# Patient Record
Sex: FEMALE | Race: WHITE | Employment: FULL TIME | URBAN - METROPOLITAN AREA
[De-identification: names, ages, dates, MRNs, and addresses within clinical notes are randomized per-mention and may not be internally consistent; named-entity substitution may affect disease eponyms.]

---

## 2017-01-19 RX ORDER — TRAMADOL HYDROCHLORIDE 50 MG/1
50 TABLET ORAL EVERY 6 HOURS PRN
Qty: 40 TABLET | Refills: 0 | Status: SHIPPED | OUTPATIENT
Start: 2017-01-19 | End: 2017-02-23 | Stop reason: HOSPADM

## 2017-02-23 PROBLEM — M17.11 OSTEOARTHRITIS OF RIGHT KNEE: Status: ACTIVE | Noted: 2017-02-23

## 2017-04-17 ENCOUNTER — OFFICE VISIT (OUTPATIENT)
Dept: FAMILY MEDICINE CLINIC | Age: 52
End: 2017-04-17
Payer: COMMERCIAL

## 2017-04-17 VITALS
HEIGHT: 62 IN | SYSTOLIC BLOOD PRESSURE: 128 MMHG | HEART RATE: 68 BPM | WEIGHT: 261 LBS | DIASTOLIC BLOOD PRESSURE: 78 MMHG | RESPIRATION RATE: 20 BRPM | BODY MASS INDEX: 48.03 KG/M2

## 2017-04-17 DIAGNOSIS — G25.81 RESTLESS LEGS: ICD-10-CM

## 2017-04-17 DIAGNOSIS — G47.33 OSA (OBSTRUCTIVE SLEEP APNEA): ICD-10-CM

## 2017-04-17 DIAGNOSIS — Z12.39 BREAST CANCER SCREENING, HIGH RISK PATIENT: ICD-10-CM

## 2017-04-17 DIAGNOSIS — J30.1 NON-SEASONAL ALLERGIC RHINITIS DUE TO POLLEN: ICD-10-CM

## 2017-04-17 DIAGNOSIS — E78.2 MIXED HYPERLIPIDEMIA: ICD-10-CM

## 2017-04-17 DIAGNOSIS — E04.9 THYROID ENLARGEMENT: ICD-10-CM

## 2017-04-17 DIAGNOSIS — I10 ESSENTIAL HYPERTENSION: Primary | ICD-10-CM

## 2017-04-17 DIAGNOSIS — Z83.49 FAMILY HISTORY OF THYROID DISEASE: ICD-10-CM

## 2017-04-17 DIAGNOSIS — K21.9 GASTROESOPHAGEAL REFLUX DISEASE WITHOUT ESOPHAGITIS: ICD-10-CM

## 2017-04-17 PROCEDURE — 99214 OFFICE O/P EST MOD 30 MIN: CPT | Performed by: NURSE PRACTITIONER

## 2017-04-17 RX ORDER — ROPINIROLE 0.5 MG/1
TABLET, FILM COATED ORAL
Qty: 90 TABLET | Refills: 1 | Status: SHIPPED | OUTPATIENT
Start: 2017-04-17 | End: 2017-10-23 | Stop reason: SDUPTHER

## 2017-04-17 RX ORDER — FLUTICASONE PROPIONATE 50 MCG
1 SPRAY, SUSPENSION (ML) NASAL DAILY
Qty: 1 BOTTLE | Refills: 3 | Status: SHIPPED | OUTPATIENT
Start: 2017-04-17 | End: 2018-04-23 | Stop reason: SDUPTHER

## 2017-04-17 RX ORDER — OMEPRAZOLE 20 MG/1
CAPSULE, DELAYED RELEASE ORAL
Qty: 90 CAPSULE | Refills: 1 | Status: SHIPPED | OUTPATIENT
Start: 2017-04-17 | End: 2017-10-23 | Stop reason: SDUPTHER

## 2017-04-17 RX ORDER — EZETIMIBE 10 MG/1
TABLET ORAL
Qty: 90 TABLET | Refills: 1 | Status: SHIPPED | OUTPATIENT
Start: 2017-04-17 | End: 2017-10-23 | Stop reason: SDUPTHER

## 2017-04-17 RX ORDER — ASPIRIN 81 MG/1
81 TABLET ORAL DAILY
COMMUNITY

## 2017-04-17 RX ORDER — METOPROLOL TARTRATE 50 MG/1
50 TABLET, FILM COATED ORAL 2 TIMES DAILY
Qty: 180 TABLET | Refills: 1 | Status: SHIPPED | OUTPATIENT
Start: 2017-04-17 | End: 2017-10-23 | Stop reason: SDUPTHER

## 2017-04-17 RX ORDER — TRAMADOL HYDROCHLORIDE 50 MG/1
TABLET ORAL
Refills: 0 | COMMUNITY
Start: 2017-02-28 | End: 2017-07-26 | Stop reason: ALTCHOICE

## 2017-04-17 ASSESSMENT — ENCOUNTER SYMPTOMS
SINUS PRESSURE: 1
WHEEZING: 0
ABDOMINAL DISTENTION: 0
RHINORRHEA: 1
COUGH: 0
SHORTNESS OF BREATH: 0

## 2017-04-27 ENCOUNTER — HOSPITAL ENCOUNTER (OUTPATIENT)
Dept: ULTRASOUND IMAGING | Age: 52
Discharge: HOME | End: 2017-04-27
Payer: COMMERCIAL

## 2017-04-27 ENCOUNTER — HOSPITAL ENCOUNTER (OUTPATIENT)
Dept: GENERAL RADIOLOGY | Age: 52
Discharge: HOME | End: 2017-04-27
Payer: COMMERCIAL

## 2017-04-27 DIAGNOSIS — Z12.39 BREAST CANCER SCREENING, HIGH RISK PATIENT: ICD-10-CM

## 2017-04-27 DIAGNOSIS — Z83.49 FAMILY HISTORY OF THYROID DISEASE: ICD-10-CM

## 2017-04-27 DIAGNOSIS — E04.9 THYROID ENLARGEMENT: ICD-10-CM

## 2017-04-27 PROCEDURE — 76536 US EXAM OF HEAD AND NECK: CPT

## 2017-04-27 PROCEDURE — G0204 DX MAMMO INCL CAD BI: HCPCS

## 2017-04-28 ENCOUNTER — TELEPHONE (OUTPATIENT)
Dept: FAMILY MEDICINE CLINIC | Age: 52
End: 2017-04-28

## 2017-05-01 ENCOUNTER — TELEPHONE (OUTPATIENT)
Dept: FAMILY MEDICINE CLINIC | Age: 52
End: 2017-05-01

## 2017-05-03 ENCOUNTER — TELEPHONE (OUTPATIENT)
Dept: FAMILY MEDICINE CLINIC | Age: 52
End: 2017-05-03

## 2017-05-03 DIAGNOSIS — E04.2 MULTIPLE THYROID NODULES: Primary | ICD-10-CM

## 2017-06-01 ENCOUNTER — HOSPITAL ENCOUNTER (OUTPATIENT)
Dept: GENERAL RADIOLOGY | Age: 52
Discharge: HOME | End: 2017-06-01
Payer: COMMERCIAL

## 2017-06-01 ENCOUNTER — HOSPITAL ENCOUNTER (OUTPATIENT)
Age: 52
Discharge: HOME | End: 2017-06-01
Payer: COMMERCIAL

## 2017-06-01 DIAGNOSIS — M25.561 RIGHT KNEE PAIN, UNSPECIFIED CHRONICITY: ICD-10-CM

## 2017-06-01 PROCEDURE — 73562 X-RAY EXAM OF KNEE 3: CPT

## 2017-06-12 ENCOUNTER — HOSPITAL ENCOUNTER (OUTPATIENT)
Age: 52
Discharge: HOME | End: 2017-06-12
Payer: COMMERCIAL

## 2017-06-12 LAB
FREE THYROXINE INDEX: 7.6 UG/DL (ref 5–11)
T4 TOTAL: 7 UG/DL (ref 4.5–12)
THYROXINE UPTAKE: 32.16 % (ref 28–41)
TSH SERPL DL<=0.05 MIU/L-ACNC: 4.48 MIU/L (ref 0.3–5)

## 2017-06-12 PROCEDURE — 84443 ASSAY THYROID STIM HORMONE: CPT

## 2017-06-12 PROCEDURE — 84479 ASSAY OF THYROID (T3 OR T4): CPT

## 2017-06-12 PROCEDURE — 84436 ASSAY OF TOTAL THYROXINE: CPT

## 2017-06-12 PROCEDURE — 36415 COLL VENOUS BLD VENIPUNCTURE: CPT

## 2017-06-29 ENCOUNTER — TELEPHONE (OUTPATIENT)
Dept: FAMILY MEDICINE CLINIC | Age: 52
End: 2017-06-29

## 2017-06-29 RX ORDER — SPIRONOLACTONE 25 MG/1
25 TABLET ORAL DAILY
Qty: 90 TABLET | Refills: 1 | Status: SHIPPED | OUTPATIENT
Start: 2017-06-29 | End: 2017-10-23 | Stop reason: SDUPTHER

## 2017-06-29 RX ORDER — SPIRONOLACTONE 25 MG/1
25 TABLET ORAL DAILY
Qty: 90 TABLET | Refills: 1 | Status: SHIPPED | OUTPATIENT
Start: 2017-06-29 | End: 2017-06-29 | Stop reason: SDUPTHER

## 2017-07-26 ENCOUNTER — HOSPITAL ENCOUNTER (EMERGENCY)
Age: 52
Discharge: HOME | End: 2017-07-27
Attending: EMERGENCY MEDICINE
Payer: COMMERCIAL

## 2017-07-26 VITALS
DIASTOLIC BLOOD PRESSURE: 64 MMHG | SYSTOLIC BLOOD PRESSURE: 131 MMHG | TEMPERATURE: 97.9 F | RESPIRATION RATE: 15 BRPM | HEART RATE: 73 BPM | OXYGEN SATURATION: 98 %

## 2017-07-26 DIAGNOSIS — N23 RENAL COLIC: Primary | ICD-10-CM

## 2017-07-26 DIAGNOSIS — N30.00 ACUTE CYSTITIS WITHOUT HEMATURIA: ICD-10-CM

## 2017-07-26 PROCEDURE — 6360000002 HC RX W HCPCS: Performed by: EMERGENCY MEDICINE

## 2017-07-26 PROCEDURE — 99283 EMERGENCY DEPT VISIT LOW MDM: CPT

## 2017-07-26 PROCEDURE — 96375 TX/PRO/DX INJ NEW DRUG ADDON: CPT

## 2017-07-26 PROCEDURE — 81001 URINALYSIS AUTO W/SCOPE: CPT

## 2017-07-26 PROCEDURE — 96374 THER/PROPH/DIAG INJ IV PUSH: CPT

## 2017-07-26 RX ORDER — MORPHINE SULFATE 4 MG/ML
4 INJECTION, SOLUTION INTRAMUSCULAR; INTRAVENOUS ONCE
Status: COMPLETED | OUTPATIENT
Start: 2017-07-27 | End: 2017-07-26

## 2017-07-26 RX ORDER — ONDANSETRON 2 MG/ML
4 INJECTION INTRAMUSCULAR; INTRAVENOUS ONCE
Status: COMPLETED | OUTPATIENT
Start: 2017-07-27 | End: 2017-07-26

## 2017-07-26 RX ADMIN — ONDANSETRON 4 MG: 2 INJECTION INTRAMUSCULAR; INTRAVENOUS at 23:54

## 2017-07-26 RX ADMIN — MORPHINE SULFATE 4 MG: 4 INJECTION, SOLUTION INTRAMUSCULAR; INTRAVENOUS at 23:54

## 2017-07-26 ASSESSMENT — PAIN DESCRIPTION - ONSET: ONSET: ON-GOING

## 2017-07-26 ASSESSMENT — PAIN DESCRIPTION - ORIENTATION: ORIENTATION: RIGHT

## 2017-07-26 ASSESSMENT — PAIN SCALES - GENERAL
PAINLEVEL_OUTOF10: 10
PAINLEVEL_OUTOF10: 10

## 2017-07-26 ASSESSMENT — PAIN DESCRIPTION - DESCRIPTORS: DESCRIPTORS: ACHING;THROBBING

## 2017-07-26 ASSESSMENT — PAIN DESCRIPTION - FREQUENCY: FREQUENCY: CONTINUOUS

## 2017-07-26 ASSESSMENT — PAIN DESCRIPTION - PAIN TYPE: TYPE: ACUTE PAIN

## 2017-07-26 ASSESSMENT — PAIN DESCRIPTION - LOCATION: LOCATION: BACK

## 2017-07-27 ENCOUNTER — APPOINTMENT (OUTPATIENT)
Dept: CT IMAGING | Age: 52
End: 2017-07-27
Payer: COMMERCIAL

## 2017-07-27 LAB
-: ABNORMAL
AMORPHOUS: ABNORMAL
BACTERIA: ABNORMAL
BILIRUBIN URINE: NEGATIVE
CASTS UA: ABNORMAL /LPF
COLOR: YELLOW
COMMENT UA: ABNORMAL
CRYSTALS, UA: ABNORMAL /HPF
EPITHELIAL CELLS UA: ABNORMAL /HPF
GLUCOSE URINE: NEGATIVE
KETONES, URINE: NEGATIVE
LEUKOCYTE ESTERASE, URINE: ABNORMAL
MUCUS: ABNORMAL
NITRITE, URINE: NEGATIVE
OTHER OBSERVATIONS UA: ABNORMAL
PH UA: 5 (ref 5–8)
PROTEIN UA: ABNORMAL
RBC UA: ABNORMAL /HPF (ref 0–2)
RENAL EPITHELIAL, UA: ABNORMAL /HPF
SPECIFIC GRAVITY UA: 1.02 (ref 1–1.03)
TRICHOMONAS: ABNORMAL
TURBIDITY: CLEAR
URINE HGB: ABNORMAL
UROBILINOGEN, URINE: NORMAL
WBC UA: ABNORMAL /HPF
YEAST: ABNORMAL

## 2017-07-27 PROCEDURE — 96375 TX/PRO/DX INJ NEW DRUG ADDON: CPT

## 2017-07-27 PROCEDURE — 87086 URINE CULTURE/COLONY COUNT: CPT

## 2017-07-27 PROCEDURE — 74176 CT ABD & PELVIS W/O CONTRAST: CPT

## 2017-07-27 PROCEDURE — 6360000002 HC RX W HCPCS: Performed by: EMERGENCY MEDICINE

## 2017-07-27 RX ORDER — OXYCODONE HYDROCHLORIDE AND ACETAMINOPHEN 5; 325 MG/1; MG/1
1 TABLET ORAL EVERY 6 HOURS PRN
Qty: 4 TABLET | Refills: 0 | Status: SHIPPED | OUTPATIENT
Start: 2017-07-27 | End: 2017-07-28

## 2017-07-27 RX ORDER — OXYCODONE HYDROCHLORIDE AND ACETAMINOPHEN 5; 325 MG/1; MG/1
1 TABLET ORAL ONCE
Status: DISCONTINUED | OUTPATIENT
Start: 2017-07-27 | End: 2017-07-27 | Stop reason: HOSPADM

## 2017-07-27 RX ORDER — KETOROLAC TROMETHAMINE 30 MG/ML
30 INJECTION, SOLUTION INTRAMUSCULAR; INTRAVENOUS ONCE
Status: COMPLETED | OUTPATIENT
Start: 2017-07-27 | End: 2017-07-27

## 2017-07-27 RX ORDER — CIPROFLOXACIN 500 MG/1
500 TABLET, FILM COATED ORAL 2 TIMES DAILY
Qty: 20 TABLET | Refills: 0 | Status: SHIPPED | OUTPATIENT
Start: 2017-07-27 | End: 2017-08-06

## 2017-07-27 RX ADMIN — KETOROLAC TROMETHAMINE 30 MG: 30 INJECTION, SOLUTION INTRAMUSCULAR at 00:16

## 2017-07-27 ASSESSMENT — PAIN SCALES - GENERAL
PAINLEVEL_OUTOF10: 3
PAINLEVEL_OUTOF10: 10

## 2017-07-27 ASSESSMENT — ENCOUNTER SYMPTOMS
ALLERGIC/IMMUNOLOGIC NEGATIVE: 1
BELCHING: 0
ABDOMINAL PAIN: 1
SHORTNESS OF BREATH: 0
ABDOMINAL DISTENTION: 0
NAUSEA: 1
FLATUS: 0
DIARRHEA: 0
EYES NEGATIVE: 1
RESPIRATORY NEGATIVE: 1
COUGH: 0
BLOOD IN STOOL: 0
HEMATEMESIS: 0
VOMITING: 0
CONSTIPATION: 0
BACK PAIN: 0
SORE THROAT: 0
RECTAL PAIN: 0
HEMATOCHEZIA: 0
ANAL BLEEDING: 0

## 2017-07-28 LAB
CULTURE: NORMAL
CULTURE: NORMAL
Lab: NORMAL
SPECIMEN DESCRIPTION: NORMAL
SPECIMEN DESCRIPTION: NORMAL
STATUS: NORMAL

## 2017-08-17 ENCOUNTER — OFFICE VISIT (OUTPATIENT)
Dept: UROLOGY | Age: 52
End: 2017-08-17
Payer: COMMERCIAL

## 2017-08-17 ENCOUNTER — HOSPITAL ENCOUNTER (OUTPATIENT)
Age: 52
Setting detail: SPECIMEN
Discharge: HOME | End: 2017-08-17
Payer: COMMERCIAL

## 2017-08-17 VITALS
BODY MASS INDEX: 45.27 KG/M2 | HEIGHT: 62 IN | DIASTOLIC BLOOD PRESSURE: 60 MMHG | TEMPERATURE: 95.3 F | WEIGHT: 246 LBS | SYSTOLIC BLOOD PRESSURE: 104 MMHG

## 2017-08-17 DIAGNOSIS — N20.0 RENAL LITHIASIS: ICD-10-CM

## 2017-08-17 DIAGNOSIS — N23 RENAL COLIC: ICD-10-CM

## 2017-08-17 DIAGNOSIS — N20.0 RENAL LITHIASIS: Primary | ICD-10-CM

## 2017-08-17 LAB
-: ABNORMAL
AMORPHOUS: ABNORMAL
BACTERIA: ABNORMAL
BILIRUBIN URINE: NEGATIVE
CASTS UA: ABNORMAL /LPF
COLOR: YELLOW
COMMENT UA: ABNORMAL
CRYSTALS, UA: ABNORMAL /HPF
EPITHELIAL CELLS UA: ABNORMAL /HPF
GLUCOSE URINE: NEGATIVE
KETONES, URINE: NEGATIVE
LEUKOCYTE ESTERASE, URINE: ABNORMAL
MUCUS: ABNORMAL
NITRITE, URINE: NEGATIVE
OTHER OBSERVATIONS UA: ABNORMAL
PH UA: 5 (ref 5–8)
PROTEIN UA: ABNORMAL
RBC UA: ABNORMAL /HPF (ref 0–2)
RENAL EPITHELIAL, UA: ABNORMAL /HPF
SPECIFIC GRAVITY UA: 1.02 (ref 1–1.03)
TRICHOMONAS: ABNORMAL
TURBIDITY: CLEAR
URINE HGB: NEGATIVE
UROBILINOGEN, URINE: NORMAL
WBC UA: ABNORMAL /HPF
YEAST: ABNORMAL

## 2017-08-17 PROCEDURE — 81001 URINALYSIS AUTO W/SCOPE: CPT

## 2017-08-17 PROCEDURE — 87086 URINE CULTURE/COLONY COUNT: CPT

## 2017-08-17 PROCEDURE — 99204 OFFICE O/P NEW MOD 45 MIN: CPT | Performed by: NURSE PRACTITIONER

## 2017-08-17 PROCEDURE — 82365 CALCULUS SPECTROSCOPY: CPT

## 2017-08-22 LAB
STONE COMPOSITION: NORMAL
STONE DESCRIPTION: NORMAL
STONE MASS: 16 MG
STONE NUMBER: 2
STONE SIZE: NORMAL MM

## 2017-08-31 ENCOUNTER — HOSPITAL ENCOUNTER (OUTPATIENT)
Dept: GENERAL RADIOLOGY | Age: 52
Discharge: HOME | End: 2017-08-31
Payer: COMMERCIAL

## 2017-08-31 ENCOUNTER — HOSPITAL ENCOUNTER (OUTPATIENT)
Dept: ULTRASOUND IMAGING | Age: 52
Discharge: HOME | End: 2017-08-31
Payer: COMMERCIAL

## 2017-08-31 ENCOUNTER — HOSPITAL ENCOUNTER (OUTPATIENT)
Age: 52
End: 2017-08-31
Payer: COMMERCIAL

## 2017-08-31 DIAGNOSIS — N20.0 RENAL LITHIASIS: ICD-10-CM

## 2017-08-31 DIAGNOSIS — N23 RENAL COLIC: ICD-10-CM

## 2017-08-31 PROCEDURE — 76770 US EXAM ABDO BACK WALL COMP: CPT

## 2017-08-31 PROCEDURE — 74000 XR ABDOMEN LIMITED (KUB): CPT

## 2017-09-21 ENCOUNTER — OFFICE VISIT (OUTPATIENT)
Dept: UROLOGY | Age: 52
End: 2017-09-21
Payer: COMMERCIAL

## 2017-09-21 VITALS
SYSTOLIC BLOOD PRESSURE: 132 MMHG | HEIGHT: 62 IN | DIASTOLIC BLOOD PRESSURE: 82 MMHG | BODY MASS INDEX: 43.79 KG/M2 | WEIGHT: 238 LBS

## 2017-09-21 DIAGNOSIS — N20.0 RENAL LITHIASIS: Primary | ICD-10-CM

## 2017-09-21 PROCEDURE — 99213 OFFICE O/P EST LOW 20 MIN: CPT | Performed by: NURSE PRACTITIONER

## 2017-09-21 ASSESSMENT — ENCOUNTER SYMPTOMS
VOMITING: 0
SHORTNESS OF BREATH: 0
BACK PAIN: 0
COUGH: 0
EYE PAIN: 0
ABDOMINAL PAIN: 0
CONSTIPATION: 0
COLOR CHANGE: 0
NAUSEA: 0
EYE REDNESS: 0
WHEEZING: 0

## 2017-10-17 ENCOUNTER — TELEPHONE (OUTPATIENT)
Dept: FAMILY MEDICINE CLINIC | Age: 52
End: 2017-10-17

## 2017-10-17 DIAGNOSIS — M79.605 LEG PAIN, BILATERAL: ICD-10-CM

## 2017-10-17 DIAGNOSIS — E78.2 MIXED HYPERLIPIDEMIA: Primary | ICD-10-CM

## 2017-10-17 DIAGNOSIS — R73.01 IFG (IMPAIRED FASTING GLUCOSE): ICD-10-CM

## 2017-10-17 DIAGNOSIS — E55.9 VITAMIN D DEFICIENCY: ICD-10-CM

## 2017-10-17 DIAGNOSIS — M79.604 LEG PAIN, BILATERAL: ICD-10-CM

## 2017-10-17 DIAGNOSIS — E03.1 CONGENITAL HYPOTHYROIDISM WITHOUT GOITER: ICD-10-CM

## 2017-10-17 DIAGNOSIS — I10 ESSENTIAL HYPERTENSION: ICD-10-CM

## 2017-10-17 DIAGNOSIS — Z13.0 SCREENING, ANEMIA, DEFICIENCY, IRON: ICD-10-CM

## 2017-10-17 NOTE — TELEPHONE ENCOUNTER
Patient left a voice message wanting to know if Rubi Rodrigez could order labs prior to her appointment on 10/23/17. Please contact patient and let her know [phone].     Health Maintenance   Topic Date Due    Hepatitis C screen  1965    HIV screen  02/28/1980    DTaP/Tdap/Td vaccine (1 - Tdap) 02/28/1984    Cervical cancer screen  11/25/2016    Flu vaccine (1) 09/01/2017    Breast cancer screen  04/27/2019    Colon cancer screen colonoscopy  10/21/2021    Lipid screen  11/04/2021             (applicable per patient's age: Cancer Screenings, Depression Screening, Fall Risk Screening, Immunizations)    LDL Cholesterol (mg/dL)   Date Value   11/04/2016 156 (H)     LDL Calculated (mg/dL)   Date Value   11/05/2015 167 (A)     AST (U/L)   Date Value   11/04/2016 28     ALT (U/L)   Date Value   11/04/2016 36 (H)     BUN (mg/dL)   Date Value   02/07/2017 20      (goal A1C is < 7)   (goal LDL is <100) need 30-50% reduction from baseline     BP Readings from Last 3 Encounters:   09/21/17 132/82   08/17/17 104/60   07/26/17 131/64    (goal /80)      All Future Testing planned in [facility]:  Lab Frequency Next Occurrence   XR ABDOMEN (KUB) (SINGLE AP VIEW) Once 09/21/2018       Next Visit Date:  Future Appointments  Date Time Provider Tez Montemayor   [facility]2017 6[facility]00 [facility]   12/12/2017 8:30  [facility]   9/27/2018 4:30 PM Carli Acevedo MD [facility]            Patient Active Problem List:     Hyperlipidemia     HTN (hypertension)     Warts     Leg pain, bilateral     Shortness of breath     UTI (urinary tract infection)     Choking sensation     History of adenomatous polyp of colon     NEIL (obstructive sleep apnea)     Restless legs     Female genuine stress incontinence     Disease of urethra     Osteoarthritis of right knee

## 2017-10-18 ENCOUNTER — HOSPITAL ENCOUNTER (OUTPATIENT)
Age: 52
Discharge: HOME | End: 2017-10-18
Payer: COMMERCIAL

## 2017-10-18 DIAGNOSIS — I10 ESSENTIAL HYPERTENSION: ICD-10-CM

## 2017-10-18 DIAGNOSIS — E55.9 VITAMIN D DEFICIENCY: ICD-10-CM

## 2017-10-18 DIAGNOSIS — R73.01 IFG (IMPAIRED FASTING GLUCOSE): ICD-10-CM

## 2017-10-18 DIAGNOSIS — E78.2 MIXED HYPERLIPIDEMIA: ICD-10-CM

## 2017-10-18 DIAGNOSIS — E03.1 CONGENITAL HYPOTHYROIDISM WITHOUT GOITER: ICD-10-CM

## 2017-10-18 LAB
ABSOLUTE EOS #: 0.2 K/UL (ref 0–0.4)
ABSOLUTE IMMATURE GRANULOCYTE: NORMAL K/UL (ref 0–0.3)
ABSOLUTE LYMPH #: 2.7 K/UL (ref 1–4.8)
ABSOLUTE MONO #: 0.6 K/UL (ref 0–1)
BASOPHILS # BLD: 0 %
BASOPHILS ABSOLUTE: 0 K/UL (ref 0–0.2)
CHOLESTEROL/HDL RATIO: 4.6
CHOLESTEROL: 237 MG/DL
DIFFERENTIAL TYPE: YES
EOSINOPHILS RELATIVE PERCENT: 3 %
ESTIMATED AVERAGE GLUCOSE: 105 MG/DL
HBA1C MFR BLD: 5.3 % (ref 4.8–5.9)
HCT VFR BLD CALC: 40 % (ref 36–46)
HDLC SERPL-MCNC: 52 MG/DL
HEMOGLOBIN: 13.7 G/DL (ref 12–16)
IMMATURE GRANULOCYTES: NORMAL %
LDL CHOLESTEROL: 154 MG/DL (ref 0–130)
LYMPHOCYTES # BLD: 43 %
MCH RBC QN AUTO: 28.7 PG (ref 26–34)
MCHC RBC AUTO-ENTMCNC: 34.2 G/DL (ref 31–37)
MCV RBC AUTO: 84 FL (ref 80–100)
MONOCYTES # BLD: 10 %
PATIENT FASTING?: YES
PDW BLD-RTO: 13.5 % (ref 12.1–15.2)
PLATELET # BLD: 245 K/UL (ref 140–450)
PLATELET ESTIMATE: NORMAL
PMV BLD AUTO: NORMAL FL (ref 6–12)
RBC # BLD: 4.76 M/UL (ref 4–5.2)
RBC # BLD: NORMAL 10*6/UL
SEG NEUTROPHILS: 44 %
SEGMENTED NEUTROPHILS ABSOLUTE COUNT: 2.8 K/UL (ref 2.5–7)
TRIGL SERPL-MCNC: 156 MG/DL
TSH SERPL DL<=0.05 MIU/L-ACNC: 1.7 MIU/L (ref 0.3–5)
VITAMIN D 25-HYDROXY: 19.6 NG/ML (ref 30–100)
VLDLC SERPL CALC-MCNC: ABNORMAL MG/DL (ref 1–30)
WBC # BLD: 6.3 K/UL (ref 3.5–11)
WBC # BLD: NORMAL 10*3/UL

## 2017-10-18 PROCEDURE — 85025 COMPLETE CBC W/AUTO DIFF WBC: CPT

## 2017-10-18 PROCEDURE — 84443 ASSAY THYROID STIM HORMONE: CPT

## 2017-10-18 PROCEDURE — 80061 LIPID PANEL: CPT

## 2017-10-18 PROCEDURE — 83036 HEMOGLOBIN GLYCOSYLATED A1C: CPT

## 2017-10-18 PROCEDURE — 82306 VITAMIN D 25 HYDROXY: CPT

## 2017-10-18 PROCEDURE — 36415 COLL VENOUS BLD VENIPUNCTURE: CPT

## 2017-10-23 ENCOUNTER — HOSPITAL ENCOUNTER (OUTPATIENT)
Age: 52
Setting detail: SPECIMEN
Discharge: HOME | End: 2017-10-23
Payer: COMMERCIAL

## 2017-10-23 ENCOUNTER — OFFICE VISIT (OUTPATIENT)
Dept: FAMILY MEDICINE CLINIC | Age: 52
End: 2017-10-23
Payer: COMMERCIAL

## 2017-10-23 VITALS
HEART RATE: 63 BPM | RESPIRATION RATE: 18 BRPM | HEIGHT: 62 IN | SYSTOLIC BLOOD PRESSURE: 110 MMHG | OXYGEN SATURATION: 98 % | BODY MASS INDEX: 43.24 KG/M2 | WEIGHT: 235 LBS | DIASTOLIC BLOOD PRESSURE: 70 MMHG

## 2017-10-23 DIAGNOSIS — Z11.59 NEED FOR HEPATITIS C SCREENING TEST: ICD-10-CM

## 2017-10-23 DIAGNOSIS — E78.00 HYPERCHOLESTEROLEMIA: Primary | ICD-10-CM

## 2017-10-23 DIAGNOSIS — E78.2 MIXED HYPERLIPIDEMIA: ICD-10-CM

## 2017-10-23 DIAGNOSIS — I10 ESSENTIAL HYPERTENSION: ICD-10-CM

## 2017-10-23 DIAGNOSIS — G25.81 RESTLESS LEGS: ICD-10-CM

## 2017-10-23 DIAGNOSIS — Z11.4 SCREENING FOR HIV WITHOUT PRESENCE OF RISK FACTORS: ICD-10-CM

## 2017-10-23 DIAGNOSIS — Z78.9 STATIN INTOLERANCE: ICD-10-CM

## 2017-10-23 DIAGNOSIS — K21.9 GASTROESOPHAGEAL REFLUX DISEASE WITHOUT ESOPHAGITIS: ICD-10-CM

## 2017-10-23 DIAGNOSIS — E78.00 HYPERCHOLESTEROLEMIA: ICD-10-CM

## 2017-10-23 LAB
ALBUMIN SERPL-MCNC: 4.6 G/DL (ref 3.5–5.2)
ALBUMIN/GLOBULIN RATIO: NORMAL (ref 1–2.5)
ALP BLD-CCNC: 73 U/L (ref 35–104)
ALT SERPL-CCNC: 29 U/L (ref 5–33)
AST SERPL-CCNC: 24 U/L
BILIRUB SERPL-MCNC: 0.39 MG/DL (ref 0.3–1.2)
BILIRUBIN DIRECT: <0.08 MG/DL
BILIRUBIN, INDIRECT: NORMAL MG/DL (ref 0–1)
GLOBULIN: NORMAL G/DL (ref 1.5–3.8)
TOTAL CK: 350 U/L (ref 26–192)
TOTAL PROTEIN: 7.5 G/DL (ref 6.4–8.3)

## 2017-10-23 PROCEDURE — 99214 OFFICE O/P EST MOD 30 MIN: CPT | Performed by: NURSE PRACTITIONER

## 2017-10-23 PROCEDURE — 82550 ASSAY OF CK (CPK): CPT

## 2017-10-23 PROCEDURE — 80076 HEPATIC FUNCTION PANEL: CPT

## 2017-10-23 RX ORDER — SPIRONOLACTONE 25 MG/1
25 TABLET ORAL DAILY
Qty: 90 TABLET | Refills: 1 | Status: SHIPPED | OUTPATIENT
Start: 2017-10-23 | End: 2018-04-23 | Stop reason: SDUPTHER

## 2017-10-23 RX ORDER — METOPROLOL TARTRATE 50 MG/1
50 TABLET, FILM COATED ORAL 2 TIMES DAILY
Qty: 180 TABLET | Refills: 1 | Status: SHIPPED | OUTPATIENT
Start: 2017-10-23 | End: 2018-04-23 | Stop reason: SDUPTHER

## 2017-10-23 RX ORDER — EZETIMIBE 10 MG/1
TABLET ORAL
Qty: 90 TABLET | Refills: 1 | Status: SHIPPED | OUTPATIENT
Start: 2017-10-23 | End: 2018-04-23 | Stop reason: SDUPTHER

## 2017-10-23 RX ORDER — OMEPRAZOLE 20 MG/1
CAPSULE, DELAYED RELEASE ORAL
Qty: 90 CAPSULE | Refills: 1 | Status: SHIPPED | OUTPATIENT
Start: 2017-10-23 | End: 2018-04-23 | Stop reason: SDUPTHER

## 2017-10-23 RX ORDER — ROPINIROLE 0.5 MG/1
TABLET, FILM COATED ORAL
Qty: 90 TABLET | Refills: 1 | Status: SHIPPED | OUTPATIENT
Start: 2017-10-23 | End: 2018-04-13 | Stop reason: SDUPTHER

## 2017-10-23 ASSESSMENT — PATIENT HEALTH QUESTIONNAIRE - PHQ9
2. FEELING DOWN, DEPRESSED OR HOPELESS: 0
1. LITTLE INTEREST OR PLEASURE IN DOING THINGS: 0
SUM OF ALL RESPONSES TO PHQ QUESTIONS 1-9: 0
SUM OF ALL RESPONSES TO PHQ9 QUESTIONS 1 & 2: 0

## 2017-10-23 NOTE — PROGRESS NOTES
(1 - Tdap) 02/28/1984    Cervical cancer screen  11/25/2016    Flu vaccine (1) 09/01/2017    Breast cancer screen  04/27/2019    Colon cancer screen colonoscopy  10/21/2021    Lipid screen  10/18/2022       HPI:   Darleen Jc is a 46 y.o. female who presents today for her medical conditions/complaints as noted below. Darleen Jc is c/o of Check-Up (HTN )      HPI    Patient uses cpap full mask with humidification and heated. Used nightly. [address] Works 3rd shift. Labs done recently rewed  hepatitic C and HIV screening discussed coverage with insurance will be checked by patient. Pap not due with hx PABLO with Jl S+O , no pap indicated not having any discharge. Hx endometriosis. 28 pound weight loss with weight watchers for 2 1/2 months. Discussed family hx heart disease, obesity, htn and concern with cholesterol control hx using zocor in past but caused trouble with muscles. Upon review elevation in CK level . No hx fatty liver. Patient on zetia and fish oil only. ASCVD risk over next 10 years is 0.8% and lifetime risk 2.6 % but with strong family hx concern with urge to start low dose statin pt willing to do liver labs and CK to check baseline. May need to do liver ultrasound if any elevations. Pt agreeable to treatment plan. Continues daily 81 mg EC ASA. Lab Results   Component Value Date    LDLCALC 167 (A) 11/05/2015    LDLCHOLESTEROL 154 (H) 10/18/2017     Hx thyroid nodule and follows with ENT DR. Janneth Krueger has scheduled repeat ultrasound in December and may need Bx. Gerd controlled with PPI. Has plantar fasciitis in past and now with achilles issues on one foot, using naprosyn already for knees with minimal improvement following with Dr. Kenzie Weeks. Vitamin D deficiency on supplement. requip used for RLS , no anemia.           Past Medical History:   Diagnosis Date    Allergic rhinitis     Enlarged thyroid 2016    nodules check annually    GERD Swelling of upper lip    Simvastatin Other (See Comments)     Elevated CK/CKMB. Health Maintenance   Topic Date Due    Hepatitis C screen  1965    HIV screen  02/28/1980    DTaP/Tdap/Td vaccine (1 - Tdap) 02/28/1984    Cervical cancer screen  11/25/2016    Flu vaccine (1) 09/01/2017    Breast cancer screen  04/27/2019    Colon cancer screen colonoscopy  10/21/2021    Lipid screen  10/18/2022       Subjective:      Review of Systems   Constitutional: Negative for activity change and fever. HENT: Negative for congestion, rhinorrhea and sore throat. Eyes: Negative. Respiratory: Negative for chest tightness and shortness of breath. Cardiovascular: Negative for chest pain, palpitations and leg swelling. Gastrointestinal: Negative for abdominal distention. Endocrine: Negative. Negative for cold intolerance and heat intolerance. Genitourinary: Negative for difficulty urinating. Musculoskeletal: Positive for arthralgias (chris knees and  left achilles). Neurological: Negative for dizziness. Psychiatric/Behavioral: Positive for sleep disturbance (on night shift). The patient is not nervous/anxious. Objective:     Physical Exam   Constitutional: She is oriented to person, place, and time. She appears well-developed and well-nourished. HENT:   Head: Normocephalic and atraumatic. Right Ear: External ear normal.   Left Ear: External ear normal.   Mouth/Throat: Oropharynx is clear and moist. No oropharyngeal exudate. Eyes: Pupils are equal, round, and reactive to light. Neck: Normal range of motion. Neck supple. No JVD present. Thyromegaly (right side enlargement) present. Cardiovascular: Normal rate, regular rhythm and normal heart sounds. No murmur heard. No carotid bruit chris   Pulmonary/Chest: Effort normal and breath sounds normal. No respiratory distress. She has no wheezes. Abdominal: Soft. Bowel sounds are normal. She exhibits no distension.  There is no

## 2017-10-24 DIAGNOSIS — R74.8 ELEVATED CK: Primary | ICD-10-CM

## 2017-10-24 DIAGNOSIS — R14.0 BLOATING: ICD-10-CM

## 2017-10-24 ASSESSMENT — ENCOUNTER SYMPTOMS
SHORTNESS OF BREATH: 0
EYES NEGATIVE: 1
CHEST TIGHTNESS: 0
SORE THROAT: 0
ABDOMINAL DISTENTION: 0
RHINORRHEA: 0

## 2017-11-09 ENCOUNTER — HOSPITAL ENCOUNTER (OUTPATIENT)
Dept: ULTRASOUND IMAGING | Age: 52
Discharge: HOME | End: 2017-11-09
Payer: COMMERCIAL

## 2017-11-09 DIAGNOSIS — R14.0 BLOATING: ICD-10-CM

## 2017-11-09 DIAGNOSIS — R74.8 ELEVATED CK: ICD-10-CM

## 2017-11-09 PROCEDURE — 76705 ECHO EXAM OF ABDOMEN: CPT

## 2017-11-13 DIAGNOSIS — R74.8 ELEVATED CPK: Primary | ICD-10-CM

## 2017-11-28 ENCOUNTER — HOSPITAL ENCOUNTER (OUTPATIENT)
Age: 52
Discharge: HOME | End: 2017-11-28
Payer: COMMERCIAL

## 2017-11-28 LAB
C-REACTIVE PROTEIN: 1.3 MG/L (ref 0–5)
FERRITIN: 116 UG/L (ref 13–150)
IRON SATURATION: 19 % (ref 20–55)
IRON: 66 UG/DL (ref 37–145)
SEDIMENTATION RATE, ERYTHROCYTE: 14 MM (ref 0–30)
TOTAL CK: 300 U/L (ref 26–192)
TOTAL IRON BINDING CAPACITY: 356 UG/DL (ref 250–450)
UNSATURATED IRON BINDING CAPACITY: 290 UG/DL (ref 112–347)

## 2017-11-28 PROCEDURE — 83540 ASSAY OF IRON: CPT

## 2017-11-28 PROCEDURE — 82728 ASSAY OF FERRITIN: CPT

## 2017-11-28 PROCEDURE — 83550 IRON BINDING TEST: CPT

## 2017-11-28 PROCEDURE — 82550 ASSAY OF CK (CPK): CPT

## 2017-11-28 PROCEDURE — 36415 COLL VENOUS BLD VENIPUNCTURE: CPT

## 2017-11-28 PROCEDURE — 85651 RBC SED RATE NONAUTOMATED: CPT

## 2017-11-28 PROCEDURE — 86140 C-REACTIVE PROTEIN: CPT

## 2017-12-12 ENCOUNTER — HOSPITAL ENCOUNTER (OUTPATIENT)
Dept: ULTRASOUND IMAGING | Age: 52
Discharge: HOME | End: 2017-12-12
Payer: COMMERCIAL

## 2017-12-12 DIAGNOSIS — E04.1 NONTOXIC UNINODULAR GOITER: ICD-10-CM

## 2017-12-12 PROCEDURE — 76536 US EXAM OF HEAD AND NECK: CPT

## 2018-01-08 ENCOUNTER — OFFICE VISIT (OUTPATIENT)
Dept: PRIMARY CARE CLINIC | Age: 53
End: 2018-01-08
Payer: COMMERCIAL

## 2018-01-08 VITALS
OXYGEN SATURATION: 96 % | TEMPERATURE: 97.5 F | HEIGHT: 62 IN | DIASTOLIC BLOOD PRESSURE: 84 MMHG | WEIGHT: 233 LBS | SYSTOLIC BLOOD PRESSURE: 135 MMHG | HEART RATE: 56 BPM | BODY MASS INDEX: 42.88 KG/M2 | RESPIRATION RATE: 22 BRPM

## 2018-01-08 DIAGNOSIS — N30.01 ACUTE CYSTITIS WITH HEMATURIA: Primary | ICD-10-CM

## 2018-01-08 DIAGNOSIS — R35.0 FREQUENCY OF URINATION: ICD-10-CM

## 2018-01-08 LAB
BILIRUBIN, POC: ABNORMAL
BLOOD URINE, POC: ABNORMAL
CLARITY, POC: CLEAR
COLOR, POC: YELLOW
GLUCOSE URINE, POC: ABNORMAL
KETONES, POC: ABNORMAL
LEUKOCYTE EST, POC: ABNORMAL
NITRITE, POC: ABNORMAL
PH, POC: 5.5
PROTEIN, POC: ABNORMAL
SPECIFIC GRAVITY, POC: 1.02
UROBILINOGEN, POC: 0.2

## 2018-01-08 PROCEDURE — 99213 OFFICE O/P EST LOW 20 MIN: CPT | Performed by: NURSE PRACTITIONER

## 2018-01-08 PROCEDURE — 81003 URINALYSIS AUTO W/O SCOPE: CPT | Performed by: NURSE PRACTITIONER

## 2018-01-08 RX ORDER — NITROFURANTOIN 25; 75 MG/1; MG/1
100 CAPSULE ORAL 2 TIMES DAILY
Qty: 14 CAPSULE | Refills: 0 | Status: SHIPPED | OUTPATIENT
Start: 2018-01-08 | End: 2018-01-15

## 2018-01-08 ASSESSMENT — ENCOUNTER SYMPTOMS
NAUSEA: 1
WHEEZING: 0
SHORTNESS OF BREATH: 0
CONSTIPATION: 0
VOMITING: 0
DIARRHEA: 0
COUGH: 0

## 2018-01-08 NOTE — PATIENT INSTRUCTIONS
your body to absorb nitrofurantoin. What are the possible side effects of nitrofurantoin? Get emergency medical help if you have any of these signs of an allergic reaction:  hives; difficult breathing; swelling of your face, lips, tongue, or throat. Call your doctor at once if you have:  · diarrhea that is watery or bloody;  · sudden chest pain or discomfort, wheezing, dry cough or hack;  · new or worsening cough, trouble breathing;  · fever, chills, body aches, tiredness, unexplained weight loss;  · numbness, tingling, or pain in your hands or feet;  · liver problems --nausea, upper stomach pain, itching, tired feeling, loss of appetite, dark urine, eric-colored stools, jaundice (yellowing of the skin or eyes); or  · lupus-like syndrome --joint pain or swelling with fever, swollen glands, muscle aches, chest pain, vomiting, unusual thoughts or behavior, and patchy skin color. Serious side effects may be more likely in older adults and those who are ill or debilitated. Common side effects may include:  · headache, dizziness;  · gas, upset stomach;  · mild diarrhea; or  · vaginal itching or discharge. This is not a complete list of side effects and others may occur. Call your doctor for medical advice about side effects. You may report side effects to FDA at 5-937-FDA-3104. What other drugs will affect nitrofurantoin? Other drugs may interact with nitrofurantoin, including prescription and over-the-counter medicines, vitamins, and herbal products. Tell each of your health care providers about all medicines you use now and any medicine you start or stop using. Where can I get more information? Your pharmacist can provide more information about nitrofurantoin. Remember, keep this and all other medicines out of the reach of children, never share your medicines with others, and use this medication only for the indication prescribed.   Every effort has been made to ensure that the information provided by [address] is accurate, up-to-date, and complete, but no guarantee is made to that effect. Drug information contained herein may be time sensitive. [facility] information has been compiled for use by healthcare practitioners and consumers in the [place] and therefore [facility] does not warrant that uses outside of the [place] are appropriate, unless specifically indicated otherwise. [facility]'s drug information does not endorse drugs, diagnose patients or recommend therapy. [facility] drug information is an informational resource designed to assist licensed healthcare practitioners in caring for their patients and/or to serve consumers viewing this service as a supplement to, and not a substitute for, the expertise, skill, knowledge and judgment of healthcare practitioners. The absence of a warning for a given drug or drug combination in no way should be construed to indicate that the drug or drug combination is safe, effective or appropriate for any given patient. [facility] does not assume any responsibility for any aspect of healthcare administered with the aid of information [facility] provides. The information contained herein is not intended to cover all possible uses, directions, precautions, warnings, drug interactions, allergic reactions, or adverse effects. If you have questions about the drugs you are taking, check with your doctor, nurse or pharmacist.  Copyright 5799-9024 [address]. Version: 8.01. Revision date: 3/11/2014. Care instructions adapted under license by [facility] [place] ([facility]). If you have questions about a medical condition or this instruction, always ask your healthcare professional. [place] [address] any warranty or liability for your use of this information.           Urinary Tract Infection in Women: Care Instructions  Your Care Instructions    A urinary tract infection, or UTI, is a general term for an infection anywhere between the kidneys and the urethra new pain in your back just below your rib cage. This is called flank pain. ? · There is new blood or pus in your urine. ? · You have any problems with your antibiotic medicine. ? Watch closely for changes in your health, and be sure to contact your doctor if:  ? · You are not getting better after taking an antibiotic for 2 days. ? · Your symptoms go away but then come back. Where can you learn more? Go to [email or web address] and sign in to your M[employer]ie account. Enter M329 in the Fungos box to learn more about \"Urinary Tract Infection in Women: Care Instructions. \"     If you do not have an account, please click on the \"Sign Up Now\" link. Current as of: May 12, 2017  Content Version: 11.5  © 9757-2412 [employer]. Care instructions adapted under license by [facility] ([facility]). If you have questions about a medical condition or this instruction, always ask your healthcare professional. [place] [address] any warranty or liability for your use of this information. · Encouraged to increase fluids and to eat nutritiously. · Avoid full bladder, bubble baths, bath oils and hot tubs. · Wipe front to back and void after sexual intercourse. · Continue antibiotic as prescribed until all doses are completed  · Probiotic OTC or greek yogurt daily while on antibiotic  · Have urine checked for blood after completion of antibiotics. · Patient instructions given for urinary tract infection. · To ER or call 911 if any difficulty breathing, shortness of breath, inability to swallow, hives, rash, facial/tongue swelling or temp greater than 103 degrees.   · Follow up with PCP or [facility] if symptoms worsen or do not improve

## 2018-01-08 NOTE — PROGRESS NOTES
delayed release capsule TAKE 1 CAPSULE DAILY 90 capsule 1    rOPINIRole (REQUIP) 0.5 MG tablet TAKE 1 TABLET EVERY EVENING 90 tablet 1    spironolactone (ALDACTONE) 25 MG tablet Take 1 tablet by mouth daily 90 tablet 1    aspirin 81 MG EC tablet Take 81 mg by mouth Indications: once a day      fluticasone (FLONASE) 50 MCG/ACT nasal spray 1 spray by Nasal route daily 1 Bottle 3    Calcium Carb-Cholecalciferol (CALCIUM + D3 PO) Take 1,200 mg by mouth daily      naproxen (NAPROSYN) 500 MG tablet Take 1 tablet by mouth 2 times daily (with meals) 60 tablet 3    vitamin D (CHOLECALCIFEROL) 1000 UNIT TABS tablet Take 1,000 Units by mouth daily       Omega-3 Fatty Acids (FISH OIL) 1000 MG CAPS Take 1,000 mg by mouth daily        No current facility-administered medications for this visit. Allergies   Allergen Reactions    Iv Contrast [Iodides] Swelling    Lisinopril Swelling     Swelling of upper lip    Simvastatin Other (See Comments)     Elevated CK/CKMB. Subjective:      Review of Systems   Constitutional: Negative for appetite change, chills, diaphoresis, fatigue and fever. HENT: Negative for congestion. Respiratory: Negative for cough, shortness of breath and wheezing. Gastrointestinal: Positive for nausea. Negative for constipation, diarrhea and vomiting. Genitourinary: Positive for dysuria, flank pain, frequency and urgency. Negative for hematuria, hesitancy and vaginal discharge. Skin: Negative for rash and wound. Neurological: Positive for headaches. Negative for dizziness and light-headedness. Objective:     Physical Exam   Constitutional: She is oriented to person, place, and time. Vital signs are normal. She appears well-developed and well-nourished. She is cooperative. She does not appear ill. No distress. HENT:   Head: Normocephalic and atraumatic. Right Ear: External ear normal.   Left Ear: External ear normal.   Eyes: Conjunctivae are normal.   Neck: Neck supple. 14 capsule     Refill:  0      · Encouraged to increase fluids and to eat nutritiously. · Avoid full bladder, bubble baths, bath oils and hot tubs. · Wipe front to back and void after sexual intercourse. · Continue antibiotic as prescribed until all doses are completed  · Probiotic OTC or greek yogurt daily while on antibiotic  · Have urine checked for blood after completion of antibiotics. · Patient instructions given for urinary tract infection. · To ER or call 911 if any difficulty breathing, shortness of breath, inability to swallow, hives, rash, facial/tongue swelling or temp greater than 103 degrees. · Follow up with PCP or [facility] if symptoms worsen or do not improve. Loretta received counseling on the following healthy behaviors: medication adherence. Patient given educational materials - see patient instructions. Discussed use, benefit, and side effects of prescribed medications. Treatment plan discussed at visit. Continue routine health care follow up. All patient questions answered. Pt voiced understanding.       Electronically signed by Latia Bonilla CNP on 1/8/2018 at 7:06 PM

## 2018-03-31 ENCOUNTER — OFFICE VISIT (OUTPATIENT)
Dept: PRIMARY CARE CLINIC | Age: 53
End: 2018-03-31
Payer: COMMERCIAL

## 2018-03-31 VITALS
DIASTOLIC BLOOD PRESSURE: 94 MMHG | WEIGHT: 241 LBS | TEMPERATURE: 97.6 F | OXYGEN SATURATION: 100 % | BODY MASS INDEX: 44.08 KG/M2 | HEART RATE: 56 BPM | SYSTOLIC BLOOD PRESSURE: 144 MMHG

## 2018-03-31 DIAGNOSIS — H10.9 BACTERIAL CONJUNCTIVITIS OF RIGHT EYE: ICD-10-CM

## 2018-03-31 DIAGNOSIS — J01.00 ACUTE NON-RECURRENT MAXILLARY SINUSITIS: Primary | ICD-10-CM

## 2018-03-31 LAB
INFLUENZA A ANTIBODY: NORMAL
INFLUENZA B ANTIBODY: NORMAL

## 2018-03-31 PROCEDURE — 99213 OFFICE O/P EST LOW 20 MIN: CPT | Performed by: NURSE PRACTITIONER

## 2018-03-31 PROCEDURE — 87804 INFLUENZA ASSAY W/OPTIC: CPT | Performed by: NURSE PRACTITIONER

## 2018-03-31 RX ORDER — AMOXICILLIN AND CLAVULANATE POTASSIUM 875; 125 MG/1; MG/1
1 TABLET, FILM COATED ORAL 2 TIMES DAILY
Qty: 20 TABLET | Refills: 0 | Status: SHIPPED | OUTPATIENT
Start: 2018-03-31 | End: 2018-04-10

## 2018-03-31 ASSESSMENT — ENCOUNTER SYMPTOMS
NAUSEA: 0
SHORTNESS OF BREATH: 0
EYE REDNESS: 1
HOARSE VOICE: 0
EYE ITCHING: 1
SINUS PRESSURE: 1
COUGH: 1
CONSTIPATION: 0
RHINORRHEA: 1
EYE DISCHARGE: 1
SORE THROAT: 1
VOMITING: 0
DIARRHEA: 0
SINUS PAIN: 1

## 2018-03-31 NOTE — PROGRESS NOTES
for constipation, diarrhea, nausea and vomiting. Skin: Negative for rash and wound. Neurological: Positive for dizziness (couple times when laying down) and headaches. Negative for light-headedness. Objective:     Physical Exam   Constitutional: She is oriented to person, place, and time. She appears well-developed and well-nourished. She is cooperative. She does not appear ill. No distress. HENT:   Head: Normocephalic and atraumatic. Right Ear: Hearing, external ear and ear canal normal. No mastoid tenderness. Tympanic membrane is not injected, not erythematous and not bulging. A middle ear effusion (opaque white fluid) is present. Left Ear: Hearing, external ear and ear canal normal. No mastoid tenderness. Tympanic membrane is not injected, not erythematous and not bulging. A middle ear effusion (opaque white fluid) is present. Nose: Mucosal edema and rhinorrhea present. Right sinus exhibits maxillary sinus tenderness. Right sinus exhibits no frontal sinus tenderness. Left sinus exhibits maxillary sinus tenderness. Left sinus exhibits no frontal sinus tenderness. Mouth/Throat: Uvula is midline and mucous membranes are normal. Oropharyngeal exudate (thick yellow secretions posterior pharynx) and posterior oropharyngeal erythema (slight erythema) present. No posterior oropharyngeal edema or tonsillar abscesses. Eyes: EOM are normal. Pupils are equal, round, and reactive to light. Right eye exhibits no discharge and no hordeolum. No foreign body present in the right eye. Left eye exhibits no discharge. Right conjunctiva is injected. No scleral icterus. Cardiovascular: Regular rhythm, S1 normal, S2 normal, normal heart sounds and intact distal pulses. Bradycardia present. Exam reveals no gallop and no friction rub. No murmur heard. Pulmonary/Chest: Effort normal and breath sounds normal. No accessory muscle usage. No respiratory distress. She has no decreased breath sounds.  She has no wheezes. She has no rhonchi. She has no rales. Occasional moist cough. Breath sounds clear B/L anterior and posterior lobes. Chest expansion symmetrical.  No audible wheezing or respiratory distress. No rales or rhonchi   Musculoskeletal: Normal range of motion. Lymphadenopathy:     She has no cervical adenopathy. Right cervical: No superficial cervical and no posterior cervical adenopathy present. Left cervical: No superficial cervical and no posterior cervical adenopathy present. Neurological: She is alert and oriented to person, place, and time. Skin: Skin is warm and dry. No rash noted. She is not diaphoretic. No erythema. No pallor. Psychiatric: She has a normal mood and affect. Her behavior is normal.   Nursing note and vitals reviewed. BP (!) 144/94 (Site: Left Arm, Position: Sitting, Cuff Size: Large Adult)   Pulse 56   Temp 97.6 °F (36.4 °C) (Oral)   Wt 241 lb (109.3 kg)   SpO2 100%   BMI 44.08 kg/m²      POCT influenza:  Negative A and Negative B    Assessment:     1. Acute non-recurrent maxillary sinusitis  POCT Influenza A/B    amoxicillin-clavulanate (AUGMENTIN) 875-125 MG per tablet   2. Bacterial conjunctivitis of right eye  amoxicillin-clavulanate (AUGMENTIN) 875-125 MG per tablet       Plan:      Return if symptoms worsen or fail to improve, for Resume all previous medications as directed. Orders Placed This Encounter   Medications    amoxicillin-clavulanate (AUGMENTIN) 875-125 MG per tablet     Sig: Take 1 tablet by mouth 2 times daily for 10 days     Dispense:  20 tablet     Refill:  0      1.   Sinusitis:  · Encouraged to increase fluids and rest  · Continue antibiotic as prescribed until all doses are completed - take with food  · Probiotic OTC or greek yogurt daily while on antibiotic  · Mucinex/Guaifenesin OTC as directed on package  · Nasal saline spray OTC every couple of hours for nasal congestion  · Fluticasone nasal spray, 1 spray each nostril, twice a

## 2018-04-02 ENCOUNTER — TELEPHONE (OUTPATIENT)
Dept: PRIMARY CARE CLINIC | Age: 53
End: 2018-04-02

## 2018-04-02 DIAGNOSIS — J01.00 ACUTE NON-RECURRENT MAXILLARY SINUSITIS: Primary | ICD-10-CM

## 2018-04-02 RX ORDER — BENZONATATE 100 MG/1
100 CAPSULE ORAL 3 TIMES DAILY PRN
Qty: 21 CAPSULE | Refills: 0 | Status: SHIPPED | OUTPATIENT
Start: 2018-04-02 | End: 2018-04-09

## 2018-04-13 DIAGNOSIS — G25.81 RESTLESS LEGS: ICD-10-CM

## 2018-04-13 RX ORDER — ROPINIROLE 0.5 MG/1
TABLET, FILM COATED ORAL
Qty: 90 TABLET | Refills: 1 | Status: SHIPPED | OUTPATIENT
Start: 2018-04-13 | End: 2018-04-23 | Stop reason: ALTCHOICE

## 2018-04-23 ENCOUNTER — OFFICE VISIT (OUTPATIENT)
Dept: FAMILY MEDICINE CLINIC | Age: 53
End: 2018-04-23
Payer: COMMERCIAL

## 2018-04-23 VITALS
WEIGHT: 242 LBS | SYSTOLIC BLOOD PRESSURE: 124 MMHG | HEIGHT: 62 IN | DIASTOLIC BLOOD PRESSURE: 70 MMHG | OXYGEN SATURATION: 98 % | RESPIRATION RATE: 18 BRPM | BODY MASS INDEX: 44.53 KG/M2 | HEART RATE: 66 BPM

## 2018-04-23 DIAGNOSIS — J30.89 CHRONIC NONSEASONAL ALLERGIC RHINITIS DUE TO POLLEN: ICD-10-CM

## 2018-04-23 DIAGNOSIS — Z12.39 SCREENING FOR BREAST CANCER: ICD-10-CM

## 2018-04-23 DIAGNOSIS — I10 ESSENTIAL HYPERTENSION: Primary | ICD-10-CM

## 2018-04-23 DIAGNOSIS — E28.39 ESTROGEN DEFICIENCY: ICD-10-CM

## 2018-04-23 DIAGNOSIS — Z13.820 OSTEOPOROSIS SCREENING: ICD-10-CM

## 2018-04-23 DIAGNOSIS — Z72.89 OTHER PROBLEMS RELATED TO LIFESTYLE: ICD-10-CM

## 2018-04-23 DIAGNOSIS — K21.9 GASTROESOPHAGEAL REFLUX DISEASE WITHOUT ESOPHAGITIS: ICD-10-CM

## 2018-04-23 DIAGNOSIS — M17.11 PRIMARY OSTEOARTHRITIS OF RIGHT KNEE: ICD-10-CM

## 2018-04-23 DIAGNOSIS — E78.2 MIXED HYPERLIPIDEMIA: ICD-10-CM

## 2018-04-23 PROCEDURE — 99214 OFFICE O/P EST MOD 30 MIN: CPT | Performed by: NURSE PRACTITIONER

## 2018-04-23 RX ORDER — MELOXICAM 15 MG/1
15 TABLET ORAL DAILY
Qty: 90 TABLET | Refills: 1 | Status: SHIPPED | OUTPATIENT
Start: 2018-04-23 | End: 2018-10-22 | Stop reason: ALTCHOICE

## 2018-04-23 RX ORDER — SPIRONOLACTONE 25 MG/1
25 TABLET ORAL DAILY
Qty: 90 TABLET | Refills: 1 | Status: SHIPPED | OUTPATIENT
Start: 2018-04-23 | End: 2018-10-22 | Stop reason: SDUPTHER

## 2018-04-23 RX ORDER — ROPINIROLE 1 MG/1
TABLET, FILM COATED ORAL
Refills: 6 | COMMUNITY
Start: 2018-04-05 | End: 2018-04-23 | Stop reason: SDUPTHER

## 2018-04-23 RX ORDER — FLUTICASONE PROPIONATE 50 MCG
1 SPRAY, SUSPENSION (ML) NASAL DAILY
Qty: 1 BOTTLE | Refills: 3 | Status: SHIPPED | OUTPATIENT
Start: 2018-04-23 | End: 2019-04-15 | Stop reason: SDUPTHER

## 2018-04-23 RX ORDER — EZETIMIBE 10 MG/1
TABLET ORAL
Qty: 90 TABLET | Refills: 1 | Status: SHIPPED | OUTPATIENT
Start: 2018-04-23 | End: 2018-10-22 | Stop reason: SDUPTHER

## 2018-04-23 RX ORDER — NAPROXEN 375 MG/1
375 TABLET ORAL 2 TIMES DAILY WITH MEALS
Qty: 60 TABLET | Refills: 1 | Status: SHIPPED | OUTPATIENT
Start: 2018-04-23 | End: 2018-09-05 | Stop reason: SDUPTHER

## 2018-04-23 RX ORDER — ROPINIROLE 1 MG/1
1 TABLET, FILM COATED ORAL NIGHTLY
Qty: 90 TABLET | Refills: 1 | Status: SHIPPED | OUTPATIENT
Start: 2018-04-23 | End: 2018-10-22 | Stop reason: SDUPTHER

## 2018-04-23 RX ORDER — METOPROLOL TARTRATE 50 MG/1
50 TABLET, FILM COATED ORAL 2 TIMES DAILY
Qty: 180 TABLET | Refills: 1 | Status: SHIPPED | OUTPATIENT
Start: 2018-04-23 | End: 2018-10-22 | Stop reason: SDUPTHER

## 2018-04-23 RX ORDER — OMEPRAZOLE 20 MG/1
CAPSULE, DELAYED RELEASE ORAL
Qty: 90 CAPSULE | Refills: 1 | Status: SHIPPED | OUTPATIENT
Start: 2018-04-23 | End: 2018-10-22 | Stop reason: SDUPTHER

## 2018-04-23 ASSESSMENT — ENCOUNTER SYMPTOMS
ABDOMINAL DISTENTION: 0
BLURRED VISION: 0
EYES NEGATIVE: 1
SHORTNESS OF BREATH: 0

## 2018-05-03 LAB
ALT SERPL-CCNC: 30 U/L
AST SERPL-CCNC: 26 U/L
BASOPHILS ABSOLUTE: NORMAL /ΜL
BASOPHILS RELATIVE PERCENT: NORMAL %
BUN BLDV-MCNC: 19 MG/DL
CALCIUM SERPL-MCNC: 9.6 MG/DL
CHLORIDE BLD-SCNC: 102 MMOL/L
CHOLESTEROL, TOTAL: 249 MG/DL
CHOLESTEROL/HDL RATIO: 4
CO2: 23 MMOL/L
CREAT SERPL-MCNC: 0.71 MG/DL
EOSINOPHILS ABSOLUTE: NORMAL /ΜL
EOSINOPHILS RELATIVE PERCENT: NORMAL %
GFR CALCULATED: >60
GLUCOSE BLD-MCNC: 87 MG/DL
HCT VFR BLD CALC: 42 % (ref 36–46)
HDLC SERPL-MCNC: 62 MG/DL (ref 35–70)
HEMOGLOBIN: 14.2 G/DL (ref 12–16)
LDL CHOLESTEROL CALCULATED: 158 MG/DL (ref 0–160)
LYMPHOCYTES ABSOLUTE: NORMAL /ΜL
LYMPHOCYTES RELATIVE PERCENT: NORMAL %
MCH RBC QN AUTO: 28.8 PG
MCHC RBC AUTO-ENTMCNC: 33.7 G/DL
MCV RBC AUTO: 85.3 FL
MONOCYTES ABSOLUTE: NORMAL /ΜL
MONOCYTES RELATIVE PERCENT: NORMAL %
NEUTROPHILS ABSOLUTE: NORMAL /ΜL
NEUTROPHILS RELATIVE PERCENT: NORMAL %
PLATELET # BLD: 264 K/ΜL
PMV BLD AUTO: 13.2 FL
POTASSIUM SERPL-SCNC: 3.9 MMOL/L
RBC # BLD: 4.93 10^6/ΜL
SODIUM BLD-SCNC: 141 MMOL/L
TRIGL SERPL-MCNC: 143 MG/DL
VLDLC SERPL CALC-MCNC: NORMAL MG/DL
WBC # BLD: 7.7 10^3/ML

## 2018-05-04 ENCOUNTER — HOSPITAL ENCOUNTER (OUTPATIENT)
Dept: BONE DENSITY | Age: 53
Discharge: HOME | End: 2018-05-06
Payer: COMMERCIAL

## 2018-05-04 ENCOUNTER — HOSPITAL ENCOUNTER (OUTPATIENT)
Dept: MAMMOGRAPHY | Age: 53
Discharge: HOME | End: 2018-05-06
Payer: COMMERCIAL

## 2018-05-04 DIAGNOSIS — Z13.820 OSTEOPOROSIS SCREENING: ICD-10-CM

## 2018-05-04 DIAGNOSIS — Z12.39 SCREENING FOR BREAST CANCER: ICD-10-CM

## 2018-05-04 DIAGNOSIS — E28.39 ESTROGEN DEFICIENCY: ICD-10-CM

## 2018-05-04 PROCEDURE — 77080 DXA BONE DENSITY AXIAL: CPT

## 2018-05-04 PROCEDURE — 77067 SCR MAMMO BI INCL CAD: CPT

## 2018-08-10 ENCOUNTER — HOSPITAL ENCOUNTER (EMERGENCY)
Age: 53
Discharge: ANOTHER HOSPITAL | End: 2018-08-10
Attending: EMERGENCY MEDICINE
Payer: COMMERCIAL

## 2018-08-10 ENCOUNTER — APPOINTMENT (OUTPATIENT)
Dept: GENERAL RADIOLOGY | Age: 53
DRG: 694 | End: 2018-08-10
Attending: UROLOGY
Payer: COMMERCIAL

## 2018-08-10 ENCOUNTER — ANESTHESIA EVENT (OUTPATIENT)
Dept: OPERATING ROOM | Age: 53
DRG: 694 | End: 2018-08-10
Payer: COMMERCIAL

## 2018-08-10 ENCOUNTER — ANESTHESIA (OUTPATIENT)
Dept: OPERATING ROOM | Age: 53
DRG: 694 | End: 2018-08-10
Payer: COMMERCIAL

## 2018-08-10 ENCOUNTER — HOSPITAL ENCOUNTER (INPATIENT)
Age: 53
LOS: 1 days | Discharge: HOME | DRG: 694 | End: 2018-08-10
Attending: UROLOGY | Admitting: UROLOGY
Payer: COMMERCIAL

## 2018-08-10 ENCOUNTER — APPOINTMENT (OUTPATIENT)
Dept: CT IMAGING | Age: 53
End: 2018-08-10
Payer: COMMERCIAL

## 2018-08-10 VITALS
HEIGHT: 62 IN | DIASTOLIC BLOOD PRESSURE: 78 MMHG | WEIGHT: 248 LBS | HEART RATE: 69 BPM | OXYGEN SATURATION: 100 % | BODY MASS INDEX: 45.64 KG/M2 | TEMPERATURE: 96.8 F | RESPIRATION RATE: 18 BRPM | SYSTOLIC BLOOD PRESSURE: 137 MMHG

## 2018-08-10 VITALS
OXYGEN SATURATION: 97 % | BODY MASS INDEX: 45.25 KG/M2 | DIASTOLIC BLOOD PRESSURE: 62 MMHG | WEIGHT: 245.9 LBS | RESPIRATION RATE: 20 BRPM | HEART RATE: 63 BPM | HEIGHT: 62 IN | TEMPERATURE: 97.8 F | SYSTOLIC BLOOD PRESSURE: 130 MMHG

## 2018-08-10 VITALS
OXYGEN SATURATION: 97 % | DIASTOLIC BLOOD PRESSURE: 60 MMHG | RESPIRATION RATE: 16 BRPM | SYSTOLIC BLOOD PRESSURE: 104 MMHG

## 2018-08-10 DIAGNOSIS — R10.9 RIGHT FLANK PAIN: ICD-10-CM

## 2018-08-10 DIAGNOSIS — N20.0 KIDNEY STONE: Primary | ICD-10-CM

## 2018-08-10 PROBLEM — N13.2 URETERAL STONE WITH HYDRONEPHROSIS: Status: ACTIVE | Noted: 2018-08-10

## 2018-08-10 PROBLEM — N23 RENAL COLIC ON RIGHT SIDE: Status: ACTIVE | Noted: 2018-08-10

## 2018-08-10 LAB
-: ABNORMAL
ABSOLUTE EOS #: <0.03 K/UL (ref 0–0.44)
ABSOLUTE IMMATURE GRANULOCYTE: 0.05 K/UL (ref 0–0.3)
ABSOLUTE LYMPH #: 1.67 K/UL (ref 1.1–3.7)
ABSOLUTE MONO #: 0.64 K/UL (ref 0.1–1.2)
AMORPHOUS: ABNORMAL
ANION GAP SERPL CALCULATED.3IONS-SCNC: 10 MMOL/L (ref 9–17)
BACTERIA: ABNORMAL
BASOPHILS # BLD: 0 % (ref 0–2)
BASOPHILS ABSOLUTE: <0.03 K/UL (ref 0–0.2)
BILIRUBIN URINE: NEGATIVE
BUN BLDV-MCNC: 18 MG/DL (ref 6–20)
BUN/CREAT BLD: 29 (ref 9–20)
CALCIUM SERPL-MCNC: 8.6 MG/DL (ref 8.6–10.4)
CASTS UA: ABNORMAL /LPF
CHLORIDE BLD-SCNC: 105 MMOL/L (ref 98–107)
CO2: 23 MMOL/L (ref 20–31)
COLOR: YELLOW
COMMENT UA: ABNORMAL
CREAT SERPL-MCNC: 0.63 MG/DL (ref 0.5–0.9)
CRYSTALS, UA: ABNORMAL /HPF
DIFFERENTIAL TYPE: ABNORMAL
EOSINOPHILS RELATIVE PERCENT: 0 % (ref 1–4)
EPITHELIAL CELLS UA: ABNORMAL /HPF
GFR AFRICAN AMERICAN: >60 ML/MIN
GFR NON-AFRICAN AMERICAN: >60 ML/MIN
GFR SERPL CREATININE-BSD FRML MDRD: ABNORMAL ML/MIN/{1.73_M2}
GFR SERPL CREATININE-BSD FRML MDRD: ABNORMAL ML/MIN/{1.73_M2}
GLUCOSE BLD-MCNC: 105 MG/DL (ref 70–99)
GLUCOSE URINE: NEGATIVE
HCT VFR BLD CALC: 37.9 % (ref 36.3–47.1)
HEMOGLOBIN: 12.9 G/DL (ref 11.9–15.1)
IMMATURE GRANULOCYTES: 1 %
KETONES, URINE: NEGATIVE
LEUKOCYTE ESTERASE, URINE: ABNORMAL
LYMPHOCYTES # BLD: 18 % (ref 24–43)
MCH RBC QN AUTO: 29.1 PG (ref 25.2–33.5)
MCHC RBC AUTO-ENTMCNC: 34 G/DL (ref 28.4–34.8)
MCV RBC AUTO: 85.6 FL (ref 82.6–102.9)
MONOCYTES # BLD: 7 % (ref 3–12)
MUCUS: ABNORMAL
NITRITE, URINE: NEGATIVE
NRBC AUTOMATED: 0 PER 100 WBC
OTHER OBSERVATIONS UA: ABNORMAL
PDW BLD-RTO: 12.7 % (ref 11.8–14.4)
PH UA: 6 (ref 5–8)
PLATELET # BLD: 233 K/UL (ref 138–453)
PLATELET ESTIMATE: ABNORMAL
PMV BLD AUTO: 8.9 FL (ref 8.1–13.5)
POTASSIUM SERPL-SCNC: 3.7 MMOL/L (ref 3.7–5.3)
PROTEIN UA: ABNORMAL
RBC # BLD: 4.43 M/UL (ref 3.95–5.11)
RBC # BLD: ABNORMAL 10*6/UL
RBC UA: ABNORMAL /HPF (ref 0–2)
RENAL EPITHELIAL, UA: ABNORMAL /HPF
SEG NEUTROPHILS: 74 % (ref 36–65)
SEGMENTED NEUTROPHILS ABSOLUTE COUNT: 7.18 K/UL (ref 1.5–8.1)
SODIUM BLD-SCNC: 138 MMOL/L (ref 135–144)
SPECIFIC GRAVITY UA: 1.02 (ref 1–1.03)
TRICHOMONAS: ABNORMAL
TURBIDITY: CLEAR
URINE HGB: ABNORMAL
UROBILINOGEN, URINE: NORMAL
WBC # BLD: 9.6 K/UL (ref 3.5–11.3)
WBC # BLD: ABNORMAL 10*3/UL
WBC UA: ABNORMAL /HPF
YEAST: ABNORMAL

## 2018-08-10 PROCEDURE — 96375 TX/PRO/DX INJ NEW DRUG ADDON: CPT

## 2018-08-10 PROCEDURE — 87086 URINE CULTURE/COLONY COUNT: CPT

## 2018-08-10 PROCEDURE — 80048 BASIC METABOLIC PNL TOTAL CA: CPT

## 2018-08-10 PROCEDURE — 2709999900 HC NON-CHARGEABLE SUPPLY: Performed by: UROLOGY

## 2018-08-10 PROCEDURE — 6360000002 HC RX W HCPCS: Performed by: NURSE ANESTHETIST, CERTIFIED REGISTERED

## 2018-08-10 PROCEDURE — 1200000000 HC SEMI PRIVATE

## 2018-08-10 PROCEDURE — C1769 GUIDE WIRE: HCPCS | Performed by: UROLOGY

## 2018-08-10 PROCEDURE — 96374 THER/PROPH/DIAG INJ IV PUSH: CPT

## 2018-08-10 PROCEDURE — 2580000003 HC RX 258: Performed by: EMERGENCY MEDICINE

## 2018-08-10 PROCEDURE — 74018 RADEX ABDOMEN 1 VIEW: CPT

## 2018-08-10 PROCEDURE — 74176 CT ABD & PELVIS W/O CONTRAST: CPT

## 2018-08-10 PROCEDURE — 2500000003 HC RX 250 WO HCPCS: Performed by: NURSE ANESTHETIST, CERTIFIED REGISTERED

## 2018-08-10 PROCEDURE — 2580000003 HC RX 258: Performed by: NURSE PRACTITIONER

## 2018-08-10 PROCEDURE — 2580000003 HC RX 258: Performed by: UROLOGY

## 2018-08-10 PROCEDURE — 0T768DZ DILATION OF RIGHT URETER WITH INTRALUMINAL DEVICE, VIA NATURAL OR ARTIFICIAL OPENING ENDOSCOPIC: ICD-10-PCS | Performed by: UROLOGY

## 2018-08-10 PROCEDURE — 6360000002 HC RX W HCPCS: Performed by: NURSE PRACTITIONER

## 2018-08-10 PROCEDURE — 3700000000 HC ANESTHESIA ATTENDED CARE: Performed by: UROLOGY

## 2018-08-10 PROCEDURE — 6370000000 HC RX 637 (ALT 250 FOR IP): Performed by: UROLOGY

## 2018-08-10 PROCEDURE — 7100000000 HC PACU RECOVERY - FIRST 15 MIN: Performed by: UROLOGY

## 2018-08-10 PROCEDURE — 85025 COMPLETE CBC W/AUTO DIFF WBC: CPT

## 2018-08-10 PROCEDURE — 3600000004 HC SURGERY LEVEL 4 BASE: Performed by: UROLOGY

## 2018-08-10 PROCEDURE — 6370000000 HC RX 637 (ALT 250 FOR IP): Performed by: NURSE PRACTITIONER

## 2018-08-10 PROCEDURE — 96376 TX/PRO/DX INJ SAME DRUG ADON: CPT

## 2018-08-10 PROCEDURE — 3600000014 HC SURGERY LEVEL 4 ADDTL 15MIN: Performed by: UROLOGY

## 2018-08-10 PROCEDURE — 87088 URINE BACTERIA CULTURE: CPT

## 2018-08-10 PROCEDURE — 7100000001 HC PACU RECOVERY - ADDTL 15 MIN: Performed by: UROLOGY

## 2018-08-10 PROCEDURE — 6370000000 HC RX 637 (ALT 250 FOR IP): Performed by: EMERGENCY MEDICINE

## 2018-08-10 PROCEDURE — 6360000002 HC RX W HCPCS: Performed by: EMERGENCY MEDICINE

## 2018-08-10 PROCEDURE — 99285 EMERGENCY DEPT VISIT HI MDM: CPT

## 2018-08-10 PROCEDURE — 81001 URINALYSIS AUTO W/SCOPE: CPT

## 2018-08-10 PROCEDURE — 87186 SC STD MICRODIL/AGAR DIL: CPT

## 2018-08-10 PROCEDURE — 6360000002 HC RX W HCPCS: Performed by: UROLOGY

## 2018-08-10 PROCEDURE — 3700000001 HC ADD 15 MINUTES (ANESTHESIA): Performed by: UROLOGY

## 2018-08-10 PROCEDURE — C2617 STENT, NON-COR, TEM W/O DEL: HCPCS | Performed by: UROLOGY

## 2018-08-10 PROCEDURE — 36415 COLL VENOUS BLD VENIPUNCTURE: CPT

## 2018-08-10 DEVICE — URETERAL STENT
Type: IMPLANTABLE DEVICE | Site: URETER | Status: FUNCTIONAL
Brand: PERCUFLEX™ PLUS

## 2018-08-10 RX ORDER — METOPROLOL TARTRATE 50 MG/1
50 TABLET, FILM COATED ORAL 2 TIMES DAILY
Status: DISCONTINUED | OUTPATIENT
Start: 2018-08-10 | End: 2018-08-10 | Stop reason: HOSPADM

## 2018-08-10 RX ORDER — SODIUM CHLORIDE 9 MG/ML
INJECTION, SOLUTION INTRAVENOUS CONTINUOUS
Status: DISCONTINUED | OUTPATIENT
Start: 2018-08-10 | End: 2018-08-10

## 2018-08-10 RX ORDER — ROPINIROLE 1 MG/1
1 TABLET, FILM COATED ORAL NIGHTLY
Status: DISCONTINUED | OUTPATIENT
Start: 2018-08-10 | End: 2018-08-10

## 2018-08-10 RX ORDER — PANTOPRAZOLE SODIUM 40 MG/1
40 TABLET, DELAYED RELEASE ORAL
Status: DISCONTINUED | OUTPATIENT
Start: 2018-08-11 | End: 2018-08-10

## 2018-08-10 RX ORDER — METOCLOPRAMIDE HYDROCHLORIDE 5 MG/ML
10 INJECTION INTRAMUSCULAR; INTRAVENOUS
Status: DISCONTINUED | OUTPATIENT
Start: 2018-08-10 | End: 2018-08-10 | Stop reason: HOSPADM

## 2018-08-10 RX ORDER — FLUTICASONE PROPIONATE 50 MCG
1 SPRAY, SUSPENSION (ML) NASAL DAILY
Status: DISCONTINUED | OUTPATIENT
Start: 2018-08-11 | End: 2018-08-10 | Stop reason: HOSPADM

## 2018-08-10 RX ORDER — MORPHINE SULFATE 4 MG/ML
2 INJECTION, SOLUTION INTRAMUSCULAR; INTRAVENOUS
Status: DISCONTINUED | OUTPATIENT
Start: 2018-08-10 | End: 2018-08-10 | Stop reason: HOSPADM

## 2018-08-10 RX ORDER — EZETIMIBE 10 MG/1
1 TABLET ORAL DAILY
Status: DISCONTINUED | OUTPATIENT
Start: 2018-08-10 | End: 2018-08-10 | Stop reason: CLARIF

## 2018-08-10 RX ORDER — SODIUM CHLORIDE 9 MG/ML
INJECTION, SOLUTION INTRAVENOUS CONTINUOUS
Status: DISCONTINUED | OUTPATIENT
Start: 2018-08-10 | End: 2018-08-10 | Stop reason: HOSPADM

## 2018-08-10 RX ORDER — KETOROLAC TROMETHAMINE 30 MG/ML
30 INJECTION, SOLUTION INTRAMUSCULAR; INTRAVENOUS EVERY 6 HOURS PRN
Status: DISCONTINUED | OUTPATIENT
Start: 2018-08-10 | End: 2018-08-10

## 2018-08-10 RX ORDER — SULFAMETHOXAZOLE AND TRIMETHOPRIM 800; 160 MG/1; MG/1
1 TABLET ORAL 2 TIMES DAILY
Qty: 20 TABLET | Refills: 0 | Status: SHIPPED | OUTPATIENT
Start: 2018-08-10 | End: 2018-08-13 | Stop reason: ALTCHOICE

## 2018-08-10 RX ORDER — DEXAMETHASONE SODIUM PHOSPHATE 4 MG/ML
INJECTION, SOLUTION INTRA-ARTICULAR; INTRALESIONAL; INTRAMUSCULAR; INTRAVENOUS; SOFT TISSUE PRN
Status: DISCONTINUED | OUTPATIENT
Start: 2018-08-10 | End: 2018-08-10 | Stop reason: SDUPTHER

## 2018-08-10 RX ORDER — SODIUM CHLORIDE, SODIUM LACTATE, POTASSIUM CHLORIDE, CALCIUM CHLORIDE 600; 310; 30; 20 MG/100ML; MG/100ML; MG/100ML; MG/100ML
INJECTION, SOLUTION INTRAVENOUS CONTINUOUS
Status: DISCONTINUED | OUTPATIENT
Start: 2018-08-10 | End: 2018-08-10 | Stop reason: HOSPADM

## 2018-08-10 RX ORDER — TAMSULOSIN HYDROCHLORIDE 0.4 MG/1
0.4 CAPSULE ORAL DAILY
Status: COMPLETED | OUTPATIENT
Start: 2018-08-10 | End: 2018-08-10

## 2018-08-10 RX ORDER — SPIRONOLACTONE 25 MG/1
25 TABLET ORAL DAILY
Status: DISCONTINUED | OUTPATIENT
Start: 2018-08-10 | End: 2018-08-10 | Stop reason: HOSPADM

## 2018-08-10 RX ORDER — LIDOCAINE HYDROCHLORIDE 20 MG/ML
INJECTION, SOLUTION INFILTRATION; PERINEURAL PRN
Status: DISCONTINUED | OUTPATIENT
Start: 2018-08-10 | End: 2018-08-10 | Stop reason: SDUPTHER

## 2018-08-10 RX ORDER — ONDANSETRON 2 MG/ML
4 INJECTION INTRAMUSCULAR; INTRAVENOUS
Status: COMPLETED | OUTPATIENT
Start: 2018-08-10 | End: 2018-08-10

## 2018-08-10 RX ORDER — CIPROFLOXACIN 2 MG/ML
400 INJECTION, SOLUTION INTRAVENOUS ONCE
Status: COMPLETED | OUTPATIENT
Start: 2018-08-10 | End: 2018-08-10

## 2018-08-10 RX ORDER — EZETIMIBE 10 MG/1
10 TABLET ORAL DAILY
Status: DISCONTINUED | OUTPATIENT
Start: 2018-08-10 | End: 2018-08-10 | Stop reason: HOSPADM

## 2018-08-10 RX ORDER — EZETIMIBE 10 MG/1
10 TABLET ORAL DAILY
Status: DISCONTINUED | OUTPATIENT
Start: 2018-08-10 | End: 2018-08-10

## 2018-08-10 RX ORDER — PROPOFOL 10 MG/ML
INJECTION, EMULSION INTRAVENOUS PRN
Status: DISCONTINUED | OUTPATIENT
Start: 2018-08-10 | End: 2018-08-10 | Stop reason: SDUPTHER

## 2018-08-10 RX ORDER — IBUPROFEN 600 MG/1
600 TABLET ORAL EVERY 6 HOURS PRN
Qty: 20 TABLET | Refills: 0 | Status: SHIPPED | OUTPATIENT
Start: 2018-08-10 | End: 2018-08-10

## 2018-08-10 RX ORDER — KETOROLAC TROMETHAMINE 30 MG/ML
INJECTION, SOLUTION INTRAMUSCULAR; INTRAVENOUS PRN
Status: DISCONTINUED | OUTPATIENT
Start: 2018-08-10 | End: 2018-08-10 | Stop reason: SDUPTHER

## 2018-08-10 RX ORDER — ONDANSETRON 4 MG/1
4 TABLET, ORALLY DISINTEGRATING ORAL EVERY 30 MIN PRN
Status: DISCONTINUED | OUTPATIENT
Start: 2018-08-10 | End: 2018-08-10 | Stop reason: HOSPADM

## 2018-08-10 RX ORDER — ROPINIROLE 1 MG/1
1 TABLET, FILM COATED ORAL NIGHTLY
Status: DISCONTINUED | OUTPATIENT
Start: 2018-08-10 | End: 2018-08-10 | Stop reason: HOSPADM

## 2018-08-10 RX ORDER — ONDANSETRON 2 MG/ML
4 INJECTION INTRAMUSCULAR; INTRAVENOUS EVERY 6 HOURS PRN
Status: DISCONTINUED | OUTPATIENT
Start: 2018-08-10 | End: 2018-08-10 | Stop reason: HOSPADM

## 2018-08-10 RX ORDER — PANTOPRAZOLE SODIUM 40 MG/1
40 TABLET, DELAYED RELEASE ORAL
Status: DISCONTINUED | OUTPATIENT
Start: 2018-08-11 | End: 2018-08-10 | Stop reason: HOSPADM

## 2018-08-10 RX ORDER — SODIUM CHLORIDE 0.9 % (FLUSH) 0.9 %
10 SYRINGE (ML) INJECTION EVERY 12 HOURS SCHEDULED
Status: DISCONTINUED | OUTPATIENT
Start: 2018-08-10 | End: 2018-08-10 | Stop reason: HOSPADM

## 2018-08-10 RX ORDER — PROMETHAZINE HYDROCHLORIDE 25 MG/ML
6.25 INJECTION, SOLUTION INTRAMUSCULAR; INTRAVENOUS
Status: DISCONTINUED | OUTPATIENT
Start: 2018-08-10 | End: 2018-08-10 | Stop reason: HOSPADM

## 2018-08-10 RX ORDER — HYDROMORPHONE HCL 110MG/55ML
0.5 PATIENT CONTROLLED ANALGESIA SYRINGE INTRAVENOUS
Status: DISCONTINUED | OUTPATIENT
Start: 2018-08-10 | End: 2018-08-10 | Stop reason: HOSPADM

## 2018-08-10 RX ORDER — KETOROLAC TROMETHAMINE 30 MG/ML
30 INJECTION, SOLUTION INTRAMUSCULAR; INTRAVENOUS EVERY 6 HOURS PRN
Status: DISCONTINUED | OUTPATIENT
Start: 2018-08-10 | End: 2018-08-10 | Stop reason: HOSPADM

## 2018-08-10 RX ORDER — METOPROLOL TARTRATE 50 MG/1
50 TABLET, FILM COATED ORAL 2 TIMES DAILY
Status: DISCONTINUED | OUTPATIENT
Start: 2018-08-10 | End: 2018-08-10

## 2018-08-10 RX ORDER — SODIUM CHLORIDE 0.9 % (FLUSH) 0.9 %
10 SYRINGE (ML) INJECTION PRN
Status: DISCONTINUED | OUTPATIENT
Start: 2018-08-10 | End: 2018-08-10

## 2018-08-10 RX ORDER — TAMSULOSIN HYDROCHLORIDE 0.4 MG/1
0.4 CAPSULE ORAL DAILY
Qty: 5 CAPSULE | Refills: 0 | Status: SHIPPED | OUTPATIENT
Start: 2018-08-10 | End: 2018-08-13 | Stop reason: ALTCHOICE

## 2018-08-10 RX ORDER — TAMSULOSIN HYDROCHLORIDE 0.4 MG/1
0.4 CAPSULE ORAL 2 TIMES DAILY
Status: DISCONTINUED | OUTPATIENT
Start: 2018-08-10 | End: 2018-08-10

## 2018-08-10 RX ORDER — SPIRONOLACTONE 25 MG/1
25 TABLET ORAL DAILY
Status: DISCONTINUED | OUTPATIENT
Start: 2018-08-10 | End: 2018-08-10

## 2018-08-10 RX ORDER — OXYCODONE HYDROCHLORIDE AND ACETAMINOPHEN 5; 325 MG/1; MG/1
1 TABLET ORAL EVERY 8 HOURS PRN
Qty: 10 TABLET | Refills: 0 | Status: SHIPPED | OUTPATIENT
Start: 2018-08-10 | End: 2018-08-13 | Stop reason: ALTCHOICE

## 2018-08-10 RX ORDER — IBUPROFEN 600 MG/1
600 TABLET ORAL EVERY 6 HOURS PRN
Qty: 20 TABLET | Refills: 0 | Status: SHIPPED | OUTPATIENT
Start: 2018-08-10 | End: 2018-10-22 | Stop reason: ALTCHOICE

## 2018-08-10 RX ORDER — ACETAMINOPHEN 325 MG/1
650 TABLET ORAL EVERY 4 HOURS PRN
Status: DISCONTINUED | OUTPATIENT
Start: 2018-08-10 | End: 2018-08-10 | Stop reason: HOSPADM

## 2018-08-10 RX ORDER — OXYCODONE HYDROCHLORIDE 5 MG/1
5 TABLET ORAL
Status: DISCONTINUED | OUTPATIENT
Start: 2018-08-10 | End: 2018-08-10 | Stop reason: HOSPADM

## 2018-08-10 RX ORDER — HYDROMORPHONE HCL 110MG/55ML
0.5 PATIENT CONTROLLED ANALGESIA SYRINGE INTRAVENOUS ONCE
Status: COMPLETED | OUTPATIENT
Start: 2018-08-10 | End: 2018-08-10

## 2018-08-10 RX ORDER — SODIUM CHLORIDE 0.9 % (FLUSH) 0.9 %
10 SYRINGE (ML) INJECTION EVERY 12 HOURS SCHEDULED
Status: DISCONTINUED | OUTPATIENT
Start: 2018-08-10 | End: 2018-08-10

## 2018-08-10 RX ORDER — ONDANSETRON 2 MG/ML
4 INJECTION INTRAMUSCULAR; INTRAVENOUS EVERY 6 HOURS PRN
Status: DISCONTINUED | OUTPATIENT
Start: 2018-08-10 | End: 2018-08-10

## 2018-08-10 RX ORDER — FENTANYL CITRATE 50 UG/ML
50 INJECTION, SOLUTION INTRAMUSCULAR; INTRAVENOUS EVERY 5 MIN PRN
Status: DISCONTINUED | OUTPATIENT
Start: 2018-08-10 | End: 2018-08-10 | Stop reason: HOSPADM

## 2018-08-10 RX ORDER — MORPHINE SULFATE 2 MG/ML
2 INJECTION, SOLUTION INTRAMUSCULAR; INTRAVENOUS EVERY 4 HOURS PRN
Status: DISCONTINUED | OUTPATIENT
Start: 2018-08-10 | End: 2018-08-10

## 2018-08-10 RX ORDER — OMEPRAZOLE 20 MG/1
1 CAPSULE, DELAYED RELEASE ORAL DAILY
Status: DISCONTINUED | OUTPATIENT
Start: 2018-08-10 | End: 2018-08-10 | Stop reason: CLARIF

## 2018-08-10 RX ORDER — KETOROLAC TROMETHAMINE 30 MG/ML
15 INJECTION, SOLUTION INTRAMUSCULAR; INTRAVENOUS ONCE
Status: COMPLETED | OUTPATIENT
Start: 2018-08-10 | End: 2018-08-10

## 2018-08-10 RX ORDER — FLUTICASONE PROPIONATE 50 MCG
1 SPRAY, SUSPENSION (ML) NASAL DAILY
Status: DISCONTINUED | OUTPATIENT
Start: 2018-08-10 | End: 2018-08-10

## 2018-08-10 RX ORDER — ACETAMINOPHEN 325 MG/1
650 TABLET ORAL EVERY 4 HOURS PRN
Status: DISCONTINUED | OUTPATIENT
Start: 2018-08-10 | End: 2018-08-10

## 2018-08-10 RX ORDER — FENTANYL CITRATE 50 UG/ML
25 INJECTION, SOLUTION INTRAMUSCULAR; INTRAVENOUS EVERY 5 MIN PRN
Status: DISCONTINUED | OUTPATIENT
Start: 2018-08-10 | End: 2018-08-10 | Stop reason: HOSPADM

## 2018-08-10 RX ORDER — SODIUM CHLORIDE 0.9 % (FLUSH) 0.9 %
10 SYRINGE (ML) INJECTION PRN
Status: DISCONTINUED | OUTPATIENT
Start: 2018-08-10 | End: 2018-08-10 | Stop reason: HOSPADM

## 2018-08-10 RX ORDER — 0.9 % SODIUM CHLORIDE 0.9 %
500 INTRAVENOUS SOLUTION INTRAVENOUS ONCE
Status: COMPLETED | OUTPATIENT
Start: 2018-08-10 | End: 2018-08-10

## 2018-08-10 RX ADMIN — MORPHINE SULFATE 2 MG: 4 INJECTION INTRAVENOUS at 02:37

## 2018-08-10 RX ADMIN — LIDOCAINE HYDROCHLORIDE 100 MG: 20 INJECTION, SOLUTION INFILTRATION; PERINEURAL at 18:30

## 2018-08-10 RX ADMIN — SODIUM CHLORIDE: 9 INJECTION, SOLUTION INTRAVENOUS at 12:22

## 2018-08-10 RX ADMIN — SODIUM CHLORIDE, POTASSIUM CHLORIDE, SODIUM LACTATE AND CALCIUM CHLORIDE: 600; 310; 30; 20 INJECTION, SOLUTION INTRAVENOUS at 17:27

## 2018-08-10 RX ADMIN — SODIUM CHLORIDE 500 ML: 9 INJECTION, SOLUTION INTRAVENOUS at 02:40

## 2018-08-10 RX ADMIN — KETOROLAC TROMETHAMINE 15 MG: 30 INJECTION, SOLUTION INTRAMUSCULAR at 02:35

## 2018-08-10 RX ADMIN — KETOROLAC TROMETHAMINE 30 MG: 30 INJECTION, SOLUTION INTRAMUSCULAR; INTRAVENOUS at 18:33

## 2018-08-10 RX ADMIN — KETOROLAC TROMETHAMINE 30 MG: 30 INJECTION, SOLUTION INTRAMUSCULAR at 12:25

## 2018-08-10 RX ADMIN — TAMSULOSIN HYDROCHLORIDE 0.4 MG: 0.4 CAPSULE ORAL at 13:11

## 2018-08-10 RX ADMIN — SODIUM CHLORIDE: 9 INJECTION, SOLUTION INTRAVENOUS at 19:47

## 2018-08-10 RX ADMIN — MORPHINE SULFATE 2 MG: 4 INJECTION INTRAVENOUS at 03:16

## 2018-08-10 RX ADMIN — DEXAMETHASONE SODIUM PHOSPHATE 8 MG: 4 INJECTION, SOLUTION INTRAMUSCULAR; INTRAVENOUS at 18:33

## 2018-08-10 RX ADMIN — HYDROMORPHONE HYDROCHLORIDE 0.5 MG: 2 INJECTION, SOLUTION INTRAMUSCULAR; INTRAVENOUS; SUBCUTANEOUS at 03:49

## 2018-08-10 RX ADMIN — ONDANSETRON 4 MG: 2 INJECTION INTRAMUSCULAR; INTRAVENOUS at 03:51

## 2018-08-10 RX ADMIN — ONDANSETRON 4 MG: 2 INJECTION INTRAMUSCULAR; INTRAVENOUS at 02:39

## 2018-08-10 RX ADMIN — CIPROFLOXACIN 400 MG: 2 INJECTION, SOLUTION INTRAVENOUS at 18:08

## 2018-08-10 RX ADMIN — TAMSULOSIN HYDROCHLORIDE 0.4 MG: 0.4 CAPSULE ORAL at 05:23

## 2018-08-10 RX ADMIN — HYDROMORPHONE HYDROCHLORIDE 0.5 MG: 2 INJECTION, SOLUTION INTRAMUSCULAR; INTRAVENOUS; SUBCUTANEOUS at 05:22

## 2018-08-10 RX ADMIN — PROPOFOL 200 MG: 10 INJECTION, EMULSION INTRAVENOUS at 18:30

## 2018-08-10 ASSESSMENT — PAIN SCALES - GENERAL
PAINLEVEL_OUTOF10: 0
PAINLEVEL_OUTOF10: 5
PAINLEVEL_OUTOF10: 9
PAINLEVEL_OUTOF10: 0
PAINLEVEL_OUTOF10: 0
PAINLEVEL_OUTOF10: 5
PAINLEVEL_OUTOF10: 0
PAINLEVEL_OUTOF10: 10
PAINLEVEL_OUTOF10: 2
PAINLEVEL_OUTOF10: 10
PAINLEVEL_OUTOF10: 0
PAINLEVEL_OUTOF10: 3
PAINLEVEL_OUTOF10: 9
PAINLEVEL_OUTOF10: 10

## 2018-08-10 ASSESSMENT — PULMONARY FUNCTION TESTS
PIF_VALUE: 1
PIF_VALUE: 1
PIF_VALUE: 14
PIF_VALUE: 12
PIF_VALUE: 13
PIF_VALUE: 3
PIF_VALUE: 13
PIF_VALUE: 12
PIF_VALUE: 1
PIF_VALUE: 12
PIF_VALUE: 1
PIF_VALUE: 1
PIF_VALUE: 13
PIF_VALUE: 12
PIF_VALUE: 24
PIF_VALUE: 1
PIF_VALUE: 12
PIF_VALUE: 1
PIF_VALUE: 13
PIF_VALUE: 13
PIF_VALUE: 12
PIF_VALUE: 1

## 2018-08-10 ASSESSMENT — PAIN DESCRIPTION - DESCRIPTORS
DESCRIPTORS: THROBBING
DESCRIPTORS: CONSTANT;SHARP
DESCRIPTORS: CONSTANT;STABBING

## 2018-08-10 ASSESSMENT — PAIN DESCRIPTION - ORIENTATION
ORIENTATION: RIGHT

## 2018-08-10 ASSESSMENT — PAIN - FUNCTIONAL ASSESSMENT: PAIN_FUNCTIONAL_ASSESSMENT: 0-10

## 2018-08-10 ASSESSMENT — ENCOUNTER SYMPTOMS
SHORTNESS OF BREATH: 0
COLOR CHANGE: 0
SORE THROAT: 0
DIARRHEA: 0
ABDOMINAL PAIN: 0
COUGH: 0
BACK PAIN: 0
TROUBLE SWALLOWING: 0
EYE PAIN: 0
NAUSEA: 0
VOMITING: 0
EYE REDNESS: 0

## 2018-08-10 ASSESSMENT — PAIN SCALES - WONG BAKER: WONGBAKER_NUMERICALRESPONSE: 0

## 2018-08-10 ASSESSMENT — PAIN DESCRIPTION - PAIN TYPE
TYPE: ACUTE PAIN

## 2018-08-10 ASSESSMENT — PAIN DESCRIPTION - PROGRESSION
CLINICAL_PROGRESSION: NOT CHANGED
CLINICAL_PROGRESSION: GRADUALLY WORSENING

## 2018-08-10 ASSESSMENT — PAIN DESCRIPTION - LOCATION
LOCATION: FLANK
LOCATION: FLANK
LOCATION: BACK
LOCATION: FLANK

## 2018-08-10 ASSESSMENT — LIFESTYLE VARIABLES: SMOKING_STATUS: 0

## 2018-08-10 ASSESSMENT — PAIN DESCRIPTION - FREQUENCY
FREQUENCY: CONTINUOUS
FREQUENCY: CONTINUOUS

## 2018-08-10 ASSESSMENT — PAIN DESCRIPTION - ONSET
ONSET: SUDDEN
ONSET: GRADUAL

## 2018-08-10 NOTE — H&P
Daily    tamsulosin  0.4 mg Oral BID    sodium chloride flush  10 mL Intravenous 2 times per day    ezetimibe  10 mg Oral Daily    [START ON 8/11/2018] pantoprazole  40 mg Oral QAM AC     Continuous Infusions:   sodium chloride 150 mL/hr at 08/10/18 1222     PRN Meds:.ketorolac, morphine, ondansetron, sodium chloride flush, acetaminophen, magnesium hydroxide    Allergies: Iv contrast [iodides]; Lisinopril; and Simvastatin    Social History:    Social History     Social History    Marital status: Single     Spouse name: N/A    Number of children: N/A    Years of education: N/A     Occupational History    Not on file. Social History Main Topics    Smoking status: Never Smoker    Smokeless tobacco: Never Used    Alcohol use No    Drug use: No    Sexual activity: Not on file     Other Topics Concern    Not on file     Social History Narrative    No narrative on file       Family History:    Family History   Problem Relation Age of Onset    Heart Disease Mother     High Blood Pressure Father     Heart Disease Father     High Blood Pressure Sister     High Blood Pressure Brother     Cancer Sister         Breast       REVIEW OF SYSTEMS:  Constitutional: Negative for fever, chills and unexpected weight change. Respiratory: Negative for shortness of breath and wheezing. Cardiovascular: Negative for chest pain and palpitations. Gastrointestinal: Positive for nausea. Negative for vomiting, abdominal pain, diarrhea, constipation and abdominal distention. Endocrine: Negative for polydipsia and polyuria. Genitourinary: Positive for flank pain and hematuria. Negative for dysuria, urgency, frequency, enuresis, decreased urine volume, vaginal discharge, genital sores. Musculoskeletal: Negative for myalgias and joint swelling. Skin: Negative for rash and wound. Neurological: Negative for dizziness and headaches. Hematological: Negative for adenopathy. Does not bruise/bleed easily.

## 2018-08-10 NOTE — ANESTHESIA PRE PROCEDURE
Department of Anesthesiology  Preprocedure Note       Name:  Dwayne Walter   Age:  48 y.o.  :  1965                                          MRN:  [id number]         Date:  8/10/2018      Surgeon: Ana Laura Douglas):  Kunal Garcia MD    Procedure: Procedure(s):  CYSTOSCOPY STENT INSERTION    Medications prior to admission:   Prior to Admission medications    Medication Sig Start Date End Date Taking?  Authorizing Provider   tamsulosin (FLOMAX) 0.4 MG capsule Take 1 capsule by mouth daily 8/10/18  Yes Monty Rodriguez MD   ibuprofen (ADVIL;MOTRIN) 600 MG tablet Take 1 tablet by mouth every 6 hours as needed for Pain 8/10/18  Yes Monty Rodriguez MD   fluticasone [facility]) 50 MCG/ACT nasal spray 1 spray by Nasal route daily 18  Yes ELIGIO Marsh CNP   spironolactone (ALDACTONE) 25 MG tablet Take 1 tablet by mouth daily 18  Yes ELIGIO Marsh CNP   omeprazole (PRILOSEC) 20 MG delayed release capsule TAKE 1 CAPSULE DAILY 18  Yes ELIGIO Marsh CNP   metoprolol tartrate (LOPRESSOR) 50 MG tablet Take 1 tablet by mouth 2 times daily 18  Yes ELIGIO Marsh CNP   ezetimibe (ZETIA) 10 MG tablet TAKE 1 TABLET DAILY 18  Yes ELIGIO Marsh CNP   meloxicam (MOBIC) 15 MG tablet Take 1 tablet by mouth daily For right knee osteoarthritis 18  Yes ELIGIO Marsh CNP   rOPINIRole (REQUIP) 1 MG tablet Take 1 tablet by mouth nightly Restless legs 18  Yes ELIGIO Marsh CNP   naproxen (NAPROSYN) 375 MG tablet Take 1 tablet by mouth 2 times daily (with meals) For flares of right knee pain (aware to hold meloxicam if this is used) 18  Yes ELIGIO Marsh CNP   aspirin 81 MG EC tablet Take 81 mg by mouth Indications: once a day   Yes Historical Provider, MD   Calcium Carb-Cholecalciferol (CALCIUM + D3 PO) Take 1,200 mg by mouth daily   Yes Historical Provider, MD   vitamin D (CHOLECALCIFEROL) 1000 UNIT TABS tablet Take 1,000 Units by mouth daily    Yes Historical Provider, MD   Omega-3 Fatty Acids (FISH OIL) 1000 MG CAPS Take 1,000 mg by mouth daily    Yes Historical Provider, MD   sulfamethoxazole-trimethoprim (BACTRIM DS) 800-160 MG per tablet Take 1 tablet by mouth 2 times daily for 10 days 8/10/18 8/20/18  Edie Robbins MD   oxyCODONE-acetaminophen (PERCOCET) 5-325 MG per tablet Take 1 tablet by mouth every 8 hours as needed for Pain for up to 3 days. . 8/10/18 8/13/18  Edie Robbins MD       Current medications:    Current Facility-Administered Medications   Medication Dose Route Frequency Provider Last Rate Last Dose    [MAR Hold] 0.9 % sodium chloride infusion   Intravenous Continuous ELIGIO Ortiz  mL/hr at 08/10/18 1222      [MAR Hold] ketorolac (TORADOL) injection 30 mg  30 mg Intravenous Q6H PRN ELIGIO Ortiz CNP   30 mg at 08/10/18 1225    [MAR Hold] morphine injection 2 mg  2 mg Intravenous Q4H PRN ELIGIO Ortiz CNP        [MAR Hold] ondansetron (ZOFRAN) injection 4 mg  4 mg Intravenous Q6H PRN ELIGIO Wilder CNP        [MAR Hold] fluticasone (FLONASE) 50 MCG/ACT nasal spray 1 spray  1 spray Nasal Daily ELIGIO Wilder CNP        [MAR Hold] metoprolol tartrate (LOPRESSOR) tablet 50 mg  50 mg Oral BID ELIGIO Wilder CNP        [MAR Hold] rOPINIRole (REQUIP) tablet 1 mg  1 mg Oral Nightly ELIGIO Wilder CNP        [MAR Hold] spironolactone (ALDACTONE) tablet 25 mg  25 mg Oral Daily ELIGIO Wilder CNP        [MAR Hold] tamsulosin (FLOMAX) capsule 0.4 mg  0.4 mg Oral BID ELIGIO Wilder CNP   0.4 mg at 08/10/18 1311    [MAR Hold] sodium chloride flush 0.9 % injection 10 mL  10 mL Intravenous 2 times per day ELIGIO Wilder CNP        [facility] Hold] sodium chloride flush 0.9 % injection 10 mL  10 mL Intravenous PRN ELIGIO Ortiz - CNP        [MAR Hold] acetaminophen (TYLENOL) tablet 650 mg  650 mg Oral Q4H PRN Valentín Villatoro, PROTIME 9.4 03/14/2014    INR 0.9 03/14/2014    APTT 25.9 03/14/2014       HCG (If Applicable): No results found for: PREGTESTUR, PREGSERUM, HCG, HCGQUANT     ABGs: No results found for: PHART, PO2ART, LRR7RPU, CYM7IHK, BEART, N0PNCMFI     Type & Screen (If Applicable):  No results found for: [facility]    Anesthesia Evaluation  Patient summary reviewed no history of anesthetic complications:   Airway: Mallampati: II  TM distance: >3 FB   Neck ROM: full  Mouth opening: > = 3 FB Dental: normal exam         Pulmonary:normal exam    (+) sleep apnea: on CPAP,      (-) recent URI and not a current smoker                           Cardiovascular:  Exercise tolerance: good (>4 METS),   (+) hypertension: mild,          Beta Blocker:  Dose within 24 Hrs         Neuro/Psych:   (+) headaches: migraine headaches,             GI/Hepatic/Renal:   (+) GERD (Pt states none today. ): well controlled,           Endo/Other:                     Abdominal:   (+) obese,         Vascular:                                        Anesthesia Plan      general     ASA 3       Induction: intravenous. MIPS: Postoperative opioids intended and Prophylactic antiemetics administered. Anesthetic plan and risks discussed with patient.                       [address] [facility], ELIGIO - CRNA   8/10/2018

## 2018-08-10 NOTE — ED NOTES
Pt resting with eyes closed. No distress noted at this time. Call light within reach.       L[place] Cantrell RN  08/10/18 4832

## 2018-08-10 NOTE — ED PROVIDER NOTES
[facility] ED  eMERGENCY dEPARTMENT eNCOUnter      Pt Name: Jeb Mustafa  MRN: [id number]  [place] 1965  Date of evaluation: 8/10/2018  Provider: Brittany Velázquez MD    CHIEF COMPLAINT       Chief Complaint   Patient presents with    Flank Pain     \"think I am passing a kidney stone on right side\"     Patient is a 70-year-old female who presents to the emergency department complaining of pain to the right flank. Patient states that she woke up this morning with sharp pain to her flank. She states she's had a history of kidney stone the past and it feels similar. Denies any fever or chills denies any chest pain or shortness of breath. Denies any abdominal pain. States the pain is severe and makes her nauseous. Denies any other associated symptoms. Nursing Notes were reviewed. REVIEW OF SYSTEMS    (2-9 systems for level 4, 10 or more for level 5)     Review of Systems   Constitutional: Negative for chills and fever. HENT: Negative for ear pain, sore throat and trouble swallowing. Eyes: Negative for pain and redness. Respiratory: Negative for cough and shortness of breath. Cardiovascular: Negative for chest pain and palpitations. Gastrointestinal: Negative for abdominal pain, diarrhea, nausea and vomiting. Genitourinary: Positive for flank pain. Negative for dysuria and frequency. Musculoskeletal: Negative for back pain and neck pain. Skin: Negative for color change and rash. Neurological: Negative for dizziness, syncope and headaches. Psychiatric/Behavioral: Negative for hallucinations and suicidal ideas. Except as noted above the remainder of the review of systems was reviewed and negative.        PAST MEDICAL HISTORY     Past Medical History:   Diagnosis Date    Allergic rhinitis     Enlarged thyroid 2016    nodules check annually    GERD (gastroesophageal reflux disease)     Headache(784.0)     migranes at times    Hyperlipidemia     Hypertension

## 2018-08-10 NOTE — BRIEF OP NOTE
Brief Postoperative Note  ______________________________________________________________    Patient: Alice Rodarte  YOB: 1965  MRN: [id number]  Date of Procedure: 8/10/2018    Pre-Op Diagnosis: RENAL CALCULUS    Post-Op Diagnosis: Same       Procedure(s):  CYSTOSCOPY STENT INSERTION    Anesthesia: General    Surgeon(s):  Nam Brown MD    Staff:  Scrub Person First: Robert Richardson  Scrub Person Second: Constanza Fernández     Estimated Blood Loss: * No values recorded between 8/10/2018  6:20 PM and 8/10/2018  6:39 PM * None    Complications: None    Specimens:   * No specimens in log *    Implants:    Implant Name Type Inv.  Item Serial No.  Lot No. LRB No. Used   STENT URET HYDRPL COAT W/O [id number] Stent:Urological STENT URET HYDRPL COAT W/O [id number]   [place] SCI: INTERVENTIONAL CARDIO [id number] Right 1         Drains:      Findings: right ureteral obstruction    Nam Brown MD  Date: 8/10/2018  Time: 6:39 PM

## 2018-08-10 NOTE — ED NOTES
Pt reports pain is back to 10 out of 10. Dr. Eliza Riley notified. Dr. Eliza Riley calls Dr. Keyona Soto, urologist. Pt agrees to be transferred to [facility].       Alvarez Coon RN  08/10/18 2159

## 2018-08-10 NOTE — ANESTHESIA POSTPROCEDURE EVALUATION
Department of Anesthesiology  Postprocedure Note    Patient: Kathy Wilson  MRN: [id number]  YOB: 1965  Date of evaluation: 8/10/2018  Time:  7:10 PM     Procedure Summary     Date:  08/10/18 Room / Location:  [facility]    Anesthesia Start:  [place] 36 Anesthesia Stop:  1848    Procedure:  CYSTOSCOPY STENT INSERTION (Right ) Diagnosis:  (RENAL CALCULUS)    Surgeon:  Lavaun Barthel, MD Responsible Provider:  Ferna Osler, APRN - CRNA    Anesthesia Type:  general ASA Status:  3          Anesthesia Type: general    Philip Phase I: Philip Score: 10    Philip Phase II:      Last vitals: Reviewed and per EMR flowsheets.        Anesthesia Post Evaluation    Patient location during evaluation: PACU  Patient participation: complete - patient participated  Level of consciousness: awake and alert  Pain score: 0  Airway patency: patent  Nausea & Vomiting: no vomiting and no nausea  Complications: no  Cardiovascular status: hemodynamically stable  Respiratory status: room air and spontaneous ventilation  Hydration status: stable

## 2018-08-11 LAB
CULTURE: ABNORMAL
Lab: ABNORMAL
ORGANISM: ABNORMAL
SPECIMEN DESCRIPTION: ABNORMAL
STATUS: ABNORMAL

## 2018-08-11 NOTE — OP NOTE
[address] [address]                                 OPERATIVE REPORT    PATIENT NAME: COLTEN Beckett                    :        1965  MED REC NO:   [id number]                              ROOM:  ACCOUNT NO:   [de-identified]                           ADMIT DATE: 08/10/2018  PROVIDER:     Toya Chirinos    DATE OF PROCEDURE:  08/10/2018    SURGEON:  Dr. Toya Chirinos. ASSISTANT:  None. PREOPERATIVE DIAGNOSIS:  Right ureteral calculus. POSTOPERATIVE DIAGNOSIS:  Right ureteral calculus. PROCEDURE PERFORMED:  Cystoscopy and right ureteral stent placement. ANESTHESIA:  General.    COMPLICATIONS:  None. ESTIMATED BLOOD LOSS:  Minimal.    SPECIMENS:  None. PROSTHESIS:  A 6-[place] 24 cm JJ ureteral stent. DISPOSITION:  Stable. FINDINGS:  Right ureteral obstruction. INDICATIONS:  The patient is a 70-year-old female with CT-proven distal  right ureteral calculus, did get a 12-hour trial of medical expulsive  therapy, could not pass the stone, here now for temporizing therapy in the  form of ureteral stent placement. DESCRIPTION OF PROCEDURE:  The patient was taken back to the operating  room. After informed consent including all risks, benefits, and  alternatives were obtained, the patient was transferred from [facility] onto  the operating room table where she was induced under general anesthesia. She was given IV Cipro for preoperative antibiotic prophylaxis. To begin  the case, she was prepped and draped in normal sterile fashion, placed in  dorsal lithotomy. She had a 22-[place] sheath with 30-degree lens passed  through the urethra into the bladder. Once in the bladder, we identified  the right ureteral orifice. The patient then had a 0.035-inch wire was  passed up the ureter into the kidney, confirmed by fluoroscopy.   We then  were able to place a 6-[place] 24-cm JJ ureteral stent over

## 2018-08-13 ENCOUNTER — TELEPHONE (OUTPATIENT)
Dept: UROLOGY | Age: 53
End: 2018-08-13

## 2018-08-13 RX ORDER — SULFAMETHOXAZOLE AND TRIMETHOPRIM 800; 160 MG/1; MG/1
1 TABLET ORAL 2 TIMES DAILY
Qty: 20 TABLET | Refills: 0 | Status: SHIPPED | OUTPATIENT
Start: 2018-08-13 | End: 2018-08-23

## 2018-08-13 NOTE — PROGRESS NOTES
Patient instructed on the pre-operative, intra-operative, and post-operative process. Patient's surgery arrival time to the hospital and surgery start time confirmed for the day of surgery. Patient instructed on NPO status. Medication instructions and pre operative instruction sheet reviewed over the phone. Instructed pt to stop taking all non prescription vitamins, aspirin, mobic, naproxen, and ibuprofen as of 8-14-18. Instructed pt to take metoprolol and Prilosec with a small sip of water prior to arriving to the hospital the day of surgery. Informed pt if her surgery remains at 1530 on 8-21-18 she may have watetr only until 0800 on 8-21-18 and then remain NPO after 0800. Pt verbalizes understanding of NPO instructions.

## 2018-08-13 NOTE — TELEPHONE ENCOUNTER
Called patient and informed her that she is scheduled for HLL on 8/21. She stated that she never received RX for antibiotic when in hospital @ [facility].

## 2018-08-14 ENCOUNTER — TELEPHONE (OUTPATIENT)
Dept: FAMILY MEDICINE CLINIC | Age: 53
End: 2018-08-14

## 2018-08-14 NOTE — TELEPHONE ENCOUNTER
[facility] Transitions Initial Follow Up Call    Outreach made within 2 business days of discharge: Yes    Patient: Brabara Acosta Patient : 1965   MRN: [id number]  Reason for Admission: There are no discharge diagnoses documented for the most recent discharge. Discharge Date: 8/10/18       Spoke with: Adiel Wilkins     Discharge department/facility: [facility]     TCM Interactive Patient Contact:  Was patient able to fill all prescriptions: Yes  Was patient instructed to bring all medications to the follow-up visit: Yes  Is patient taking all medications as directed in the discharge summary?  Yes  Does patient understand their discharge instructions: Yes  Does patient have questions or concerns that need addressed prior to 7-14 day follow up office visit: no    Scheduled appointment with PCP within 7-14 days    Follow Up  Future Appointments  Date Time Provider Tez Montemayor   2018 4:30 PM MD Tye Ambrose [facility]   10/22/2018 6:00 PM ELIGIO Hayward CNP [facility]   2018 9:30 AM [address] [place] [address]       Liudmila Sosa LPN

## 2018-08-15 NOTE — DISCHARGE SUMMARY
Fatty Acids (FISH OIL) 1000 MG CAPS  Take 1,000 mg by mouth daily              omeprazole (PRILOSEC) 20 MG delayed release capsule  TAKE 1 CAPSULE DAILY             rOPINIRole (REQUIP) 1 MG tablet  Take 1 tablet by mouth nightly Restless legs             spironolactone (ALDACTONE) 25 MG tablet  Take 1 tablet by mouth daily             vitamin D (CHOLECALCIFEROL) 1000 UNIT TABS tablet  Take 1,000 Units by mouth daily                  Hospital course: Pt admitted for pain contro. Pt had right ureteral stent placed. She tolerated PO and remained afebrile. She will f/u in one to two weeks for definitive stone tx.     Claudia Woodson  8:53 AM 8/15/2018

## 2018-08-20 ENCOUNTER — ANESTHESIA EVENT (OUTPATIENT)
Dept: OPERATING ROOM | Age: 53
End: 2018-08-20
Payer: COMMERCIAL

## 2018-08-21 ENCOUNTER — ANESTHESIA (OUTPATIENT)
Dept: OPERATING ROOM | Age: 53
End: 2018-08-21
Payer: COMMERCIAL

## 2018-08-21 ENCOUNTER — HOSPITAL ENCOUNTER (OUTPATIENT)
Age: 53
Setting detail: OUTPATIENT SURGERY
Discharge: HOME | End: 2018-08-21
Attending: UROLOGY | Admitting: UROLOGY
Payer: COMMERCIAL

## 2018-08-21 ENCOUNTER — APPOINTMENT (OUTPATIENT)
Dept: GENERAL RADIOLOGY | Age: 53
End: 2018-08-21
Attending: UROLOGY
Payer: COMMERCIAL

## 2018-08-21 VITALS
TEMPERATURE: 95.6 F | SYSTOLIC BLOOD PRESSURE: 102 MMHG | RESPIRATION RATE: 14 BRPM | OXYGEN SATURATION: 97 % | DIASTOLIC BLOOD PRESSURE: 55 MMHG

## 2018-08-21 VITALS
OXYGEN SATURATION: 99 % | DIASTOLIC BLOOD PRESSURE: 76 MMHG | BODY MASS INDEX: 45.64 KG/M2 | HEIGHT: 62 IN | HEART RATE: 69 BPM | TEMPERATURE: 97 F | SYSTOLIC BLOOD PRESSURE: 133 MMHG | RESPIRATION RATE: 18 BRPM | WEIGHT: 248 LBS

## 2018-08-21 PROCEDURE — 6360000002 HC RX W HCPCS: Performed by: UROLOGY

## 2018-08-21 PROCEDURE — 7100000001 HC PACU RECOVERY - ADDTL 15 MIN: Performed by: UROLOGY

## 2018-08-21 PROCEDURE — C1769 GUIDE WIRE: HCPCS | Performed by: UROLOGY

## 2018-08-21 PROCEDURE — C2617 STENT, NON-COR, TEM W/O DEL: HCPCS | Performed by: UROLOGY

## 2018-08-21 PROCEDURE — 7100000011 HC PHASE II RECOVERY - ADDTL 15 MIN: Performed by: UROLOGY

## 2018-08-21 PROCEDURE — 7100000010 HC PHASE II RECOVERY - FIRST 15 MIN: Performed by: UROLOGY

## 2018-08-21 PROCEDURE — 2709999900 HC NON-CHARGEABLE SUPPLY: Performed by: UROLOGY

## 2018-08-21 PROCEDURE — 6360000002 HC RX W HCPCS: Performed by: NURSE ANESTHETIST, CERTIFIED REGISTERED

## 2018-08-21 PROCEDURE — 6370000000 HC RX 637 (ALT 250 FOR IP): Performed by: UROLOGY

## 2018-08-21 PROCEDURE — 3600000004 HC SURGERY LEVEL 4 BASE: Performed by: UROLOGY

## 2018-08-21 PROCEDURE — 3700000000 HC ANESTHESIA ATTENDED CARE: Performed by: UROLOGY

## 2018-08-21 PROCEDURE — 74018 RADEX ABDOMEN 1 VIEW: CPT

## 2018-08-21 PROCEDURE — 82365 CALCULUS SPECTROSCOPY: CPT

## 2018-08-21 PROCEDURE — 7100000000 HC PACU RECOVERY - FIRST 15 MIN: Performed by: UROLOGY

## 2018-08-21 PROCEDURE — 3600000014 HC SURGERY LEVEL 4 ADDTL 15MIN: Performed by: UROLOGY

## 2018-08-21 PROCEDURE — 2500000003 HC RX 250 WO HCPCS: Performed by: NURSE ANESTHETIST, CERTIFIED REGISTERED

## 2018-08-21 PROCEDURE — 2580000003 HC RX 258: Performed by: UROLOGY

## 2018-08-21 PROCEDURE — 3700000001 HC ADD 15 MINUTES (ANESTHESIA): Performed by: UROLOGY

## 2018-08-21 PROCEDURE — C1773 RET DEV, INSERTABLE: HCPCS | Performed by: UROLOGY

## 2018-08-21 DEVICE — URETERAL STENT
Type: IMPLANTABLE DEVICE | Site: URETER | Status: FUNCTIONAL
Brand: PERCUFLEX™ PLUS

## 2018-08-21 RX ORDER — CIPROFLOXACIN 2 MG/ML
400 INJECTION, SOLUTION INTRAVENOUS
Status: COMPLETED | OUTPATIENT
Start: 2018-08-21 | End: 2018-08-21

## 2018-08-21 RX ORDER — SODIUM CHLORIDE, SODIUM LACTATE, POTASSIUM CHLORIDE, CALCIUM CHLORIDE 600; 310; 30; 20 MG/100ML; MG/100ML; MG/100ML; MG/100ML
INJECTION, SOLUTION INTRAVENOUS CONTINUOUS
Status: DISCONTINUED | OUTPATIENT
Start: 2018-08-21 | End: 2018-08-21 | Stop reason: HOSPADM

## 2018-08-21 RX ORDER — ONDANSETRON 4 MG/1
4 TABLET, ORALLY DISINTEGRATING ORAL ONCE
Status: COMPLETED | OUTPATIENT
Start: 2018-08-21 | End: 2018-08-21

## 2018-08-21 RX ORDER — DIMENHYDRINATE 50 MG/1
50 TABLET ORAL ONCE
Status: COMPLETED | OUTPATIENT
Start: 2018-08-21 | End: 2018-08-21

## 2018-08-21 RX ORDER — CIPROFLOXACIN 2 MG/ML
INJECTION, SOLUTION INTRAVENOUS PRN
Status: DISCONTINUED | OUTPATIENT
Start: 2018-08-21 | End: 2018-08-21 | Stop reason: SDUPTHER

## 2018-08-21 RX ORDER — FENTANYL CITRATE 50 UG/ML
INJECTION, SOLUTION INTRAMUSCULAR; INTRAVENOUS PRN
Status: DISCONTINUED | OUTPATIENT
Start: 2018-08-21 | End: 2018-08-21 | Stop reason: SDUPTHER

## 2018-08-21 RX ORDER — ACETAMINOPHEN 325 MG/1
650 TABLET ORAL ONCE
Status: COMPLETED | OUTPATIENT
Start: 2018-08-21 | End: 2018-08-21

## 2018-08-21 RX ORDER — PROPOFOL 10 MG/ML
INJECTION, EMULSION INTRAVENOUS PRN
Status: DISCONTINUED | OUTPATIENT
Start: 2018-08-21 | End: 2018-08-21 | Stop reason: SDUPTHER

## 2018-08-21 RX ORDER — EPHEDRINE SULFATE 50 MG/ML
INJECTION, SOLUTION INTRAVENOUS PRN
Status: DISCONTINUED | OUTPATIENT
Start: 2018-08-21 | End: 2018-08-21 | Stop reason: SDUPTHER

## 2018-08-21 RX ADMIN — DIMENHYDRINATE 50 MG: 50 TABLET ORAL at 11:21

## 2018-08-21 RX ADMIN — CIPROFLOXACIN 400 MG: 2 INJECTION, SOLUTION INTRAVENOUS at 11:37

## 2018-08-21 RX ADMIN — ONDANSETRON 4 MG: 4 TABLET, ORALLY DISINTEGRATING ORAL at 11:21

## 2018-08-21 RX ADMIN — FENTANYL CITRATE 50 MCG: 50 INJECTION INTRAMUSCULAR; INTRAVENOUS at 11:43

## 2018-08-21 RX ADMIN — SODIUM CHLORIDE, POTASSIUM CHLORIDE, SODIUM LACTATE AND CALCIUM CHLORIDE: 600; 310; 30; 20 INJECTION, SOLUTION INTRAVENOUS at 11:23

## 2018-08-21 RX ADMIN — ACETAMINOPHEN 650 MG: 325 TABLET ORAL at 11:21

## 2018-08-21 RX ADMIN — PROPOFOL 50 MG: 10 INJECTION, EMULSION INTRAVENOUS at 12:10

## 2018-08-21 RX ADMIN — CIPROFLOXACIN 400 MG: 2 INJECTION, SOLUTION INTRAVENOUS at 11:40

## 2018-08-21 RX ADMIN — PROPOFOL 150 MG: 10 INJECTION, EMULSION INTRAVENOUS at 11:43

## 2018-08-21 RX ADMIN — EPHEDRINE SULFATE 10 MG: 50 INJECTION INTRAMUSCULAR; INTRAVENOUS; SUBCUTANEOUS at 11:58

## 2018-08-21 ASSESSMENT — PULMONARY FUNCTION TESTS
PIF_VALUE: 2
PIF_VALUE: 14
PIF_VALUE: 14
PIF_VALUE: 2
PIF_VALUE: 2
PIF_VALUE: 4
PIF_VALUE: 4
PIF_VALUE: 14
PIF_VALUE: 2
PIF_VALUE: 2
PIF_VALUE: 14
PIF_VALUE: 3
PIF_VALUE: 2
PIF_VALUE: 1
PIF_VALUE: 15
PIF_VALUE: 13
PIF_VALUE: 15
PIF_VALUE: 14
PIF_VALUE: 21
PIF_VALUE: 15
PIF_VALUE: 13
PIF_VALUE: 15
PIF_VALUE: 3
PIF_VALUE: 16
PIF_VALUE: 15
PIF_VALUE: 14
PIF_VALUE: 13
PIF_VALUE: 0
PIF_VALUE: 3
PIF_VALUE: 2
PIF_VALUE: 14
PIF_VALUE: 0
PIF_VALUE: 15

## 2018-08-21 ASSESSMENT — PAIN DESCRIPTION - DESCRIPTORS
DESCRIPTORS: BURNING
DESCRIPTORS: ACHING

## 2018-08-21 ASSESSMENT — PAIN SCALES - GENERAL
PAINLEVEL_OUTOF10: 0
PAINLEVEL_OUTOF10: 2
PAINLEVEL_OUTOF10: 0
PAINLEVEL_OUTOF10: 0

## 2018-08-21 ASSESSMENT — ENCOUNTER SYMPTOMS: SHORTNESS OF BREATH: 1

## 2018-08-21 ASSESSMENT — PAIN - FUNCTIONAL ASSESSMENT: PAIN_FUNCTIONAL_ASSESSMENT: 0-10

## 2018-08-21 ASSESSMENT — PAIN DESCRIPTION - PAIN TYPE: TYPE: SURGICAL PAIN

## 2018-08-21 NOTE — ANESTHESIA PRE PROCEDURE
POCGLU, POCNA, POCK, POCCL, POCBUN, POCHEMO, POCHCT in the last 72 hours. Coags:   Lab Results   Component Value Date    PROTIME 9.4 03/14/2014    INR 0.9 03/14/2014    APTT 25.9 03/14/2014       HCG (If Applicable): No results found for: PREGTESTUR, PREGSERUM, HCG, HCGQUANT     ABGs: No results found for: PHART, PO2ART, BTA6QZS, LJW9YNT, BEART, A8FAGCUN     Type & Screen (If Applicable):  No results found for: [facility], [address]    Anesthesia Evaluation  Patient summary reviewed and Nursing notes reviewed no history of anesthetic complications:   Airway: Mallampati: II  TM distance: >3 FB   Neck ROM: full  Mouth opening: > = 3 FB Dental:          Pulmonary:normal exam    (+) shortness of breath: no interval change,  sleep apnea: on CPAP,                             Cardiovascular:  Exercise tolerance: no interval change,   (+) hypertension: no interval change,         Rhythm: regular  Rate: normal           Beta Blocker:  Dose within 24 Hrs         Neuro/Psych:   (+) headaches:,             GI/Hepatic/Renal:   (+) GERD: well controlled,           Endo/Other: Negative Endo/Other ROS                    Abdominal:   (+) obese,         Vascular:                                        Anesthesia Plan      general     ASA 3       Induction: intravenous. Anesthetic plan and risks discussed with patient. Plan discussed with CRNA.                   Ammon Barger   8/21/2018

## 2018-08-21 NOTE — ANESTHESIA POSTPROCEDURE EVALUATION
Department of Anesthesiology  Postprocedure Note    Patient: Makayla Mayorga  MRN: [id number]  YOB: 1965  Date of evaluation: 8/21/2018  Time:  2:18 PM     Procedure Summary     Date:  08/21/18 Room / Location:  [facility] OR    Anesthesia Start:  1140 Anesthesia Stop:  1227    Procedures:       CYSTOSCOPY URETEROSCOPY LASER-HLL (Right )      CYSTOSCOPY STENT INSERTION-EXCHANGE (Right ) Diagnosis:  (RIGHT URETHERAL CALCULUS)    Surgeon:  Lauri Zamarripa MD Responsible Provider:  Danilo Erwin    Anesthesia Type:  general ASA Status:  3          Anesthesia Type: general    Philip Phase I: Philip Score: 10    Philip Phase II:      Last vitals: Reviewed and per EMR flowsheets.        Anesthesia Post Evaluation    Patient location during evaluation: PACU  Patient participation: complete - patient participated  Level of consciousness: awake and alert  Pain score: 0  Airway patency: patent  Nausea & Vomiting: no nausea and no vomiting  Complications: no  Cardiovascular status: hemodynamically stable  Respiratory status: acceptable and spontaneous ventilation  Hydration status: stable

## 2018-08-23 ENCOUNTER — OFFICE VISIT (OUTPATIENT)
Dept: UROLOGY | Age: 53
End: 2018-08-23
Payer: COMMERCIAL

## 2018-08-23 VITALS
HEIGHT: 62 IN | SYSTOLIC BLOOD PRESSURE: 120 MMHG | TEMPERATURE: 97.3 F | BODY MASS INDEX: 43.98 KG/M2 | WEIGHT: 239 LBS | DIASTOLIC BLOOD PRESSURE: 84 MMHG

## 2018-08-23 DIAGNOSIS — N20.0 KIDNEY STONES: Primary | ICD-10-CM

## 2018-08-23 PROCEDURE — 99213 OFFICE O/P EST LOW 20 MIN: CPT | Performed by: UROLOGY

## 2018-08-23 ASSESSMENT — ENCOUNTER SYMPTOMS
COLOR CHANGE: 0
EYE REDNESS: 0
SHORTNESS OF BREATH: 0
ABDOMINAL PAIN: 0
VOMITING: 0
COUGH: 0
EYE PAIN: 0
BACK PAIN: 0
NAUSEA: 0
WHEEZING: 0

## 2018-08-23 NOTE — PROGRESS NOTES
Subjective:      Patient ID: Kaitlyn Tinoco is a 48 y.o. female. HPI  Patient is a 48 y.o. female in no acute distress. She is alert and oriented to person, place, and time. History  Referral from the ER for right ureteral stone. She did present to the ER on 7/27/17 with complaints of right flank pain and nausea. While at the ER she did have a CT scan did show a 3 mm bladder stone with right hydronephrosis and right hydroureter. She does have 2 additional punctate nonobstructing stones in the right kidney. There are no calcifications in the left. She states when she did leave the ER she did pass her stone. She did bring this with her to today's visit. Since being evaluated in the ER she has not experienced any pain, nausea or vomiting, fevers, dysuria, gross hematuria, frequency, or urgency. She denies any previous stone history. She has seen urology in the past for evaluation of stress incontinence. Her gynecologist did perform one treatment of urethral bulking. She did have a mid urethral sling placed 3 years ago. She has not had any new or worsening urinary incontinence since. Right HLL 8/21/2018    Currently  Pt is here today for stent string pull. Pt did have recent Holmium laser lithotripsy done on the right side. Patient did have her stent removed today without difficulty. She is experiencing some mild right flank pain after stent removal.  Patient is very minimal gross hematuria. Patient to see that she had some right-sided flank pain that started this morning before the stent was removed.   Mild pain, 3 of 10 currently    Past Medical History:   Diagnosis Date    Allergic rhinitis     Enlarged thyroid 2016    nodules check annually    GERD (gastroesophageal reflux disease)     Headache(784.0)     migranes at times    Hyperlipidemia     Hypertension     NEIL (obstructive sleep apnea) 7/8/2016    Restless legs 7/8/2016     Past Surgical History:   Procedure Laterality Date    BLADDER SURGERY  9/9/14    bladder sling    CHOLECYSTECTOMY      CYSTOSCOPY Right 08/10/2018    Dr Brennen Wilson- stent placement    HYSTERECTOMY      Total    LITHOTRIPSY Right 08/21/2018    stent placement    OTHER SURGICAL HISTORY  03-    Coaptite (Dr. Belle Laurent)    [place] CYSTO/URETERO W/LITHOTRIPSY &INDWELL STENT INSRT Right 8/21/2018    CYSTOSCOPY URETEROSCOPY LASER-HLL performed by Maxim Decker MD at [facility] &INDWELL STENT INSRT Right 8/21/2018    CYSTOSCOPY STENT [place] performed by Maxim Decker MD at [address] [facility] OFFICE/OUTPT [address] [facility] Right 8/10/2018    CYSTOSCOPY STENT INSERTION performed by Maxim Decker MD at [address] [place] [address]       Family History   Problem Relation Age of Onset    Heart Disease Mother     High Blood Pressure Father     Heart Disease Father     High Blood Pressure Sister     High Blood Pressure Brother     Cancer Sister         Breast     Current Outpatient Prescriptions on File Prior to Visit   Medication Sig Dispense Refill    sulfamethoxazole-trimethoprim (BACTRIM DS) 800-160 MG per tablet Take 1 tablet by mouth 2 times daily for 10 days 20 tablet 0    ibuprofen (ADVIL;MOTRIN) 600 MG tablet Take 1 tablet by mouth every 6 hours as needed for Pain 20 tablet 0    fluticasone (FLONASE) 50 MCG/ACT nasal spray 1 spray by Nasal route daily (Patient taking differently: 1 spray by Nasal route daily as needed ) 1 Bottle 3    spironolactone (ALDACTONE) 25 MG tablet Take 1 tablet by mouth daily 90 tablet 1    omeprazole (PRILOSEC) 20 MG delayed release capsule TAKE 1 CAPSULE DAILY 90 capsule 1    metoprolol tartrate (LOPRESSOR) 50 MG tablet Take 1 tablet by mouth 2 times daily 180 tablet 1    ezetimibe (ZETIA) 10 MG tablet TAKE 1 TABLET DAILY 90 tablet 1    meloxicam (MOBIC) 15 MG tablet Take 1 tablet by mouth daily For right knee osteoarthritis 90 tablet 1    rOPINIRole (REQUIP) 1 MG tablet Take 1 tablet by mouth nightly Restless legs 90 tablet 1    naproxen (NAPROSYN) 375 MG tablet Take 1 tablet by mouth 2 times daily (with meals) For flares of right knee pain (aware to hold meloxicam if this is used) 60 tablet 1    aspirin 81 MG EC tablet Take 81 mg by mouth Indications: once a day      Calcium Carb-Cholecalciferol (CALCIUM + D3 PO) Take 1,200 mg by mouth daily      vitamin D (CHOLECALCIFEROL) 1000 UNIT TABS tablet Take 1,000 Units by mouth daily       Omega-3 Fatty Acids (FISH OIL) 1000 MG CAPS Take 1,000 mg by mouth daily        No current facility-administered medications on file prior to visit.        Outpatient Encounter Prescriptions as of 8/23/2018   Medication Sig Dispense Refill    sulfamethoxazole-trimethoprim (BACTRIM DS) 800-160 MG per tablet Take 1 tablet by mouth 2 times daily for 10 days 20 tablet 0    ibuprofen (ADVIL;MOTRIN) 600 MG tablet Take 1 tablet by mouth every 6 hours as needed for Pain 20 tablet 0    fluticasone (FLONASE) 50 MCG/ACT nasal spray 1 spray by Nasal route daily (Patient taking differently: 1 spray by Nasal route daily as needed ) 1 Bottle 3    spironolactone (ALDACTONE) 25 MG tablet Take 1 tablet by mouth daily 90 tablet 1    omeprazole (PRILOSEC) 20 MG delayed release capsule TAKE 1 CAPSULE DAILY 90 capsule 1    metoprolol tartrate (LOPRESSOR) 50 MG tablet Take 1 tablet by mouth 2 times daily 180 tablet 1    ezetimibe (ZETIA) 10 MG tablet TAKE 1 TABLET DAILY 90 tablet 1    meloxicam (MOBIC) 15 MG tablet Take 1 tablet by mouth daily For right knee osteoarthritis 90 tablet 1    rOPINIRole (REQUIP) 1 MG tablet Take 1 tablet by mouth nightly Restless legs 90 tablet 1    naproxen (NAPROSYN) 375 MG tablet Take 1 tablet by mouth 2 times daily (with meals) For flares of right knee pain (aware to hold meloxicam if this is used) 60 tablet 1    aspirin 81 MG EC tablet Take 81 mg by mouth Indications: once a day      Calcium Carb-Cholecalciferol (CALCIUM + D3 PO) Take 1,200 mg by mouth daily      vitamin D (CHOLECALCIFEROL) 1000 UNIT TABS tablet Take 1,000 Units by mouth daily       Omega-3 Fatty Acids (FISH OIL) 1000 MG CAPS Take 1,000 mg by mouth daily        No facility-administered encounter medications on file as of 8/23/2018. Iv contrast [iodides]; Lisinopril; and Simvastatin  History   Smoking Status    Never Smoker   Smokeless Tobacco    Never Used     History   Alcohol Use No       There were no vitals filed for this visit. PHYSICAL EXAM:  Constitutional: Patient resting comfortably, in no acute distress. Neuro: Alert and oriented to person place and time. Cranial nerves grossly intact. Psych: Mood and affect normal.  Skin: Warm, dry  HEENT: normocephalic, atraumatic  Lymphatics: No palpable lymphadenopathy  Lungs: Respiratory effort normal, unlabored  Cardiovascular:  Normal peripheral pulses  Abdomen: Soft, non-tender, non-distended with no organomegaly or palpable masses. : No CVA tenderness. Bladder non-tender and not distended. Pelvic: deferred      No results found for this visit on 08/23/18. Lab Results   Component Value Date    BUN 18 08/10/2018     Lab Results   Component Value Date    CREATININE 0.63 08/10/2018     Review of Systems   Constitutional: Negative for appetite change, chills and fever. Eyes: Negative for pain, redness and visual disturbance. Respiratory: Negative for cough, shortness of breath and wheezing. Cardiovascular: Negative for chest pain and leg swelling. Gastrointestinal: Negative for abdominal pain, nausea and vomiting. Genitourinary: Negative for difficulty urinating, dysuria, flank pain, frequency, hematuria, pelvic pain, vaginal bleeding and vaginal discharge. Musculoskeletal: Negative for back pain, joint swelling and myalgias. Skin: Negative for color change, rash and wound. Neurological: Negative for dizziness, tremors and numbness. Hematological: Negative for adenopathy.  Does not bruise/bleed easily. Objective:   Physical Exam    Assessment: This is a 48 y.o. female with the following diagnoses:   Diagnosis Orders   1. Kidney stones  XR ABDOMEN (KUB) (SINGLE AP VIEW)         Plan: We will see her back in 6 weeks with a repeat KUB. We did touch upon stone prevention from a dietary standpoint today. She will obtain a KUB prior to returning. We did have her take ibuprofen for discomfort today.         Lisa Schaeffer MD

## 2018-08-23 NOTE — PROGRESS NOTES
Pt had ureteral stent placed on 08/21/2018 following Right HLL. Stent removed via string in office today without difficulty. Pt tolerated removal well.

## 2018-08-25 LAB
STONE COMPOSITION: NORMAL
STONE DESCRIPTION: NORMAL
STONE MASS: 13 MG
STONE NUMBER: 1
STONE SIZE: NORMAL MM

## 2018-09-05 DIAGNOSIS — M17.11 PRIMARY OSTEOARTHRITIS OF RIGHT KNEE: ICD-10-CM

## 2018-09-05 RX ORDER — NAPROXEN 375 MG/1
375 TABLET ORAL 2 TIMES DAILY WITH MEALS
Qty: 60 TABLET | Refills: 1 | Status: SHIPPED | OUTPATIENT
Start: 2018-09-05 | End: 2018-10-22 | Stop reason: SDUPTHER

## 2018-10-02 ENCOUNTER — HOSPITAL ENCOUNTER (OUTPATIENT)
Age: 53
Setting detail: SPECIMEN
Discharge: HOME | End: 2018-10-02
Payer: COMMERCIAL

## 2018-10-02 ENCOUNTER — TELEPHONE (OUTPATIENT)
Dept: UROLOGY | Age: 53
End: 2018-10-02

## 2018-10-02 ENCOUNTER — HOSPITAL ENCOUNTER (OUTPATIENT)
Dept: CT IMAGING | Age: 53
Discharge: HOME | End: 2018-10-04
Payer: COMMERCIAL

## 2018-10-02 DIAGNOSIS — N23 RENAL COLIC: ICD-10-CM

## 2018-10-02 DIAGNOSIS — N23 RENAL COLIC: Primary | ICD-10-CM

## 2018-10-02 LAB
-: ABNORMAL
AMORPHOUS: ABNORMAL
BACTERIA: ABNORMAL
BILIRUBIN URINE: NEGATIVE
CASTS UA: ABNORMAL /LPF
COLOR: YELLOW
COMMENT UA: ABNORMAL
CRYSTALS, UA: ABNORMAL /HPF
EPITHELIAL CELLS UA: ABNORMAL /HPF
GLUCOSE URINE: NEGATIVE
KETONES, URINE: NEGATIVE
LEUKOCYTE ESTERASE, URINE: NEGATIVE
MUCUS: ABNORMAL
NITRITE, URINE: NEGATIVE
OTHER OBSERVATIONS UA: ABNORMAL
PH UA: 6 (ref 5–8)
PROTEIN UA: ABNORMAL
RBC UA: ABNORMAL /HPF (ref 0–2)
RENAL EPITHELIAL, UA: ABNORMAL /HPF
SPECIFIC GRAVITY UA: 1.02 (ref 1–1.03)
TRICHOMONAS: ABNORMAL
TURBIDITY: CLEAR
URINE HGB: ABNORMAL
UROBILINOGEN, URINE: NORMAL
WBC UA: ABNORMAL /HPF
YEAST: ABNORMAL

## 2018-10-02 PROCEDURE — 81001 URINALYSIS AUTO W/SCOPE: CPT

## 2018-10-02 PROCEDURE — 74176 CT ABD & PELVIS W/O CONTRAST: CPT

## 2018-10-02 PROCEDURE — 87086 URINE CULTURE/COLONY COUNT: CPT

## 2018-10-02 RX ORDER — TAMSULOSIN HYDROCHLORIDE 0.4 MG/1
0.4 CAPSULE ORAL EVERY EVENING
Qty: 30 CAPSULE | Refills: 1 | Status: SHIPPED | OUTPATIENT
Start: 2018-10-02 | End: 2019-03-15 | Stop reason: ALTCHOICE

## 2018-10-02 NOTE — TELEPHONE ENCOUNTER
Patient contacted the office this afternoon stating she had the CT performed today at 12:30pm and has since passed her stone. She did mention she was able to save the stone and will bring to the appointment on Thursday 10/04/2018.

## 2018-10-02 NOTE — TELEPHONE ENCOUNTER
Called patient and informed her of response and all instructions.   Patient stated she does not have RX for Flomax and would like sent to [facility]

## 2018-10-03 LAB
CULTURE: NORMAL
Lab: NORMAL
SPECIMEN DESCRIPTION: NORMAL
STATUS: NORMAL

## 2018-10-04 ENCOUNTER — HOSPITAL ENCOUNTER (OUTPATIENT)
Age: 53
Setting detail: SPECIMEN
Discharge: HOME | End: 2018-10-04
Payer: COMMERCIAL

## 2018-10-04 ENCOUNTER — OFFICE VISIT (OUTPATIENT)
Dept: UROLOGY | Age: 53
End: 2018-10-04
Payer: COMMERCIAL

## 2018-10-04 VITALS
DIASTOLIC BLOOD PRESSURE: 84 MMHG | WEIGHT: 250 LBS | HEIGHT: 62 IN | SYSTOLIC BLOOD PRESSURE: 152 MMHG | BODY MASS INDEX: 46.01 KG/M2

## 2018-10-04 DIAGNOSIS — N13.2 URETERAL STONE WITH HYDRONEPHROSIS: ICD-10-CM

## 2018-10-04 DIAGNOSIS — N20.0 KIDNEY STONES: Primary | ICD-10-CM

## 2018-10-04 PROCEDURE — 99213 OFFICE O/P EST LOW 20 MIN: CPT | Performed by: UROLOGY

## 2018-10-04 PROCEDURE — 82365 CALCULUS SPECTROSCOPY: CPT

## 2018-10-04 ASSESSMENT — ENCOUNTER SYMPTOMS
EYE REDNESS: 0
COUGH: 0
BACK PAIN: 0
NAUSEA: 0
VOMITING: 0
COLOR CHANGE: 0
WHEEZING: 0
ABDOMINAL PAIN: 0
SHORTNESS OF BREATH: 0
EYE PAIN: 0

## 2018-10-04 NOTE — PROGRESS NOTES
7/8/2016     Past Surgical History:   Procedure Laterality Date    BLADDER SURGERY  9/9/14    bladder sling    CHOLECYSTECTOMY      CYSTOSCOPY Right 08/10/2018    Dr Herbie Allen- stent placement    HYSTERECTOMY      Total    LITHOTRIPSY Right 08/21/2018    stent placement    OTHER SURGICAL HISTORY  03-    Coaptite (Dr. Cherri Shelton)    [place] CYSTO/URETERO W/LITHOTRIPSY &INDWELL STENT INSRT Right 8/21/2018    CYSTOSCOPY URETEROSCOPY LASER-HLL performed by Claudia Barnes MD at [facility] &INDWELL STENT INSRT Right 8/21/2018    CYSTOSCOPY STENT [place] performed by Claudia Barnes MD at [address] [facility] OFFICE/OUTPT [address] [facility] Right 8/10/2018    CYSTOSCOPY STENT INSERTION performed by Claudia Barnes MD at [facility]       Family History   Problem Relation Age of Onset    Heart Disease Mother     High Blood Pressure Father     Heart Disease Father     High Blood Pressure Sister     High Blood Pressure Brother     Cancer Sister         Breast     Current Outpatient Prescriptions on File Prior to Visit   Medication Sig Dispense Refill    tamsulosin (FLOMAX) 0.4 MG capsule Take 1 capsule by mouth every evening 30 capsule 1    naproxen (NAPROSYN) 375 MG tablet Take 1 tablet by mouth 2 times daily (with meals) For flares of right knee pain (aware to hold meloxicam if this is used) 60 tablet 1    ibuprofen (ADVIL;MOTRIN) 600 MG tablet Take 1 tablet by mouth every 6 hours as needed for Pain 20 tablet 0    fluticasone (FLONASE) 50 MCG/ACT nasal spray 1 spray by Nasal route daily (Patient taking differently: 1 spray by Nasal route daily as needed ) 1 Bottle 3    spironolactone (ALDACTONE) 25 MG tablet Take 1 tablet by mouth daily 90 tablet 1    omeprazole (PRILOSEC) 20 MG delayed release capsule TAKE 1 CAPSULE DAILY 90 capsule 1    metoprolol tartrate (LOPRESSOR) 50 MG tablet Take 1 tablet by mouth 2 times daily 180 tablet 1    ezetimibe mg by mouth daily      vitamin D (CHOLECALCIFEROL) 1000 UNIT TABS tablet Take 1,000 Units by mouth daily       Omega-3 Fatty Acids (FISH OIL) 1000 MG CAPS Take 1,000 mg by mouth daily        No facility-administered encounter medications on file as of 10/4/2018. Iv contrast [iodides]; Lisinopril; and Simvastatin  History   Smoking Status    Never Smoker   Smokeless Tobacco    Never Used     History   Alcohol Use No       Vitals:    10/04/18 1557   BP: (!) 158/85     PHYSICAL EXAM:  Constitutional: Patient resting comfortably, in no acute distress. Neuro: Alert and oriented to person place and time. Cranial nerves grossly intact. Psych: Mood and affect normal.  Skin: Warm, dry  HEENT: normocephalic, atraumatic  Lymphatics: No palpable lymphadenopathy  Lungs: Respiratory effort normal, unlabored  Cardiovascular:  Normal peripheral pulses  Abdomen: Soft, non-tender, non-distended with no organomegaly or palpable masses. : No CVA tenderness. Bladder non-tender and not distended. Pelvic: deferred      No results found for this visit on 10/04/18. Lab Results   Component Value Date    BUN 18 08/10/2018     Lab Results   Component Value Date    CREATININE 0.63 08/10/2018     Review of Systems   Constitutional: Negative for appetite change, chills and fever. Eyes: Negative for pain, redness and visual disturbance. Respiratory: Negative for cough, shortness of breath and wheezing. Cardiovascular: Negative for chest pain and leg swelling. Gastrointestinal: Negative for abdominal pain, nausea and vomiting. Genitourinary: Negative for difficulty urinating, dysuria, flank pain, frequency, hematuria, pelvic pain, vaginal bleeding and vaginal discharge. Musculoskeletal: Negative for back pain, joint swelling and myalgias. Skin: Negative for color change, rash and wound. Neurological: Negative for dizziness, tremors and numbness. Hematological: Negative for adenopathy.  Does not bruise/bleed

## 2018-10-05 ENCOUNTER — TELEPHONE (OUTPATIENT)
Dept: UROLOGY | Age: 53
End: 2018-10-05

## 2018-10-10 LAB
STONE COMPOSITION: NORMAL
STONE DESCRIPTION: NORMAL
STONE MASS: 14 MG
STONE NUMBER: 1
STONE SIZE: NORMAL MM

## 2018-10-22 ENCOUNTER — OFFICE VISIT (OUTPATIENT)
Dept: FAMILY MEDICINE CLINIC | Age: 53
End: 2018-10-22
Payer: COMMERCIAL

## 2018-10-22 VITALS
SYSTOLIC BLOOD PRESSURE: 126 MMHG | OXYGEN SATURATION: 97 % | DIASTOLIC BLOOD PRESSURE: 74 MMHG | HEIGHT: 62 IN | BODY MASS INDEX: 45.64 KG/M2 | HEART RATE: 70 BPM | WEIGHT: 248 LBS

## 2018-10-22 DIAGNOSIS — I10 ESSENTIAL HYPERTENSION: ICD-10-CM

## 2018-10-22 DIAGNOSIS — E78.2 MIXED HYPERLIPIDEMIA: ICD-10-CM

## 2018-10-22 DIAGNOSIS — M17.11 PRIMARY OSTEOARTHRITIS OF RIGHT KNEE: ICD-10-CM

## 2018-10-22 DIAGNOSIS — K21.9 GASTROESOPHAGEAL REFLUX DISEASE WITHOUT ESOPHAGITIS: ICD-10-CM

## 2018-10-22 PROCEDURE — 99214 OFFICE O/P EST MOD 30 MIN: CPT | Performed by: NURSE PRACTITIONER

## 2018-10-22 RX ORDER — SPIRONOLACTONE 25 MG/1
25 TABLET ORAL DAILY
Qty: 90 TABLET | Refills: 1 | Status: SHIPPED | OUTPATIENT
Start: 2018-10-22 | End: 2019-04-15 | Stop reason: SDUPTHER

## 2018-10-22 RX ORDER — METOPROLOL TARTRATE 50 MG/1
50 TABLET, FILM COATED ORAL 2 TIMES DAILY
Qty: 180 TABLET | Refills: 1 | Status: SHIPPED | OUTPATIENT
Start: 2018-10-22 | End: 2019-04-15 | Stop reason: SDUPTHER

## 2018-10-22 RX ORDER — OMEPRAZOLE 20 MG/1
CAPSULE, DELAYED RELEASE ORAL
Qty: 90 CAPSULE | Refills: 1 | Status: SHIPPED | OUTPATIENT
Start: 2018-10-22 | End: 2019-04-15 | Stop reason: SDUPTHER

## 2018-10-22 RX ORDER — NAPROXEN 375 MG/1
375 TABLET ORAL 2 TIMES DAILY WITH MEALS
Qty: 180 TABLET | Refills: 1 | Status: SHIPPED | OUTPATIENT
Start: 2018-10-22 | End: 2019-02-28 | Stop reason: SDUPTHER

## 2018-10-22 RX ORDER — EZETIMIBE 10 MG/1
TABLET ORAL
Qty: 90 TABLET | Refills: 1 | Status: SHIPPED | OUTPATIENT
Start: 2018-10-22 | End: 2019-04-15 | Stop reason: SDUPTHER

## 2018-10-22 RX ORDER — ROPINIROLE 1 MG/1
1 TABLET, FILM COATED ORAL NIGHTLY
Qty: 90 TABLET | Refills: 1 | Status: SHIPPED | OUTPATIENT
Start: 2018-10-22 | End: 2019-04-15 | Stop reason: SDUPTHER

## 2018-10-22 ASSESSMENT — PATIENT HEALTH QUESTIONNAIRE - PHQ9
SUM OF ALL RESPONSES TO PHQ QUESTIONS 1-9: 0
1. LITTLE INTEREST OR PLEASURE IN DOING THINGS: 0
SUM OF ALL RESPONSES TO PHQ9 QUESTIONS 1 & 2: 0
SUM OF ALL RESPONSES TO PHQ QUESTIONS 1-9: 0
2. FEELING DOWN, DEPRESSED OR HOPELESS: 0

## 2018-10-22 NOTE — PROGRESS NOTES
[place] [id number]  Dept: [phone]  Dept Fax: [phone]    Last encounter Visit date not found    HPI:   Ericka Trevino is a 48 y.o. female who presentstoday for her medical conditions/complaints as noted below. Ericka Trevino is c/o of Diabetes (Has been doing ok)      HPI    48year old female with hx hyperlipidemia, hypertension, chronic elevated CPK, kidney stones with procedure since last visit. NEIL  RLS    Vaccine-Tdap   Influenza vaccine at work will be done. zetia for hypercholesterol due to elevated liver enzymes and CPK elevation chronically. Lab Results   Component Value Date    LDLCALC 158 05/03/2018    LDLCHOLESTEROL 154 (H) 10/18/2017     Knees with bilateral pain but also has in shoulders and other joints too feels naproxyn works better than mobic would like to continue daily up to twice a day use, continues on PPI. Hypertension well controlled  Leg cramps managed well with higher dose requip. Due to kidney stones make sure pt is not taking calcium and vitamin D supplement. Last DEXA good no osteoporosis. These supplements can increase risk of kidney stones. Mammogram up to date.      Past Medical History:   Diagnosis Date    Allergic rhinitis     Enlarged thyroid 2016    nodules check annually    GERD (gastroesophageal reflux disease)     Headache(784.0)     migranes at times    Hyperlipidemia     Hypertension     NEIL (obstructive sleep apnea) 7/8/2016    Restless legs 7/8/2016      Past Surgical History:   Procedure Laterality Date    BLADDER SURGERY  9/9/14    bladder sling    CHOLECYSTECTOMY      CYSTOSCOPY Right 08/10/2018    Dr Jose J Javed- stent placement    HYSTERECTOMY      Total    LITHOTRIPSY Right 08/21/2018    stent placement    OTHER SURGICAL HISTORY  03-    Coaptite (Dr. Hernán Inman)    [place] CYSTO/URETERO W/LITHOTRIPSY &INDWELL STENT INSRT Right 8/21/2018    CYSTOSCOPY tablet Take 1,000 Units by mouth daily       Omega-3 Fatty Acids (FISH OIL) 1000 MG CAPS Take 1,000 mg by mouth daily        No current facility-administered medications for this visit. Allergies   Allergen Reactions    Iv Contrast [Iodides] Swelling    Lisinopril Swelling     Swelling of upper lip    Simvastatin Other (See Comments)     Elevated CK/CKMB. Health Maintenance   Topic Date Due    Hepatitis C screen  1965    HIV screen  02/28/1980    DTaP/Tdap/Td vaccine (1 - Tdap) 02/28/1984    Shingles Vaccine (1 of 2 - 2 Dose Series) 02/28/2015    Cervical cancer screen  11/25/2016    Flu vaccine (1) 09/01/2018    Potassium monitoring  08/10/2019    Creatinine monitoring  08/10/2019    Breast cancer screen  05/04/2020    Diabetes screen  10/18/2020    Colon cancer screen colonoscopy  10/21/2021    Lipid screen  05/03/2023       Subjective:      Review of Systems   Constitutional: Negative for activity change, appetite change and fever. HENT: Negative for congestion, postnasal drip, sore throat and voice change. Eyes: Negative. Respiratory: Negative for chest tightness and shortness of breath. Cardiovascular: Negative for chest pain. Gastrointestinal: Negative for abdominal distention. Genitourinary: Negative for difficulty urinating. Musculoskeletal: Positive for arthralgias (knees , shoulder). Neurological: Negative for dizziness and headaches. Objective:     Physical Exam   Constitutional: She is oriented to person, place, and time. She appears well-developed and well-nourished. No distress. HENT:   Head: Normocephalic and atraumatic. Right Ear: External ear normal.   Left Ear: External ear normal.   Mouth/Throat: Oropharynx is clear and moist.   Eyes: Pupils are equal, round, and reactive to light. EOM are normal. No scleral icterus. Neck: Normal range of motion. Neck supple. Cardiovascular: Normal rate, regular rhythm and normal heart sounds.     No Sig: Take 1 tablet by mouth 2 times daily (with meals) For R knee pain       All patient questions answered. Patient voiced understanding. Quality Measures    Body mass index is 45.36 kg/m². Elevated. Weight control planned discussed Healthy diet and regular exercise. BP: 126/74 Blood pressure is normal. Treatment plan consists of Weight Reduction, DASH Eating Plan, Dietary Sodium Restriction and No treatment change needed.     Lab Results   Component Value Date    LDLCALC 158 05/03/2018    LDLCHOLESTEROL 154 (H) 10/18/2017    (goal LDL reduction with dx if diabetes is 50% LDL reduction)      PHQ Scores 10/22/2018 10/23/2017   PHQ2 Score 0 0   PHQ9 Score 0 0     Interpretation of Total Score Depression Severity: 1-4 = Minimal depression, 5-9 = Mild depression, 10-14 = Moderate depression, 15-19 = Moderately severe depression, 20-27 = Severe depression         Electronically signed by ELIGIO Vergara CNP on 10/23/2018 at 11:18 PM

## 2018-10-23 ASSESSMENT — ENCOUNTER SYMPTOMS
EYES NEGATIVE: 1
ABDOMINAL DISTENTION: 0
VOICE CHANGE: 0
SORE THROAT: 0
SHORTNESS OF BREATH: 0
CHEST TIGHTNESS: 0

## 2018-12-12 ENCOUNTER — HOSPITAL ENCOUNTER (OUTPATIENT)
Dept: ULTRASOUND IMAGING | Age: 53
Discharge: HOME | End: 2018-12-14
Payer: COMMERCIAL

## 2018-12-12 DIAGNOSIS — E04.1 THYROID NODULE: ICD-10-CM

## 2018-12-12 PROCEDURE — 76536 US EXAM OF HEAD AND NECK: CPT

## 2018-12-26 ENCOUNTER — OFFICE VISIT (OUTPATIENT)
Dept: PRIMARY CARE CLINIC | Age: 53
End: 2018-12-26
Payer: COMMERCIAL

## 2018-12-26 VITALS
TEMPERATURE: 98.1 F | SYSTOLIC BLOOD PRESSURE: 121 MMHG | OXYGEN SATURATION: 97 % | HEART RATE: 75 BPM | DIASTOLIC BLOOD PRESSURE: 78 MMHG | WEIGHT: 245 LBS | BODY MASS INDEX: 44.81 KG/M2

## 2018-12-26 DIAGNOSIS — K52.9 GASTROENTERITIS, ACUTE: ICD-10-CM

## 2018-12-26 DIAGNOSIS — J02.9 SORE THROAT: ICD-10-CM

## 2018-12-26 DIAGNOSIS — J06.9 UPPER RESPIRATORY INFECTION, ACUTE: Primary | ICD-10-CM

## 2018-12-26 LAB — S PYO AG THROAT QL: NORMAL

## 2018-12-26 PROCEDURE — 99213 OFFICE O/P EST LOW 20 MIN: CPT | Performed by: NURSE PRACTITIONER

## 2018-12-26 PROCEDURE — 87880 STREP A ASSAY W/OPTIC: CPT | Performed by: NURSE PRACTITIONER

## 2018-12-26 RX ORDER — BENZONATATE 100 MG/1
100 CAPSULE ORAL 3 TIMES DAILY PRN
Qty: 21 CAPSULE | Refills: 0 | Status: SHIPPED | OUTPATIENT
Start: 2018-12-26 | End: 2019-01-02

## 2018-12-26 ASSESSMENT — ENCOUNTER SYMPTOMS
WHEEZING: 0
SINUS PRESSURE: 0
SORE THROAT: 1
SHORTNESS OF BREATH: 0
RHINORRHEA: 1
VOMITING: 1
NAUSEA: 1
SINUS PAIN: 0
COUGH: 1
DIARRHEA: 1

## 2018-12-26 NOTE — PROGRESS NOTES
place, and time. Vital signs are normal. She appears well-developed and well-nourished. She is cooperative. She does not appear ill. No distress. HENT:   Head: Normocephalic and atraumatic. Right Ear: Hearing, tympanic membrane, external ear and ear canal normal.   Left Ear: Hearing, tympanic membrane, external ear and ear canal normal.   Nose: Mucosal edema and rhinorrhea present. Right sinus exhibits no maxillary sinus tenderness and no frontal sinus tenderness. Left sinus exhibits no maxillary sinus tenderness and no frontal sinus tenderness. Mouth/Throat: Uvula is midline and mucous membranes are normal. Posterior oropharyngeal erythema present. No oropharyngeal exudate, posterior oropharyngeal edema or tonsillar abscesses. Eyes: Pupils are equal, round, and reactive to light. Conjunctivae are normal.   Cardiovascular: Normal rate, regular rhythm, S1 normal, S2 normal, normal heart sounds and intact distal pulses. Exam reveals no gallop and no friction rub. No murmur heard. Pulmonary/Chest: Effort normal and breath sounds normal. No accessory muscle usage. No respiratory distress. She has no decreased breath sounds. She has no wheezes. She has no rhonchi. She has no rales. Abdominal: Soft. Normal appearance. Bowel sounds are increased. There is no tenderness. Musculoskeletal: Normal range of motion. Lymphadenopathy:     She has no cervical adenopathy. Right cervical: No superficial cervical and no posterior cervical adenopathy present. Left cervical: No superficial cervical and no posterior cervical adenopathy present. Neurological: She is alert and oriented to person, place, and time. Skin: Skin is warm and dry. No rash noted. She is not diaphoretic. No erythema. No pallor. Psychiatric: She has a normal mood and affect. Her behavior is normal.   Nursing note and vitals reviewed.     /78 (Site: Left Upper Arm, Position: Sitting, Cuff Size: Large Adult)   Pulse 75 next 24 hours, broths, jello and oral rehydration solutions (Pedialyte). Avoid spicy, hot or high acid beverages or foods. Increase diet as tolerated, start with bland diet, dry toast, bananas, rice and applesauce and take smaller portions. · Meticulous handwashing to prevent spread of infection. · Patient instructions given for viral gastroenteritis. · Follow up with PCP or walk in care if symptoms worsen or do not improve. · To ER if unable to keep fluids down, severe abdominal pain, excessive bloody stool or rectal bleeding, prolonged symptoms greater than a week, weakness, any difficulty breathing, shortness of breath, inability to swallow, hives, rash, facial/tongue swelling or temp greater than 103 degrees. Loretta received counseling on the following healthy behaviors: medication adherence. Patient given educational materials - see patient instructions. Discussed use,benefit, and side effects of prescribed medications. Treatment plan discussed atvisit. Continue routine health care follow up. All patient questions answered. Pt voiced understanding.       Electronically signed by ELIGIO Allen CNP on 12/27/2018 at 8:28 PM

## 2018-12-26 NOTE — PATIENT INSTRUCTIONS
larger or smaller amounts or for longer than recommended. Improper use of lidocaine viscous may result in death. If your medicine comes with patient instructions for safe and effective use, follow these directions carefully. Ask your doctor or pharmacist if you have any questions. Lidocaine viscous may be applied with your finger tips or a cotton swab, or with the applicator provided with the medicine. Use the smallest amount of this medication needed to numb or relieve pain. Do not use large amounts of lidocaine viscous. Avoid swallowing the medicine while applying it to your gums or the inside of your mouth. Your body may absorb more of this medicine if:  · you use too much;  · you swallow the medicine;  · you apply the medicine to gum tissue that is cut or irritated; or  · you apply heat to a treated area. Avoid eating within 1 hour after using this medicine inside your mouth or throat. You may have trouble swallowing and choking could occur, especially in a child. Store at room temperature away from moisture and heat. What happens if I miss a dose? Since lidocaine viscous is used when needed, you may not be on a dosing schedule. If you are on a schedule, use the missed dose as soon as you remember. Skip the missed dose if it is almost time for your next scheduled dose. Do not use extra medicine to make up the missed dose. What happens if I overdose? Seek emergency medical attention or call the Poison Help line at [phone] if anyone has accidentally swallowed the medication. An overdose of lidocaine viscous can be fatal.  Lidocaine viscous is not likely to cause an overdose unless you apply more than the recommended dose. Overdose may also occur if you apply heat to treated areas.   Overdose symptoms may include drowsiness, ringing in your ears, confusion, double vision, feeling hot or cold, tremors or shaking, seizure (convulsions), slow heartbeats, weak or shallow breathing, and loss of help quitting, talk to your doctor about stop-smoking programs and medicines. These can increase your chances of quitting for good. When should you call for help? Call 911 anytime you think you may need emergency care. For example, call if:    · You have severe trouble breathing.    Call your doctor now or seek immediate medical care if:    · You seem to be getting much sicker.     · You have new or worse trouble breathing.     · You have a new or higher fever.     · You have a new rash.    Watch closely for changes in your health, and be sure to contact your doctor if:    · You have a new symptom, such as a sore throat, an earache, or sinus pain.     · You cough more deeply or more often, especially if you notice more mucus or a change in the color of your mucus.     · You do not get better as expected. Where can you learn more? Go to https://Sophonopepiceweb.CosmosID. org and sign in to your [employer] account. Enter Q376 in the [employer] box to learn more about \"Upper Respiratory Infection (Cold): Care Instructions. \"     If you do not have an account, please click on the \"Sign Up Now\" link. Current as of: December 6, 2017  Content Version: 11.8  © 2784-4092 Healthwise, Incorporated. Care instructions adapted under license by [facility] ([facility]). If you have questions about a medical condition or this instruction, always ask your healthcare professional. [place] [address] any warranty or liability for your use of this information.

## 2018-12-28 ENCOUNTER — OFFICE VISIT (OUTPATIENT)
Dept: PRIMARY CARE CLINIC | Age: 53
End: 2018-12-28
Payer: COMMERCIAL

## 2018-12-28 VITALS
TEMPERATURE: 98.3 F | OXYGEN SATURATION: 96 % | SYSTOLIC BLOOD PRESSURE: 119 MMHG | BODY MASS INDEX: 44.81 KG/M2 | WEIGHT: 245 LBS | HEART RATE: 70 BPM | DIASTOLIC BLOOD PRESSURE: 79 MMHG

## 2018-12-28 DIAGNOSIS — J01.10 ACUTE FRONTAL SINUSITIS, RECURRENCE NOT SPECIFIED: Primary | ICD-10-CM

## 2018-12-28 DIAGNOSIS — R05.9 COUGH: ICD-10-CM

## 2018-12-28 LAB
INFLUENZA A ANTIBODY: NEGATIVE
INFLUENZA B ANTIBODY: NEGATIVE

## 2018-12-28 PROCEDURE — 99213 OFFICE O/P EST LOW 20 MIN: CPT | Performed by: NURSE PRACTITIONER

## 2018-12-28 PROCEDURE — 87804 INFLUENZA ASSAY W/OPTIC: CPT | Performed by: NURSE PRACTITIONER

## 2018-12-28 RX ORDER — AMOXICILLIN 875 MG/1
875 TABLET, COATED ORAL EVERY 12 HOURS
Qty: 20 TABLET | Refills: 0 | Status: SHIPPED | OUTPATIENT
Start: 2018-12-28 | End: 2019-01-07

## 2018-12-28 ASSESSMENT — ENCOUNTER SYMPTOMS
COUGH: 1
SINUS PAIN: 1
EYES NEGATIVE: 1
SHORTNESS OF BREATH: 0
RHINORRHEA: 1
SORE THROAT: 1
SINUS PRESSURE: 1
ALLERGIC/IMMUNOLOGIC NEGATIVE: 1
GASTROINTESTINAL NEGATIVE: 1

## 2019-01-08 ENCOUNTER — HOSPITAL ENCOUNTER (OUTPATIENT)
Dept: ULTRASOUND IMAGING | Age: 54
Discharge: HOME | End: 2019-01-10
Payer: COMMERCIAL

## 2019-01-08 VITALS — HEART RATE: 79 BPM | DIASTOLIC BLOOD PRESSURE: 83 MMHG | OXYGEN SATURATION: 100 % | SYSTOLIC BLOOD PRESSURE: 140 MMHG

## 2019-01-08 DIAGNOSIS — E04.1 THYROID NODULE: ICD-10-CM

## 2019-01-08 PROCEDURE — 88173 CYTOPATH EVAL FNA REPORT: CPT

## 2019-01-08 PROCEDURE — 60100 BIOPSY OF THYROID: CPT

## 2019-01-08 PROCEDURE — 2500000003 HC RX 250 WO HCPCS: Performed by: RADIOLOGY

## 2019-01-08 PROCEDURE — 88305 TISSUE EXAM BY PATHOLOGIST: CPT

## 2019-01-08 RX ADMIN — Medication 10 ML: at 09:09

## 2019-01-09 LAB — SURGICAL PATHOLOGY REPORT: NORMAL

## 2019-01-30 ENCOUNTER — TELEPHONE (OUTPATIENT)
Dept: FAMILY MEDICINE CLINIC | Age: 54
End: 2019-01-30

## 2019-02-11 ENCOUNTER — NURSE ONLY (OUTPATIENT)
Dept: FAMILY MEDICINE CLINIC | Age: 54
End: 2019-02-11

## 2019-02-11 VITALS — SYSTOLIC BLOOD PRESSURE: 146 MMHG | HEART RATE: 79 BPM | DIASTOLIC BLOOD PRESSURE: 82 MMHG | OXYGEN SATURATION: 99 %

## 2019-02-11 DIAGNOSIS — I10 HYPERTENSION, ESSENTIAL: Primary | ICD-10-CM

## 2019-02-11 RX ORDER — DOXAZOSIN MESYLATE 1 MG/1
1 TABLET ORAL DAILY
Qty: 30 TABLET | Refills: 2 | Status: SHIPPED | OUTPATIENT
Start: 2019-02-11 | End: 2019-04-15 | Stop reason: SDUPTHER

## 2019-02-11 ASSESSMENT — PATIENT HEALTH QUESTIONNAIRE - PHQ9
SUM OF ALL RESPONSES TO PHQ QUESTIONS 1-9: 0
SUM OF ALL RESPONSES TO PHQ9 QUESTIONS 1 & 2: 0
2. FEELING DOWN, DEPRESSED OR HOPELESS: 0
1. LITTLE INTEREST OR PLEASURE IN DOING THINGS: 0
SUM OF ALL RESPONSES TO PHQ QUESTIONS 1-9: 0

## 2019-02-28 ENCOUNTER — OFFICE VISIT (OUTPATIENT)
Dept: FAMILY MEDICINE CLINIC | Age: 54
End: 2019-02-28
Payer: COMMERCIAL

## 2019-02-28 VITALS
SYSTOLIC BLOOD PRESSURE: 136 MMHG | HEART RATE: 72 BPM | OXYGEN SATURATION: 97 % | DIASTOLIC BLOOD PRESSURE: 84 MMHG | TEMPERATURE: 97.8 F | BODY MASS INDEX: 47.19 KG/M2 | WEIGHT: 258 LBS

## 2019-02-28 DIAGNOSIS — H65.93 FLUID LEVEL BEHIND TYMPANIC MEMBRANE OF BOTH EARS: ICD-10-CM

## 2019-02-28 DIAGNOSIS — M23.8X2 MCL DEFICIENCY, KNEE, LEFT: Primary | ICD-10-CM

## 2019-02-28 PROCEDURE — 99213 OFFICE O/P EST LOW 20 MIN: CPT | Performed by: NURSE PRACTITIONER

## 2019-02-28 RX ORDER — NAPROXEN 500 MG/1
500 TABLET ORAL 2 TIMES DAILY WITH MEALS
Qty: 60 TABLET | Refills: 2 | Status: SHIPPED | OUTPATIENT
Start: 2019-02-28 | End: 2019-08-12 | Stop reason: DRUGHIGH

## 2019-02-28 ASSESSMENT — ENCOUNTER SYMPTOMS
COUGH: 0
VOMITING: 0
SHORTNESS OF BREATH: 0
NAUSEA: 0
DIARRHEA: 0

## 2019-03-15 ENCOUNTER — OFFICE VISIT (OUTPATIENT)
Dept: FAMILY MEDICINE CLINIC | Age: 54
End: 2019-03-15
Payer: COMMERCIAL

## 2019-03-15 ENCOUNTER — HOSPITAL ENCOUNTER (OUTPATIENT)
Dept: VASCULAR LAB | Age: 54
Discharge: HOME | End: 2019-03-17
Payer: COMMERCIAL

## 2019-03-15 VITALS
DIASTOLIC BLOOD PRESSURE: 86 MMHG | HEART RATE: 66 BPM | WEIGHT: 260.2 LBS | TEMPERATURE: 97.7 F | SYSTOLIC BLOOD PRESSURE: 116 MMHG | OXYGEN SATURATION: 98 % | BODY MASS INDEX: 47.59 KG/M2

## 2019-03-15 DIAGNOSIS — M79.89 PAIN AND SWELLING OF LEFT LOWER LEG: Primary | ICD-10-CM

## 2019-03-15 DIAGNOSIS — M46.1 SACROILIITIS (HCC): ICD-10-CM

## 2019-03-15 DIAGNOSIS — M54.32 SCIATICA OF LEFT SIDE: ICD-10-CM

## 2019-03-15 DIAGNOSIS — M79.662 PAIN AND SWELLING OF LEFT LOWER LEG: ICD-10-CM

## 2019-03-15 DIAGNOSIS — M79.89 PAIN AND SWELLING OF LEFT LOWER LEG: ICD-10-CM

## 2019-03-15 DIAGNOSIS — M79.662 PAIN AND SWELLING OF LEFT LOWER LEG: Primary | ICD-10-CM

## 2019-03-15 PROCEDURE — 99213 OFFICE O/P EST LOW 20 MIN: CPT | Performed by: NURSE PRACTITIONER

## 2019-03-15 PROCEDURE — 93971 EXTREMITY STUDY: CPT

## 2019-03-15 RX ORDER — PREDNISONE 10 MG/1
TABLET ORAL
Qty: 18 TABLET | Refills: 0 | Status: SHIPPED | OUTPATIENT
Start: 2019-03-15 | End: 2019-04-01 | Stop reason: ALTCHOICE

## 2019-03-16 ASSESSMENT — ENCOUNTER SYMPTOMS
BACK PAIN: 1
EYES NEGATIVE: 1
ABDOMINAL DISTENTION: 0
RHINORRHEA: 0

## 2019-03-22 ENCOUNTER — TELEPHONE (OUTPATIENT)
Dept: FAMILY MEDICINE CLINIC | Age: 54
End: 2019-03-22

## 2019-03-22 RX ORDER — AZITHROMYCIN 250 MG/1
TABLET, FILM COATED ORAL
Qty: 1 PACKET | Refills: 0 | Status: SHIPPED | OUTPATIENT
Start: 2019-03-22 | End: 2019-04-01 | Stop reason: ALTCHOICE

## 2019-04-01 ENCOUNTER — OFFICE VISIT (OUTPATIENT)
Dept: FAMILY MEDICINE CLINIC | Age: 54
End: 2019-04-01
Payer: COMMERCIAL

## 2019-04-01 VITALS
DIASTOLIC BLOOD PRESSURE: 86 MMHG | SYSTOLIC BLOOD PRESSURE: 138 MMHG | HEART RATE: 104 BPM | TEMPERATURE: 97.7 F | WEIGHT: 258.2 LBS | BODY MASS INDEX: 47.23 KG/M2 | OXYGEN SATURATION: 98 %

## 2019-04-01 DIAGNOSIS — S86.112A GASTROCNEMIUS STRAIN, LEFT, INITIAL ENCOUNTER: Primary | ICD-10-CM

## 2019-04-01 DIAGNOSIS — M79.605 LEFT LEG PAIN: ICD-10-CM

## 2019-04-01 PROCEDURE — 99214 OFFICE O/P EST MOD 30 MIN: CPT | Performed by: NURSE PRACTITIONER

## 2019-04-01 ASSESSMENT — ENCOUNTER SYMPTOMS
SORE THROAT: 0
COUGH: 0

## 2019-04-01 NOTE — PATIENT INSTRUCTIONS
SURVEY:    You may be receiving a survey from [employer] regarding your visit today. Please complete the survey to enable us to provide the highest quality of care to you and your family. If you cannot score us a very good on any question, please call the office to discuss how we could have made your experience a very good one. Thank you.

## 2019-04-01 NOTE — LETTER
[facility] of [address] [address]  Phone: [phone]  Fax: [address], ELIGIO - JOSIANE        April 1, 2019     Patient: Rod Vitale   YOB: 1965   Date of Visit: 4/1/2019       To Whom It May Concern: It is my medical opinion that Felisha Cunha was seen today in office with Left Lower leg pain. She will be off work due to inability to ambulate well with this strain 4/1/19- 4/10/19. Further evaluation by ortho is being requested. If you have any questions or concerns, please don't hesitate to call.     Sincerely,        ELIGIO Santoro CNP

## 2019-04-01 NOTE — PROGRESS NOTES
0  [facility]  [facility] OF [place] [address] [address]  [address]  Dept: [phone]  Dept Fax: [phone]    Last encounter 3/15/2019    HPI:   Vernon Gil is a 47 y.o. female who presentstoday for her medical conditions/complaints as noted below. Vernon Gil is c/o of Leg Pain (left calf/leg)      HPI      Patient with chronic pain in left leg with some swelling 2/28/19 originally seen  MCL deficiency knee used naprosyn. 3/15/19 with pain in lower left leg and sacroiliitis after doppler negative pt given steroid taper 30 mg did well with improvement. 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   Pain posterior knee and posterior calf with dorsiflexion of foot any twisting movements to lower leg with tenderness and pain. Swelling in left leg. Denies any falls or quick stops. ASA 81 mg taken daily. Steroid taper finished 3/23/19. Restarted naprosyn 500 mg  taking twice a day. Wrapped leg with ace wrap and did not seem to help . Not more painful but about the same. Right leg is normal    PABLO in past     3/15/19 doppler left leg     Findings:        Right Impression:     Left Impression:    Right groin scanned   The common femoral, superficial femoral, popliteal,    for comparison        tibials, and greater saphenous veins, profunda are    appears normal.       patent, compressible with normal doppler responses.                          Negative for DVT.                               Duplex scan using B-mode, Gray scale imaging.                          Spectral doppler analysis and color flow throughout (ZETIA) 10 MG tablet TAKE 1 TABLET DAILY 90 tablet 1    omeprazole (PRILOSEC) 20 MG delayed release capsule TAKE 1 CAPSULE DAILY in am on empty stomach 90 capsule 1    rOPINIRole (REQUIP) 1 MG tablet Take 1 tablet by mouth nightly Restless legs 90 tablet 1    spironolactone (ALDACTONE) 25 MG tablet Take 1 tablet by mouth daily 90 tablet 1    metoprolol tartrate (LOPRESSOR) 50 MG tablet Take 1 tablet by mouth 2 times daily 180 tablet 1    fluticasone (FLONASE) 50 MCG/ACT nasal spray 1 spray by Nasal route daily (Patient taking differently: 1 spray by Nasal route daily as needed ) 1 Bottle 3    aspirin 81 MG EC tablet Take 81 mg by mouth daily       Omega-3 Fatty Acids (FISH OIL) 1000 MG CAPS Take 1,000 mg by mouth daily        No current facility-administered medications for this visit. Allergies   Allergen Reactions    Iv Contrast [Iodides] Swelling    Lisinopril Swelling     Swelling of upper lip    Simvastatin Other (See Comments)     Elevated CK/CKMB. Health Maintenance   Topic Date Due    Hepatitis C screen  1965    HIV screen  02/28/1980    DTaP/Tdap/Td vaccine (1 - Tdap) 02/28/1984    Shingles Vaccine (1 of 2) 02/28/2015    Cervical cancer screen  11/25/2016    Potassium monitoring  08/10/2019    Creatinine monitoring  08/10/2019    Flu vaccine (Season Ended) 09/01/2019    Breast cancer screen  05/04/2020    Diabetes screen  10/18/2020    Colon cancer screen colonoscopy  10/21/2021    Lipid screen  05/03/2023       Subjective:      Review of Systems   Constitutional: Positive for activity change. Negative for chills and fever. HENT: Negative for congestion and sore throat. Respiratory: Negative for cough. Genitourinary: Negative for difficulty urinating. Musculoskeletal: Positive for arthralgias and myalgias (left lower leg). Neurological: Negative for dizziness. Objective:     Physical Exam   Constitutional: She is oriented to person, place, and time. She appears well-developed and well-nourished. No distress. HENT:   Head: Normocephalic and atraumatic. Right Ear: External ear normal.   Left Ear: External ear normal.   Mouth/Throat: Oropharynx is clear and moist.   Eyes: Pupils are equal, round, and reactive to light. EOM are normal. No scleral icterus. Neck: Normal range of motion. Neck supple. No thyromegaly present. Cardiovascular: Normal rate, regular rhythm and normal heart sounds. No murmur heard. Pulmonary/Chest: Effort normal and breath sounds normal. No respiratory distress. She has no wheezes. Abdominal: Soft. There is no tenderness. Musculoskeletal: Normal range of motion. She exhibits tenderness ( His present of left lower extremity likely the gastrocnemius muscle no warmth or swelling noted). Her Allan pedis and posterior tibial pulses 2+ bilaterally  Bulk of lower extremities normal patient ambulates with hobble to gait heel walking on left leg, LCL tenderness on exam. Neg patellar apprehension, morales test present with intact achilles. Poor flexion of left leg. Lymphadenopathy:     She has no cervical adenopathy. Neurological: She is alert and oriented to person, place, and time. Skin: Skin is warm and dry. Circumference of left calf in and 1/4 inches left lower leg 2 inches below the knee 16 inches  Bright leg 2 inches below the knee 16-1/4 inches right calf 19 inches get of Homans sign chris   Psychiatric: She has a normal mood and affect. Her behavior is normal. Judgment and thought content normal.   Nursing note and vitals reviewed.     /86 (Site: Right Upper Arm, Position: Sitting, Cuff Size: Large Adult)   Pulse 104   Temp 97.7 °F (36.5 °C) (Oral)   Wt 258 lb 3.2 oz (117.1 kg)   LMP  (LMP Unknown)   SpO2 98%   BMI 47.23 kg/m²     Data:     Lab Results   Component Value Date     08/10/2018    K 3.7 08/10/2018     08/10/2018    CO2 23 08/10/2018    BUN 18 08/10/2018    CREATININE 0.63 08/10/2018 GLUCOSE 105 08/10/2018    GLUCOSE 95 02/14/2012    PROT 7.5 10/23/2017    LABALBU 4.6 10/23/2017    LABALBU 4.6 02/14/2012    BILITOT 0.39 10/23/2017    ALKPHOS 73 10/23/2017    AST 26 05/03/2018    ALT 30 05/03/2018     Lab Results   Component Value Date    WBC 9.6 08/10/2018    RBC 4.43 08/10/2018    HGB 12.9 08/10/2018    HCT 37.9 08/10/2018    MCV 85.6 08/10/2018    MCH 29.1 08/10/2018    MCHC 34.0 08/10/2018    RDW 12.7 08/10/2018     08/10/2018    MPV 8.9 08/10/2018     Lab Results   Component Value Date    TSH 1.70 10/18/2017     Lab Results   Component Value Date    CHOL 249 05/03/2018    HDL 62 05/03/2018    LABA1C 5.3 10/18/2017          Assessment & Plan       1. Gastrocnemius strain, left, initial encounter  Rest, ice compression, elevation. Try to ambulate normally  May use crutches TTWB if needed for recovery. Good hydration  Gentle stretches pain is your guide. - External Referral To Orthopedic Surgery    2. Left leg pain    - External Referral To Orthopedic Surgery        Likely strain of soleus or gastrocnemius muscle with TPT negative rachel's sign. Completed Refills   Requested Prescriptions      No prescriptions requested or ordered in this encounter     No follow-ups on file. No orders of the defined types were placed in this encounter. Orders Placed This Encounter   Procedures    External Referral To Orthopedic Surgery     Referral Priority:   Routine     Referral Type:   Consult for Advice and Opinion     Referral Reason:   Specialty Services Required     Referred to Provider:   Linsey Trinidad DO     Requested Specialty:   Orthopedic Surgery     Number of Visits Requested:   1         Loretta received counseling on the following healthy behaviors: nutrition, exercise and medication adherence  Reviewed prior labs and health maintenance. Continue current medications, diet and exercise. Discussed use, benefit, and side effects of prescribed medications.

## 2019-04-02 ENCOUNTER — HOSPITAL ENCOUNTER (OUTPATIENT)
Dept: GENERAL RADIOLOGY | Age: 54
Discharge: HOME | End: 2019-04-04
Payer: COMMERCIAL

## 2019-04-02 ENCOUNTER — HOSPITAL ENCOUNTER (OUTPATIENT)
Age: 54
Discharge: HOME | End: 2019-04-04
Payer: COMMERCIAL

## 2019-04-02 DIAGNOSIS — N20.0 KIDNEY STONES: ICD-10-CM

## 2019-04-02 DIAGNOSIS — N13.2 URETERAL STONE WITH HYDRONEPHROSIS: ICD-10-CM

## 2019-04-02 PROCEDURE — 74018 RADEX ABDOMEN 1 VIEW: CPT

## 2019-04-04 ENCOUNTER — OFFICE VISIT (OUTPATIENT)
Dept: UROLOGY | Age: 54
End: 2019-04-04
Payer: COMMERCIAL

## 2019-04-04 VITALS — BODY MASS INDEX: 47.37 KG/M2 | DIASTOLIC BLOOD PRESSURE: 72 MMHG | SYSTOLIC BLOOD PRESSURE: 116 MMHG | WEIGHT: 259 LBS

## 2019-04-04 DIAGNOSIS — N20.0 KIDNEY STONES: Primary | ICD-10-CM

## 2019-04-04 PROCEDURE — 99213 OFFICE O/P EST LOW 20 MIN: CPT | Performed by: UROLOGY

## 2019-04-10 ENCOUNTER — HOSPITAL ENCOUNTER (OUTPATIENT)
Dept: GENERAL RADIOLOGY | Age: 54
Discharge: HOME | End: 2019-04-12
Payer: COMMERCIAL

## 2019-04-10 ENCOUNTER — HOSPITAL ENCOUNTER (OUTPATIENT)
Age: 54
Discharge: HOME | End: 2019-04-12
Payer: COMMERCIAL

## 2019-04-10 DIAGNOSIS — M79.662 PAIN OF LEFT LOWER LEG: ICD-10-CM

## 2019-04-10 PROCEDURE — 73590 X-RAY EXAM OF LOWER LEG: CPT

## 2019-04-15 ENCOUNTER — OFFICE VISIT (OUTPATIENT)
Dept: FAMILY MEDICINE CLINIC | Age: 54
End: 2019-04-15
Payer: COMMERCIAL

## 2019-04-15 VITALS
DIASTOLIC BLOOD PRESSURE: 72 MMHG | OXYGEN SATURATION: 98 % | HEART RATE: 91 BPM | TEMPERATURE: 97.5 F | BODY MASS INDEX: 47.19 KG/M2 | SYSTOLIC BLOOD PRESSURE: 118 MMHG | WEIGHT: 258 LBS

## 2019-04-15 DIAGNOSIS — Z00.00 ANNUAL PHYSICAL EXAM: Primary | ICD-10-CM

## 2019-04-15 DIAGNOSIS — J30.89 CHRONIC NONSEASONAL ALLERGIC RHINITIS DUE TO POLLEN: ICD-10-CM

## 2019-04-15 DIAGNOSIS — G47.33 OSA (OBSTRUCTIVE SLEEP APNEA): ICD-10-CM

## 2019-04-15 DIAGNOSIS — K21.9 GASTROESOPHAGEAL REFLUX DISEASE WITHOUT ESOPHAGITIS: ICD-10-CM

## 2019-04-15 DIAGNOSIS — B35.1 TOENAIL FUNGUS: ICD-10-CM

## 2019-04-15 DIAGNOSIS — R73.01 IFG (IMPAIRED FASTING GLUCOSE): ICD-10-CM

## 2019-04-15 DIAGNOSIS — Z12.39 BREAST CANCER SCREENING: ICD-10-CM

## 2019-04-15 DIAGNOSIS — I10 ESSENTIAL HYPERTENSION: ICD-10-CM

## 2019-04-15 DIAGNOSIS — G25.81 RESTLESS LEGS: ICD-10-CM

## 2019-04-15 DIAGNOSIS — E55.9 VITAMIN D DEFICIENCY: ICD-10-CM

## 2019-04-15 DIAGNOSIS — E04.2 MULTINODULAR THYROID: ICD-10-CM

## 2019-04-15 DIAGNOSIS — E78.2 MIXED HYPERLIPIDEMIA: ICD-10-CM

## 2019-04-15 DIAGNOSIS — E78.01 FAMILIAL HYPERCHOLESTEROLEMIA: ICD-10-CM

## 2019-04-15 PROCEDURE — 99396 PREV VISIT EST AGE 40-64: CPT | Performed by: NURSE PRACTITIONER

## 2019-04-15 RX ORDER — METOPROLOL TARTRATE 50 MG/1
50 TABLET, FILM COATED ORAL 2 TIMES DAILY
Qty: 180 TABLET | Refills: 1 | Status: SHIPPED | OUTPATIENT
Start: 2019-04-15 | End: 2019-10-21 | Stop reason: SDUPTHER

## 2019-04-15 RX ORDER — DOXAZOSIN MESYLATE 1 MG/1
1 TABLET ORAL DAILY
Qty: 90 TABLET | Refills: 3 | Status: SHIPPED | OUTPATIENT
Start: 2019-04-15 | End: 2020-05-23

## 2019-04-15 RX ORDER — SPIRONOLACTONE 25 MG/1
25 TABLET ORAL DAILY
Qty: 90 TABLET | Refills: 1 | Status: SHIPPED | OUTPATIENT
Start: 2019-04-15 | End: 2019-10-21 | Stop reason: SDUPTHER

## 2019-04-15 RX ORDER — OMEPRAZOLE 20 MG/1
CAPSULE, DELAYED RELEASE ORAL
Qty: 90 CAPSULE | Refills: 1 | Status: SHIPPED | OUTPATIENT
Start: 2019-04-15 | End: 2019-10-21 | Stop reason: SDUPTHER

## 2019-04-15 RX ORDER — EZETIMIBE 10 MG/1
TABLET ORAL
Qty: 90 TABLET | Refills: 1 | Status: SHIPPED | OUTPATIENT
Start: 2019-04-15 | End: 2019-10-21 | Stop reason: SDUPTHER

## 2019-04-15 RX ORDER — FLUTICASONE PROPIONATE 50 MCG
1 SPRAY, SUSPENSION (ML) NASAL DAILY PRN
Qty: 2 BOTTLE | Refills: 1 | Status: SHIPPED | OUTPATIENT
Start: 2019-04-15 | End: 2020-06-01 | Stop reason: ALTCHOICE

## 2019-04-15 RX ORDER — PREDNISONE 10 MG/1
10 TABLET ORAL DAILY
Refills: 0 | COMMUNITY
Start: 2019-04-10 | End: 2019-08-14 | Stop reason: ALTCHOICE

## 2019-04-15 RX ORDER — ROPINIROLE 1 MG/1
1 TABLET, FILM COATED ORAL NIGHTLY
Qty: 90 TABLET | Refills: 1 | Status: SHIPPED | OUTPATIENT
Start: 2019-04-15 | End: 2019-10-21 | Stop reason: SDUPTHER

## 2019-04-15 ASSESSMENT — ENCOUNTER SYMPTOMS
NAUSEA: 0
ABDOMINAL PAIN: 0
COUGH: 0
SHORTNESS OF BREATH: 0
EYE REDNESS: 0
BACK PAIN: 0
WHEEZING: 0
COLOR CHANGE: 0
EYE PAIN: 0
VOMITING: 0

## 2019-04-15 NOTE — PROGRESS NOTES
Arelytod Liset- stent placement    HYSTERECTOMY      Total    LITHOTRIPSY Right 08/21/2018    stent placement    OTHER SURGICAL HISTORY  03-    [facility] (Dr. Noemi Leo)   [facility] W/LITHOTRIPSY &[facility] STENT INSRT Right 8/21/2018    CYSTOSCOPY URETEROSCOPY LASER-HLL performed by Migel Shaw MD at [facility] &[facility]WE[facility] [address] Right 8/21/2018    CYSTOSCOPY STENT INSERTION-EXCHANGE performed by Migel Shaw MD at [address] [facility] [address] OFFICE/OUTPT [facility] Right 8/10/2018    CYSTOSCOPY STENT INSERTION performed by Migel Shaw MD at [address]       Outpatient Encounter Medications as of 4/4/2019   Medication Sig Dispense Refill    vitamin D 1000 units CAPS Take 1 capsule by mouth daily      naproxen (NAPROSYN) 500 MG tablet Take 1 tablet by mouth 2 times daily (with meals) 60 tablet 2    doxazosin (CARDURA) 1 MG tablet Take 1 tablet by mouth daily At bedtime 30 tablet 2    Acetaminophen (TYLENOL PO) Take 500 mg by mouth every 6 hours as needed       ezetimibe (ZETIA) 10 MG tablet TAKE 1 TABLET DAILY 90 tablet 1    omeprazole (PRILOSEC) 20 MG delayed release capsule TAKE 1 CAPSULE DAILY in am on empty stomach 90 capsule 1    rOPINIRole (REQUIP) 1 MG tablet Take 1 tablet by mouth nightly Restless legs 90 tablet 1    spironolactone (ALDACTONE) 25 MG tablet Take 1 tablet by mouth daily 90 tablet 1    metoprolol tartrate (LOPRESSOR) 50 MG tablet Take 1 tablet by mouth 2 times daily 180 tablet 1    fluticasone (FLONASE) 50 MCG/ACT nasal spray 1 spray by Nasal route daily (Patient taking differently: 1 spray by Nasal route daily as needed ) 1 Bottle 3    aspirin 81 MG EC tablet Take 81 mg by mouth daily       Omega-3 Fatty Acids (FISH OIL) 1000 MG CAPS Take 1,000 mg by mouth daily        No facility-administered encounter medications on file as of 4/4/2019.        Current Outpatient Medications on File Prior to Visit   Medication Sig Dispense Refill    vitamin D 1000 units CAPS Take 1 capsule by mouth daily      naproxen (NAPROSYN) 500 MG tablet Take 1 tablet by mouth 2 times daily (with meals) 60 tablet 2    doxazosin (CARDURA) 1 MG tablet Take 1 tablet by mouth daily At bedtime 30 tablet 2    Acetaminophen (TYLENOL PO) Take 500 mg by mouth every 6 hours as needed       ezetimibe (ZETIA) 10 MG tablet TAKE 1 TABLET DAILY 90 tablet 1    omeprazole (PRILOSEC) 20 MG delayed release capsule TAKE 1 CAPSULE DAILY in am on empty stomach 90 capsule 1    rOPINIRole (REQUIP) 1 MG tablet Take 1 tablet by mouth nightly Restless legs 90 tablet 1    spironolactone (ALDACTONE) 25 MG tablet Take 1 tablet by mouth daily 90 tablet 1    metoprolol tartrate (LOPRESSOR) 50 MG tablet Take 1 tablet by mouth 2 times daily 180 tablet 1    fluticasone (FLONASE) 50 MCG/ACT nasal spray 1 spray by Nasal route daily (Patient taking differently: 1 spray by Nasal route daily as needed ) 1 Bottle 3    aspirin 81 MG EC tablet Take 81 mg by mouth daily       Omega-3 Fatty Acids (FISH OIL) 1000 MG CAPS Take 1,000 mg by mouth daily        No current facility-administered medications on file prior to visit. Iv contrast [iodides]; Lisinopril; and Simvastatin  Family History   Problem Relation Age of Onset    Heart Disease Mother     High Blood Pressure Father     Heart Disease Father     High Blood Pressure Sister     High Blood Pressure Brother     Cancer Sister         Breast     Social History     Tobacco Use   Smoking Status Never Smoker   Smokeless Tobacco Never Used       Social History     Substance and Sexual Activity   Alcohol Use No       Review of Systems   Constitutional: Negative for appetite change, chills and fever. Eyes: Negative for pain, redness and visual disturbance. Respiratory: Negative for cough, shortness of breath and wheezing. Cardiovascular: Negative for chest pain and leg swelling.    Gastrointestinal: Negative for abdominal pain, nausea and vomiting. Genitourinary: Negative for difficulty urinating, dysuria, flank pain, frequency, hematuria, pelvic pain, vaginal bleeding and vaginal discharge. Musculoskeletal: Negative for back pain, joint swelling and myalgias. Skin: Negative for color change, rash and wound. Neurological: Negative for dizziness, tremors and numbness. Hematological: Negative for adenopathy. Does not bruise/bleed easily. /72 (Site: Left Upper Arm, Position: Sitting, Cuff Size: Medium Adult)   Wt 259 lb (117.5 kg)   LMP  (LMP Unknown)   BMI 47.37 kg/m²       PHYSICAL EXAM:  Constitutional: Patient resting comfortably, in no acute distress. Neuro: Alert and oriented to person place and time. Cranial nerves grossly intact. Psych: Mood and affect normal.  Skin: Warm, dry  HEENT: normocephalic, atraumatic  Lymphatics: No palpable lymphadenopathy  Lungs: Respiratory effort normal, unlabored  Cardiovascular:  Normal peripheral pulses  Abdomen: Soft, non-tender, non-distended with no organomegaly or palpable masses. : No CVA tenderness. Bladder non-tender and not distended. Pelvic: Deferred    Lab Results   Component Value Date    BUN 18 08/10/2018     Lab Results   Component Value Date    CREATININE 0.63 08/10/2018       ASSESSMENT:  This is a 47 y.o. female with the following diagnoses:   Diagnosis Orders   1. Kidney stones  XR ABDOMEN (KUB) (SINGLE AP VIEW)         PLAN:  We will plan on seeing the patient back in 6 months for repeat KUB. If the stone does increase in size in her left side we will plan for ESWL. Patient will call the interim if she does develop flank pain nausea or vomiting.

## 2019-04-15 NOTE — PROGRESS NOTES
[address]  [address] [place] [id number]  Dept: [phone]  Dept Fax: [phone]    Last encounter 2/28/2019    HPI:   Hilario Das is a 47 y.o. female who presentstoday for her medical conditions/complaints as noted below. Hilario Das is c/o of Annual Exam (right foot 2nd toe nail)      HPI    Annual physical   47year old female  Right handed  Education:  graduate  Occupation : [employer]  Left leg radiculopathy currently in physical therapy , off work and steroid taper. Not taking naprosyn while on prednisone. Hypertension- doxazosin, metoprolol, spironolactone. Sleep apnea on cpap with mask - uses [place]. Recent treatment for leg swelling and suspected low back origin. Currently starting physical therapy. bp has been well controlled. On omeprazole for GERD    penlac for toenail fungus on right 1,2 toe.     requip for leg cramps works okay    Hx kidney stones and recommended NOT to take calcium or vitamin D. Good hydration     Enlarged thyroid with FNA in past has follow up with Dr. Sharonda Chiang.      Past Medical History:   Diagnosis Date    Allergic rhinitis     Enlarged thyroid 2016    nodules check annually    GERD (gastroesophageal reflux disease)     Headache(784.0)     migranes at times    Hyperlipidemia     Hypertension     NEIL (obstructive sleep apnea) 7/8/2016    Restless legs 7/8/2016      Past Surgical History:   Procedure Laterality Date    BLADDER SURGERY  9/9/14    bladder sling    CHOLECYSTECTOMY      CYSTOSCOPY Right 08/10/2018    Dr Fito Melchor- stent placement    HYSTERECTOMY      Total    LITHOTRIPSY Right 08/21/2018    stent placement    OTHER SURGICAL HISTORY  03-    Coaptite (Dr. Hortensia Stanley)    LA CYSTO/URETERO W/LITHOTRIPSY &INDWELL STENT INSRT Right 8/21/2018    CYSTOSCOPY URETEROSCOPY LASER-HLL performed by Phyliss Lombard, MD at [facility] STENT INSRT Right 8/21/2018    CYSTOSCOPY STENT INSERTION-EXCHANGE performed by Abhi Bowden MD at [address] [facility] OFFICE/OUTPT VISIT,PROCEDURE ONLY Right 8/10/2018    CYSTOSCOPY STENT INSERTION performed by Abhi Bowden MD at [address] [facility]         Family History   Problem Relation Age of Onset    Heart Disease Mother     High Blood Pressure Father     Heart Disease Father     High Blood Pressure Sister     High Blood Pressure Brother     Cancer Sister         Breast          Social History     Tobacco Use    Smoking status: Never Smoker    Smokeless tobacco: Never Used   Substance Use Topics    Alcohol use: No      Current Outpatient Medications   Medication Sig Dispense Refill    predniSONE (DELTASONE) 10 MG tablet Take 10 mg by mouth daily  0    doxazosin (CARDURA) 1 MG tablet Take 1 tablet by mouth daily At bedtime 90 tablet 3    ciclopirox (PENLAC) 8 % solution Apply to the affected nails nightly remove medication from nail every 7 days with rubbing alcohol 1 Bottle 1    ezetimibe (ZETIA) 10 MG tablet TAKE 1 TABLET DAILY 90 tablet 1    omeprazole (PRILOSEC) 20 MG delayed release capsule TAKE 1 CAPSULE DAILY in am on empty stomach 90 capsule 1    rOPINIRole (REQUIP) 1 MG tablet Take 1 tablet by mouth nightly Restless legs 90 tablet 1    spironolactone (ALDACTONE) 25 MG tablet Take 1 tablet by mouth daily 90 tablet 1    metoprolol tartrate (LOPRESSOR) 50 MG tablet Take 1 tablet by mouth 2 times daily 180 tablet 1    fluticasone (FLONASE) 50 MCG/ACT nasal spray 1 spray by Nasal route daily as needed for Rhinitis or Allergies 2 Bottle 1    vitamin D 1000 units CAPS Take 1 capsule by mouth daily      naproxen (NAPROSYN) 500 MG tablet Take 1 tablet by mouth 2 times daily (with meals) 60 tablet 2    Acetaminophen (TYLENOL PO) Take 500 mg by mouth every 6 hours as needed       aspirin 81 MG EC tablet Take 81 mg by mouth daily       Omega-3 Fatty Acids (FISH OIL) 1000 MG CAPS Take 1,000 mg by mouth daily        No current facility-administered medications for this visit. Allergies   Allergen Reactions    Iv Contrast [Iodides] Swelling    Lisinopril Swelling     Swelling of upper lip    Simvastatin Other (See Comments)     Elevated CK/CKMB. Health Maintenance   Topic Date Due    Hepatitis C screen  1965    HIV screen  02/28/1980    DTaP/Tdap/Td vaccine (1 - Tdap) 02/28/1984    Shingles Vaccine (1 of 2) 02/28/2015    Cervical cancer screen  11/25/2016    Potassium monitoring  08/10/2019    Creatinine monitoring  08/10/2019    Flu vaccine (Season Ended) 09/01/2019    Breast cancer screen  05/04/2020    Diabetes screen  10/18/2020    Colon cancer screen colonoscopy  10/21/2021    Lipid screen  05/03/2023    Pneumococcal 0-64 years Vaccine  Aged Out       Subjective:      Review of Systems   Constitutional: Negative for activity change, chills and fever. HENT: Negative for congestion, postnasal drip, sinus pain and sore throat. Eyes: Negative. Respiratory: Negative for cough and shortness of breath. Cardiovascular: Negative for chest pain. Gastrointestinal: Negative for abdominal distention. Genitourinary: Negative for difficulty urinating. Musculoskeletal: Positive for back pain and gait problem. Negative for arthralgias. Neurological: Negative for dizziness. Psychiatric/Behavioral: Negative for agitation. Objective:     Physical Exam   Constitutional: She is oriented to person, place, and time. She appears well-developed and well-nourished. No distress. HENT:   Head: Normocephalic and atraumatic. Right Ear: External ear normal.   Left Ear: External ear normal.   Mouth/Throat: Oropharynx is clear and moist.   Eyes: Pupils are equal, round, and reactive to light. EOM are normal. No scleral icterus. Neck: Normal range of motion. Neck supple. Thyromegaly (enlarged bilaterally) present.    Cardiovascular: Normal rate, regular rhythm and hypertension  Stable and controlled    - Basic Metabolic Panel; Future  - Lipid Panel; Future  - CBC Auto Differential; Future  - metoprolol tartrate (LOPRESSOR) 50 MG tablet; Take 1 tablet by mouth 2 times daily  Dispense: 180 tablet; Refill: 1    3. Mixed hyperlipidemia  Lab Results   Component Value Date    LDLCALC 158 05/03/2018    LDLCHOLESTEROL 154 (H) 10/18/2017       - ezetimibe (ZETIA) 10 MG tablet; TAKE 1 TABLET DAILY  Dispense: 90 tablet; Refill: 1    4. NEIL (obstructive sleep apnea)  Compliant with cpap  Which helps control    - CBC Auto Differential; Future    5. Multinodular thyroid  Keep follow up with ENT  - TSH with Reflex; Future    6. Breast cancer screening    - ISAAC SCREENING W CAD BILATERAL 2 VW; Future    7. Gastroesophageal reflux disease without esophagitis  Continue PPI  - omeprazole (PRILOSEC) 20 MG delayed release capsule; TAKE 1 CAPSULE DAILY in am on empty stomach  Dispense: 90 capsule; Refill: 1    8. Toenail fungus  Start penlac discussed proper use  - ciclopirox (PENLAC) 8 % solution; Apply to the affected nails nightly remove medication from nail every 7 days with rubbing alcohol  Dispense: 1 Bottle; Refill: 1    9. Familial hypercholesterolemia  Lab Results   Component Value Date    LDLCALC 158 05/03/2018    LDLCHOLESTEROL 154 (H) 10/18/2017     Continue zetia  Hx chronic CPK elevation  - Hepatic Function Panel; Future  - Lipid Panel; Future    10. Restless legs  Control requip  - Basic Metabolic Panel; Future    11. Vitamin D deficiency    - Vitamin D 25 Hydroxy; Future    12. IFG (impaired fasting glucose)    - Hemoglobin A1C; Future    13. Chronic nonseasonal allergic rhinitis due to pollen    - fluticasone (FLONASE) 50 MCG/ACT nasal spray; 1 spray by Nasal route daily as needed for Rhinitis or Allergies  Dispense: 2 Bottle; Refill: 1    See in 6 months    No follow-ups on file.   Orders Placed This Encounter   Medications    doxazosin (CARDURA) 1 MG tablet     Sig: Take 1 tablet Differential     Standing Status:   Future     Standing Expiration Date:   4/15/2020         Loretta received counseling on the following healthy behaviors: nutrition, exercise and medication adherence  Reviewed prior labs and health maintenance. Continue current medications, diet and exercise. Discussed use, benefit, and side effects of prescribed medications. Barriers to medication compliance addressed. Patient given educational materials - see patient instructions. All patient questions answered. Patient voiced understanding.           Electronically signed by ELIGIO Marcial CNP on 4/16/2019 at 9:05 PM

## 2019-04-16 ENCOUNTER — HOSPITAL ENCOUNTER (OUTPATIENT)
Dept: PHYSICAL THERAPY | Age: 54
Setting detail: THERAPIES SERIES
Discharge: HOME | End: 2019-04-16
Payer: COMMERCIAL

## 2019-04-16 PROCEDURE — 97162 PT EVAL MOD COMPLEX 30 MIN: CPT

## 2019-04-16 PROCEDURE — 97012 MECHANICAL TRACTION THERAPY: CPT

## 2019-04-16 ASSESSMENT — PAIN DESCRIPTION - ORIENTATION: ORIENTATION: LEFT

## 2019-04-16 ASSESSMENT — ENCOUNTER SYMPTOMS
EYES NEGATIVE: 1
COUGH: 0
SINUS PAIN: 0
ABDOMINAL DISTENTION: 0
SORE THROAT: 0
BACK PAIN: 1
SHORTNESS OF BREATH: 0

## 2019-04-16 ASSESSMENT — PAIN DESCRIPTION - LOCATION: LOCATION: LEG

## 2019-04-16 ASSESSMENT — PAIN SCALES - GENERAL: PAINLEVEL_OUTOF10: 4

## 2019-04-16 NOTE — PROGRESS NOTES
Phone: [phone]         Fax: [phone]                      Outpatient Physical Therapy                                                                      Evaluation    Date: 2019  Patient: Kadi Bonilla  : 1965  ACCT #: [de-identified]    Referring Practitioner: Dr. Donny Chaney    Referral Date : 04/10/19    Diagnosis: Left Sciatica    Treatment Diagnosis: back pain  Onset Date: 04/10/19(Referral)  Total # of Visits Approved: 12 Per Physician Order  Total # of Visits to Date: 1     Subjective     Additional Pertinent Hx: Left lower leg pain started in calf about 3 months ago. Dr set patient up on predisone pack and had scan to rule out blood clot. Patient doing better until  took trip to [place] and sitting in car increased pain. Back to Dr who put her off work x 10 days and referred to Dr Donny Chaney. Dx with sciatica and put on steroid pack again. Still off work until next week. Did have xray- negative. Laying down with legs up decreases pain. Prolong sitting and standing increase pain buttock to ankle back of leg. Rates pain 4/10 currently with medication. Hx- restless legs,hysterectomy, right knee scope, Gallbladder removed, HBP. Works at [employer] in [employer] [facility] with lifting and climbing up steps.   Pain Screening  Patient Currently in Pain: Yes  Pain Assessment  Pain Assessment: 0-10  Pain Level: 4(with med/ steroid)  Pain Location: Leg  Pain Orientation: Left  Social/Functional History  Occupation: Full time employment  Type of occupation: Factory Work       Objective        Spine  Lumbar: forward flexion WFL, left rotation 25 % limited  Special Tests: positive SLR left leg  Joint Mobility  Spine: tightness noted left L 3-L4  Strength RLE  Strength RLE: WFL  AROM RLE (degrees)  RLE AROM: WFL  Strength LLE  Strength LLE: Exception  L Hip Flexion: 5/5  L Hip Extension: 4-/5  L Hip ABduction: 4/5  L Knee Extension: 3+/5  L Ankle Dorsiflexion: 5/5  AROM LLE (degrees)  LLE AROM : WFL       AROM RLE (degrees)  RLE AROM: WFL  AROM LLE (degrees)  LLE AROM : WFL        PROM LLE (degrees)  LLE PROM: WFL  LLE General PROM: except positive SLR  PROM RLE (degrees)  RLE PROM: WFL                 Ambulation 1  Quality of Gait: antalgic, mild limp favoring left leg             Assessment  Body structures, Functions, Activity limitations: Decreased functional mobility , Decreased ROM, Decreased strength  Assessment: Patient has sciatica left leg, possible herniated disc. Educated on HEP and issued handout and lumbar traction per Doc Flow. Plan for core strengthening, back exercises, back education, traction and as needed manual therapy. Prognosis: Good  Decision Making: Medium Complexity  History: as above  Exam: LEFS score 33/80    Clinical Presentation:  Evolving  The Following Comorbities will impact the patients progression and Plan of Care:   Obesity and Previous Orthopedic Injury/Surgery        Education:   Patient Education: On POC and HEP handout       Goals  Short term goals  Time Frame for Short term goals: 8  Short term goal 1: Patient to be independent with HEP  Short term goal 2: Increase trunk ROM WFL all planes  Short term goal 3:  Increase strength left knee extension 4+/5 to walk up and down stair steps WFL    Long term goals  Time Frame for Long term goals : 12  Long term goal 1: Improve functional mobility with score of >50/80 on LEFS  Long term goal 2: Decrease pain in left leg 2/10 without medication x 3 days    Patient's Goal:   Patient wants to be rid of left leg pain    Total Treatment Time: 55     Time In: 10:00  Time Out: 10:55    Ronal Arredondo PT Date: 4/16/2019

## 2019-04-16 NOTE — PLAN OF CARE
[facility] JANNIE MARTINO       Phone: [phone]   Date: 2019                      Outpatient Physical Therapy  Fax: [phone]    ACCT #: [de-identified]                     Plan of Care  [facility]#: [id number]  Patient: Kadi Bonilla  : 1965    Referring Practitioner: Dr. Donny Chaney    Referral Date : 04/10/19    Diagnosis: Left Sciatica  Onset Date: 04/10/19(Referral)  Treatment Diagnosis: back pain    Assessment  Body structures, Functions, Activity limitations: Decreased functional mobility , Decreased ROM, Decreased strength  Assessment: Patient has sciatica left leg, possible herniated disc. Educated on HEP and issued handout and lumbar traction per Doc Flow. Plan for core strengthening, back exercises, back education, traction and as needed manual therapy. Prognosis: Good    Treatment Plan   Days: 2 times per week Weeks: 6 weeks Total # of Visits Approved: 12    Therapeutic Exercise, Traction, Patient Education/HEP, Back Education, Manual Therapy: Myofacial Release and Manual Therapy: Mobilization/Manipulation     Goals  Short term goals  Time Frame for Short term goals: 8  Short term goal 1: Patient to be independent with HEP  Short term goal 2: Increase trunk ROM WFL all planes  Short term goal 3: Increase strength left knee extension 4+/5 to walk up and down stair steps WFL  Long term goals  Time Frame for Long term goals : 12  Long term goal 1: Improve functional mobility with score of >50/80 on LEFS  Long term goal 2: Decrease pain in left leg 2/10 without medication x 3 days     Sidra Matthews PT   Date: 2019    ______________________________________ Date: 2019  Physician Signature  By signing above or cosigning electronically, I have reviewed this Plan of Care and certify a need for medically necessary rehabilitation services.

## 2019-04-16 NOTE — OP NOTE
PT/OT INITIAL EVALUATION REPORT   Paty Malin Date: 4/16/2019     Insurance Company: _____________  Patient Name: Juana Harris                                           Patient ID #:  ____________________   Date of Birth / Age: 1965/ 47 y.o. Date Of Injury: 4 /10  /2019   Date Of Surgery:  /  /   ICD-9 Code(s): _________________ Diagnosis: Left Sciatica  Referring Practitioner: Dr. Mik Villarreal  Referring Physician ID #:_______________   Therapy Office: [address] [place] [address] and [address] [place] [address]           Discipline: [x]PT / []OT       OBJECTIVE FINDINGS    Involved Region: [x]Left / []Right / []N/A     Strength ( 0-5 )       Range of Motion   Motion  /  Grade     PROM    AROM   Strength RLE  Strength RLE: WFL PROM RLE (degrees)  RLE PROM: WFL        Strength LLE  Strength LLE: Exception  L Hip Flexion: 5/5  L Hip Extension: 4-/5  L Hip ABduction: 4/5  L Knee Extension: 3+/5  L Ankle Dorsiflexion: 5/5 PROM LLE (degrees)  LLE PROM: WFL  LLE General PROM: except positive SLR            AROM RLE (degrees)  RLE AROM: WFL          AROM LLE (degrees)  LLE AROM : WFL        Spine  Lumbar: forward flexion WFL, left rotation 25 % limited  Special Tests: positive SLR left leg     How / Where Injury Occurred:     Work Related? []Yes  [x]No      Pertinent History: Additional Pertinent Hx: Left lower leg pain started in calf about 3 months ago.  set patient up on predisone pack and had scan to rule out blood clot. Patient doing better until  took trip to [place] and sitting in car increased pain. Back to Dr who put her off work x 10 days and referred to Dr Mik Villarreal. Dx with sciatica and put on steroid pack again. Still off work until next week. Did have xray- negative. Laying down with legs up decreases pain. Prolong sitting and standing increase pain buttock to ankle back of leg. Rates pain 4/10 currently with medication. Hx- restless legs,hysterectomy, right knee scope, Gallbladder removed, HBP.     Works at [employer]

## 2019-04-18 NOTE — PROGRESS NOTES
Patient called and stated that after her eval earlier this week, she had increased pain. She phoned her doctor who recommended that she cancel her remaining appointments and she is scheduled to have an MRI.     Thom Bass PTA

## 2019-04-19 ENCOUNTER — HOSPITAL ENCOUNTER (OUTPATIENT)
Dept: PHYSICAL THERAPY | Age: 54
Setting detail: THERAPIES SERIES
Discharge: HOME | End: 2019-04-19
Payer: COMMERCIAL

## 2019-04-22 ENCOUNTER — APPOINTMENT (OUTPATIENT)
Dept: PHYSICAL THERAPY | Age: 54
End: 2019-04-22
Payer: COMMERCIAL

## 2019-04-23 ENCOUNTER — HOSPITAL ENCOUNTER (OUTPATIENT)
Age: 54
Discharge: HOME | End: 2019-04-23
Payer: COMMERCIAL

## 2019-04-23 DIAGNOSIS — G25.81 RESTLESS LEGS: ICD-10-CM

## 2019-04-23 DIAGNOSIS — E55.9 VITAMIN D DEFICIENCY: ICD-10-CM

## 2019-04-23 DIAGNOSIS — R73.01 IFG (IMPAIRED FASTING GLUCOSE): ICD-10-CM

## 2019-04-23 DIAGNOSIS — E04.2 MULTINODULAR THYROID: ICD-10-CM

## 2019-04-23 DIAGNOSIS — I10 ESSENTIAL HYPERTENSION: ICD-10-CM

## 2019-04-23 DIAGNOSIS — Z00.00 ANNUAL PHYSICAL EXAM: ICD-10-CM

## 2019-04-23 DIAGNOSIS — E78.01 FAMILIAL HYPERCHOLESTEROLEMIA: ICD-10-CM

## 2019-04-23 DIAGNOSIS — G47.33 OSA (OBSTRUCTIVE SLEEP APNEA): ICD-10-CM

## 2019-04-23 LAB
ABSOLUTE EOS #: 0.1 K/UL (ref 0–0.4)
ABSOLUTE IMMATURE GRANULOCYTE: ABNORMAL K/UL (ref 0–0.3)
ABSOLUTE LYMPH #: 3.7 K/UL (ref 1–4.8)
ABSOLUTE MONO #: 0.9 K/UL (ref 0–1)
ALBUMIN SERPL-MCNC: 4.4 G/DL (ref 3.5–5.2)
ALBUMIN/GLOBULIN RATIO: NORMAL (ref 1–2.5)
ALP BLD-CCNC: 77 U/L (ref 35–104)
ALT SERPL-CCNC: 30 U/L (ref 5–33)
ANION GAP SERPL CALCULATED.3IONS-SCNC: 12 MMOL/L (ref 9–17)
AST SERPL-CCNC: 15 U/L
BASOPHILS # BLD: 1 % (ref 0–2)
BASOPHILS ABSOLUTE: 0 K/UL (ref 0–0.2)
BILIRUB SERPL-MCNC: 0.4 MG/DL (ref 0.3–1.2)
BILIRUBIN DIRECT: <0.08 MG/DL
BILIRUBIN, INDIRECT: NORMAL MG/DL (ref 0–1)
BUN BLDV-MCNC: 18 MG/DL (ref 6–20)
BUN/CREAT BLD: 24 (ref 9–20)
CALCIUM SERPL-MCNC: 8.7 MG/DL (ref 8.6–10.4)
CHLORIDE BLD-SCNC: 102 MMOL/L (ref 98–107)
CHOLESTEROL/HDL RATIO: 3.3
CHOLESTEROL: 254 MG/DL
CO2: 23 MMOL/L (ref 20–31)
CREAT SERPL-MCNC: 0.74 MG/DL (ref 0.5–0.9)
DIFFERENTIAL TYPE: YES
EOSINOPHILS RELATIVE PERCENT: 1 % (ref 0–5)
ESTIMATED AVERAGE GLUCOSE: 108 MG/DL
GFR AFRICAN AMERICAN: >60 ML/MIN
GFR NON-AFRICAN AMERICAN: >60 ML/MIN
GFR SERPL CREATININE-BSD FRML MDRD: ABNORMAL ML/MIN/{1.73_M2}
GFR SERPL CREATININE-BSD FRML MDRD: ABNORMAL ML/MIN/{1.73_M2}
GLOBULIN: NORMAL G/DL (ref 1.5–3.8)
GLUCOSE BLD-MCNC: 92 MG/DL (ref 70–99)
HBA1C MFR BLD: 5.4 % (ref 4.8–5.9)
HCT VFR BLD CALC: 40.2 % (ref 36–46)
HDLC SERPL-MCNC: 78 MG/DL
HEMOGLOBIN: 13.9 G/DL (ref 12–16)
IMMATURE GRANULOCYTES: ABNORMAL %
LDL CHOLESTEROL: 109 MG/DL (ref 0–130)
LYMPHOCYTES # BLD: 39 % (ref 15–40)
MCH RBC QN AUTO: 29.8 PG (ref 26–34)
MCHC RBC AUTO-ENTMCNC: 34.5 G/DL (ref 31–37)
MCV RBC AUTO: 86.1 FL (ref 80–100)
MONOCYTES # BLD: 10 % (ref 4–8)
NRBC AUTOMATED: ABNORMAL PER 100 WBC
PATIENT FASTING?: YES
PDW BLD-RTO: 14.3 % (ref 12.1–15.2)
PLATELET # BLD: 268 K/UL (ref 140–450)
PLATELET ESTIMATE: ABNORMAL
PMV BLD AUTO: ABNORMAL FL (ref 6–12)
POTASSIUM SERPL-SCNC: 3.9 MMOL/L (ref 3.7–5.3)
RBC # BLD: 4.67 M/UL (ref 4–5.2)
RBC # BLD: ABNORMAL 10*6/UL
SEG NEUTROPHILS: 49 % (ref 47–75)
SEGMENTED NEUTROPHILS ABSOLUTE COUNT: 4.7 K/UL (ref 2.5–7)
SODIUM BLD-SCNC: 137 MMOL/L (ref 135–144)
TOTAL PROTEIN: 7.4 G/DL (ref 6.4–8.3)
TRIGL SERPL-MCNC: 335 MG/DL
TSH SERPL DL<=0.05 MIU/L-ACNC: 2.42 MIU/L (ref 0.3–5)
VITAMIN D 25-HYDROXY: 18.4 NG/ML (ref 30–100)
VLDLC SERPL CALC-MCNC: ABNORMAL MG/DL (ref 1–30)
WBC # BLD: 9.5 K/UL (ref 3.5–11)
WBC # BLD: ABNORMAL 10*3/UL

## 2019-04-23 PROCEDURE — 80076 HEPATIC FUNCTION PANEL: CPT

## 2019-04-23 PROCEDURE — 85025 COMPLETE CBC W/AUTO DIFF WBC: CPT

## 2019-04-23 PROCEDURE — 82306 VITAMIN D 25 HYDROXY: CPT

## 2019-04-23 PROCEDURE — 80061 LIPID PANEL: CPT

## 2019-04-23 PROCEDURE — 36415 COLL VENOUS BLD VENIPUNCTURE: CPT

## 2019-04-23 PROCEDURE — 80048 BASIC METABOLIC PNL TOTAL CA: CPT

## 2019-04-23 PROCEDURE — 84443 ASSAY THYROID STIM HORMONE: CPT

## 2019-04-23 PROCEDURE — 83036 HEMOGLOBIN GLYCOSYLATED A1C: CPT

## 2019-04-24 ENCOUNTER — APPOINTMENT (OUTPATIENT)
Dept: PHYSICAL THERAPY | Age: 54
End: 2019-04-24
Payer: COMMERCIAL

## 2019-05-03 ENCOUNTER — HOSPITAL ENCOUNTER (OUTPATIENT)
Age: 54
Discharge: HOME | End: 2019-05-05
Payer: COMMERCIAL

## 2019-05-03 ENCOUNTER — HOSPITAL ENCOUNTER (OUTPATIENT)
Dept: MRI IMAGING | Age: 54
Discharge: HOME | End: 2019-05-05
Payer: COMMERCIAL

## 2019-05-03 ENCOUNTER — HOSPITAL ENCOUNTER (OUTPATIENT)
Dept: GENERAL RADIOLOGY | Age: 54
Discharge: HOME | End: 2019-05-05
Payer: COMMERCIAL

## 2019-05-03 DIAGNOSIS — M54.16 RADICULOPATHY, LUMBAR REGION: ICD-10-CM

## 2019-05-03 DIAGNOSIS — M54.32 LEFT SIDED SCIATICA: ICD-10-CM

## 2019-05-03 PROCEDURE — 72148 MRI LUMBAR SPINE W/O DYE: CPT

## 2019-05-03 PROCEDURE — 72110 X-RAY EXAM L-2 SPINE 4/>VWS: CPT

## 2019-05-06 ENCOUNTER — HOSPITAL ENCOUNTER (OUTPATIENT)
Dept: MAMMOGRAPHY | Age: 54
Discharge: HOME | End: 2019-05-08
Payer: COMMERCIAL

## 2019-05-06 DIAGNOSIS — Z00.00 ANNUAL PHYSICAL EXAM: ICD-10-CM

## 2019-05-06 DIAGNOSIS — Z12.39 BREAST CANCER SCREENING: ICD-10-CM

## 2019-05-06 PROCEDURE — 77067 SCR MAMMO BI INCL CAD: CPT

## 2019-05-16 ENCOUNTER — HOSPITAL ENCOUNTER (OUTPATIENT)
Dept: PHYSICAL THERAPY | Age: 54
Setting detail: THERAPIES SERIES
Discharge: HOME | End: 2019-05-16
Payer: COMMERCIAL

## 2019-05-16 PROCEDURE — 97164 PT RE-EVAL EST PLAN CARE: CPT

## 2019-05-16 PROCEDURE — 97012 MECHANICAL TRACTION THERAPY: CPT

## 2019-05-16 ASSESSMENT — PAIN SCALES - GENERAL: PAINLEVEL_OUTOF10: 6

## 2019-05-16 ASSESSMENT — PAIN DESCRIPTION - LOCATION: LOCATION: BACK;HIP;LEG

## 2019-05-16 ASSESSMENT — PAIN DESCRIPTION - ORIENTATION: ORIENTATION: LEFT;RIGHT

## 2019-05-16 NOTE — PROGRESS NOTES
Phone: [address]      Fax: [phone]                            Outpatient Physical Therapy                                                                            Daily Note    Date: 2019  Patient Name: Bess Hodgkins        MRN: [id number]   ACCT#:  [de-identified]  : 1965  (47 y.o.)    Referring Practitioner: Dr. Paul Espinoza    Referral Date : 04/10/19    Diagnosis: Left Sciatica  Treatment Diagnosis: back pain    Onset Date: 04/10/19(Referral)  PT Insurance Information: [employer]  Total # of Visits Approved: 12 Per Physician Order  Total # of Visits to Date: 2    Pre-Treatment Pain:  6/10     Assessment  Assessment: Patient has not been to PT x one month, reassessed patient. Patient reports still off work at this time secondary to radicular back pain. Pain 5-6/10 low back and radiates into both legs but left worse than right. C/o left hip numbness constant for about 5 days. Pain worse in left calf pain. Had MRI- bulging disc L5-S1. Strength Right hip, knee , ankle 5/5; left hip flexion 4-/5, hip abd 4-/5, knee extension 4-/5, ankle DF 4+/5. Dr Paul Espinoza wanted patient to return to PT and scheduled to see pain management Dr for injections. Modified traction to prone position and added ice pack. Patient c/o poor tolerance on traction , that left leg burning worse after traction. Plan to start aquatics next visit. Chart Reviewed: Yes    Plan  Plan: Alter current plan- aquatics    Exercises/Modalities/Manual:  See DocFlow Sheet    Education:           Goals  (Total # of Visits to Date: 2)   Short Term Goals - Time Frame for Short term goals: 8  Short term goal 1: Patient to be independent with HEP  Short term goal 2: Patient to demonstrate proper lifting following back education  Short term goal 3:  Increase strength left knee extension 4+/5 to walk up and down stair steps WFL          Long Term Goals - Time Frame for Long term goals : 12  Long term goal 1: Improve functional mobility with score of >50/80 on LEFS  Long term goal 2: Decrease pain in left leg 2/10 without medication x 3 days             Post Treatment Pain:  5/10    Time In: 14:45    Time Out : 15:20        Timed Code Treatment Minutes: 20 Minutes  Total Treatment Time: Alexei Perdomo, PT     Date: 5/16/2019

## 2019-05-20 ENCOUNTER — HOSPITAL ENCOUNTER (OUTPATIENT)
Dept: PHYSICAL THERAPY | Age: 54
Setting detail: THERAPIES SERIES
Discharge: HOME | End: 2019-05-20
Payer: COMMERCIAL

## 2019-05-20 PROCEDURE — 97113 AQUATIC THERAPY/EXERCISES: CPT

## 2019-05-20 ASSESSMENT — PAIN SCALES - GENERAL: PAINLEVEL_OUTOF10: 5

## 2019-05-20 NOTE — PROGRESS NOTES
Phone: [address]      Fax: [phone]                            Outpatient Physical Therapy                                                                            Daily Note    Date: 2019  Patient Name: Janet Talavera        MRN: [id number]   ACCT#:  [de-identified]  : 1965  (47 y.o.)    Referring Practitioner: Dr. Leavitt Officer    Referral Date : 04/10/19    Diagnosis: Left Sciatica  Treatment Diagnosis: back pain    Onset Date: 04/10/19(Referral)  PT Insurance Information: Aetna  Total # of Visits Approved: 12 Per Physician Order  Total # of Visits to Date: 3  No Show: 0  Canceled Appointment: 0    Pre-Treatment Pain:  5/10  Subjective: Pain management  appt 19. Assessment  Assessment: Pt reports she plans to contact  asking for extension of SETELLE. Initiated aquatic ex as outlined. Good guerrero to session. Will progress reps of ex's as guerrero. Chart Reviewed: Yes    Plan  Plan: Continue with current plan    Exercises/Modalities/Manual:  See DocFlow Sheet      Goals  (Total # of Visits to Date: 3)   Short Term Goals - Time Frame for Short term goals: 8  Short term goal 1: Patient to be independent with HEP  Short term goal 2: Patient to demonstrate proper lifting following back education  Short term goal 3:  Increase strength left knee extension 4+/5 to walk up and down stair steps WFL        Long Term Goals - Time Frame for Long term goals : 12  Long term goal 1: Improve functional mobility with score of >50/80 on LEFS  Long term goal 2: Decrease pain in left leg 2/10 without medication x 3 days       Post Treatment Pain:  5/10    Time In: 1302  Time Out : 1342        Timed Code Treatment Minutes: 30 Minutes  Total Treatment Time: 40(Allowed time to enter and exit pool) Minutes    Sanchez Baker  PTA   Date: 2019

## 2019-05-22 ENCOUNTER — HOSPITAL ENCOUNTER (OUTPATIENT)
Dept: PHYSICAL THERAPY | Age: 54
Setting detail: THERAPIES SERIES
Discharge: HOME | End: 2019-05-22
Payer: COMMERCIAL

## 2019-05-22 PROCEDURE — 97113 AQUATIC THERAPY/EXERCISES: CPT

## 2019-05-22 ASSESSMENT — PAIN SCALES - GENERAL: PAINLEVEL_OUTOF10: 6

## 2019-05-22 NOTE — PROGRESS NOTES
Phone: [phone]      Fax: [phone]                            Outpatient Physical Therapy                                                                            Daily Note    Date: 2019  Patient Name: Lukas Weiss        MRN: [id number]   ACCT#:  [de-identified]  : 1965  (47 y.o.)    Referring Practitioner: Dr. Boris Corley    Referral Date : 04/10/19    Diagnosis: Left Sciatica  Treatment Diagnosis: back pain    Onset Date: 04/10/19(Referral)  PT Insurance Information: A[employer]na  Total # of Visits Approved: 12 Per Physician Order  Total # of Visits to Date: 4  No Show: 0  Canceled Appointment: 0    Pre-Treatment Pain:  6/10  Subjective: Pain management  appradha 19. Assessment  Assessment: Pt reports good guerrero to last session. RTW postponed until  6/10/19. continued with aquatics with progressed ex's with good guerrero. Chart Reviewed: Yes    Plan  Plan: Continue with current plan    Exercises/Modalities/Manual:  See DocFlow Sheet      Goals  (Total # of Visits to Date: 4)   Short Term Goals - Time Frame for Short term goals: 8  Short term goal 1: Patient to be independent with HEP  Short term goal 2: Patient to demonstrate proper lifting following back education  Short term goal 3:  Increase strength left knee extension 4+/5 to walk up and down stair steps WFL       Long Term Goals - Time Frame for Long term goals : 12  Long term goal 1: Improve functional mobility with score of >50/80 on LEFS  Long term goal 2: Decrease pain in left leg 2/10 without medication x 3 days       Post Treatment Pain:  5/10    Time In: 1245    Time Out : 1325        Timed Code Treatment Minutes: 30 Minutes  Total Treatment Time: 40(Allowed time to enter and exit pool) Minutes    Dante Baker PTA    Date: 2019

## 2019-05-24 ENCOUNTER — HOSPITAL ENCOUNTER (OUTPATIENT)
Dept: PHYSICAL THERAPY | Age: 54
Setting detail: THERAPIES SERIES
Discharge: HOME | End: 2019-05-24
Payer: COMMERCIAL

## 2019-05-24 PROCEDURE — 97113 AQUATIC THERAPY/EXERCISES: CPT

## 2019-05-24 ASSESSMENT — PAIN SCALES - GENERAL: PAINLEVEL_OUTOF10: 6

## 2019-05-24 ASSESSMENT — PAIN DESCRIPTION - LOCATION: LOCATION: BACK;LEG

## 2019-05-24 ASSESSMENT — PAIN DESCRIPTION - ORIENTATION: ORIENTATION: RIGHT;LEFT

## 2019-05-24 NOTE — PROGRESS NOTES
Phone: [address]      Fax: [phone]                            Outpatient Physical Therapy                                                                            Daily Note    Date: 2019  Patient Name: Monica Bhardwaj        MRN: [id number]   ACCT#:  [de-identified]  : 1965  (47 y.o.)    Referring Practitioner: Dr. Patsy Swain    Referral Date : 04/10/19    Diagnosis: Left Sciatica  Treatment Diagnosis: back pain    Onset Date: 04/10/19(Referral)  PT Insurance Information: Aetna  Total # of Visits Approved: 12 Per Physician Order  Total # of Visits to Date: 5    Pre-Treatment Pain:  6/10  Subjective: Pain management  appt 19. Assessment  Assessment: Patient reports pain still 5-6/10 low back and radiating down both legs. She reports several hours of less pain after aquatic sessions, but overall no improvement. Encouraged patient to try getting in pool to exercise on own over weekend being she will have access to pool. Chart Reviewed: Yes    Plan  Plan: Continue with current plan    Exercises/Modalities/Manual:  See DocFlow Sheet    Education:           Goals  (Total # of Visits to Date: 5)   Short Term Goals - Time Frame for Short term goals: 8  Short term goal 1: Patient to be independent with HEP  Short term goal 2: Patient to demonstrate proper lifting following back education  Short term goal 3:  Increase strength left knee extension 4+/5 to walk up and down stair steps WFL          Long Term Goals - Time Frame for Long term goals : 12  Long term goal 1: Improve functional mobility with score of >50/80 on LEFS  Long term goal 2: Decrease pain in left leg 2/10 without medication x 3 days             Post Treatment Pain:  4/10    Time In: 13:00    Time Out : 13:45        Timed Code Treatment Minutes: 35 Minutes  Total Treatment Time: 45(Allowed time to enter and exit pool) [address], PT     Date: 2019

## 2019-05-28 ENCOUNTER — HOSPITAL ENCOUNTER (OUTPATIENT)
Dept: ULTRASOUND IMAGING | Age: 54
Discharge: HOME | End: 2019-05-30
Payer: COMMERCIAL

## 2019-05-28 DIAGNOSIS — E04.1 THYROID NODULE: ICD-10-CM

## 2019-05-28 PROCEDURE — 76536 US EXAM OF HEAD AND NECK: CPT

## 2019-05-29 ENCOUNTER — HOSPITAL ENCOUNTER (OUTPATIENT)
Dept: PHYSICAL THERAPY | Age: 54
Setting detail: THERAPIES SERIES
Discharge: HOME | End: 2019-05-29
Payer: COMMERCIAL

## 2019-05-29 PROCEDURE — 97113 AQUATIC THERAPY/EXERCISES: CPT

## 2019-05-29 ASSESSMENT — PAIN SCALES - GENERAL: PAINLEVEL_OUTOF10: 5

## 2019-05-31 ENCOUNTER — HOSPITAL ENCOUNTER (OUTPATIENT)
Dept: PHYSICAL THERAPY | Age: 54
Setting detail: THERAPIES SERIES
Discharge: HOME | End: 2019-05-31
Payer: COMMERCIAL

## 2019-05-31 PROCEDURE — 97113 AQUATIC THERAPY/EXERCISES: CPT

## 2019-05-31 ASSESSMENT — PAIN SCALES - GENERAL: PAINLEVEL_OUTOF10: 7

## 2019-05-31 NOTE — PROGRESS NOTES
Phone: [phone]      Fax: [phone]                            Outpatient Physical Therapy                                                                            Daily Note    Date: 2019  Patient Name: Tiera De La Garza        MRN: [id number]   ACCT#:  [de-identified]  : 1965  (47 y.o.)    Referring Practitioner: Dr. Betzy Prater    Referral Date : 04/10/19    Diagnosis: Left Sciatica  Treatment Diagnosis: back pain    Onset Date: 04/10/19(Referral)  PT Insurance Information: A[employer]  Total # of Visits Approved: 12 Per Physician Order  Total # of Visits to Date: 7  No Show: 0  Canceled Appointment: 0    Pre-Treatment Pain:  7/10  Subjective: Pain management Dr grajeda 19. Assessment  Assessment: Patient reports was feeling better but babysat grandchildren today  and was on feet and up and down more than usual which increased pain this date. Pain decreased post therapy session. Chart Reviewed: Yes    Plan  Plan: Continue with current plan    Exercises/Modalities/Manual:  See DocFlow Sheet    Education:           Goals  (Total # of Visits to Date: 7)   Short Term Goals - Time Frame for Short term goals: 8  Short term goal 1: Patient to be independent with HEP-met  Short term goal 2: Patient to demonstrate proper lifting following back education  Short term goal 3:  Increase strength left knee extension 4+/5 to walk up and down stair steps WFL          Long Term Goals - Time Frame for Long term goals : 12  Long term goal 1: Improve functional mobility with score of >50/80 on LEFS  Long term goal 2: Decrease pain in left leg 10 without medication x 3 days             Post Treatment Pain:  5/10      Time In: 1503  Time Out: 1545  Timed Code Treatment Minutes: 36 Minutes  Total Treatment Time: 45(Allowed time to enter and exit pool) Minutes    Manasa Morrell PTA     Date: 2019

## 2019-06-03 ENCOUNTER — HOSPITAL ENCOUNTER (OUTPATIENT)
Dept: PHYSICAL THERAPY | Age: 54
Setting detail: THERAPIES SERIES
Discharge: HOME | End: 2019-06-03
Payer: COMMERCIAL

## 2019-06-03 PROCEDURE — 97113 AQUATIC THERAPY/EXERCISES: CPT

## 2019-06-03 ASSESSMENT — PAIN SCALES - GENERAL: PAINLEVEL_OUTOF10: 4

## 2019-06-06 ENCOUNTER — HOSPITAL ENCOUNTER (OUTPATIENT)
Dept: PHYSICAL THERAPY | Age: 54
Setting detail: THERAPIES SERIES
Discharge: HOME | End: 2019-06-06
Payer: COMMERCIAL

## 2019-06-06 PROCEDURE — 97113 AQUATIC THERAPY/EXERCISES: CPT

## 2019-06-06 ASSESSMENT — PAIN SCALES - GENERAL: PAINLEVEL_OUTOF10: 4

## 2019-06-06 NOTE — PROGRESS NOTES
Phone: [phone]      Fax: [phone]                            Outpatient Physical Therapy                                                                            Daily Note    Date: 2019  Patient Name: Kadi Bonilla        MRN: [id number]   ACCT#:  [de-identified]  : 1965  (47 y.o.)    Referring Practitioner: Dr. Donny Chaney    Referral Date : 04/10/19    Diagnosis: Left Sciatica  Treatment Diagnosis: back pain    Onset Date: 04/10/19(Referral)  PT Insurance Information: Aetna  Total # of Visits Approved: 12 Per Physician Order  Total # of Visits to Date: 9    Pre-Treatment Pain:  4/10  Subjective: Pain management Dr appt 19. Assessment  Assessment: Patient reports slowly feeling better, no longer having numbness in left leg. Pain 4/10. Walking much improved. Continue per plan x 3 visits. Chart Reviewed: Yes    Plan  Plan: Continue with current plan    Exercises/Modalities/Manual:  See DocFlow Sheet    Education:           Goals  (Total # of Visits to Date: 5)   Short Term Goals - Time Frame for Short term goals: 8  Short term goal 1: Patient to be independent with HEP-met  Short term goal 2: Patient to demonstrate proper lifting following back education Met  Short term goal 3:  Increase strength left knee extension 4+/5 to walk up and down stair steps WFL Met          Long Term Goals - Time Frame for Long term goals : 12  Long term goal 1: Improve functional mobility with score of >50/80 on LEFS  Long term goal 2: Decrease pain in left leg 2/10 without medication x 3 days             Post Treatment Pain:  3/10    Time In: 13:00    Time Out : 13;45        Timed Code Treatment Minutes: 35 Minutes  Total Treatment Time: 45(Allowed time to enter and exit pool) [address], PT     Date: 2019

## 2019-06-10 ENCOUNTER — HOSPITAL ENCOUNTER (OUTPATIENT)
Dept: PHYSICAL THERAPY | Age: 54
Setting detail: THERAPIES SERIES
Discharge: HOME | End: 2019-06-10
Payer: COMMERCIAL

## 2019-06-10 PROCEDURE — 97113 AQUATIC THERAPY/EXERCISES: CPT

## 2019-06-10 ASSESSMENT — PAIN SCALES - GENERAL: PAINLEVEL_OUTOF10: 3

## 2019-06-10 NOTE — PROGRESS NOTES
Phone: [address]      Fax: [phone]                            Outpatient Physical Therapy                                                                            Daily Note    Date: 6/10/2019  Patient Name: Talia Estes        MRN: [id number]   ACCT#:  [de-identified]  : 1965  (47 y.o.)    Referring Practitioner: Dr. Henrik Graham    Referral Date : 04/10/19    Diagnosis: Left Sciatica  Treatment Diagnosis: back pain    Onset Date: 04/10/19(Referral)  PT Insurance Information: A[employer]na  Total # of Visits Approved: 12 Per Physician Order  Total # of Visits to Date: 10  No Show: 0  Canceled Appointment: 0    Pre-Treatment Pain:  3/10  Subjective: Pain management  appradha 19. Assessment  Assessment: Patient to see pain management tomorrow and returns to work on Monday. Patient continues to state no numbness in leg at this time. Patient continues to c/o leg pain when completing ambulation and pain increases with long sitting and excessive ambulation. Patient experiences relief when walking post long time sitting and from in bed all night. Chart Reviewed: Yes    Plan  Plan: Continue with current plan    Exercises/Modalities/Manual:  See DocFlow Sheet    Education:           Goals  (Total # of Visits to Date: 8)   Short Term Goals - Time Frame for Short term goals: 8  Short term goal 1: Patient to be independent with HEP-met  Short term goal 2: Patient to demonstrate proper lifting following back education Met  Short term goal 3:  Increase strength left knee extension 4+/5 to walk up and down stair steps WFL Met          Long Term Goals - Time Frame for Long term goals : 15  Long term goal 1: Improve functional mobility with score of >50/80 on LEFS  Long term goal 2: Decrease pain in left leg 2/10 without medication x 3 days             Post Treatment Pain:  3/10    Time In: 1548  Time Out: 1628  Timed Code Treatment Minutes: 37 Minutes  Total Treatment Time: 45(Allowed time to enter and exit pool) Minutes    Lilo Holliday, PTA     Date: 6/10/2019

## 2019-06-12 ENCOUNTER — HOSPITAL ENCOUNTER (OUTPATIENT)
Dept: PHYSICAL THERAPY | Age: 54
Setting detail: THERAPIES SERIES
Discharge: HOME | End: 2019-06-12
Payer: COMMERCIAL

## 2019-06-12 PROCEDURE — 97110 THERAPEUTIC EXERCISES: CPT

## 2019-06-12 ASSESSMENT — PAIN DESCRIPTION - ORIENTATION: ORIENTATION: LEFT

## 2019-06-12 ASSESSMENT — PAIN SCALES - GENERAL: PAINLEVEL_OUTOF10: 3

## 2019-06-12 ASSESSMENT — PAIN DESCRIPTION - LOCATION: LOCATION: LEG

## 2019-06-12 NOTE — PROGRESS NOTES
Phone: [address]      Fax: [phone]                            Outpatient Physical Therapy                                                                            Daily Note    Date: 2019  Patient Name: Juana Harris        MRN: [id number]   ACCT#:  [de-identified]  : 1965  (47 y.o.)    Referring Practitioner: Dr. Mik Villarreal    Referral Date : 04/10/19    Diagnosis: Left Sciatica  Treatment Diagnosis: back pain    Onset Date: 04/10/19(Referral)  PT Insurance Information: [employer]  Total # of Visits Approved: 12 Per Physician Order  Total # of Visits to Date: 11    Pre-Treatment Pain:  3/10  Subjective: Pain management Dr grajeda 19. Assessment  Assessment: Pain 3/10 in left leg, no right leg pain today. Per patient , Neurologist has scheduled injection for disc on 19 and she is to return to work 19. MMT strength B hip flexion 4+/5, knee extension R 5/5, left 4+/5. Education on proper body mechanics to avoid bending and twisting at trunk. Plan to recheck LEFS and discharge next visit. Chart Reviewed: Yes    Plan  Plan: Continue with current plan    Exercises/Modalities/Manual:  See DocFlow Sheet    Education:           Goals  (Total # of Visits to Date: 6)   Short Term Goals - Time Frame for Short term goals: 8  Short term goal 1: Patient to be independent with HEP-met  Short term goal 2: Patient to demonstrate proper lifting following back education Met  Short term goal 3:  Increase strength left knee extension 4+/5 to walk up and down stair steps WFL Met          Long Term Goals - Time Frame for Long term goals : 15  Long term goal 1: Improve functional mobility with score of >50/80 on LEFS  Long term goal 2: Decrease pain in left leg 2/10 without medication x 3 days             Post Treatment Pain:  2/10    Time In: 10:30    Time Out : 11:15        Timed Code Treatment Minutes: 37 Minutes  Total Treatment Time: 45(Allowed time to enter and exit

## 2019-06-14 ENCOUNTER — HOSPITAL ENCOUNTER (OUTPATIENT)
Dept: PHYSICAL THERAPY | Age: 54
Setting detail: THERAPIES SERIES
Discharge: HOME | End: 2019-06-14
Payer: COMMERCIAL

## 2019-06-14 PROCEDURE — 97113 AQUATIC THERAPY/EXERCISES: CPT

## 2019-06-14 ASSESSMENT — PAIN SCALES - GENERAL: PAINLEVEL_OUTOF10: 3

## 2019-06-14 ASSESSMENT — PAIN DESCRIPTION - LOCATION: LOCATION: LEG

## 2019-06-14 ASSESSMENT — PAIN DESCRIPTION - ORIENTATION: ORIENTATION: LEFT

## 2019-06-14 NOTE — PROGRESS NOTES
Phone: [address]      Fax: [phone]                            Outpatient Physical Therapy                                                                            Daily Note    Date: 2019  Patient Name: Bess Hodgkins        MRN: [id number]   ACCT#:  [de-identified]  : 1965  (47 y.o.)    Referring Practitioner: Dr. Paul Espinoza    Referral Date : 04/10/19    Diagnosis: Left Sciatica  Treatment Diagnosis: back pain    Onset Date: 04/10/19(Referral)  PT Insurance Information: Aetna  Total # of Visits Approved: 12 Per Physician Order  Total # of Visits to Date: 12    Pre-Treatment Pain:  3/10  Subjective: Pain management  appradha 19. Assessment  Assessment: Patient reports she continues  taking Tylenol every 8 hours as needed for pain but not requied to take as often. Patient reports when having a good day pain reduces to 2/10 occasionally but pain normally 3/10. LEFS score = 38/80 (score from first visit 33/80)  Chart Reviewed: Yes    Plan  Plan: Discharge    Exercises/Modalities/Manual:  See DocFlow Sheet    Education:           Goals  (Total # of Visits to Date: 15)   Short Term Goals - Time Frame for Short term goals: 8  Short term goal 1: Patient to be independent with HEP-met  Short term goal 2: Patient to demonstrate proper lifting following back education Met  Short term goal 3:  Increase strength left knee extension 4+/5 to walk up and down stair steps WFL Met          Long Term Goals - Time Frame for Long term goals : 12  Long term goal 1: Improve functional mobility with score of >50/80 on LEFS Not met  Long term goal 2: Decrease pain in left leg 2/10 without medication x 3 days Not met             Post Treatment Pain:  3/10      Time In: 1113  Time Out: 1158  Timed Code Treatment Minutes: 40 Minutes  Total Treatment Time: (Allowed time to enter and exit pool) Minutes    Chelly Estimable, PTA     Date: 2019

## 2019-06-17 NOTE — DISCHARGE SUMMARY
Phone: [address]             Fax: [phone]                            Outpatient Physical Therapy                                                                    Discharge Summary    Patient: Jeffery Ta  : 1965  ACCT #: [de-identified]   Referring Practitioner: Dr. Anais Smith      Diagnosis: Left Sciatica  Date Treatment Initiated:19  Date of Last Treatment: 19    PT Visit Information  Onset Date: 04/10/19(Referral)  PT Insurance Information: Aetna  Total # of Visits Approved: 12  Total # of Visits to Date: 12  Plan of Care/Certification Expiration Date: 19    Frequency/Duration  Days: 2 times per week  Weeks: 6 weeks    Treatment Received  Therapeutic Exercise, Traction, Patient Education/HEP, Aquatics and Back Education    Assessment   Patient reports when having a good day pain reduces to 2/10 occasionally but pain normally 3/10. MMT strength B hip flexion 4+/5, knee extension R 5/5, left 4+/5. Education on proper body mechanics to avoid bending and twisting at trunk. LEFS score = 38/80 (score from first visit 33/80). Pain Level: 3  Ambulation 1  Quality of Gait: antalgic, mild limp favoring left leg    Reason for Discharge  Completion of Prescribed visits    Comments:   Thank you for this referral      Leonardo Mathis  Date: 2019

## 2019-06-24 ENCOUNTER — OFFICE VISIT (OUTPATIENT)
Dept: PRIMARY CARE CLINIC | Age: 54
End: 2019-06-24
Payer: COMMERCIAL

## 2019-06-24 ENCOUNTER — HOSPITAL ENCOUNTER (OUTPATIENT)
Age: 54
Setting detail: SPECIMEN
Discharge: HOME | End: 2019-06-24
Payer: COMMERCIAL

## 2019-06-24 VITALS
TEMPERATURE: 97.8 F | DIASTOLIC BLOOD PRESSURE: 80 MMHG | WEIGHT: 259 LBS | HEART RATE: 66 BPM | OXYGEN SATURATION: 100 % | BODY MASS INDEX: 47.37 KG/M2 | SYSTOLIC BLOOD PRESSURE: 132 MMHG

## 2019-06-24 DIAGNOSIS — R30.0 DYSURIA: ICD-10-CM

## 2019-06-24 DIAGNOSIS — N30.01 ACUTE CYSTITIS WITH HEMATURIA: Primary | ICD-10-CM

## 2019-06-24 PROCEDURE — 81003 URINALYSIS AUTO W/O SCOPE: CPT | Performed by: NURSE PRACTITIONER

## 2019-06-24 PROCEDURE — 86403 PARTICLE AGGLUT ANTBDY SCRN: CPT

## 2019-06-24 PROCEDURE — 87088 URINE BACTERIA CULTURE: CPT

## 2019-06-24 PROCEDURE — 99213 OFFICE O/P EST LOW 20 MIN: CPT | Performed by: NURSE PRACTITIONER

## 2019-06-24 PROCEDURE — 87086 URINE CULTURE/COLONY COUNT: CPT

## 2019-06-24 PROCEDURE — 87186 SC STD MICRODIL/AGAR DIL: CPT

## 2019-06-24 RX ORDER — NITROFURANTOIN 25; 75 MG/1; MG/1
100 CAPSULE ORAL 2 TIMES DAILY
Qty: 10 CAPSULE | Refills: 0 | Status: SHIPPED | OUTPATIENT
Start: 2019-06-24 | End: 2019-06-29

## 2019-06-24 RX ORDER — GABAPENTIN 300 MG/1
400 CAPSULE ORAL 3 TIMES DAILY
Refills: 0 | COMMUNITY
Start: 2019-06-20 | End: 2020-06-01

## 2019-06-24 ASSESSMENT — ENCOUNTER SYMPTOMS
VOMITING: 0
NAUSEA: 0
SORE THROAT: 0
COUGH: 0
WHEEZING: 0
DIARRHEA: 0

## 2019-06-24 NOTE — PATIENT INSTRUCTIONS
SURVEY:    You may be receiving a survey from [employer] regarding your visit today. Please complete the survey to enable us to provide the highest quality of care to you and your family. If you cannot score us as very good on any question, please call the office to discuss how we could have made your experience exceptional.     Thank you. Patient Education        Urinary Tract Infection in Women: Care Instructions  Your Care Instructions    A urinary tract infection, or UTI, is a general term for an infection anywhere between the kidneys and the urethra (where urine comes out). Most UTIs are bladder infections. They often cause pain or burning when you urinate. UTIs are caused by bacteria and can be cured with antibiotics. Be sure to complete your treatment so that the infection goes away. Follow-up care is a key part of your treatment and safety. Be sure to make and go to all appointments, and call your doctor if you are having problems. It's also a good idea to know your test results and keep a list of the medicines you take. How can you care for yourself at home? · Take your antibiotics as directed. Do not stop taking them just because you feel better. You need to take the full course of antibiotics. · Drink extra water and other fluids for the next day or two. This may help wash out the bacteria that are causing the infection. (If you have kidney, heart, or liver disease and have to limit fluids, talk with your doctor before you increase your fluid intake.)  · Avoid drinks that are carbonated or have caffeine. They can irritate the bladder. · Urinate often. Try to empty your bladder each time. · To relieve pain, take a hot bath or lay a heating pad set on low over your lower belly or genital area. Never go to sleep with a heating pad in place. To prevent UTIs  · Drink plenty of water each day. This helps you urinate often, which clears bacteria from your system.  (If you have kidney, heart, or liver disease and have to limit fluids, talk with your doctor before you increase your fluid intake.)  · Urinate when you need to. · Urinate right after you have sex. · Change sanitary pads often. · Avoid douches, bubble baths, feminine hygiene sprays, and other feminine hygiene products that have deodorants. · After going to the bathroom, wipe from front to back. When should you call for help? Call your doctor now or seek immediate medical care if:    · Symptoms such as fever, chills, nausea, or vomiting get worse or appear for the first time.     · You have new pain in your back just below your rib cage. This is called flank pain.     · There is new blood or pus in your urine.     · You have any problems with your antibiotic medicine.    Watch closely for changes in your health, and be sure to contact your doctor if:    · You are not getting better after taking an antibiotic for 2 days.     · Your symptoms go away but then come back. Where can you learn more? Go to [email or web address]://[employer]igmore.[employer]. org and sign in to your [employer] account. Enter R183 in the [employer] box to learn more about \"Urinary Tract Infection in Women: Care Instructions. \"     If you do not have an account, please click on the \"Sign Up Now\" link. Current as of: December 19, 2018  Content Version: 12.0  © 6642-2001 Healthwise, Incorporated. Care instructions adapted under license by [facility] ([facility]). If you have questions about a medical condition or this instruction, always ask your healthcare professional. [place] [address] any warranty or liability for your use of this information. Patient Education        nitrofurantoin  Pronunciation:  ANTHONY RODRIGUEZ toin  Brand:  Furadantin, Macrobid, Macrodantin  What is the most important information I should know about nitrofurantoin?   You should not take nitrofurantoin if you have severe kidney disease, urination problems, or a history of jaundice or liver problems caused by nitrofurantoin. Do not take nitrofurantoin if you are in the last 2 to 4 weeks of pregnancy. What is nitrofurantoin? Nitrofurantoin is an antibiotic that fights bacteria in the body. Nitrofurantoin is used to treat urinary tract infections. Nitrofurantoin may also be used for purposes not listed in this medication guide. What should I discuss with my healthcare provider before taking nitrofurantoin? You should not take nitrofurantoin if you are allergic to it, or if you have:  · severe kidney disease;  · a history of jaundice or liver problems caused by taking nitrofurantoin;  · if you are urinating less than usual or not at all; or  · if you are in the last 2 to 4 weeks of pregnancy. Do not take nitrofurantoin if you are in the last 2 to 4 weeks of pregnancy. To make sure nitrofurantoin is safe for you, tell your doctor if you have:  · kidney disease;  · anemia;  · diabetes;  · an electrolyte imbalance or vitamin B deficiency;  · glucose-6-phosphate dehydrogenase (G6PD) deficiency; or  · any type of debilitating disease. FDA pregnancy category B. This medicine is not expected to be harmful to an unborn baby during early pregnancy. Tell your doctor if you are pregnant or plan to become pregnant during treatment. Nitrofurantoin can pass into breast milk and may harm a nursing baby. You should not breast-feed while you are taking nitrofurantoin. Nitrofurantoin should not be given to a child younger than 2 month old. How should I take nitrofurantoin? Follow all directions on your prescription label. Do not take this medicine in larger or smaller amounts or for longer than recommended. Take nitrofurantoin with food. Shake the oral suspension (liquid) well just before you measure a dose. Measure liquid medicine with the dosing syringe provided, or with a special dose-measuring spoon or medicine cup. If you do not have a dose-measuring device, ask your pharmacist for one.   You may mix your liquid dose with water, milk, or fruit juice to make it easier to swallow. Drink the entire mixture right away. Use this medicine for the full prescribed length of time. Your symptoms may improve before the infection is completely cleared. Skipping doses may also increase your risk of further infection that is resistant to antibiotics. Nitrofurantoin will not treat a viral infection such as the common cold or flu. Nitrofurantoin is usually given for up to 3 days after lab tests show that the infection has cleared. If you use this medicine long-term, you may need frequent medical tests at your doctor's office. Nitrofurantoin can cause unusual results with certain lab tests for glucose (sugar) in the urine. Tell any doctor who treats you that you are using nitrofurantoin. Store at room temperature away from moisture, heat, and light. What happens if I miss a dose? Take the missed dose as soon as you remember. Skip the missed dose if it is almost time for your next scheduled dose. Do not  take extra medicine to make up the missed dose. What happens if I overdose? Seek emergency medical attention or call the Poison Help line at 1-316.813.8926. What should I avoid while taking nitrofurantoin? Antibiotic medicines can cause diarrhea, which may be a sign of a new infection. If you have diarrhea that is watery or has blood in it, call your doctor. Do not use any medicine to stop the diarrhea unless your doctor has told you to. Avoid using antacids without your doctor's advice. Use only the type of antacid your doctor recommends. Some antacids can make it harder for your body to absorb nitrofurantoin. What are the possible side effects of nitrofurantoin? Get emergency medical help if you have any of these signs of an allergic reaction:  hives; difficult breathing; swelling of your face, lips, tongue, or throat.   Call your doctor at once if you have:  · diarrhea that is watery or bloody;  · sudden chest pain or discomfort, wheezing, dry cough or hack;  · new or worsening cough, trouble breathing;  · fever, chills, body aches, tiredness, unexplained weight loss;  · numbness, tingling, or pain in your hands or feet;  · liver problems --nausea, upper stomach pain, itching, tired feeling, loss of appetite, dark urine, eric-colored stools, jaundice (yellowing of the skin or eyes); or  · lupus-like syndrome --joint pain or swelling with fever, swollen glands, muscle aches, chest pain, vomiting, unusual thoughts or behavior, and patchy skin color. Serious side effects may be more likely in older adults and those who are ill or debilitated. Common side effects may include:  · headache, dizziness;  · gas, upset stomach;  · mild diarrhea; or  · vaginal itching or discharge. This is not a complete list of side effects and others may occur. Call your doctor for medical advice about side effects. You may report side effects to FDA at 7-277-FDA-5789. What other drugs will affect nitrofurantoin? Other drugs may interact with nitrofurantoin, including prescription and over-the-counter medicines, vitamins, and herbal products. Tell each of your health care providers about all medicines you use now and any medicine you start or stop using. Where can I get more information? Your pharmacist can provide more information about nitrofurantoin. Remember, keep this and all other medicines out of the reach of children, never share your medicines with others, and use this medication only for the indication prescribed. Every effort has been made to ensure that the information provided by [address] is accurate, up-to-date, and complete, but no guarantee is made to that effect. Drug information contained herein may be time sensitive.  [employer] information has been compiled for use by healthcare practitioners and consumers in the [facility] and therefore [employer] does not warrant that uses outside of the [facility] are appropriate, unless specifically indicated otherwise. [facility] drug information does not endorse drugs, diagnose patients or recommend therapy. [facility]'s drug information is an informational resource designed to assist licensed healthcare practitioners in caring for their patients and/or to serve consumers viewing this service as a supplement to, and not a substitute for, the expertise, skill, knowledge and judgment of healthcare practitioners. The absence of a warning for a given drug or drug combination in no way should be construed to indicate that the drug or drug combination is safe, effective or appropriate for any given patient. [facility] does not assume any responsibility for any aspect of healthcare administered with the aid of information [facility] provides. The information contained herein is not intended to cover all possible uses, directions, precautions, warnings, drug interactions, allergic reactions, or adverse effects. If you have questions about the drugs you are taking, check with your doctor, nurse or pharmacist.  Copyright 1821-[address]27 [address]. Version: 8.01. Revision date: 3/11/2014. Care instructions adapted under license by [facility] ([facility]). If you have questions about a medical condition or this instruction, always ask your healthcare professional. [place] [address] any warranty or liability for your use of this information. · Encouraged to increase fluids and to eat nutritiously. · Avoid full bladder, bubble baths, bath oils and hot tubs. · Wipe front to back and void after sexual intercourse. · Continue antibiotic as prescribed until all doses are completed  · Probiotic OTC or greek yogurt daily while on antibiotic  · Will call with urine culture results once obtained. · Patient instructions given for urinary tract infection and macrobid.   · To ER or call 911 if any difficulty breathing, shortness of breath, inability to swallow, hives, rash, facial/tongue

## 2019-06-24 NOTE — PROGRESS NOTES
[address] [facility] WALK-IN [facility] Madhav Chang [address] [id number]  Dept: [phone]  Dept Fax: [phone]     Mike Tolbert is a 47 y.o. female who presents to the [facility] in Care today for hermedical conditions/complaints as noted below. Mike Tolbert is c/o of Dysuria (patient presents for dysuria, urinary frequency since today, patient states the frequency started yesterday )      HPI:     Dysuria    This is a new problem. The current episode started today (Started today with burning with urination and frequency started yesterday. Denies fever or chills. ). The problem occurs every urination. The problem has been gradually worsening. The quality of the pain is described as burning. The pain is at a severity of 5/10. The pain is moderate. There has been no fever. She is sexually active. There is no history of pyelonephritis. Associated symptoms include frequency and urgency. Pertinent negatives include no chills, discharge, flank pain, hematuria, nausea, possible pregnancy or vomiting. Associated symptoms comments: Real dark urine. Scheduled for epidural on Wednesday for low back pain, bulging disc. 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 The treatment provided no relief. Her past medical history is significant for kidney stones (1 year or less, just seen by kidney doctor) and a urological procedure (stent a year ago with kidney stones, sling 5 years ago. ). There is no history of recurrent UTIs or urinary stasis.           Past Medical History:   Diagnosis Date    Allergic rhinitis     Enlarged thyroid 2016    nodules check annually    GERD (gastroesophageal reflux disease)     Headache(784.0)     migranes at times    Hyperlipidemia     Hypertension     NEIL (obstructive sleep apnea) 7/8/2016    Restless legs 7/8/2016        Current Outpatient Medications   Medication Sig Dispense Refill    gabapentin (NEURONTIN) 300 MG capsule TAKE 1 CAPSULE DAILY AT BEDTIME and increase every 3-5 days until taking THREE TIMES DAILY  0    nitrofurantoin, macrocrystal-monohydrate, (MACROBID) 100 MG capsule Take 1 capsule by mouth 2 times daily for 5 days 10 capsule 0    doxazosin (CARDURA) 1 MG tablet Take 1 tablet by mouth daily At bedtime 90 tablet 3    ciclopirox (PENLAC) 8 % solution Apply to the affected nails nightly remove medication from nail every 7 days with rubbing alcohol 1 Bottle 1    ezetimibe (ZETIA) 10 MG tablet TAKE 1 TABLET DAILY 90 tablet 1    omeprazole (PRILOSEC) 20 MG delayed release capsule TAKE 1 CAPSULE DAILY in am on empty stomach 90 capsule 1    rOPINIRole (REQUIP) 1 MG tablet Take 1 tablet by mouth nightly Restless legs 90 tablet 1    spironolactone (ALDACTONE) 25 MG tablet Take 1 tablet by mouth daily 90 tablet 1    metoprolol tartrate (LOPRESSOR) 50 MG tablet Take 1 tablet by mouth 2 times daily 180 tablet 1    fluticasone (FLONASE) 50 MCG/ACT nasal spray 1 spray by Nasal route daily as needed for Rhinitis or Allergies 2 Bottle 1    vitamin D 1000 units CAPS Take 1 capsule by mouth daily      naproxen (NAPROSYN) 500 MG tablet Take 1 tablet by mouth 2 times daily (with meals) 60 tablet 2    Acetaminophen (TYLENOL PO) Take 500 mg by mouth every 6 hours as needed       aspirin 81 MG EC tablet Take 81 mg by mouth daily       Omega-3 Fatty Acids (FISH OIL) 1000 MG CAPS Take 1,000 mg by mouth daily       predniSONE (DELTASONE) 10 MG tablet Take 10 mg by mouth daily  0     No current facility-administered medications for this visit. Allergies   Allergen Reactions    Iv Contrast [Iodides] Swelling    Lisinopril Swelling     Swelling of upper lip    Simvastatin Other (See Comments)     Elevated CK/CKMB. Subjective:     Review of Systems   Constitutional: Negative for appetite change, chills, diaphoresis, fatigue and fever. HENT: Negative for congestion, ear pain and sore throat. Respiratory: Negative for cough and wheezing. Gastrointestinal: Negative for diarrhea, nausea and vomiting. Genitourinary: Positive for decreased urine volume (urinating small amounts frequently), dysuria, frequency and urgency. Negative for flank pain and hematuria. Skin: Negative for rash and wound. Neurological: Negative for dizziness, light-headedness and headaches. Objective:      Physical Exam   Constitutional: She is oriented to person, place, and time. Vital signs are normal. She appears well-developed and well-nourished. She is cooperative. She does not appear ill. No distress. HENT:   Head: Normocephalic and atraumatic. Right Ear: External ear normal.   Left Ear: External ear normal.   Eyes: Conjunctivae are normal.   Neck: Neck supple. Cardiovascular: Normal rate, regular rhythm, S1 normal, S2 normal, normal heart sounds and intact distal pulses. Exam reveals no gallop and no friction rub. No murmur heard. Pulmonary/Chest: Effort normal and breath sounds normal. No accessory muscle usage. No respiratory distress. She has no decreased breath sounds. She has no wheezes. She has no rhonchi. She has no rales. Abdominal: Soft. Normal appearance. She exhibits no distension. There is no tenderness. There is no rigidity, no rebound, no guarding and no CVA tenderness. Musculoskeletal: Normal range of motion. Lymphadenopathy:     She has no cervical adenopathy. Right cervical: No superficial cervical and no posterior cervical adenopathy present. Left cervical: No superficial cervical and no posterior cervical adenopathy present. Neurological: She is alert and oriented to person, place, and time. Skin: Skin is warm and dry. No rash noted. She is not diaphoretic. No erythema. No pallor. Psychiatric: She has a normal mood and affect. Her behavior is normal.   Nursing note and vitals reviewed.     /80   Pulse 66   Temp 97.8 °F (36.6 °C)   Wt 259 lb (117.5 kg)   LMP  (LMP Unknown)   SpO2 100%   BMI 47.37 kg/m²     Results for orders placed or performed in visit on 06/24/19   POCT Urinalysis No Micro (Auto)   Result Value Ref Range    Color, UA yellow     Clarity, UA clear     Glucose, UA POC -     Bilirubin, UA -     Ketones, UA -     Spec Grav, UA 1.025     Blood, UA POC moderate     pH, UA 5.5     Protein, UA POC -     Urobilinogen, UA 0.2     Leukocytes, UA small     Nitrite, UA -      Will treat for UTI based on POCT Urine and symptoms. Will send urine for culture. Assessment:      Diagnosis Orders   1. Acute cystitis with hematuria  nitrofurantoin, macrocrystal-monohydrate, (MACROBID) 100 MG capsule   2. Dysuria  POCT Urinalysis No Micro (Auto)    Urine Culture       Plan:      Return if symptoms worsen or fail to improve, for Resume all previous medications as directed. Orders Placed This Encounter   Medications    nitrofurantoin, macrocrystal-monohydrate, (MACROBID) 100 MG capsule     Sig: Take 1 capsule by mouth 2 times daily for 5 days     Dispense:  10 capsule     Refill:  0      · Encouraged to increase fluids and to eat nutritiously. · Avoid full bladder, bubble baths, bath oils and hot tubs. · Wipe front to back and void after sexual intercourse. · Continue antibiotic as prescribed until all doses are completed  · Probiotic OTC or greek yogurt daily while on antibiotic  · Will call with urine culture results once obtained. · Patient instructions given for urinary tract infection and macrobid. · To ER or call 911 if any difficulty breathing, shortness of breath, inability to swallow, hives, rash, facial/tongue swelling or temp greater than 103 degrees. · Follow up with PCP or Walk in Care if symptoms worsen or do not improve. Loretta received counseling on the following healthy behaviors: medication adherence. Patient given educational materials - see patient instructions. Discussed use,benefit, and side effects of prescribed medications. Treatment plan discussed atvisit.  Continue routine health care follow up. All patient questions answered. Pt voiced understanding.       Electronically signed by ELIGIO Khalil CNP on 6/24/2019 at 6:54 PM

## 2019-06-27 LAB
CULTURE: ABNORMAL
CULTURE: ABNORMAL
Lab: ABNORMAL
SPECIMEN DESCRIPTION: ABNORMAL

## 2019-07-01 NOTE — PROGRESS NOTES
Inform patient UTI with E. Coli bacteria present, macrobid antibiotic she took is correct med to eliminate this bacteria. Encourage good hydration 80 ounces water daily. Reminder wipe from front to back. E.coli stool bacteria. If swimming urinate after and get out of wet swim clothes promptly. Voiding after intercourse also is recommended to prevent UTI. Any ? Let us know.      thanks

## 2019-08-10 DIAGNOSIS — M17.11 PRIMARY OSTEOARTHRITIS OF RIGHT KNEE: ICD-10-CM

## 2019-08-12 RX ORDER — NAPROXEN 375 MG/1
TABLET ORAL
Qty: 180 TABLET | Refills: 1 | Status: SHIPPED | OUTPATIENT
Start: 2019-08-12 | End: 2019-11-28 | Stop reason: SDUPTHER

## 2019-08-14 ENCOUNTER — OFFICE VISIT (OUTPATIENT)
Dept: FAMILY MEDICINE CLINIC | Age: 54
End: 2019-08-14
Payer: COMMERCIAL

## 2019-08-14 ENCOUNTER — HOSPITAL ENCOUNTER (OUTPATIENT)
Age: 54
Setting detail: SPECIMEN
Discharge: HOME | End: 2019-08-14
Payer: COMMERCIAL

## 2019-08-14 VITALS
BODY MASS INDEX: 46.57 KG/M2 | HEART RATE: 80 BPM | DIASTOLIC BLOOD PRESSURE: 82 MMHG | WEIGHT: 254.6 LBS | SYSTOLIC BLOOD PRESSURE: 118 MMHG | OXYGEN SATURATION: 98 % | TEMPERATURE: 97.8 F

## 2019-08-14 DIAGNOSIS — R31.9 URINARY TRACT INFECTION WITH HEMATURIA, SITE UNSPECIFIED: ICD-10-CM

## 2019-08-14 DIAGNOSIS — N39.0 URINARY TRACT INFECTION WITH HEMATURIA, SITE UNSPECIFIED: ICD-10-CM

## 2019-08-14 DIAGNOSIS — R30.9 PAINFUL URINATION: ICD-10-CM

## 2019-08-14 DIAGNOSIS — R30.9 PAINFUL URINATION: Primary | ICD-10-CM

## 2019-08-14 LAB
BILIRUBIN, POC: ABNORMAL
BLOOD URINE, POC: ABNORMAL
CLARITY, POC: ABNORMAL
COLOR, POC: ABNORMAL
GLUCOSE URINE, POC: ABNORMAL
KETONES, POC: ABNORMAL
LEUKOCYTE EST, POC: ABNORMAL
NITRITE, POC: ABNORMAL
PH, POC: 5
PROTEIN, POC: ABNORMAL
SPECIFIC GRAVITY, POC: 1.01
UROBILINOGEN, POC: 0.2

## 2019-08-14 PROCEDURE — 87086 URINE CULTURE/COLONY COUNT: CPT

## 2019-08-14 PROCEDURE — 87077 CULTURE AEROBIC IDENTIFY: CPT

## 2019-08-14 PROCEDURE — 99213 OFFICE O/P EST LOW 20 MIN: CPT | Performed by: NURSE PRACTITIONER

## 2019-08-14 PROCEDURE — 86403 PARTICLE AGGLUT ANTBDY SCRN: CPT

## 2019-08-14 PROCEDURE — 87186 SC STD MICRODIL/AGAR DIL: CPT

## 2019-08-14 PROCEDURE — 81002 URINALYSIS NONAUTO W/O SCOPE: CPT | Performed by: NURSE PRACTITIONER

## 2019-08-14 RX ORDER — CIPROFLOXACIN 500 MG/1
500 TABLET, FILM COATED ORAL 2 TIMES DAILY
Qty: 10 TABLET | Refills: 0 | Status: SHIPPED | OUTPATIENT
Start: 2019-08-14 | End: 2019-08-19

## 2019-08-16 LAB
CULTURE: ABNORMAL
CULTURE: ABNORMAL
Lab: ABNORMAL
SPECIMEN DESCRIPTION: ABNORMAL

## 2019-08-16 ASSESSMENT — ENCOUNTER SYMPTOMS
SORE THROAT: 0
SINUS PAIN: 0
ABDOMINAL DISTENTION: 0
CHEST TIGHTNESS: 0

## 2019-10-21 ENCOUNTER — OFFICE VISIT (OUTPATIENT)
Dept: FAMILY MEDICINE CLINIC | Age: 54
End: 2019-10-21
Payer: COMMERCIAL

## 2019-10-21 VITALS
TEMPERATURE: 97.9 F | BODY MASS INDEX: 47.55 KG/M2 | WEIGHT: 260 LBS | SYSTOLIC BLOOD PRESSURE: 118 MMHG | DIASTOLIC BLOOD PRESSURE: 84 MMHG | OXYGEN SATURATION: 97 % | HEART RATE: 69 BPM

## 2019-10-21 DIAGNOSIS — Z91.09 ENVIRONMENTAL ALLERGIES: ICD-10-CM

## 2019-10-21 DIAGNOSIS — M16.12 PRIMARY OSTEOARTHRITIS OF LEFT HIP: ICD-10-CM

## 2019-10-21 DIAGNOSIS — M54.17 LUMBOSACRAL RADICULOPATHY AT L4: ICD-10-CM

## 2019-10-21 DIAGNOSIS — I10 ESSENTIAL HYPERTENSION: Primary | ICD-10-CM

## 2019-10-21 DIAGNOSIS — G47.33 OSA (OBSTRUCTIVE SLEEP APNEA): ICD-10-CM

## 2019-10-21 DIAGNOSIS — E78.2 MIXED HYPERLIPIDEMIA: ICD-10-CM

## 2019-10-21 DIAGNOSIS — E04.2 MULTINODULAR THYROID: ICD-10-CM

## 2019-10-21 DIAGNOSIS — K21.9 GASTROESOPHAGEAL REFLUX DISEASE WITHOUT ESOPHAGITIS: ICD-10-CM

## 2019-10-21 DIAGNOSIS — G25.81 RESTLESS LEG: ICD-10-CM

## 2019-10-21 DIAGNOSIS — M17.11 PRIMARY OSTEOARTHRITIS OF RIGHT KNEE: ICD-10-CM

## 2019-10-21 PROCEDURE — 99214 OFFICE O/P EST MOD 30 MIN: CPT | Performed by: NURSE PRACTITIONER

## 2019-10-21 RX ORDER — SPIRONOLACTONE 25 MG/1
25 TABLET ORAL DAILY
Qty: 90 TABLET | Refills: 1 | Status: SHIPPED | OUTPATIENT
Start: 2019-10-21 | End: 2020-06-01 | Stop reason: SDUPTHER

## 2019-10-21 RX ORDER — EZETIMIBE 10 MG/1
TABLET ORAL
Qty: 90 TABLET | Refills: 1 | Status: SHIPPED | OUTPATIENT
Start: 2019-10-21 | End: 2020-06-01 | Stop reason: SDUPTHER

## 2019-10-21 RX ORDER — ROPINIROLE 1 MG/1
1 TABLET, FILM COATED ORAL NIGHTLY
Qty: 90 TABLET | Refills: 1 | Status: SHIPPED | OUTPATIENT
Start: 2019-10-21 | End: 2020-04-27 | Stop reason: SDUPTHER

## 2019-10-21 RX ORDER — OMEPRAZOLE 20 MG/1
CAPSULE, DELAYED RELEASE ORAL
Qty: 90 CAPSULE | Refills: 1 | Status: SHIPPED | OUTPATIENT
Start: 2019-10-21 | End: 2020-06-01 | Stop reason: SDUPTHER

## 2019-10-21 RX ORDER — MONTELUKAST SODIUM 10 MG/1
10 TABLET ORAL NIGHTLY
Qty: 90 TABLET | Refills: 1 | Status: SHIPPED | OUTPATIENT
Start: 2019-10-21 | End: 2019-10-21 | Stop reason: SDUPTHER

## 2019-10-21 RX ORDER — ROPINIROLE 1 MG/1
1 TABLET, FILM COATED ORAL NIGHTLY
Qty: 90 TABLET | Refills: 1 | Status: SHIPPED | OUTPATIENT
Start: 2019-10-21 | End: 2019-10-21 | Stop reason: SDUPTHER

## 2019-10-21 RX ORDER — METOPROLOL TARTRATE 50 MG/1
50 TABLET, FILM COATED ORAL 2 TIMES DAILY
Qty: 180 TABLET | Refills: 1 | Status: SHIPPED | OUTPATIENT
Start: 2019-10-21 | End: 2019-10-21 | Stop reason: SDUPTHER

## 2019-10-21 RX ORDER — METOPROLOL TARTRATE 50 MG/1
50 TABLET, FILM COATED ORAL 2 TIMES DAILY
Qty: 180 TABLET | Refills: 1 | Status: SHIPPED | OUTPATIENT
Start: 2019-10-21 | End: 2020-06-01 | Stop reason: SDUPTHER

## 2019-10-21 RX ORDER — SPIRONOLACTONE 25 MG/1
25 TABLET ORAL DAILY
Qty: 90 TABLET | Refills: 1 | Status: SHIPPED | OUTPATIENT
Start: 2019-10-21 | End: 2019-10-21 | Stop reason: SDUPTHER

## 2019-10-21 RX ORDER — EZETIMIBE 10 MG/1
TABLET ORAL
Qty: 90 TABLET | Refills: 1 | Status: SHIPPED | OUTPATIENT
Start: 2019-10-21 | End: 2019-10-21 | Stop reason: SDUPTHER

## 2019-10-21 RX ORDER — MONTELUKAST SODIUM 10 MG/1
10 TABLET ORAL NIGHTLY
Qty: 90 TABLET | Refills: 1 | Status: SHIPPED | OUTPATIENT
Start: 2019-10-21 | End: 2020-04-27 | Stop reason: SDUPTHER

## 2019-10-21 RX ORDER — OMEPRAZOLE 20 MG/1
CAPSULE, DELAYED RELEASE ORAL
Qty: 90 CAPSULE | Refills: 1 | Status: SHIPPED | OUTPATIENT
Start: 2019-10-21 | End: 2019-10-21 | Stop reason: SDUPTHER

## 2019-10-21 ASSESSMENT — ENCOUNTER SYMPTOMS
SHORTNESS OF BREATH: 0
EYES NEGATIVE: 1
SORE THROAT: 0
SINUS PAIN: 0
COUGH: 0
BACK PAIN: 1
ABDOMINAL DISTENTION: 0

## 2019-11-05 ENCOUNTER — HOSPITAL ENCOUNTER (OUTPATIENT)
Dept: ULTRASOUND IMAGING | Age: 54
Discharge: HOME | End: 2019-11-07
Payer: COMMERCIAL

## 2019-11-05 DIAGNOSIS — E04.1 THYROID NODULE: ICD-10-CM

## 2019-11-05 PROCEDURE — 76536 US EXAM OF HEAD AND NECK: CPT

## 2019-11-20 ENCOUNTER — HOSPITAL ENCOUNTER (OUTPATIENT)
Age: 54
Discharge: HOME | End: 2019-11-20
Payer: COMMERCIAL

## 2019-11-20 DIAGNOSIS — E78.2 MIXED HYPERLIPIDEMIA: ICD-10-CM

## 2019-11-20 LAB
ALBUMIN SERPL-MCNC: 4.5 G/DL (ref 3.5–5.2)
ALBUMIN/GLOBULIN RATIO: ABNORMAL (ref 1–2.5)
ALP BLD-CCNC: 85 U/L (ref 35–104)
ALT SERPL-CCNC: 23 U/L (ref 5–33)
ANION GAP SERPL CALCULATED.3IONS-SCNC: 12 MMOL/L (ref 9–17)
AST SERPL-CCNC: 18 U/L
BILIRUB SERPL-MCNC: 0.53 MG/DL (ref 0.3–1.2)
BUN BLDV-MCNC: 18 MG/DL (ref 6–20)
BUN/CREAT BLD: 26 (ref 9–20)
CALCIUM SERPL-MCNC: 9.9 MG/DL (ref 8.6–10.4)
CHLORIDE BLD-SCNC: 102 MMOL/L (ref 98–107)
CHOLESTEROL/HDL RATIO: 3.6
CHOLESTEROL: 247 MG/DL
CO2: 24 MMOL/L (ref 20–31)
CREAT SERPL-MCNC: 0.69 MG/DL (ref 0.5–0.9)
GFR AFRICAN AMERICAN: >60 ML/MIN
GFR NON-AFRICAN AMERICAN: >60 ML/MIN
GFR SERPL CREATININE-BSD FRML MDRD: ABNORMAL ML/MIN/{1.73_M2}
GFR SERPL CREATININE-BSD FRML MDRD: ABNORMAL ML/MIN/{1.73_M2}
GLUCOSE BLD-MCNC: 96 MG/DL (ref 70–99)
HDLC SERPL-MCNC: 69 MG/DL
LDL CHOLESTEROL: 150 MG/DL (ref 0–130)
PATIENT FASTING?: YES
POTASSIUM SERPL-SCNC: 4.2 MMOL/L (ref 3.7–5.3)
SODIUM BLD-SCNC: 138 MMOL/L (ref 135–144)
TOTAL PROTEIN: 8.2 G/DL (ref 6.4–8.3)
TRIGL SERPL-MCNC: 139 MG/DL
VLDLC SERPL CALC-MCNC: ABNORMAL MG/DL (ref 1–30)

## 2019-11-20 PROCEDURE — 80053 COMPREHEN METABOLIC PANEL: CPT

## 2019-11-20 PROCEDURE — 36415 COLL VENOUS BLD VENIPUNCTURE: CPT

## 2019-11-20 PROCEDURE — 80061 LIPID PANEL: CPT

## 2019-11-28 DIAGNOSIS — M17.11 PRIMARY OSTEOARTHRITIS OF RIGHT KNEE: ICD-10-CM

## 2019-11-28 RX ORDER — ROSUVASTATIN CALCIUM 10 MG/1
10 TABLET, COATED ORAL NIGHTLY
Qty: 90 TABLET | Refills: 0 | Status: SHIPPED | OUTPATIENT
Start: 2019-11-28 | End: 2020-03-24

## 2019-11-28 RX ORDER — NAPROXEN 375 MG/1
375 TABLET ORAL 2 TIMES DAILY WITH MEALS
Qty: 180 TABLET | Refills: 1 | Status: SHIPPED | OUTPATIENT
Start: 2019-11-28 | End: 2020-10-06

## 2020-03-24 RX ORDER — ROSUVASTATIN CALCIUM 10 MG/1
TABLET, COATED ORAL
Qty: 90 TABLET | Refills: 3 | Status: SHIPPED | OUTPATIENT
Start: 2020-03-24 | End: 2021-06-07 | Stop reason: SDUPTHER

## 2020-04-15 ENCOUNTER — TELEPHONE (OUTPATIENT)
Dept: UROLOGY | Age: 55
End: 2020-04-15

## 2020-04-27 ENCOUNTER — TELEPHONE (OUTPATIENT)
Dept: FAMILY MEDICINE CLINIC | Age: 55
End: 2020-04-27

## 2020-04-27 RX ORDER — ROPINIROLE 1 MG/1
1 TABLET, FILM COATED ORAL NIGHTLY
Qty: 90 TABLET | Refills: 1 | Status: SHIPPED | OUTPATIENT
Start: 2020-04-27 | End: 2020-10-22

## 2020-04-27 RX ORDER — MONTELUKAST SODIUM 10 MG/1
10 TABLET ORAL NIGHTLY
Qty: 90 TABLET | Refills: 1 | Status: SHIPPED | OUTPATIENT
Start: 2020-04-27 | End: 2020-11-30 | Stop reason: SDUPTHER

## 2020-05-23 RX ORDER — DOXAZOSIN MESYLATE 1 MG/1
TABLET ORAL
Qty: 90 TABLET | Refills: 3 | Status: SHIPPED | OUTPATIENT
Start: 2020-05-23 | End: 2021-06-07 | Stop reason: SDUPTHER

## 2020-06-01 ENCOUNTER — OFFICE VISIT (OUTPATIENT)
Dept: FAMILY MEDICINE CLINIC | Age: 55
End: 2020-06-01
Payer: COMMERCIAL

## 2020-06-01 VITALS
HEART RATE: 71 BPM | OXYGEN SATURATION: 98 % | DIASTOLIC BLOOD PRESSURE: 76 MMHG | BODY MASS INDEX: 47.74 KG/M2 | TEMPERATURE: 98.3 F | WEIGHT: 261 LBS | SYSTOLIC BLOOD PRESSURE: 124 MMHG

## 2020-06-01 PROCEDURE — 99214 OFFICE O/P EST MOD 30 MIN: CPT | Performed by: NURSE PRACTITIONER

## 2020-06-01 RX ORDER — EZETIMIBE 10 MG/1
TABLET ORAL
Qty: 90 TABLET | Refills: 1 | Status: SHIPPED | OUTPATIENT
Start: 2020-06-01 | End: 2021-01-25 | Stop reason: SDUPTHER

## 2020-06-01 RX ORDER — PREGABALIN 225 MG/1
225 CAPSULE ORAL 2 TIMES DAILY
COMMUNITY
Start: 2020-05-09 | End: 2020-12-11 | Stop reason: SDUPTHER

## 2020-06-01 RX ORDER — METOPROLOL TARTRATE 50 MG/1
50 TABLET, FILM COATED ORAL 2 TIMES DAILY
Qty: 180 TABLET | Refills: 1 | Status: SHIPPED | OUTPATIENT
Start: 2020-06-01 | End: 2020-11-30 | Stop reason: SDUPTHER

## 2020-06-01 RX ORDER — OMEPRAZOLE 20 MG/1
CAPSULE, DELAYED RELEASE ORAL
Qty: 90 CAPSULE | Refills: 1 | Status: SHIPPED | OUTPATIENT
Start: 2020-06-01 | End: 2020-11-30 | Stop reason: SDUPTHER

## 2020-06-01 RX ORDER — DULOXETIN HYDROCHLORIDE 30 MG/1
30 CAPSULE, DELAYED RELEASE ORAL DAILY
COMMUNITY
Start: 2020-05-06 | End: 2020-12-11 | Stop reason: SDUPTHER

## 2020-06-01 RX ORDER — SPIRONOLACTONE 25 MG/1
25 TABLET ORAL DAILY
Qty: 90 TABLET | Refills: 1 | Status: SHIPPED | OUTPATIENT
Start: 2020-06-01 | End: 2020-11-30 | Stop reason: SDUPTHER

## 2020-06-01 ASSESSMENT — PATIENT HEALTH QUESTIONNAIRE - PHQ9
SUM OF ALL RESPONSES TO PHQ QUESTIONS 1-9: 0
1. LITTLE INTEREST OR PLEASURE IN DOING THINGS: 0
2. FEELING DOWN, DEPRESSED OR HOPELESS: 0
SUM OF ALL RESPONSES TO PHQ QUESTIONS 1-9: 0
SUM OF ALL RESPONSES TO PHQ9 QUESTIONS 1 & 2: 0

## 2020-06-01 ASSESSMENT — ENCOUNTER SYMPTOMS
ABDOMINAL DISTENTION: 0
SHORTNESS OF BREATH: 0
CHEST TIGHTNESS: 0
RHINORRHEA: 0
SORE THROAT: 0
EYES NEGATIVE: 1

## 2020-06-01 NOTE — PROGRESS NOTES
[address] [place] [address] PRIMARY CARE [address] [address]  Dept: [phone]  Dept Fax: [phone]    Last encounter 10/21/2019    HPI:   Tanvir Martini is a 54 y.o. female who presentstoday for her medical conditions/complaints as noted below. Tanvir Martini is c/o of Hypertension (check up)      HPI    54year old female    hypertension on cardura 1 mg daily  Lopressor 50 mg bid. Spironolactone. No chest pain, no shortness of breath no dizziness. Hx prior eval by rheumatology without any further advise. Pain management-Erin   lyrica and also cymbalta with some nerve blocks. Much help epidurals do not last.   Radiofrequency ablasion took awhile but improvement. Legs greatly helped with cymbalta. singulair taken for allergies. crestor 10 daily    Working on weight loss. -Dr. Stacy Silverman in [place]    Due to kidney stones and bp restricted on what meds he can use. Toenails improved no longer using fungal med.          Past Medical History:   Diagnosis Date    Allergic rhinitis     Enlarged thyroid 2016    nodules check annually    GERD (gastroesophageal reflux disease)     Headache(784.0)     migranes at times    Hyperlipidemia     Hypertension     NEIL (obstructive sleep apnea) 7/8/2016    Restless legs 7/8/2016      Past Surgical History:   Procedure Laterality Date    BLADDER SURGERY  9/9/14    bladder sling    CHOLECYSTECTOMY      CYSTOSCOPY Right 08/10/2018    Dr Balderrama Back- stent placement    HYSTERECTOMY      Total    LITHOTRIPSY Right 08/21/2018    stent placement    OTHER SURGICAL HISTORY  03-    Coaptite (Dr. Jimmie Perera)    [place] CYSTO/URETERO W/LITHOTRIPSY &[place] STENT INSRT Right 8/21/2018    CYSTOSCOPY URETEROSCOPY LASER-HLL performed by Petr Leal MD at [address] &[place] [address] Right 8/21/2018    CYSTOSCOPY STENT INSERTION-EXCHANGE performed by Petr Leal MD at [address] Health Maintenance   Topic Date Due    Hepatitis C screen  1965    HIV screen  02/28/1980    DTaP/Tdap/Td vaccine (1 - Tdap) 02/28/1984    Shingles Vaccine (1 of 2) 02/28/2015    Cervical cancer screen  11/25/2016    Flu vaccine (Season Ended) 09/01/2020    Lipid screen  11/20/2020    Potassium monitoring  11/20/2020    Creatinine monitoring  11/20/2020    Breast cancer screen  05/06/2021    Colon cancer screen colonoscopy  10/21/2021    Hepatitis A vaccine  Aged Out    Hepatitis B vaccine  Aged Out    Hib vaccine  Aged Out    Meningococcal (ACWY) vaccine  Aged Out    Pneumococcal 0-64 years Vaccine  Aged Out       Subjective:      Review of Systems   Constitutional: Negative for activity change, chills and fever. HENT: Negative for congestion, rhinorrhea and sore throat. Eyes: Negative. Respiratory: Negative for chest tightness and shortness of breath. Cardiovascular: Negative for chest pain. Gastrointestinal: Negative for abdominal distention. Musculoskeletal: Negative for arthralgias. Psychiatric/Behavioral: Negative for agitation. Objective:     Physical Exam  Vitals signs and nursing note reviewed. Constitutional:       General: She is not in acute distress. Appearance: Normal appearance. She is well-developed. Comments: Female with BMI 47   HENT:      Head: Normocephalic and atraumatic. Right Ear: Tympanic membrane and external ear normal.      Left Ear: Tympanic membrane and external ear normal.      Nose: No congestion. Mouth/Throat:      Mouth: Mucous membranes are moist.   Eyes:      General: No scleral icterus. Pupils: Pupils are equal, round, and reactive to light. Neck:      Musculoskeletal: Normal range of motion and neck supple. Cardiovascular:      Rate and Rhythm: Normal rate and regular rhythm. Heart sounds: Normal heart sounds. No murmur.    Pulmonary:      Effort: Pulmonary effort is normal. No respiratory distress. Breath sounds: Normal breath sounds. No wheezing. Abdominal:      Palpations: Abdomen is soft. Tenderness: There is no abdominal tenderness. Musculoskeletal: Normal range of motion. General: Tenderness (low back and bilateral legs with swelling) present. Right lower leg: Edema present. Left lower leg: Edema present. Lymphadenopathy:      Cervical: No cervical adenopathy. Skin:     General: Skin is warm and dry. Findings: No erythema. Neurological:      Mental Status: She is alert and oriented to person, place, and time. Psychiatric:         Behavior: Behavior normal.         Thought Content: Thought content normal.         Judgment: Judgment normal.       /76 (Site: Right Upper Arm, Position: Sitting)   Pulse 71   Temp 98.3 °F (36.8 °C) (Oral)   Wt 261 lb (118.4 kg)   LMP  (LMP Unknown)   SpO2 98%   BMI 47.74 kg/m²     Data:     Lab Results   Component Value Date     11/20/2019    K 4.2 11/20/2019     11/20/2019    CO2 24 11/20/2019    BUN 18 11/20/2019    CREATININE 0.69 11/20/2019    GLUCOSE 96 11/20/2019    GLUCOSE 95 02/14/2012    PROT 8.2 11/20/2019    LABALBU 4.5 11/20/2019    LABALBU 4.6 02/14/2012    BILITOT 0.53 11/20/2019    ALKPHOS 85 11/20/2019    AST 18 11/20/2019    ALT 23 11/20/2019     Lab Results   Component Value Date    WBC 9.5 04/23/2019    RBC 4.67 04/23/2019    HGB 13.9 04/23/2019    HCT 40.2 04/23/2019    MCV 86.1 04/23/2019    MCH 29.8 04/23/2019    MCHC 34.5 04/23/2019    RDW 14.3 04/23/2019     04/23/2019    MPV NOT REPORTED 04/23/2019     Lab Results   Component Value Date    TSH 2.42 04/23/2019     Lab Results   Component Value Date    CHOL 247 11/20/2019    CHOL 249 05/03/2018    HDL 69 11/20/2019    LABA1C 5.4 04/23/2019          Assessment & Plan       1. Essential hypertension  Stable  Reduce salt  Increase exercise    - metoprolol tartrate (LOPRESSOR) 50 MG tablet;  Take 1 tablet by mouth 2 times 20 MG delayed release capsule     Sig: TAKE 1 CAPSULE DAILY in am on empty stomach     Dispense:  90 capsule     Refill:  1    ezetimibe (ZETIA) 10 MG tablet     Sig: TAKE 1 TABLET DAILY     Dispense:  90 tablet     Refill:  1     Orders Placed This Encounter   Procedures    ISAAC DIGITAL SCREEN W OR WO CAD BILATERAL     Standing Status:   Future     Standing Expiration Date:   8/1/2021     Order Specific Question:   Reason for exam:     Answer:   breast cancer screening         Loretta received counseling on the following healthy behaviors: nutrition, exercise and medication adherence  Reviewed prior labs and health maintenance. Continue current medications, diet and exercise. Discussed use, benefit, and side effects of prescribed medications. Barriers to medication compliance addressed. Patient given educational materials - see patient instructions. All patient questions answered. Patient voiced understanding.           Electronically signed by ELIGIO Ortiz CNP on 6/2/2020 at 11:27 PM

## 2020-06-01 NOTE — PATIENT INSTRUCTIONS
You may be receiving a survey from [email or web address] regarding your visit today. You may get this in the mail, through your MyChart or in your email. Please complete the survey to enable us to provide the highest quality of care to you and your family. If you cannot score us as very good ( 5 Stars) on any question, please feel free to call the office to discuss how we could have made your experience exceptional.     Thank You! ELIGIO Osullivan-CNP  [address]  [place]  Arslan Lunch, RMA      Weight loss med    [address]

## 2020-06-02 ENCOUNTER — TELEPHONE (OUTPATIENT)
Dept: PRIMARY CARE CLINIC | Age: 55
End: 2020-06-02

## 2020-06-02 NOTE — LETTER
[place] [address]  Phone: [phone]  Fax: [address], APRN - JOSIANE        June 2, 2020     Patient: Mary Louie   YOB: 1965   Date of Visit: 6/2/2020       To Whom It May Concern: It is my medical opinion that Whit Larsen will be off work 6/3/2020-6/7/2020 due to leg swelling and knee osteoarthritis. If you have any questions or concerns, please don't hesitate to call.     Sincerely,        ELIGIO Gonzalez - CNP

## 2020-06-16 ENCOUNTER — TELEPHONE (OUTPATIENT)
Dept: PRIMARY CARE CLINIC | Age: 55
End: 2020-06-16

## 2020-06-17 ENCOUNTER — HOSPITAL ENCOUNTER (OUTPATIENT)
Dept: MAMMOGRAPHY | Age: 55
Discharge: HOME | End: 2020-06-19
Payer: COMMERCIAL

## 2020-06-17 PROCEDURE — 77067 SCR MAMMO BI INCL CAD: CPT

## 2020-06-20 ENCOUNTER — APPOINTMENT (OUTPATIENT)
Dept: GENERAL RADIOLOGY | Age: 55
End: 2020-06-20
Payer: COMMERCIAL

## 2020-06-20 ENCOUNTER — HOSPITAL ENCOUNTER (EMERGENCY)
Age: 55
Discharge: HOME | End: 2020-06-21
Attending: FAMILY MEDICINE
Payer: COMMERCIAL

## 2020-06-20 VITALS
OXYGEN SATURATION: 100 % | TEMPERATURE: 98.2 F | DIASTOLIC BLOOD PRESSURE: 93 MMHG | HEIGHT: 62 IN | BODY MASS INDEX: 47.84 KG/M2 | RESPIRATION RATE: 18 BRPM | HEART RATE: 89 BPM | SYSTOLIC BLOOD PRESSURE: 139 MMHG | WEIGHT: 260 LBS

## 2020-06-20 PROCEDURE — 73564 X-RAY EXAM KNEE 4 OR MORE: CPT

## 2020-06-20 PROCEDURE — 99283 EMERGENCY DEPT VISIT LOW MDM: CPT

## 2020-06-20 ASSESSMENT — PAIN DESCRIPTION - LOCATION: LOCATION: KNEE

## 2020-06-20 ASSESSMENT — PAIN SCALES - GENERAL: PAINLEVEL_OUTOF10: 8

## 2020-06-20 ASSESSMENT — PAIN DESCRIPTION - ORIENTATION: ORIENTATION: RIGHT

## 2020-06-20 ASSESSMENT — PAIN DESCRIPTION - PAIN TYPE: TYPE: ACUTE PAIN

## 2020-06-20 NOTE — LETTER
[facility] ED  [address] [place], [address]  Phone: [phone]               June 21, 2020    Patient: Irl Flow   YOB: 1965   Date of Visit: 6/20/2020       To Whom It May Concern:    Landy Pichardo was seen and treated in our emergency department on 6/20/2020. She may return to work on 6/22/2020.       Sincerely,       Emmanuel Garcia RN         Signature:__________________________________

## 2020-06-21 PROCEDURE — 6360000002 HC RX W HCPCS: Performed by: FAMILY MEDICINE

## 2020-06-21 PROCEDURE — 96372 THER/PROPH/DIAG INJ SC/IM: CPT

## 2020-06-21 RX ORDER — KETOROLAC TROMETHAMINE 30 MG/ML
30 INJECTION, SOLUTION INTRAMUSCULAR; INTRAVENOUS ONCE
Status: COMPLETED | OUTPATIENT
Start: 2020-06-21 | End: 2020-06-21

## 2020-06-21 RX ADMIN — KETOROLAC TROMETHAMINE 30 MG: 30 INJECTION, SOLUTION INTRAMUSCULAR; INTRAVENOUS at 00:38

## 2020-06-21 NOTE — ED PROVIDER NOTES
tablet Take 1 tablet by mouth 2 times daily 180 tablet 1    spironolactone (ALDACTONE) 25 MG tablet Take 1 tablet by mouth daily 90 tablet 1    omeprazole (PRILOSEC) 20 MG delayed release capsule TAKE 1 CAPSULE DAILY in am on empty stomach 90 capsule 1    ezetimibe (ZETIA) 10 MG tablet TAKE 1 TABLET DAILY 90 tablet 1    doxazosin (CARDURA) 1 MG tablet TAKE 1 TABLET DAILY AT BEDTIME 90 tablet 3    montelukast (SINGULAIR) 10 MG tablet Take 1 tablet by mouth nightly 90 tablet 1    rOPINIRole (REQUIP) 1 MG tablet Take 1 tablet by mouth nightly Restless legs 90 tablet 1    rosuvastatin (CRESTOR) 10 MG tablet TAKE 1 TABLET NIGHTLY 90 tablet 3    naproxen (NAPROSYN) 375 MG tablet Take 1 tablet by mouth 2 times daily (with meals) 180 tablet 1    Acetaminophen (TYLENOL PO) Take 500 mg by mouth every 6 hours as needed       aspirin 81 MG EC tablet Take 81 mg by mouth daily          ALLERGIES    Allergies   Allergen Reactions    Iv Contrast [Iodides] Swelling    Lisinopril Swelling     Swelling of upper lip    Simvastatin Other (See Comments)     Elevated CK/CKMB.        FAMILY HISTORY    Family History   Problem Relation Age of Onset    Heart Disease Mother     High Blood Pressure Father     Heart Disease Father     High Blood Pressure Sister     High Blood Pressure Brother     Cancer Sister         Breast       SOCIAL HISTORY    Social History     Socioeconomic History    Marital status: Single     Spouse name: None    Number of children: None    Years of education: None    Highest education level: None   Occupational History    None   Social Needs    Financial resource strain: None    Food insecurity     Worry: None     Inability: None    Transportation needs     Medical: None     Non-medical: None   Tobacco Use    Smoking status: Never Smoker    Smokeless tobacco: Never Used   Substance and Sexual Activity    Alcohol use: No    Drug use: No    Sexual activity: None   Lifestyle    Physical Patient had good relief with treatment she received in the ER. She was stable on discharge. ED Medications administered this visit:    Medications   ketorolac (TORADOL) injection 30 mg (30 mg Intramuscular Given 6/21/20 0038)       New Prescriptions from this visit:    Discharge Medication List as of 6/21/2020 12:50 AM          Follow-up:  ELIGIO Polo CNP  [address]  [phone]    Call in 1 day          Final Impression:   1.  Sprain of right knee, unspecified ligament, initial encounter               (Please note that portions of this note were completed with a voice recognition program.  Efforts were made to edit the dictations but occasionally words are mis-transcribed.)     Janeen Estrada MD  06/21/20 [id number]

## 2020-06-22 ENCOUNTER — TELEPHONE (OUTPATIENT)
Dept: PRIMARY CARE CLINIC | Age: 55
End: 2020-06-22

## 2020-06-22 NOTE — TELEPHONE ENCOUNTER
[facility] ([facility]) ED Follow up Call    Reason for ED visit:  Knee sprain     6/22/2020     Jean Burgos , this is [place] from Dr. Beverly Dwyer office, just calling to see how you are doing after your recent ED visit. Did you receive discharge instructions? Yes  Do you understand the discharge instructions? Yes  Did the ED give you any new prescriptions? Yes  Were you able to fill your prescriptions? yes      Do you have one of our red, yellow and green  Zone sheets that help you to determine when you should go to the ED? Not Applicable      Do you need or want to make a follow up appt with your PCP? Not Applicable    Do you have any further needs in the home, e.g. equipment?   No        FU appts/Provider:    Future Appointments   Date Time Provider Tez Montemayor   7/16/2020  4:30 PM MD yosvany Baig urol W   11/30/2020  6:30 PM ELIGIO Graves - JOSIANE Jiang [place] [facility]

## 2020-06-23 ENCOUNTER — OFFICE VISIT (OUTPATIENT)
Dept: PRIMARY CARE CLINIC | Age: 55
End: 2020-06-23
Payer: COMMERCIAL

## 2020-06-23 ENCOUNTER — HOSPITAL ENCOUNTER (OUTPATIENT)
Age: 55
Discharge: HOME | End: 2020-06-23
Payer: COMMERCIAL

## 2020-06-23 VITALS
WEIGHT: 260 LBS | SYSTOLIC BLOOD PRESSURE: 130 MMHG | BODY MASS INDEX: 47.55 KG/M2 | DIASTOLIC BLOOD PRESSURE: 86 MMHG | TEMPERATURE: 97.2 F | OXYGEN SATURATION: 98 % | HEART RATE: 69 BPM

## 2020-06-23 LAB
ABSOLUTE EOS #: 0.2 K/UL (ref 0–0.4)
ABSOLUTE IMMATURE GRANULOCYTE: ABNORMAL K/UL (ref 0–0.3)
ABSOLUTE LYMPH #: 2.3 K/UL (ref 1–4.8)
ABSOLUTE MONO #: 0.6 K/UL (ref 0–1)
BASOPHILS # BLD: 0 % (ref 0–2)
BASOPHILS ABSOLUTE: 0 K/UL (ref 0–0.2)
DIFFERENTIAL TYPE: YES
EOSINOPHILS RELATIVE PERCENT: 3 % (ref 0–5)
FERRITIN: 189 UG/L (ref 13–150)
HCT VFR BLD CALC: 37.9 % (ref 36–46)
HEMOGLOBIN: 13.2 G/DL (ref 12–16)
IMMATURE GRANULOCYTES: ABNORMAL %
LYMPHOCYTES # BLD: 40 % (ref 15–40)
MCH RBC QN AUTO: 30.1 PG (ref 26–34)
MCHC RBC AUTO-ENTMCNC: 34.8 G/DL (ref 31–37)
MCV RBC AUTO: 86.4 FL (ref 80–100)
MONOCYTES # BLD: 10 % (ref 4–8)
NRBC AUTOMATED: ABNORMAL PER 100 WBC
PDW BLD-RTO: 13.5 % (ref 12.1–15.2)
PLATELET # BLD: 233 K/UL (ref 140–450)
PLATELET ESTIMATE: ABNORMAL
PMV BLD AUTO: ABNORMAL FL (ref 6–12)
RBC # BLD: 4.39 M/UL (ref 4–5.2)
RBC # BLD: ABNORMAL 10*6/UL
SEDIMENTATION RATE, ERYTHROCYTE: 25 MM (ref 0–30)
SEG NEUTROPHILS: 47 % (ref 47–75)
SEGMENTED NEUTROPHILS ABSOLUTE COUNT: 2.7 K/UL (ref 2.5–7)
TOTAL CK: 284 U/L (ref 26–192)
WBC # BLD: 5.6 K/UL (ref 3.5–11)
WBC # BLD: ABNORMAL 10*3/UL

## 2020-06-23 PROCEDURE — 86038 ANTINUCLEAR ANTIBODIES: CPT

## 2020-06-23 PROCEDURE — 99214 OFFICE O/P EST MOD 30 MIN: CPT | Performed by: NURSE PRACTITIONER

## 2020-06-23 PROCEDURE — 82550 ASSAY OF CK (CPK): CPT

## 2020-06-23 PROCEDURE — 82728 ASSAY OF FERRITIN: CPT

## 2020-06-23 PROCEDURE — 36415 COLL VENOUS BLD VENIPUNCTURE: CPT

## 2020-06-23 PROCEDURE — 85652 RBC SED RATE AUTOMATED: CPT

## 2020-06-23 PROCEDURE — 85025 COMPLETE CBC W/AUTO DIFF WBC: CPT

## 2020-06-23 ASSESSMENT — ENCOUNTER SYMPTOMS
ABDOMINAL DISTENTION: 0
COUGH: 0
EYES NEGATIVE: 1
CHEST TIGHTNESS: 0
SINUS PAIN: 0
RHINORRHEA: 0

## 2020-06-23 NOTE — PROGRESS NOTES
regular rhythm. Heart sounds: Normal heart sounds. No murmur. Pulmonary:      Effort: Pulmonary effort is normal. No respiratory distress. Breath sounds: Normal breath sounds. No wheezing. Abdominal:      Palpations: Abdomen is soft. Tenderness: There is no abdominal tenderness. Musculoskeletal: Normal range of motion. General: Swelling and tenderness present. Comments: Right knee with swelling TPT with small effusion lateral edge of knee. Patellar apprehension neg. Difficult with flexion of knee. SLR Right positive. Lymphadenopathy:      Cervical: No cervical adenopathy. Skin:     General: Skin is warm and dry. Neurological:      Mental Status: She is alert and oriented to person, place, and time. Psychiatric:         Behavior: Behavior normal.         Thought Content: Thought content normal.         Judgment: Judgment normal.         ASSESSMENT/PLAN:  1. Cramps of left lower extremity    - EDUARDO Screen With Reflex; Future  - Sedimentation Rate; Future  - CK; Future  - Ferritin; Future    2. Primary osteoarthritis of right knee    - [facility] [place]  - CBC Auto Differential; Future    3. Acute pain of right knee    - [facility] [place]  - CBC Auto Differential; Future    4. Lumbosacral radiculopathy at L4    - External Referral To Orthopedic Surgery    At risk for inclusion myositis, polymyositis or mixed connective tissue disease with chronic elevation of CPK levels. Pt remains off work  Physical therapy     Return in about 2 weeks (around 7/7/2020) for medicare visit-AWV. An electronic signature was used to authenticate this note.     --ELIGIO Soni - CNP on 6/26/2020 at 6:43 PM

## 2020-06-24 ENCOUNTER — HOSPITAL ENCOUNTER (OUTPATIENT)
Age: 55
Discharge: HOME | End: 2020-06-24
Payer: COMMERCIAL

## 2020-06-24 LAB
ALBUMIN SERPL-MCNC: 4.5 G/DL (ref 3.5–5.2)
ALBUMIN/GLOBULIN RATIO: ABNORMAL (ref 1–2.5)
ALP BLD-CCNC: 89 U/L (ref 35–104)
ALT SERPL-CCNC: 28 U/L (ref 5–33)
ANION GAP SERPL CALCULATED.3IONS-SCNC: 9 MMOL/L (ref 9–17)
ANTI-NUCLEAR ANTIBODY (ANA): NEGATIVE
AST SERPL-CCNC: 22 U/L
BILIRUB SERPL-MCNC: 0.35 MG/DL (ref 0.3–1.2)
BUN BLDV-MCNC: 12 MG/DL (ref 6–20)
BUN/CREAT BLD: 20 (ref 9–20)
CALCIUM IONIZED: 1.24 MMOL/L (ref 1.13–1.33)
CALCIUM SERPL-MCNC: 10.1 MG/DL (ref 8.6–10.4)
CHLORIDE BLD-SCNC: 104 MMOL/L (ref 98–107)
CO2: 25 MMOL/L (ref 20–31)
CREAT SERPL-MCNC: 0.59 MG/DL (ref 0.5–0.9)
FOLATE: 19 NG/ML
GFR AFRICAN AMERICAN: >60 ML/MIN
GFR NON-AFRICAN AMERICAN: >60 ML/MIN
GFR SERPL CREATININE-BSD FRML MDRD: ABNORMAL ML/MIN/{1.73_M2}
GFR SERPL CREATININE-BSD FRML MDRD: ABNORMAL ML/MIN/{1.73_M2}
GLUCOSE BLD-MCNC: 107 MG/DL (ref 70–99)
MAGNESIUM: 2.1 MG/DL (ref 1.6–2.6)
PATIENT FASTING?: YES
POTASSIUM SERPL-SCNC: 4 MMOL/L (ref 3.7–5.3)
PTH INTACT: 36.99 PG/ML (ref 15–65)
SODIUM BLD-SCNC: 138 MMOL/L (ref 135–144)
TOTAL PROTEIN: 8.1 G/DL (ref 6.4–8.3)
TSH SERPL DL<=0.05 MIU/L-ACNC: 1.39 MIU/L (ref 0.3–5)
VITAMIN B-12: 430 PG/ML (ref 232–1245)

## 2020-06-24 PROCEDURE — 80061 LIPID PANEL: CPT

## 2020-06-24 PROCEDURE — 82607 VITAMIN B-12: CPT

## 2020-06-24 PROCEDURE — 82746 ASSAY OF FOLIC ACID SERUM: CPT

## 2020-06-24 PROCEDURE — 84443 ASSAY THYROID STIM HORMONE: CPT

## 2020-06-24 PROCEDURE — 83970 ASSAY OF PARATHORMONE: CPT

## 2020-06-24 PROCEDURE — 82306 VITAMIN D 25 HYDROXY: CPT

## 2020-06-24 PROCEDURE — 83735 ASSAY OF MAGNESIUM: CPT

## 2020-06-24 PROCEDURE — 83036 HEMOGLOBIN GLYCOSYLATED A1C: CPT

## 2020-06-24 PROCEDURE — 80053 COMPREHEN METABOLIC PANEL: CPT

## 2020-06-24 PROCEDURE — 82330 ASSAY OF CALCIUM: CPT

## 2020-06-24 PROCEDURE — 36415 COLL VENOUS BLD VENIPUNCTURE: CPT

## 2020-06-25 LAB
CHOLESTEROL/HDL RATIO: 3.1
CHOLESTEROL: 176 MG/DL
ESTIMATED AVERAGE GLUCOSE: 114 MG/DL
HBA1C MFR BLD: 5.6 % (ref 4–6)
HDLC SERPL-MCNC: 56 MG/DL
LDL CHOLESTEROL: 90 MG/DL (ref 0–130)
TRIGL SERPL-MCNC: 152 MG/DL
VITAMIN D 25-HYDROXY: 16.6 NG/ML (ref 30–100)
VLDLC SERPL CALC-MCNC: ABNORMAL MG/DL (ref 1–30)

## 2020-06-26 ENCOUNTER — HOSPITAL ENCOUNTER (OUTPATIENT)
Dept: PHYSICAL THERAPY | Age: 55
Setting detail: THERAPIES SERIES
Discharge: HOME | End: 2020-06-26
Payer: COMMERCIAL

## 2020-06-26 PROCEDURE — 97161 PT EVAL LOW COMPLEX 20 MIN: CPT

## 2020-06-26 PROCEDURE — 97110 THERAPEUTIC EXERCISES: CPT

## 2020-06-26 ASSESSMENT — PAIN DESCRIPTION - ORIENTATION: ORIENTATION: RIGHT

## 2020-06-26 ASSESSMENT — PAIN DESCRIPTION - LOCATION: LOCATION: KNEE

## 2020-06-26 ASSESSMENT — PAIN SCALES - GENERAL: PAINLEVEL_OUTOF10: 8

## 2020-06-30 ENCOUNTER — HOSPITAL ENCOUNTER (OUTPATIENT)
Dept: PHYSICAL THERAPY | Age: 55
Setting detail: THERAPIES SERIES
Discharge: HOME | End: 2020-06-30
Payer: COMMERCIAL

## 2020-06-30 PROCEDURE — 97110 THERAPEUTIC EXERCISES: CPT

## 2020-06-30 ASSESSMENT — PAIN SCALES - GENERAL: PAINLEVEL_OUTOF10: 5

## 2020-06-30 NOTE — PROGRESS NOTES
Phone: Chon Tatum      Fax: [phone]                            Outpatient Physical Therapy                                                                            Daily Note    Date: 2020  Patient Name: Umberto Godinez        MRN: [id number]   ACCT#:  [de-identified]  : 1965  (54 y.o.)    Referring Practitioner: FRANSISCO Perez         Diagnosis: OA, Right Knee  Treatment Diagnosis: Right Knee Pain    Onset Date: 20  PT Insurance Information: BCBS  Total # of Visits Approved: 18 Per Physician Order  Total # of Visits to Date: 2  No Show: 0  Canceled Appointment: 0  Plan of Care/Certification Expiration Date: 20    Pre-Treatment Pain:  5/10     Assessment  Assessment: Reviewed HEP with pt demonstrating good understanding. Patient completed exercises per exercise log leaving with slight increased pain. Plan to continue with current POC to address knee function.   Chart Reviewed: Yes    Plan  Plan: Continue with current plan    Exercises/Modalities/Manual:  See DocFlow Sheet    Education:      Barriers to Learning: none    Goals  (Total # of Visits to Date: 2)   Short Term Goals - Time Frame for Short term goals: 6-9  Short term goal 1: Ed / Compliant with HEP                Long Term Goals - Time Frame for Long term goals : 9-18  Long term goal 1: Patient with improved gait deomstrating normal stride with 0-1/10 right knee pain  Long term goal 2: Right Knee ROM:  0-115 degrees to promote normal gait  Long term goal 3: Improved LEFS score >50/80 (26 initial score)  Long term goal 4: Demonstrate improved right knee strength 5/5 to perform stairs without UE support       Post Treatment Pain:  6/10        Time In: 1355  Time Out: 1421  Timed Code Treatment Minutes: 26 Minutes(HEP)  Total Treatment Time: 26 Minutes    Drew Dobbs PTA     Date: 2020

## 2020-07-01 ENCOUNTER — TELEPHONE (OUTPATIENT)
Dept: PRIMARY CARE CLINIC | Age: 55
End: 2020-07-01

## 2020-07-01 NOTE — TELEPHONE ENCOUNTER
Pt was not able to get appt Dr. Sergio Betancourt until 07/08 at 8:15am, pt is due to return to work tomorrow, is not sure if she is to be off until appt with Dr. Sergio Betancourt. Please advise.       Health Maintenance   Topic Date Due    Hepatitis C screen  1965    HIV screen  02/28/1980    DTaP/Tdap/Td vaccine (1 - Tdap) 02/28/1984    Shingles Vaccine (1 of 2) 02/28/2015    Cervical cancer screen  11/25/2016    Flu vaccine (1) 09/01/2020    Lipid screen  06/24/2021    Potassium monitoring  06/24/2021    Creatinine monitoring  06/24/2021    Colon cancer screen colonoscopy  10/21/2021    Breast cancer screen  06/17/2022    Diabetes screen  06/24/2023    Hepatitis A vaccine  Aged Out    Hepatitis B vaccine  Aged Out    Hib vaccine  Aged Out    Meningococcal (ACWY) vaccine  Aged Out    Pneumococcal 0-64 years Vaccine  Aged Out             (applicable per patient's age: Cancer Screenings, Depression Screening, Fall Risk Screening, Immunizations)    Hemoglobin A1C (%)   Date Value   06/24/2020 5.6   04/23/2019 5.4   10/18/2017 5.3     LDL Cholesterol (mg/dL)   Date Value   06/24/2020 90     LDL Calculated (mg/dL)   Date Value   05/03/2018 158     AST (U/L)   Date Value   06/24/2020 22     ALT (U/L)   Date Value   06/24/2020 28     BUN (mg/dL)   Date Value   06/24/2020 12      (goal A1C is < 7)   (goal LDL is <100) need 30-50% reduction from baseline     BP Readings from Last 3 Encounters:   06/23/20 130/86   06/20/20 (!) 139/93   06/01/20 124/76    (goal /80)      All Future Testing planned in [facility]:  Lab Frequency Next Occurrence   XR HIP LEFT (2-3 VIEWS) Once 10/21/2020   XR ABDOMEN (KUB) (SINGLE AP VIEW) Once 06/15/2020       Next Visit Date:  Future Appointments   Date Time Provider Tez Montemayor   7/2/2020  1:45 PM Addy Velasquez, PTA [facility]   7/7/2020 12:45 PM Sonali Musa, PTA [facility]   7/9/2020 12:45 PM Manju Ceballos PTA [facility]   7/16/2020  4:30 PM Rocio Bunch MD yosvany Isaacs urol [facility]   7/20/2020  4:00 PM ELIGIO Huynh CNP [place] [facility]   11/30/2020  6:30 PM ELIGIO Huynh CNP [place] [facility]            Patient Active Problem List:     Hyperlipidemia     HTN (hypertension)     Leg pain, bilateral     History of adenomatous polyp of colon     NEIL (obstructive sleep apnea)     Restless legs     Female genuine stress incontinence     Disease of urethra     Osteoarthritis of right knee     Kidney stones     Ureteral stone with hydronephrosis     Renal colic on right side

## 2020-07-01 NOTE — LETTER
[address] [address]  Phone: [phone]  Fax: [phone]    ELIGIO Armenta CNP        July 3, 2020     Patient: Gerber Esparza   YOB: 1965   Date of Visit: 7/1/2020       To Whom It May Concern: It is my medical opinion that Mily Romero will be off work 6/22/2020-7/9/2020 for right knee injury and chronic low back pain/radiculopathy/myalgia. Patient has further eval with orthopedic and neurology both planned. Expected RTW on 7/10/2020 full duty without restrictions. If you have any questions or concerns, please don't hesitate to call.     Sincerely,        ELIGIO Armenta CNP

## 2020-07-02 ENCOUNTER — HOSPITAL ENCOUNTER (OUTPATIENT)
Dept: PHYSICAL THERAPY | Age: 55
Setting detail: THERAPIES SERIES
Discharge: HOME | End: 2020-07-02
Payer: COMMERCIAL

## 2020-07-02 PROCEDURE — 97530 THERAPEUTIC ACTIVITIES: CPT

## 2020-07-02 ASSESSMENT — PAIN SCALES - GENERAL: PAINLEVEL_OUTOF10: 5

## 2020-07-04 NOTE — TELEPHONE ENCOUNTER
I spoke with patient about time off and she is improving some, will see neurology with chronically elevated cpk and myalgia in legs. Right knee pain has follow up with ortho next week. Weight loss encouraged working on with Dr. Marni Urrutia. Will remain off work from 6/22/2020 to 7/8/2020. Work note faxed.

## 2020-07-07 ENCOUNTER — HOSPITAL ENCOUNTER (OUTPATIENT)
Dept: PHYSICAL THERAPY | Age: 55
Setting detail: THERAPIES SERIES
Discharge: HOME | End: 2020-07-07
Payer: COMMERCIAL

## 2020-07-07 PROCEDURE — 97110 THERAPEUTIC EXERCISES: CPT

## 2020-07-07 ASSESSMENT — PAIN SCALES - GENERAL: PAINLEVEL_OUTOF10: 8

## 2020-07-07 NOTE — PROGRESS NOTES
Phone: [address]      Fax: [phone]                            Outpatient Physical Therapy                                                                            Daily Note    Date: 2020  Patient Name: Cayetano Carr        MRN: [id number]   ACCT#:  [de-identified]  : 1965  (54 y.o.)    Referring Practitioner: FRANSISCO Sweet         Diagnosis: OA, Right Knee  Treatment Diagnosis: Right Knee Pain    Onset Date: 20  PT Insurance Information: BCBS  Total # of Visits Approved: 18 Per Physician Order  Total # of Visits to Date: 4  No Show: 0  Canceled Appointment: 0  Plan of Care/Certification Expiration Date: 20    Pre-Treatment Pain:  8/10     Assessment  Assessment: Patient reports R knee pain is an 8/10 this afternoon when walking, but at rest she rates pain a 5/10. Patient notes knee is flared up because [facility]. Patient states she is off work until  unless Dr. John Paul Al changes that when she sees him tomorrow morning. Patient to schedule next week's appointments tomorrow after she sees Dr. John Paul Al. Following today's session patient reports pain is a 6/10.   Chart Reviewed: Yes    Plan  Plan: Continue with current plan    Exercises/Modalities/Manual:  See DocFlow Sheet    Education:      Barriers to Learning: none    Goals  (Total # of Visits to Date: 4)   Short Term Goals - Time Frame for Short term goals: 6-9  Short term goal 1: Ed / Compliant with HEP - MET                Long Term Goals - Time Frame for Long term goals : 9-18  Long term goal 1: Patient with improved gait deomstrating normal stride with 0-1/10 right knee pain  Long term goal 2: Right Knee ROM:  0-115 degrees to promote normal gait  Long term goal 3: Improved LEFS score >50/80 (26 initial score)  Long term goal 4: Demonstrate improved right knee strength 5/5 to perform stairs without UE support       Post Treatment Pain:  6/10    Time In: 1245    Time Out : 1325   Timed Code Treatment Minutes: 35 Minutes  Total Treatment Time: 36 Minutes    Kandis Arauz [place]     Date: 7/7/2020

## 2020-07-08 ENCOUNTER — HOSPITAL ENCOUNTER (OUTPATIENT)
Dept: PHYSICAL THERAPY | Age: 55
Setting detail: THERAPIES SERIES
Discharge: HOME | End: 2020-07-08
Payer: COMMERCIAL

## 2020-07-08 PROCEDURE — 97110 THERAPEUTIC EXERCISES: CPT

## 2020-07-08 ASSESSMENT — PAIN SCALES - GENERAL: PAINLEVEL_OUTOF10: 5

## 2020-07-08 NOTE — PROGRESS NOTES
Phone: [address]      Fax: [phone]                            Outpatient Physical Therapy                                                                            Daily Note    Date: 2020  Patient Name: Cayetano Carr        MRN: [id number]   ACCT#:  [de-identified]  : 1965  (54 y.o.)    Referring Practitioner: FRANSISCO Sweet         Diagnosis: OA, Right Knee  Treatment Diagnosis: Right Knee Pain    Onset Date: 20  PT Insurance Information: BCBS  Total # of Visits Approved: 18 Per Physician Order  Total # of Visits to Date: 5  No Show: 0  Canceled Appointment: 0  Plan of Care/Certification Expiration Date: 20    Pre-Treatment Pain:  5/10     Assessment  Assessment: Pt now off work until Aug 3rd. Dr is persuing gel shots per pt. Pt performed ex for strengthening and ROM of  knee. Supine AROM 4-107 degrees. Will cont per plan and progress as guerrero.    Chart Reviewed: Yes    Plan  Plan: Continue with current plan    Exercises/Modalities/Manual:  See DocFlow Sheet         Barriers to Learning: none    Goals  (Total # of Visits to Date: 5)   Short Term Goals - Time Frame for Short term goals: 6-9  Short term goal 1: Ed / Compliant with HEP - MET       Long Term Goals - Time Frame for Long term goals : 9-18  Long term goal 1: Patient with improved gait deomstrating normal stride with 0-1/10 right knee pain  Long term goal 2: Right Knee ROM:  0-115 degrees to promote normal gait  Long term goal 3: Improved LEFS score >50/80 (26 initial score)  Long term goal 4: Demonstrate improved right knee strength 5/5 to perform stairs without UE support       Post Treatment Pain:  5/10    Time In: 0945    Time Out : 1015        Timed Code Treatment Minutes: 30 Minutes  Total Treatment Time: 30 Minutes    Mathew Baker   PTA  Date: 2020

## 2020-07-14 ENCOUNTER — HOSPITAL ENCOUNTER (OUTPATIENT)
Dept: PHYSICAL THERAPY | Age: 55
Setting detail: THERAPIES SERIES
Discharge: HOME | End: 2020-07-14
Payer: COMMERCIAL

## 2020-07-14 ENCOUNTER — HOSPITAL ENCOUNTER (OUTPATIENT)
Age: 55
Discharge: HOME | End: 2020-07-16
Payer: COMMERCIAL

## 2020-07-14 ENCOUNTER — HOSPITAL ENCOUNTER (OUTPATIENT)
Dept: GENERAL RADIOLOGY | Age: 55
Discharge: HOME | End: 2020-07-16
Payer: COMMERCIAL

## 2020-07-14 PROCEDURE — 97110 THERAPEUTIC EXERCISES: CPT

## 2020-07-14 PROCEDURE — 74018 RADEX ABDOMEN 1 VIEW: CPT

## 2020-07-14 ASSESSMENT — PAIN SCALES - GENERAL: PAINLEVEL_OUTOF10: 4

## 2020-07-14 NOTE — PROGRESS NOTES
Phone: [address]      Fax: [phone]                            Outpatient Physical Therapy                                                                            Daily Note    Date: 2020  Patient Name: Yann Rios        MRN: [id number]   ACCT#:  [de-identified]  : 1965  (54 y.o.)    Referring Practitioner: FRANSISCO Hernandez         Diagnosis: OA, Right Knee  Treatment Diagnosis: Right Knee Pain    Onset Date: 20  PT Insurance Information: BCBS  Total # of Visits Approved: 18 Per Physician Order  Total # of Visits to Date: 6  No Show: 0  Canceled Appointment: 0  Plan of Care/Certification Expiration Date: 20    Pre-Treatment Pain:  4/10     Assessment  Assessment: Patient states R knee pain is a 4/10 this morning. She notes going up and down stairs will still increase pain to a 7-8/10. Patient notes when she returns to work she will be on 1st shift and will be doing a job where she isn't having to go up and down steps all day. R knee AROM = 4-109 deg. Patient still waiting on insurance approval for gel shots before scheduling another appointment with Dr. Juancarlos Enciso.   Chart Reviewed: Yes    Plan  Plan: Continue with current plan    Exercises/Modalities/Manual:  See DocFlow Sheet    Education:      Barriers to Learning: none    Goals  (Total # of Visits to Date: 6)   Short Term Goals - Time Frame for Short term goals: 6-9  Short term goal 1: Ed / Compliant with HEP - MET                Long Term Goals - Time Frame for Long term goals : 9-18  Long term goal 1: Patient with improved gait deomstrating normal stride with 0-1/10 right knee pain  Long term goal 2: Right Knee ROM:  0-115 degrees to promote normal gait  Long term goal 3: Improved LEFS score >50/80 (26 initial score)  Long term goal 4: Demonstrate improved right knee strength 5/5 to perform stairs without UE support       Post Treatment Pain:  4/10    Time In: 1015    Time Out : 1055 Timed Code Treatment Minutes: 40 Minutes  Total Treatment Time: 36 Minutes    Sabrina Brenner, [place]     Date: 7/14/2020

## 2020-07-16 ENCOUNTER — OFFICE VISIT (OUTPATIENT)
Dept: UROLOGY | Age: 55
End: 2020-07-16
Payer: COMMERCIAL

## 2020-07-16 VITALS
HEIGHT: 62 IN | DIASTOLIC BLOOD PRESSURE: 70 MMHG | WEIGHT: 262 LBS | BODY MASS INDEX: 48.21 KG/M2 | SYSTOLIC BLOOD PRESSURE: 118 MMHG | TEMPERATURE: 96.9 F

## 2020-07-16 PROCEDURE — 99213 OFFICE O/P EST LOW 20 MIN: CPT | Performed by: UROLOGY

## 2020-07-16 ASSESSMENT — ENCOUNTER SYMPTOMS
NAUSEA: 0
VOMITING: 0
ABDOMINAL PAIN: 0
EYE PAIN: 0
WHEEZING: 0
BACK PAIN: 0
COLOR CHANGE: 0
EYE REDNESS: 0
SHORTNESS OF BREATH: 0
COUGH: 0

## 2020-07-16 NOTE — PROGRESS NOTES
HPI:    Patient is a 54 y.o. female in no acute distress. She is alert and oriented to person, place, and time. History  Referral from the ER for right ureteral stone.  She did present to the ER on 7/27/17 with complaints of right flank pain and nausea.  While at the ER she did have a CT scan did show a 3 mm bladder stone with right hydronephrosis and right hydroureter.  She does have 2 additional punctate nonobstructing stones in the right kidney.  There are no calcifications in the left.  She states when she did leave the ER she did pass her stone.  She did bring this with her to today's visit. [place] being evaluated in the ER she has not experienced any pain, nausea or vomiting, fevers, dysuria, gross hematuria, frequency, or urgency.  She denies any previous stone history.  She has seen urology in the past for evaluation of stress incontinence. NATY ANGELES [facility] gynecologist did perform one treatment of urethral bulking.  She did have a mid urethral sling placed 3 years ago. Bel Wynn has not had any new or worsening urinary incontinence since.     Right HLL 8/21/2018     Currently  Patient is here today for annual follow-up. Patient did get a recent KUB. This film was independently reviewed today. Patient's film today does not show any significant calcifications. There is possible stone over the inferior pole of the right kidney that is unchanged from last year. Patient has been doing well. No recent gross hematuria dysuria. She reports no new lower urinary tract symptoms. Patient did have a spontaneous stone passage episode approximately 6 months ago. This was associated with urinary tract infection. This was treated by her primary care provider. Patient reports no pain today.     Past Medical History:   Diagnosis Date    Allergic rhinitis     Enlarged thyroid 2016    nodules check annually    GERD (gastroesophageal reflux disease)     Headache(784.0)     migranes at times    Hyperlipidemia     Hypertension  NEIL (obstructive sleep apnea) 7/8/2016    Restless legs 7/8/2016     Past Surgical History:   Procedure Laterality Date    BLADDER SURGERY  9/9/14    bladder sling    CHOLECYSTECTOMY      CYSTOSCOPY Right 08/10/2018    Dr Hercules Span- stent placement    HYSTERECTOMY      Total    LITHOTRIPSY Right 08/21/2018    stent placement    OTHER SURGICAL HISTORY  03-    [facility] (Dr. Theresa Maldonado)   [address] W/LITHOTRIPSY &[facility] STENT INSRT Right 8/21/2018    CYSTOSCOPY URETEROSCOPY LASER-HLL performed by Ale Gonzalez MD at [facility] &[facility] [address] Right 8/21/2018    CYSTOSCOPY STENT Hernandez Merlin performed by Ale Gonzalez MD at [address] OFFICE/OUTPT [address] Right 8/10/2018    CYSTOSCOPY STENT INSERTION performed by Ale Gonzalez MD at [address]       Outpatient Encounter Medications as of 7/16/2020   Medication Sig Dispense Refill    pregabalin (LYRICA) 225 MG capsule Take 225 mg by mouth 2 times daily.       DULoxetine (CYMBALTA) 30 MG extended release capsule Take 30 mg by mouth daily      metoprolol tartrate (LOPRESSOR) 50 MG tablet Take 1 tablet by mouth 2 times daily 180 tablet 1    spironolactone (ALDACTONE) 25 MG tablet Take 1 tablet by mouth daily 90 tablet 1    omeprazole (PRILOSEC) 20 MG delayed release capsule TAKE 1 CAPSULE DAILY in am on empty stomach 90 capsule 1    ezetimibe (ZETIA) 10 MG tablet TAKE 1 TABLET DAILY 90 tablet 1    doxazosin (CARDURA) 1 MG tablet TAKE 1 TABLET DAILY AT BEDTIME 90 tablet 3    montelukast (SINGULAIR) 10 MG tablet Take 1 tablet by mouth nightly 90 tablet 1    rOPINIRole (REQUIP) 1 MG tablet Take 1 tablet by mouth nightly Restless legs 90 tablet 1    rosuvastatin (CRESTOR) 10 MG tablet TAKE 1 TABLET NIGHTLY 90 tablet 3    naproxen (NAPROSYN) 375 MG tablet Take 1 tablet by mouth 2 times daily (with meals) 180 tablet 1    Acetaminophen (TYLENOL PO) Take 500 mg change, chills and fever. Eyes: Negative for pain, redness and visual disturbance. Respiratory: Negative for cough, shortness of breath and wheezing. Cardiovascular: Negative for chest pain and leg swelling. Gastrointestinal: Negative for abdominal pain, nausea and vomiting. Genitourinary: Negative for difficulty urinating, dysuria, flank pain, frequency, hematuria, pelvic pain, vaginal bleeding and vaginal discharge. Musculoskeletal: Negative for back pain, joint swelling and myalgias. Skin: Negative for color change, rash and wound. Neurological: Negative for dizziness, tremors and numbness. Hematological: Negative for adenopathy. Does not bruise/bleed easily. /70 (Site: Right Upper Arm, Position: Sitting, Cuff Size: Medium Adult)   Temp 96.9 °F (36.1 °C) (Infrared)   Ht 5' 2\" (1.575 m)   Wt 262 lb (118.8 kg)   LMP  (LMP Unknown)   BMI 47.92 kg/m²       PHYSICAL EXAM:  Constitutional: Patient resting comfortably, in no acute distress. Neuro: Alert and oriented to person place and time. Cranial nerves grossly intact. Psych: Mood and affect normal.  Skin: Warm, dry  HEENT: normocephalic, atraumatic  Lymphatics: No palpable lymphadenopathy  Lungs: Respiratory effort normal, unlabored  Cardiovascular:  Normal peripheral pulses  Abdomen: Soft, non-tender, non-distended with no organomegaly or palpable masses. : No CVA tenderness. Bladder non-tender and not distended. Pelvic: Deferred    Lab Results   Component Value Date    BUN 12 06/24/2020     Lab Results   Component Value Date    CREATININE 0.59 06/24/2020       ASSESSMENT:  This is a 54 y.o. female with the following diagnoses:   Diagnosis Orders   1. Renal stone  XR ABDOMEN (KUB) (SINGLE AP VIEW)         PLAN:  Patient will follow-up with us in 1 year with a repeat KUB. We did go over stone prevention with her from a dietary standpoint.

## 2020-07-16 NOTE — PATIENT INSTRUCTIONS
prescription pain medicine, ask your doctor if you can take an over-the-counter medicine. Read and follow all instructions on the label. · Your doctor may ask you to strain your urine so that you can collect your kidney stone when it passes. You can use a kitchen strainer or a tea strainer to catch the stone. Store it in a plastic bag until you see your doctor again. Preventing future kidney stones  Some changes in your diet may help prevent kidney stones. Depending on the cause of your stones, your doctor may recommend that you:  · Drink plenty of fluids, enough so that your urine is light yellow or clear like water. If you have kidney, heart, or liver disease and have to limit fluids, talk with your doctor before you increase the amount of fluids you drink. · Limit coffee, tea, and alcohol. Also avoid grapefruit juice. · Do not take more than the recommended daily dose of vitamins C and D.  · Avoid antacids such as Gaviscon, Maalox, Mylanta, or Tums. · Limit the amount of salt (sodium) in your diet. · Eat a balanced diet that is not too high in protein. · Limit foods that are high in a substance called oxalate, which can cause kidney stones. These foods include dark green vegetables, rhubarb, chocolate, wheat bran, nuts, cranberries, and beans. When should you call for help? Call your doctor now or seek immediate medical care if:  · You cannot keep down fluids. · Your pain gets worse. · You have a fever or chills. · You have new or worse pain in your back just below your rib cage (the flank area). · You have new or more blood in your urine. Watch closely for changes in your health, and be sure to contact your doctor if:  · You do not get better as expected. Where can you learn more? Go to [email or web address]://[email or web address]. org and sign in to your [employer] account. Enter R068 in the [employer] box to learn more about \"Kidney Stone: Care Instructions. \"     If you do not have an

## 2020-07-17 ENCOUNTER — HOSPITAL ENCOUNTER (OUTPATIENT)
Dept: PHYSICAL THERAPY | Age: 55
Setting detail: THERAPIES SERIES
Discharge: HOME | End: 2020-07-17
Payer: COMMERCIAL

## 2020-07-17 PROCEDURE — 97110 THERAPEUTIC EXERCISES: CPT

## 2020-07-17 ASSESSMENT — PAIN SCALES - GENERAL: PAINLEVEL_OUTOF10: 4

## 2020-07-20 ENCOUNTER — HOSPITAL ENCOUNTER (OUTPATIENT)
Dept: PHYSICAL THERAPY | Age: 55
Setting detail: THERAPIES SERIES
Discharge: HOME | End: 2020-07-20
Payer: COMMERCIAL

## 2020-07-20 ENCOUNTER — OFFICE VISIT (OUTPATIENT)
Dept: FAMILY MEDICINE CLINIC | Age: 55
End: 2020-07-20
Payer: COMMERCIAL

## 2020-07-20 VITALS
OXYGEN SATURATION: 99 % | HEIGHT: 62 IN | BODY MASS INDEX: 48.76 KG/M2 | TEMPERATURE: 98.2 F | WEIGHT: 265 LBS | DIASTOLIC BLOOD PRESSURE: 76 MMHG | SYSTOLIC BLOOD PRESSURE: 112 MMHG | HEART RATE: 73 BPM

## 2020-07-20 PROCEDURE — 99214 OFFICE O/P EST MOD 30 MIN: CPT | Performed by: NURSE PRACTITIONER

## 2020-07-20 PROCEDURE — 97110 THERAPEUTIC EXERCISES: CPT

## 2020-07-20 ASSESSMENT — PAIN SCALES - GENERAL: PAINLEVEL_OUTOF10: 5

## 2020-07-20 NOTE — PROGRESS NOTES
Phone: [address]      Fax: [phone]                            Outpatient Physical Therapy                                                                            Daily Note    Date: 2020  Patient Name: Ltea Tafoya        MRN: [id number]   ACCT#:  [de-identified]  : 1965  (54 y.o.)    Referring Practitioner: FRANSISCO Levy         Diagnosis: OA, Right Knee  Treatment Diagnosis: Right Knee Pain    Onset Date: 20  PT Insurance Information: BCBS  Total # of Visits Approved: 18 Per Physician Order  Total # of Visits to Date: 8  No Show: 0  Canceled Appointment: 0  Plan of Care/Certification Expiration Date: 20    Pre-Treatment Pain:  5/10  Subjective: Aug 3rd RTW date  Assessment  Assessment: Pt c/o stiffness this morning, which she says gets better as the day goes on. Supin AROM 0-112 degrees. Performed ex as outlined for LE strengthening and ROM. May add step ups/downs .     Chart Reviewed: Yes    Plan  Plan: Continue with current plan    Exercises/Modalities/Manual:  See DocFlow Sheet         Barriers to Learning: none    Goals  (Total # of Visits to Date: 8)   Short Term Goals - Time Frame for Short term goals: 6-9  Short term goal 1: Ed / Compliant with HEP - MET       Long Term Goals - Time Frame for Long term goals : 9-18  Long term goal 1: Patient with improved gait deomstrating normal stride with 0-1/10 right knee pain  Long term goal 2: Right Knee ROM:  0-115 degrees to promote normal gait  Long term goal 3: Improved LEFS score >50/80 (26 initial score)  Long term goal 4: Demonstrate improved right knee strength 5/5 to perform stairs without UE support       Post Treatment Pain:  5/10    Time In: 0845    Time Out : 09        Timed Code Treatment Minutes: 40 Minutes  Total Treatment Time: 40 Minutes    Avinash Baker PTA    Date: 2020

## 2020-07-20 NOTE — PROGRESS NOTES
2020     Loretta Mckeon (:  1965) is a 54 y.o. female, here for evaluation of the following medical concerns:    HPI    Right knee osteoarthritis being treated with Dr. Duey Boast. Recent knee xray abnormal.   Pt aware wt loss recommendation. Seeing pain management Dr. Maegan Black with hx back injections currently on cymbalta and lyrica with improvement. Chronic leg issues, gentle swelling bilaterally leg cramps improved with requip. No hx anemia. On spironolactone. bp controlled with lopressor and cardura. Cholesterol on zetia and low dose crestor. Chronic elevation in CPK (no difference when off cholesterol med) . Off work currently advised by DR. Duey Boast. Review of Systems   Constitutional: Positive for activity change (limited with right knee pain. ). Negative for fever. HENT: Negative for congestion, rhinorrhea and sore throat. Eyes: Negative. Respiratory: Negative for cough. Cardiovascular: Negative for chest pain and leg swelling. Gastrointestinal: Negative for abdominal distention. Endocrine: Negative for cold intolerance and heat intolerance. Genitourinary: Negative for difficulty urinating. Musculoskeletal: Positive for back pain, gait problem and joint swelling. Negative for arthralgias (knee, low back). Neurological: Negative for dizziness, tremors and headaches. Psychiatric/Behavioral: Negative for decreased concentration. The patient is nervous/anxious. Prior to Visit Medications    Medication Sig Taking? Authorizing Provider   pregabalin (LYRICA) 225 MG capsule Take 225 mg by mouth 2 times daily.  Yes Historical Provider, MD   DULoxetine (CYMBALTA) 30 MG extended release capsule Take 30 mg by mouth daily Yes Historical Provider, MD   metoprolol tartrate (LOPRESSOR) 50 MG tablet Take 1 tablet by mouth 2 times daily Yes Azalea John APRN - CNP   spironolactone (ALDACTONE) 25 MG tablet Take 1 tablet by mouth daily Yes Azalea John APRN - CNP   omeprazole (PRILOSEC) 20 MG delayed release capsule TAKE 1 CAPSULE DAILY in am on empty stomach Yes Sheria Pill, APRN - CNP   ezetimibe (ZETIA) 10 MG tablet TAKE 1 TABLET DAILY Yes Sheria Pill, APRN - CNP   doxazosin (CARDURA) 1 MG tablet TAKE 1 TABLET DAILY AT BEDTIME Yes Sheria Pill, APRN - CNP   montelukast (SINGULAIR) 10 MG tablet Take 1 tablet by mouth nightly Yes Sheria Pill, APRN - CNP   rOPINIRole (REQUIP) 1 MG tablet Take 1 tablet by mouth nightly Restless legs Yes Sheria Pill, APRN - CNP   rosuvastatin (CRESTOR) 10 MG tablet TAKE 1 TABLET NIGHTLY Yes Sheria Pill, APRN - CNP   naproxen (NAPROSYN) 375 MG tablet Take 1 tablet by mouth 2 times daily (with meals) Yes Sheria Pill, APRN - CNP   Acetaminophen (TYLENOL PO) Take 500 mg by mouth every 6 hours as needed  Yes Historical Provider, MD   aspirin 81 MG EC tablet Take 81 mg by mouth daily  Yes Historical Provider, MD        Social History     Tobacco Use    Smoking status: Never Smoker    Smokeless tobacco: Never Used   Substance Use Topics    Alcohol use: No        Vitals:    07/20/20 1606   BP: 112/76   Site: Right Upper Arm   Position: Sitting   Pulse: 73   Temp: 98.2 °F (36.8 °C)   TempSrc: Oral   SpO2: 99%   Weight: 265 lb (120.2 kg)   Height: 5' 2\" (1.575 m)     Estimated body mass index is 48.47 kg/m² as calculated from the following:    Height as of this encounter: 5' 2\" (1.575 m). Weight as of this encounter: 265 lb (120.2 kg). Physical Exam  Vitals signs and nursing note reviewed. Constitutional:       General: She is not in acute distress. Appearance: Normal appearance. She is well-developed. HENT:      Head: Normocephalic and atraumatic. Right Ear: Tympanic membrane and external ear normal.      Left Ear: Tympanic membrane and external ear normal.      Nose: No congestion. Mouth/Throat:      Mouth: Mucous membranes are moist.   Eyes:      General: No scleral icterus.      Pupils: Pupils are equal, round, and reactive to light. Neck:      Musculoskeletal: Normal range of motion and neck supple. Cardiovascular:      Rate and Rhythm: Normal rate and regular rhythm. Heart sounds: Normal heart sounds. No murmur. Pulmonary:      Effort: Pulmonary effort is normal. No respiratory distress. Breath sounds: Normal breath sounds. No wheezing. Abdominal:      Palpations: Abdomen is soft. Tenderness: There is no abdominal tenderness. Musculoskeletal: Normal range of motion. General: Swelling (right knee slight warmth) and tenderness (lumbar paraspinal and SI joint on Left) present. Right lower leg: Edema present. Left lower leg: Edema present. Lymphadenopathy:      Cervical: No cervical adenopathy. Skin:     General: Skin is warm and dry. Neurological:      Mental Status: She is alert and oriented to person, place, and time. Psychiatric:         Behavior: Behavior normal.         Thought Content: Thought content normal.         Judgment: Judgment normal.         ASSESSMENT/PLAN:  1. Essential hypertension  Stable and controlled    2. Osteoarthritis of spine with radiculopathy, lumbosacral region  Chronic with pain management Dr. Edmar Don    3. Leg pain, bilateral  On cymbalta and gabapentin    4. NEIL (obstructive sleep apnea)  Compliant with cpap    5. Class 3 severe obesity due to excess calories without serious comorbidity with body mass index (BMI) of 45.0 to 49.9 in adult [facility])  Reduce calories. 6. Gastroesophageal reflux disease without esophagitis      7. Multinodular thyroid  Limits some wt loss meds    8. Elevated CPK  Unknown cause    9. Restless legs  On requip  10. Female genuine stress incontinence        Return in about 3 months (around 10/20/2020). An electronic signature was used to authenticate this note.     --ELIGIO Waite - CNP on 7/22/2020 at 10:48 PM

## 2020-07-20 NOTE — PATIENT INSTRUCTIONS
You may be receiving a survey from [employer] regarding your visit today. You may get this in the mail, through your MyChart or in your email. Please complete the survey to enable us to provide the highest quality of care to you and your family. If you cannot score us as very good ( 5 Stars) on any question, please feel free to call the office to discuss how we could have made your experience exceptional.     Thank You! ELIGIO Britt-CNP  [address]  [place], [place]

## 2020-07-22 PROBLEM — M17.11 OSTEOARTHRITIS OF RIGHT KNEE: Status: RESOLVED | Noted: 2017-02-23 | Resolved: 2020-07-22

## 2020-07-22 ASSESSMENT — ENCOUNTER SYMPTOMS
SORE THROAT: 0
RHINORRHEA: 0
ABDOMINAL DISTENTION: 0
COUGH: 0
EYES NEGATIVE: 1
BACK PAIN: 1

## 2020-07-23 ENCOUNTER — HOSPITAL ENCOUNTER (OUTPATIENT)
Dept: PHYSICAL THERAPY | Age: 55
Setting detail: THERAPIES SERIES
Discharge: HOME | End: 2020-07-23
Payer: COMMERCIAL

## 2020-07-23 PROCEDURE — 97110 THERAPEUTIC EXERCISES: CPT

## 2020-07-23 ASSESSMENT — PAIN SCALES - GENERAL: PAINLEVEL_OUTOF10: 5

## 2020-07-23 NOTE — PROGRESS NOTES
degrees to promote normal gait  Long term goal 3: Improved LEFS score >50/80 (26 initial score)  Long term goal 4: Demonstrate improved right knee strength 5/5 to perform stairs without UE support       Post Treatment Pain:  5/10    Time In: 0935    Time Out : 1015   Timed Code Treatment Minutes: 40 Minutes  Total Treatment Time: 36 Minutes    Kamala Howard [place]     Date: 7/23/2020

## 2020-07-27 ENCOUNTER — APPOINTMENT (OUTPATIENT)
Dept: PHYSICAL THERAPY | Age: 55
End: 2020-07-27
Payer: COMMERCIAL

## 2020-07-30 ENCOUNTER — HOSPITAL ENCOUNTER (OUTPATIENT)
Dept: PHYSICAL THERAPY | Age: 55
Setting detail: THERAPIES SERIES
Discharge: HOME | End: 2020-07-30
Payer: COMMERCIAL

## 2020-07-30 PROCEDURE — 97110 THERAPEUTIC EXERCISES: CPT

## 2020-07-30 ASSESSMENT — PAIN DESCRIPTION - LOCATION: LOCATION: KNEE

## 2020-07-30 ASSESSMENT — PAIN SCALES - GENERAL: PAINLEVEL_OUTOF10: 6

## 2020-07-30 ASSESSMENT — PAIN DESCRIPTION - ORIENTATION: ORIENTATION: RIGHT

## 2020-07-31 ENCOUNTER — HOSPITAL ENCOUNTER (OUTPATIENT)
Dept: PHYSICAL THERAPY | Age: 55
Setting detail: THERAPIES SERIES
Discharge: HOME | End: 2020-07-31
Payer: COMMERCIAL

## 2020-09-15 ENCOUNTER — TELEPHONE (OUTPATIENT)
Dept: PRIMARY CARE CLINIC | Age: 55
End: 2020-09-15

## 2020-09-15 NOTE — TELEPHONE ENCOUNTER
Pt called in stating that she needed claim # [id number] added onto the [facility] paperwork that we faxed. Done and re faxed.

## 2020-10-06 ENCOUNTER — HOSPITAL ENCOUNTER (OUTPATIENT)
Age: 55
Setting detail: SPECIMEN
Discharge: HOME | End: 2020-10-06
Payer: COMMERCIAL

## 2020-10-06 ENCOUNTER — OFFICE VISIT (OUTPATIENT)
Dept: PRIMARY CARE CLINIC | Age: 55
End: 2020-10-06
Payer: COMMERCIAL

## 2020-10-06 VITALS
WEIGHT: 250 LBS | HEART RATE: 67 BPM | DIASTOLIC BLOOD PRESSURE: 75 MMHG | TEMPERATURE: 97.6 F | BODY MASS INDEX: 45.73 KG/M2 | OXYGEN SATURATION: 96 % | SYSTOLIC BLOOD PRESSURE: 112 MMHG

## 2020-10-06 PROCEDURE — 99214 OFFICE O/P EST MOD 30 MIN: CPT | Performed by: NURSE PRACTITIONER

## 2020-10-06 PROCEDURE — U0003 INFECTIOUS AGENT DETECTION BY NUCLEIC ACID (DNA OR RNA); SEVERE ACUTE RESPIRATORY SYNDROME CORONAVIRUS 2 (SARS-COV-2) (CORONAVIRUS DISEASE [COVID-19]), AMPLIFIED PROBE TECHNIQUE, MAKING USE OF HIGH THROUGHPUT TECHNOLOGIES AS DESCRIBED BY CMS-2020-01-R: HCPCS

## 2020-10-06 PROCEDURE — C9803 HOPD COVID-19 SPEC COLLECT: HCPCS

## 2020-10-06 RX ORDER — NAPROXEN 375 MG/1
TABLET ORAL
Qty: 180 TABLET | Refills: 1 | Status: SHIPPED | OUTPATIENT
Start: 2020-10-06 | End: 2021-04-13 | Stop reason: SDUPTHER

## 2020-10-06 NOTE — LETTER
[facility] In Clinic  [facility] [address]  [facility]:[phone]  Fax: [phone]      10-        To whom it may concern:     [facility]  was tested today for COVID. [employer] may not return to work/school until test results given. Patient may return to work after negative test results given,  24 hours after last fever without fever reducing medications and improvement of her symptoms. Anupama Page.  Gretchen Jernigan APRLAINEY-CNP

## 2020-10-06 NOTE — TELEPHONE ENCOUNTER
Last OV: 6/23/2020  Last RX: 11/28/19   Next scheduled apt: 11/30/2020    medication pending.       Health Maintenance   Topic Date Due    Hepatitis C screen  1965    HIV screen  02/28/1980    DTaP/Tdap/Td vaccine (1 - Tdap) 02/28/1984    Shingles Vaccine (1 of 2) 02/28/2015    Cervical cancer screen  11/25/2016    Flu vaccine (1) 09/01/2020    Lipid screen  06/24/2021    Potassium monitoring  06/24/2021    Creatinine monitoring  06/24/2021    Colon cancer screen colonoscopy  10/21/2021    Breast cancer screen  06/17/2022    Diabetes screen  06/24/2023    Hepatitis A vaccine  Aged Out    Hepatitis B vaccine  Aged Out    Hib vaccine  Aged Out    Meningococcal (ACWY) vaccine  Aged Out    Pneumococcal 0-64 years Vaccine  Aged Out             (applicable per patient's age: Cancer Screenings, Depression Screening, Fall Risk Screening, Immunizations)    Hemoglobin A1C (%)   Date Value   06/24/2020 5.6   04/23/2019 5.4   10/18/2017 5.3     LDL Cholesterol (mg/dL)   Date Value   06/24/2020 90     LDL Calculated (mg/dL)   Date Value   05/03/2018 158     AST (U/L)   Date Value   06/24/2020 22     ALT (U/L)   Date Value   06/24/2020 28     BUN (mg/dL)   Date Value   06/24/2020 12      (goal A1C is < 7)   (goal LDL is <100) need 30-50% reduction from baseline     BP Readings from Last 3 Encounters:   10/06/20 112/75   07/20/20 112/76   07/16/20 118/70    (goal /80)      All Future Testing planned in [facility]:  Lab Frequency Next Occurrence   XR HIP LEFT (2-3 VIEWS) Once 10/21/2020   XR ABDOMEN (KUB) (SINGLE AP VIEW) Once 07/16/2021   COVID-19 Ambulatory Once 10/06/2020       Next Visit Date:  Future Appointments   Date Time Provider Tez Montemayor   10/23/2020  3:20 PM Kedar Krishnan MD Neuro Spec [email or web address]   11/30/2020  6:30 PM ELIGIO Drake - CNP ABDIRIZAK [facility]   7/22/2021  4:00 PM MD Reyna Flower W            Patient Active Problem List: Hyperlipidemia     HTN (hypertension)     History of adenomatous polyp of colon     NEIL (obstructive sleep apnea)     Restless legs     Female genuine stress incontinence     Kidney stones     Ureteral stone with hydronephrosis     Renal colic on right side

## 2020-10-06 NOTE — PATIENT INSTRUCTIONS
Patient Education        Learning About Coronavirus [phone])  Coronavirus [phone]): Overview  What is coronavirus ([id number]-81)? The coronavirus disease (COVID-19) is caused by a virus. It is an illness that was first found in [place], [place], in December 2019. It has since spread worldwide. The virus can cause fever, cough, and trouble breathing. In severe cases, it can cause pneumonia and make it hard to breathe without help. It can cause death. Coronaviruses are a large group of viruses. They cause the common cold. They also cause more serious illnesses like Middle East respiratory syndrome (MERS) and severe acute respiratory syndrome (SARS). COVID-19 is caused by a novel coronavirus. That means it's a new type that has not been seen in people before. This virus spreads person-to-person through droplets from coughing and sneezing. It can also spread when you are close to someone who is infected. And it can spread when you touch something that has the virus on it, such as a doorknob or a tabletop. What can you do to protect yourself from coronavirus (COVID-19)? The best way to protect yourself from getting sick is to:  · Avoid areas where there is an outbreak. · Avoid contact with people who may be infected. · Wash your hands often with soap or alcohol-based hand sanitizers. · Avoid crowds and try to stay at least 6 feet away from other people. · Wash your hands often, especially after you cough or sneeze. Use soap and water, and scrub for at least 20 seconds. If soap and water aren't available, use an alcohol-based hand . · Avoid touching your mouth, nose, and eyes. What can you do to avoid spreading the virus to others? To help avoid spreading the virus to others:  · Cover your mouth with a tissue when you cough or sneeze. Then throw the tissue in the trash. · Use a disinfectant to clean things that you touch often. · Wear a cloth face cover if you have to go to public areas.   · Stay home if you are sick or have been exposed to the virus. Don't go to school, work, or public areas. And don't use public transportation, ride-shares, or taxis unless you have no choice. · If you are sick:  ? Leave your home only if you need to get medical care. But call the doctor's office first so they know you're coming. And wear a face cover. ? Wear the face cover whenever you're around other people. It can help stop the spread of the virus when you cough or sneeze. ? Clean and disinfect your home every day. Use household  and disinfectant wipes or sprays. Take special care to clean things that you grab with your hands. These include doorknobs, remote controls, phones, and handles on your refrigerator and microwave. And don't forget countertops, tabletops, bathrooms, and computer keyboards. When to call for help  [id number] anytime you think you may need emergency care. For example, call if:  · You have severe trouble breathing. (You can't talk at all.)  · You have constant chest pain or pressure. · You are severely dizzy or lightheaded. · You are confused or can't think clearly. · Your face and lips have a blue color. · You pass out (lose consciousness) or are very hard to wake up. Call your doctor now if you develop symptoms such as:  · Shortness of breath. · Fever. · Cough. If you need to get care, call ahead to the doctor's office for instructions before you go. Make sure you wear a face cover to prevent exposing other people to the virus. Where can you get the latest information? The following health organizations are tracking and studying this virus. Their websites contain the most up-to-date information. Pamella Jang also learn what to do if you think you may have been exposed to the virus. · U.S. Centers for Disease Control and Prevention (CDC): The CDC provides updated news about the disease and travel advice. The website also tells you how to prevent the spread of infection.  www.cdc.gov  · World Health Organization [facility]): WHO offers information about the virus outbreaks. WHO also has travel advice. www.who.int  Current as of: May 8, 2020               Content Version: 12.5  © 2006-2020 Healthwise, Incorporated. Care instructions adapted under license by [facility] ([facility]). If you have questions about a medical condition or this instruction, always ask your healthcare professional. [address] any warranty or liability for your use of this information. Patient Education        Coronavirus (NMGNR-38): Care Instructions  Overview  The coronavirus disease (COVID-19) is caused by a virus. Symptoms may include a fever, a cough, and shortness of breath. It mainly spreads person-to-person through droplets from coughing and sneezing. The virus also can spread when people are in close contact with someone who is infected. Most people have mild symptoms and can take care of themselves at home. If their symptoms get worse, they may need care in a hospital. There is no medicine to fight the virus. It's important to not spread the virus to others. If you have COVID-19, wear a face cover anytime you are around other people. You need to isolate yourself while you are sick. Your doctor or local public health official will tell you when you no longer need to be isolated. Leave your home only if you need to get medical care. Follow-up care is a key part of your treatment and safety. Be sure to make and go to all appointments, and call your doctor if you are having problems. It's also a good idea to know your test results and keep a list of the medicines you take. How can you care for yourself at home? · Get extra rest. It can help you feel better. · Drink plenty of fluids. This helps replace fluids lost from fever. Fluids also help ease a scratchy throat. Water, soup, fruit juice, and hot tea with lemon are good choices. · Take acetaminophen (such as Tylenol) to reduce a fever.  It may also help with muscle aches. Read and follow all instructions on the label. · Sponge your body with lukewarm water to help with fever. Don't use cold water or ice. · Use petroleum jelly on sore skin. This can help if the skin around your nose and lips becomes sore from rubbing a lot with tissues. Tips for isolation  · Wear a cloth face cover when you are around other people. It can help stop the spread of the virus when you cough or sneeze. · Limit contact with people in your home. If possible, stay in a separate bedroom and use a separate bathroom. · If you have to leave home, avoid crowds and try to stay at least 6 feet away from other people. · Avoid contact with pets and other animals. · Cover your mouth and nose with a tissue when you cough or sneeze. Then throw it in the trash right away. · Wash your hands often, especially after you cough or sneeze. Use soap and water, and scrub for at least 20 seconds. If soap and water aren't available, use an alcohol-based hand . · Don't share personal household items. These include bedding, towels, cups and glasses, and eating utensils. · [address] in the warmest water allowed for the fabric type, and dry it completely. It's okay to wash other people's laundry with yours. · Clean and disinfect your home every day. Use household  and disinfectant wipes or sprays. Take special care to clean things that you grab with your hands. These include doorknobs, remote controls, phones, and handles on your refrigerator and microwave. And don't forget countertops, tabletops, bathrooms, and computer keyboards. When should you call for help? [id number] anytime you think you may need emergency care. For example, call if you have life-threatening symptoms, such as:  · You have severe trouble breathing. (You can't talk at all.)  · You have constant chest pain or pressure. · You are severely dizzy or lightheaded. · You are confused or can't think clearly.   · Your face and lips have a blue color. · You pass out (lose consciousness) or are very hard to wake up. Call your doctor now or seek immediate medical care if:  · You have moderate trouble breathing. (You can't speak a full sentence.)  · You are coughing up blood (more than about 1 teaspoon). · You have signs of low blood pressure. These include feeling lightheaded; being too weak to stand; and having cold, pale, clammy skin. Watch closely for changes in your health, and be sure to contact your doctor if:  · Your symptoms get worse. · You are not getting better as expected. Call before you go to the doctor's office. Follow their instructions. And wear a cloth face cover. Current as of: May 8, 2020               Content Version: 12.5  © 2006-2020 Healthwise, Incorporated. Care instructions adapted under license by [facility] ([facility]). If you have questions about a medical condition or this instruction, always ask your healthcare professional. [place] [address] any warranty or liability for your use of this information.

## 2020-10-06 NOTE — PROGRESS NOTES
Denese Kussmaul  1965    Chief Complaint   Patient presents with    Pharyngitis     Patient c/o sore throat, headache and post nasal drip that began on Sunday. She was sent home from work and was told by PCP to come here. Respiratory Symptoms:  Patient complains of 2 day(s) history of myalgias/arthralgias, headache, ear congestion: bilaterally, clear nasal discharge, post nasal drip, non productive cough, facial pain/sinus pressure: bilateral frontal, sore throat and fatigue. Symptoms have been worsening with time. She denies any other symptoms . Relevant PMH: No pertinent PMH. Smoking history:  She  reports that she has never smoked. She has never used smokeless tobacco.     She has had no known ill contacts. Loretta was sent home from work today due to concern for symptoms and Covid 19. Works at [employer]. Treatment to date: Advil cold and sinus, Tylenol cold and flu which has been  somewhat effective. Travel screen completed: Yes            Vitals:    10/06/20 1343   BP: 112/75   Site: Left Upper Arm   Position: Sitting   Cuff Size: Medium Adult   Pulse: 67   Temp: 97.6 °F (36.4 °C)   TempSrc: Oral   SpO2: 96%   Weight: 250 lb (113.4 kg)      Physical Exam  Vitals signs and nursing note reviewed. Constitutional:       Comments: Appears to be of stated age with warm, dry skin; normal coloration without rash of the exposed skin. Patient is well-appearing, well-hydrated, and appears nontoxic without apparent distress. HENT:      Head: Normocephalic. Right Ear: Tympanic membrane normal.      Left Ear: Tympanic membrane normal.      Nose: Rhinorrhea present. Rhinorrhea is clear. Mouth/Throat:      Lips: Pink. No lesions. Mouth: Mucous membranes are moist.      Palate: No lesions. Pharynx: Oropharynx is clear. Uvula midline. Cardiovascular:      Rate and Rhythm: Normal rate and regular rhythm.    Pulmonary:      Effort: Pulmonary effort is normal.

## 2020-10-09 LAB — SARS-COV-2, NAA: NOT DETECTED

## 2020-10-22 RX ORDER — ROPINIROLE 1 MG/1
TABLET, FILM COATED ORAL
Qty: 90 TABLET | Refills: 3 | Status: SHIPPED | OUTPATIENT
Start: 2020-10-22 | End: 2021-01-25 | Stop reason: SDUPTHER

## 2020-10-23 ENCOUNTER — OFFICE VISIT (OUTPATIENT)
Dept: NEUROLOGY | Age: 55
End: 2020-10-23
Payer: COMMERCIAL

## 2020-10-23 VITALS
SYSTOLIC BLOOD PRESSURE: 124 MMHG | HEIGHT: 62 IN | HEART RATE: 81 BPM | BODY MASS INDEX: 47.84 KG/M2 | TEMPERATURE: 97.3 F | WEIGHT: 260 LBS | DIASTOLIC BLOOD PRESSURE: 80 MMHG

## 2020-10-23 PROCEDURE — 99204 OFFICE O/P NEW MOD 45 MIN: CPT | Performed by: PSYCHIATRY & NEUROLOGY

## 2020-10-23 RX ORDER — ACETAMINOPHEN 160 MG
TABLET,DISINTEGRATING ORAL DAILY
COMMUNITY

## 2020-10-23 NOTE — LETTER
[address] [facility] Neurology Specialist  [address] [place] [id number]  Phone: [phone]  Fax: [phone]    Tawny Castillo MD        2020     ELIGIO Schreiber CNP  [facility]    Patient: Heather Rodriguez  MR Number: [id number]  YOB: 1965  Date of Visit: 10/23/2020    Dear Dr. Chai Winston: Thank you for the request for consultation for Melyssa Suarez to me for the evaluation of COLTEN PUENTE  Below are the relevant portions of my assessment and plan of care. NEUROLOGY CONSULT  Patient Name:       Heather Rodriguez  :        1965  Clinic Visit Date:    10/23/2020      Dear Dr. Chai Winston, ELIGIO - CNP       I had the opportunity to see your patient, Ms. Heather Rodriguez in neurology consultation today. As you know she  is a 54 y.o. right handed  female presented with c/o pain in both legs and feet since  with a slow onset and gradually getting worse. She has had numbness in both toes; right greater than left. She also has been having shin pain bilaterally along with back pain radiating down lower extremities at times. She also has been having severe calf pain in left lower extremity and it is at times extreme. She also has Achilles pain in both lower legs. She also feels like \"on fire\" in both feet predominantly in proximal aspects of plantar aspects of both feet. She has been to podiatrist and she has used \"boot\" marginal relief. She has had MRI lumbar spine in 2019 and she had consultation with spine surgeon; everything okay except a mild bulging\". He referred her to pain management and she has had 3 ablations in lower lumbar region and also has had epidural injections with marginal relief. But she also stated that her back pain is not severe at this time.     Presently she has extreme pain ongoing involving bilateral lower extremities. She also stated that she has tried gabapentin with no relief. Then she was started on Cymbalta 30 mg daily and Lyrica with a gradual dose escalation. Presently she is getting Lyrica 225 mg twice daily with marginal relief. Her pain is of numbness and cramping pain and at times burning pain in both feet and it has been moderate and with variable severity. Symptoms occur most of the time \"all the day\". She denied hypersensitivity and allodynia. Symptoms are worse on left side. She does have dry mouth attributed to Cymbalta. She denies autonomic symptoms such as orthostatic dizziness, bloating, nausea, early satiety, diarrhea, constipation, blurred vision and lack of sweating. She denied weakness in arms. Admits to gait difficulties. Denies falls. Admits to back pain as stated above. Denies bladder dysfunction presently. She also has been having nocturnal awakenings with pain and paresthesias involving bilateral hands. Her pain gets aggravated with certain positions such as handwriting and driving. Gets some relief with shaking her hand. Review of systems done by staff reviewed and pertinent positives include Restless legs, muscle pain, trouble walking as stated above. Current Outpatient Medications on File Prior to Visit   Medication Sig Dispense Refill    Cholecalciferol (VITAMIN D3) 50 MCG (2000 UT) CAPS Take by mouth      rOPINIRole (REQUIP) 1 MG tablet TAKE 1 TABLET NIGHTLY FOR RESTLESS LEGS 90 tablet 3    naproxen (NAPROSYN) 375 MG tablet TAKE 1 TABLET TWICE A DAY WITH MEALS 180 tablet 1    liraglutide-weight management 18 MG/3ML SOPN Inject into the skin      pregabalin (LYRICA) 225 MG capsule Take 225 mg by mouth 2 times daily.       DULoxetine (CYMBALTA) 30 MG extended release capsule Take 30 mg by mouth daily      metoprolol tartrate (LOPRESSOR) 50 MG tablet Take 1 tablet by mouth 2 times daily 180 tablet 1  spironolactone (ALDACTONE) 25 MG tablet Take 1 tablet by mouth daily 90 tablet 1    omeprazole (PRILOSEC) 20 MG delayed release capsule TAKE 1 CAPSULE DAILY in am on empty stomach 90 capsule 1    ezetimibe (ZETIA) 10 MG tablet TAKE 1 TABLET DAILY 90 tablet 1    doxazosin (CARDURA) 1 MG tablet TAKE 1 TABLET DAILY AT BEDTIME 90 tablet 3    montelukast (SINGULAIR) 10 MG tablet Take 1 tablet by mouth nightly 90 tablet 1    Acetaminophen (TYLENOL PO) Take 500 mg by mouth every 6 hours as needed       aspirin 81 MG EC tablet Take 81 mg by mouth daily       rosuvastatin (CRESTOR) 10 MG tablet TAKE 1 TABLET NIGHTLY (Patient not taking: Reported on 10/23/2020) 90 tablet 3     No current facility-administered medications on file prior to visit. Allergies: Ketty Coronado is allergic to iv contrast [iodides]; lisinopril; and simvastatin.     Past Medical History:   Diagnosis Date    Allergic rhinitis     Arthritis     Enlarged thyroid 2016    nodules check annually    Fibromyalgia     GERD (gastroesophageal reflux disease)     Headache(784.0)     migranes at times    Hyperlipidemia     Hypertension     Hypertension     NEIL (obstructive sleep apnea) 7/8/2016    Restless leg syndrome     Restless legs 7/8/2016    Sleep apnea        Past Surgical History:   Procedure Laterality Date    BLADDER SURGERY  9/9/14    bladder sling    CHOLECYSTECTOMY      CYSTOSCOPY Right 08/10/2018    Dr Page French- stent placement    HYSTERECTOMY      Total    KNEE SURGERY      LITHOTRIPSY Right 08/21/2018    stent placement    OTHER SURGICAL HISTORY  03-    [facility] (Dr. Shahab Bernstein)   [address] [place] [address] W/LITHOTRIPSY &[facility]WE[facility] STENT INSRT Right 8/21/2018    CYSTOSCOPY URETEROSCOPY LASER-HLL performed by Kris Saunders MD at [facility] &[facility] STENT INSRT Right 8/21/2018    CYSTOSCOPY STENT INSERTION-EXCHANGE performed by Kris Saunders MD at [address] [place] [address] STATION and GAIT  Normal station, normal gait; could not do tandem gait     B12 (6/24/2020): 430  Folate (6/24/2020): 19.0  TSH (6/24/2020): 1.39  EDUARDO (6/23/2020): Negative  Hemoglobin A1c (6/24/2020): 5.6        Impression and Plan: Ms. Fran Wagner is a 54 y.o. 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 Will get rheumatoid factor, serum protein electrophoresis and EMG. During the interim; she will continue duloxetine and pregabalin as above. Follow-up in the clinic once the above testing is done. Please note that portions of this note were completed with a voice recognition program.  Although every effort was made to ensure the accuracy of this automated transcription, some errors in transcription may have occurred; occasionally words are mis-transcribed. Thank you for understanding. If you have questions, please do not hesitate to call me. I look forward to following Loretta along with you.     Sincerely,        Rita Negrete MD

## 2020-10-23 NOTE — PROGRESS NOTES
nausea, early satiety, diarrhea, constipation, blurred vision and lack of sweating. She denied weakness in arms. Admits to gait difficulties. Denies falls. Admits to back pain as stated above. Denies bladder dysfunction presently. She also has been having nocturnal awakenings with pain and paresthesias involving bilateral hands. Her pain gets aggravated with certain positions such as handwriting and driving. Gets some relief with shaking her hand. Review of systems done by staff reviewed and pertinent positives include Restless legs, muscle pain, trouble walking as stated above. Current Outpatient Medications on File Prior to Visit   Medication Sig Dispense Refill    Cholecalciferol (VITAMIN D3) 50 MCG (2000 UT) CAPS Take by mouth      rOPINIRole (REQUIP) 1 MG tablet TAKE 1 TABLET NIGHTLY FOR RESTLESS LEGS 90 tablet 3    naproxen (NAPROSYN) 375 MG tablet TAKE 1 TABLET TWICE A DAY WITH MEALS 180 tablet 1    liraglutide-weight management 18 MG/3ML SOPN Inject into the skin      pregabalin (LYRICA) 225 MG capsule Take 225 mg by mouth 2 times daily.       DULoxetine (CYMBALTA) 30 MG extended release capsule Take 30 mg by mouth daily      metoprolol tartrate (LOPRESSOR) 50 MG tablet Take 1 tablet by mouth 2 times daily 180 tablet 1    spironolactone (ALDACTONE) 25 MG tablet Take 1 tablet by mouth daily 90 tablet 1    omeprazole (PRILOSEC) 20 MG delayed release capsule TAKE 1 CAPSULE DAILY in am on empty stomach 90 capsule 1    ezetimibe (ZETIA) 10 MG tablet TAKE 1 TABLET DAILY 90 tablet 1    doxazosin (CARDURA) 1 MG tablet TAKE 1 TABLET DAILY AT BEDTIME 90 tablet 3    montelukast (SINGULAIR) 10 MG tablet Take 1 tablet by mouth nightly 90 tablet 1    Acetaminophen (TYLENOL PO) Take 500 mg by mouth every 6 hours as needed       aspirin 81 MG EC tablet Take 81 mg by mouth daily       rosuvastatin (CRESTOR) 10 MG tablet TAKE 1 TABLET NIGHTLY (Patient not taking: Reported on 10/23/2020) 90 tablet 3     No current facility-administered medications on file prior to visit. Allergies: Odin Segundo is allergic to iv contrast [iodides]; lisinopril; and simvastatin. Past Medical History:   Diagnosis Date    Allergic rhinitis     Arthritis     Enlarged thyroid 2016    nodules check annually    Fibromyalgia     GERD (gastroesophageal reflux disease)     Headache(784.0)     migranes at times    Hyperlipidemia     Hypertension     Hypertension     NEIL (obstructive sleep apnea) 7/8/2016    Restless leg syndrome     Restless legs 7/8/2016    Sleep apnea        Past Surgical History:   Procedure Laterality Date    BLADDER SURGERY  9/9/14    bladder sling    CHOLECYSTECTOMY      CYSTOSCOPY Right 08/10/2018    Dr Rony Zapien- stent placement    HYSTERECTOMY      Total    KNEE SURGERY      LITHOTRIPSY Right 08/21/2018    stent placement    OTHER SURGICAL HISTORY  03-    [facility] (Dr. Truong Adams)   [address] [place] [address] W/LITHOTRIPSY &INDWE[facility] STENT INSRT Right 8/21/2018    CYSTOSCOPY URETEROSCOPY LASER-HLL performed by Tigist Castañeda MD at [facility] &[facility] STENT INSRT Right 8/21/2018    CYSTOSCOPY STENT Brandon Abt performed by Tigist Castañeda MD at [address] [facility] [address] OFFICE/OUTPT [facility] Right 8/10/2018    CYSTOSCOPY STENT INSERTION performed by Tigist Castañeda MD at [place] [address] History: Odin Segundo  reports that she has never smoked. She has never used smokeless tobacco. She reports that she does not drink alcohol or use drugs.     Family History   Problem Relation Age of Onset    Heart Disease Mother     High Blood Pressure Father     Heart Disease Father     High Blood Pressure Sister     High Blood Pressure Brother     Cancer Sister         Breast       On exam: Blood pressure 124/80, pulse 81, temperature 97.3 °F (36.3 °C), height 5' 2\" (1.575 m), weight 260 lb (117.9 kg), not currently breastfeeding. NEUROLOGIC EXAMINATION  GENERAL  Appears comfortable and in no distress   HEENT  NC/ AT   NECK  Supple and no bruits heard   MENTAL STATUS:  Alert, oriented, intact memory, no confusion, normal speech, normal language, no hallucination or delusion   CRANIAL NERVES: II     -      PERRLA, Fundi reveal intact venous pulsations; Visual fields intact to confrontation  III,IV,VI -  EOMs full, no afferent defect, no CABRERA, no ptosis  V     -     Normal facial sensation  VII    -     Normal facial symmetry  VIII   -     Intact hearing  IX,X -     Symmetrical palate  XI    -     Symmetrical shoulder shrug  XII   -     Midline tongue, no atrophy    MOTOR FUNCTION:  significant for good strength of grade 5/5 in bilateral proximal and distal muscle groups of both upper and lower extremities with normal bulk, normal tone and no involuntary movements, no tremor Spurling's -ve; Tinel's -ve; Phalen's maneuver equivocal   SENSORY FUNCTION:  Normal touch, normal pin, normal vibration, normal proprioception   CEREBELLAR FUNCTION:  Intact fine motor control over upper limbs   REFLEX FUNCTION:  Symmetric 2+ in UE and 1+ at both knees and hypoactive DTRs at both ankles; no perverted reflex, no Babinski sign   STATION and GAIT  Normal station, normal gait; could not do tandem gait     B12 (6/24/2020): 430  Folate (6/24/2020): 19.0  TSH (6/24/2020): 1.39  EDUARDO (6/23/2020): Negative  Hemoglobin A1c (6/24/2020): 5.6        Impression and Plan: Ms. Daniel Harrington is a 54 y.o. 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 Will get rheumatoid factor, serum protein electrophoresis and EMG.   During the interim; she will continue duloxetine and pregabalin as above.  Follow-up in the clinic once the above testing is done. Please note that portions of this note were completed with a voice recognition program.  Although every effort was made to ensure the accuracy of this automated transcription, some errors in transcription may have occurred; occasionally words are mis-transcribed. Thank you for understanding.

## 2020-10-27 ENCOUNTER — HOSPITAL ENCOUNTER (OUTPATIENT)
Age: 55
Discharge: HOME | End: 2020-10-27
Payer: COMMERCIAL

## 2020-10-27 PROCEDURE — 84165 PROTEIN E-PHORESIS SERUM: CPT

## 2020-10-27 PROCEDURE — 84155 ASSAY OF PROTEIN SERUM: CPT

## 2020-10-27 PROCEDURE — 36415 COLL VENOUS BLD VENIPUNCTURE: CPT

## 2020-10-28 ENCOUNTER — OFFICE VISIT (OUTPATIENT)
Dept: NEUROLOGY | Age: 55
End: 2020-10-28
Payer: COMMERCIAL

## 2020-10-28 PROCEDURE — 95910 NRV CNDJ TEST 7-8 STUDIES: CPT | Performed by: PSYCHIATRY & NEUROLOGY

## 2020-10-28 PROCEDURE — 95886 MUSC TEST DONE W/N TEST COMP: CPT | Performed by: PSYCHIATRY & NEUROLOGY

## 2020-10-28 NOTE — PROGRESS NOTES
[facility] [address] [place] [address]         Patient: Esther Chacko [place] Address:   [address]  Sex: Female City: [place]  Age: 54 State: [place]  Height: 62 inches ZIP: [id number]  Weight: 260 lbs Phone: [phone]  I. D.#: [id number] Physician: Bard Claudia MD  Ref. M.D.: Cordelia Higgins APRN-CNP Test Date: 10/28/20         History/Comments: This is a 78-year-old  female with complaints of numbness and tingling involving bilateral lower extremities with progressive severity for the past 5 years. She has had back pain at times radiating down lower extremities. She denied bladder and bowel incontinence. She also has associated cramps in calves and also admits to having cold feet. Temperature is measured in both feet and it was 97.2 °F  On exam: She has intact light touch and pinprick in bilateral lower extremities with hyporeflexia at both knees and diminished at ankles. She has wide-based gait. Motor Nerve Study    Left Peroneal Nerve  Rec Site:   EDB Lat (ms) Norm Lat Amp (mV) Norm Amp Dist (mm) C.V. (m/s) Norm C.V.  STIM SITE        Ankle 4.6  <6.5  4.0  >2  90       Fib. Head 9.3    3.7    224  47.2  >40     Tibial Nerve  Rec Site:   AH Lat (ms) Norm Lat Amp (mV) Norm Amp Dist (mm) C.V. (m/s) Norm C.V.  STIM SITE L R   L R   L R L R    Ankle 4.8 5.8 <5.8  4.6 4.3 >4  90 90      Pop. Fos. 12.1 11.3   2.8 3.1   300 304 41.4 55.3 >40       Sensory Nerve Study    Sural Nerve  Rec Site:   Ankle Norm Lat Pk Lat (ms)Amp (uV) Norm Amp Dist (mm) C.V. (m/s) Norm C.V.  STIM SITE   L R L R   L R L R    mid calf <4.4  3.1 3.6 5.3 5.6 >6  140 140 57.1 48.0 >40       Left S.  Peroneal  Nerve  Rec Site:   lateral Ankl Norm Lat Pk Lat (ms)Amp (uV) Norm Amp Dist (mm) C.V. (m/s) Norm C.V.  STIM SITE        lateral calf <4.4  3.5  10.3  >6  120  47.7  >40         F-Wave Study    Left Peroneal Nerve  Rec Site:   EDB Latency Normal Amplitude  Stim Site: Ankle ms Latency mV  M wave 6.1    3.9  F wave 45.3  <56  0.4  F-M 39.1        H Reflex Study    Tibial Nerve  Rec Site: Soleus Latency Normal Amplitude  Stim Site: Pop. Fos. ms Latency mV   L R   L R  M wave 6.6 7.8   5.4 5.4  H wave 30.0 27.1 <34  0.7 2.3        EMG Study    Name   Ins Act Fibs PSW Fascics Polyph MU Amp MU Dur Recruit   L. Gastroc. Med Normal norm none none none none norm norm norm  L. Abd. Hallus Normal norm none none none none norm norm norm  L. Abd. Dig.Q. Normal norm none none none none norm norm norm  L. Ext. Dig. Br. Normal norm none none none none norm norm norm  L. Tibialis An Normal norm none none none none norm norm norm  L. Peron Long. Normal norm none none none none norm norm norm  L. Rect. Fem. Normal norm none none none none norm norm norm  L. Vastus Lat. Normal norm none none none none norm norm norm  L. LumbPSPup. Normal norm none none none none     L. LumbPSPlow. Normal norm none none none none       R. Gastroc. Med Normal norm none none none none norm norm norm  R. Abd. Hallus Normal norm none none none none norm norm norm  R. Abd. Dig.Q. Normal norm none none none none norm norm norm  R. Ext. Dig. Br. Normal norm none none none none norm norm norm  R. Tibialis An Normal norm none none none none norm norm norm  R. Peron Long. Normal norm none none none none norm norm norm  R. Rect. Fem. Normal norm none none none none norm norm norm  R. Vastus Lat. Normal norm none none none none norm norm norm  R. LumbPSPup. Normal norm none none none none     R. LumbPSPlow. Normal norm none none none none             Summary of electrodiagnostic data:  1. Bilateral sural sensory potentials demonstrated normal peak latency with slightly reduced amplitude and conduction velocity. 2. Left superficial radial sensory potential demonstrated normal peak latency, amplitude and conduction velocity. 3. Left peroneal motor potential demonstrated normal distal latency, amplitude and conduction velocity.   4. Left peroneal F wave latency is within normal limits. 5. Left tibial motor potential demonstrated normal distal latency, amplitude and conduction velocity. 6. Right tibial motor potential demonstrated normal distal latency, amplitude and conduction velocity. 7. Bilateral tibial H-reflex latencies are within normal limits. 8. Needle recording using monopolar needle was performed in a sampling of L2-L3 to L5-S1 innervated limb and paraspinal muscles in bilateral lower extremities. 9. Needle study of left lower extremity demonstrated normal-appearing motor units without any abnormal spontaneous activity in the muscles tested as stated above. 10. Needle study of right lower extremity demonstrated normal-appearing motor units without any abnormal spontaneous activity in the muscles tested as stated above. Electrodiagnostic interpretation:  · There is electrophysiologic evidence of mild sensory neuropathy affecting bilateral sural sensory potentials. · This study did not demonstrate any electrodiagnostic evidence of lumbosacral radiculopathy or lumbosacral plexopathy in the nerves and muscles tested. Clinical correlation is recommended. Please feel free to contact me should you have any questions or concerns regarding results of any of the above studies. Again, thank you for referring this patient and for allowing me to participate in the care of your patient. With sincere appreciation,      Astrid Alcantar M.D. Dictated, but not edited  Board Certified Neurologist      LEGEND :-  *EMG: electromyography *NCS: nerve conduction study*EHL : extensor hallucis longus   *Med. Gastroc : medial head of the gastrocnemius*Abd.  [place]. : abductor hallucis brevis*ADQ : abductor digiti quinti*Tib. ant. : tibialis anterior  *Tib. post. : tibialis posterior*abn : abnormal   *fibs : fibrillations  *FDI: first  dorsal interosseous*ADM: abductor digiti minimi*APB: abductor pollicis brevis *FCU: flexor corpi ulnaris*EIP: extensor indicis proprius*EDC: extensor digitorum communis *ECR: extensor carpi radialis  *PSW : positive sharp waves  *Fascics : fasciculations  *MUAP : motor unit action potential*CTS : carpal tunnel syndrome *SNAP : sensory nerve action potential  *CMAP : compound muscle action potential

## 2020-10-29 LAB
ALBUMIN (CALCULATED): 4.4 G/DL (ref 3.2–5.2)
ALBUMIN PERCENT: 64 % (ref 45–65)
ALPHA 1 PERCENT: 2 % (ref 3–6)
ALPHA 2 PERCENT: 10 % (ref 6–13)
ALPHA-1-GLOBULIN: 0.1 G/DL (ref 0.1–0.4)
ALPHA-2-GLOBULIN: 0.7 G/DL (ref 0.5–0.9)
BETA GLOBULIN: 0.7 G/DL (ref 0.5–1.1)
BETA PERCENT: 11 % (ref 11–19)
GAMMA GLOBULIN %: 13 % (ref 9–20)
GAMMA GLOBULIN: 0.9 G/DL (ref 0.5–1.5)
PATHOLOGIST: ABNORMAL
PROTEIN ELECTROPHORESIS, SERUM: ABNORMAL
TOTAL PROT. SUM,%: 100 % (ref 98–102)
TOTAL PROT. SUM: 6.8 G/DL (ref 6.3–8.2)
TOTAL PROTEIN: 6.8 G/DL (ref 6.4–8.3)

## 2020-11-06 ENCOUNTER — HOSPITAL ENCOUNTER (OUTPATIENT)
Dept: ULTRASOUND IMAGING | Age: 55
Discharge: HOME | End: 2020-11-08
Payer: COMMERCIAL

## 2020-11-06 PROCEDURE — 76536 US EXAM OF HEAD AND NECK: CPT

## 2020-11-30 ENCOUNTER — OFFICE VISIT (OUTPATIENT)
Dept: FAMILY MEDICINE CLINIC | Age: 55
End: 2020-11-30
Payer: COMMERCIAL

## 2020-11-30 VITALS
OXYGEN SATURATION: 97 % | DIASTOLIC BLOOD PRESSURE: 80 MMHG | BODY MASS INDEX: 47.55 KG/M2 | WEIGHT: 260 LBS | SYSTOLIC BLOOD PRESSURE: 122 MMHG | HEART RATE: 94 BPM

## 2020-11-30 PROCEDURE — 99214 OFFICE O/P EST MOD 30 MIN: CPT | Performed by: NURSE PRACTITIONER

## 2020-11-30 RX ORDER — SPIRONOLACTONE 25 MG/1
25 TABLET ORAL DAILY
Qty: 90 TABLET | Refills: 1 | Status: SHIPPED | OUTPATIENT
Start: 2020-11-30 | End: 2021-01-25 | Stop reason: SDUPTHER

## 2020-11-30 RX ORDER — OMEPRAZOLE 20 MG/1
CAPSULE, DELAYED RELEASE ORAL
Qty: 90 CAPSULE | Refills: 1 | Status: SHIPPED | OUTPATIENT
Start: 2020-11-30 | End: 2021-01-25 | Stop reason: SDUPTHER

## 2020-11-30 RX ORDER — METOPROLOL TARTRATE 50 MG/1
50 TABLET, FILM COATED ORAL 2 TIMES DAILY
Qty: 180 TABLET | Refills: 1 | Status: SHIPPED | OUTPATIENT
Start: 2020-11-30 | End: 2021-06-07 | Stop reason: SDUPTHER

## 2020-11-30 RX ORDER — MONTELUKAST SODIUM 10 MG/1
10 TABLET ORAL NIGHTLY
Qty: 90 TABLET | Refills: 1 | Status: SHIPPED | OUTPATIENT
Start: 2020-11-30 | End: 2021-06-07 | Stop reason: SDUPTHER

## 2020-11-30 NOTE — PROGRESS NOTES
2020     Loretta Mancuso (:  1965) is a 54 y.o. female, here for evaluation of the following medical concerns:    HPI     Pt with peripheral polyneuropathy in both legs, hx lumbar disc disease. Elevated CPK chronically. Awaiting final EMG on arm. Seeing Dr. Sabas Dolan on saxenda for weight loss. Hypertension:  Home blood pressure monitoring: No.  She is adherent to a low sodium diet. Patient denies chest pain, shortness of breath, headache, lightheadedness, blurred vision, peripheral edema and palpitations. Antihypertensive medication side effects: no medication side effects noted. Use of agents associated with hypertension: none. Sodium (mmol/L)   Date Value   2020 138    BUN (mg/dL)   Date Value   2020 12    Glucose (mg/dL)   Date Value   2020 107 (H)   2012 95      Potassium (mmol/L)   Date Value   2020 4.0    CREATININE (mg/dL)   Date Value   2020 0.59           Further evaluation for myositis. Review of Systems   Constitutional: Negative for chills. HENT: Negative for congestion, postnasal drip and rhinorrhea. Eyes: Negative. Respiratory: Negative for cough. Cardiovascular: Negative for chest pain and leg swelling. Gastrointestinal: Negative for abdominal distention. Endocrine: Negative for cold intolerance and heat intolerance. Genitourinary: Negative for difficulty urinating. Musculoskeletal: Positive for back pain, gait problem and myalgias. Negative for arthralgias. Skin: Negative for rash. Neurological: Negative for dizziness and headaches. Psychiatric/Behavioral: Negative for sleep disturbance. Prior to Visit Medications    Medication Sig Taking?  Authorizing Provider   liraglutide-weight management 18 MG/3ML SOPN Inject into the skin Yes Historical Provider, MD   montelukast (SINGULAIR) 10 MG tablet Take 1 tablet by mouth nightly Yes Levi Ortiz, APRN - CNP omeprazole (PRILOSEC) 20 MG delayed release capsule TAKE 1 CAPSULE DAILY in am on empty stomach Yes ELIGIO Padron CNP   spironolactone (ALDACTONE) 25 MG tablet Take 1 tablet by mouth daily Yes ELIGIO Padron CNP   metoprolol tartrate (LOPRESSOR) 50 MG tablet Take 1 tablet by mouth 2 times daily Yes ELIGIO Padron CNP   Cholecalciferol (VITAMIN D3) 50 MCG (2000 UT) CAPS Take by mouth Yes Historical Provider, MD   rOPINIRole (REQUIP) 1 MG tablet TAKE 1 TABLET NIGHTLY FOR RESTLESS LEGS Yes ELIGIO Padron CNP   naproxen (NAPROSYN) 375 MG tablet TAKE 1 TABLET TWICE A DAY WITH MEALS Yes ELIGIO Padron CNP   pregabalin (LYRICA) 225 MG capsule Take 225 mg by mouth 2 times daily. Yes Historical Provider, MD   DULoxetine (CYMBALTA) 30 MG extended release capsule Take 30 mg by mouth daily Yes Historical Provider, MD   ezetimibe (ZETIA) 10 MG tablet TAKE 1 TABLET DAILY Yes ELIGIO Padron CNP   doxazosin (CARDURA) 1 MG tablet TAKE 1 TABLET DAILY AT BEDTIME Yes ELIGIO Padron CNP   Acetaminophen (TYLENOL PO) Take 500 mg by mouth every 6 hours as needed  Yes Historical Provider, MD   aspirin 81 MG EC tablet Take 81 mg by mouth daily  Yes Historical Provider, MD   rosuvastatin (CRESTOR) 10 MG tablet TAKE 1 TABLET NIGHTLY  Patient not taking: Reported on 11/30/2020  ELIGIO Padron CNP        Social History     Tobacco Use    Smoking status: Never Smoker    Smokeless tobacco: Never Used   Substance Use Topics    Alcohol use: No        Vitals:    11/30/20 1731   BP: 122/80   Site: Right Upper Arm   Position: Sitting   Cuff Size: Large Adult   Pulse: 94   SpO2: 97%   Weight: 260 lb (117.9 kg)     Estimated body mass index is 47.55 kg/m² as calculated from the following:    Height as of 10/23/20: 5' 2\" (1.575 m). Weight as of this encounter: 260 lb (117.9 kg). Physical Exam  Vitals signs and nursing note reviewed.    Constitutional:       General: She is not in acute distress. Appearance: Normal appearance. She is well-developed. HENT:      Head: Normocephalic and atraumatic. Right Ear: Tympanic membrane and external ear normal.      Left Ear: Tympanic membrane and external ear normal.      Nose: No congestion. Mouth/Throat:      Mouth: Mucous membranes are moist.   Eyes:      General: No scleral icterus. Pupils: Pupils are equal, round, and reactive to light. Neck:      Musculoskeletal: Normal range of motion and neck supple. Cardiovascular:      Rate and Rhythm: Normal rate and regular rhythm. Heart sounds: Normal heart sounds. No murmur. Pulmonary:      Effort: Pulmonary effort is normal. No respiratory distress. Breath sounds: Normal breath sounds. No wheezing. Abdominal:      Palpations: Abdomen is soft. Tenderness: There is no abdominal tenderness. Musculoskeletal: Normal range of motion. Right lower leg: No edema. Left lower leg: No edema. Lymphadenopathy:      Cervical: No cervical adenopathy. Skin:     General: Skin is warm and dry. Neurological:      Mental Status: She is alert and oriented to person, place, and time. Psychiatric:         Behavior: Behavior normal.         Thought Content: Thought content normal.         Judgment: Judgment normal.         ASSESSMENT/PLAN:  1. Essential hypertension  Stable and controlled  - montelukast (SINGULAIR) 10 MG tablet; Take 1 tablet by mouth nightly  Dispense: 90 tablet; Refill: 1  - spironolactone (ALDACTONE) 25 MG tablet; Take 1 tablet by mouth daily  Dispense: 90 tablet; Refill: 1  - metoprolol tartrate (LOPRESSOR) 50 MG tablet; Take 1 tablet by mouth 2 times daily  Dispense: 180 tablet; Refill: 1    2. Gastroesophageal reflux disease without esophagitis  Well controlled on PPI  - omeprazole (PRILOSEC) 20 MG delayed release capsule; TAKE 1 CAPSULE DAILY in am on empty stomach  Dispense: 90 capsule; Refill: 1    3.  Mixed hyperlipidemia  Stopped statin with chronic CPK elevation. Further eval of muscle disease needed. 4. NEIL (obstructive sleep apnea)  On cpap compliant with use    5. Restless legs  On requip    6. Class 3 severe obesity due to excess calories without serious comorbidity with body mass index (BMI) of 45.0 to 49.9 in adult (HCC)      7. Elevated CPK  Currently eval with Dr. Oumou Schwartz     8. Multinodular thyroid  Close monitoring      Return in about 6 months (around 5/30/2021) for physical-insurance. An electronic signature was used to authenticate this note.     --ELIGIO Aguilera - CNP on 12/1/2020 at 11:32 PM

## 2020-11-30 NOTE — PATIENT INSTRUCTIONS
You may be receiving a survey from [employer] regarding your visit today. You may get this in the mail, through your MyChart or in your email. Please complete the survey to enable us to provide the highest quality of care to you and your family. If you cannot score us as very good ( 5 Stars) on any question, please feel free to call the office to discuss how we could have made your experience exceptional.     Thank You! ELIGIO Conn-CNP Conchita Hammans, RMA    ALAWAY ophthalmic drop 1 drop in affected eye daily   For 3-5 days. Naphcon -A is similar.

## 2020-12-01 ASSESSMENT — ENCOUNTER SYMPTOMS
COUGH: 0
ABDOMINAL DISTENTION: 0
RHINORRHEA: 0
EYES NEGATIVE: 1
BACK PAIN: 1

## 2020-12-09 NOTE — TELEPHONE ENCOUNTER
Pt called requesting refills. Medications pending.       Health Maintenance   Topic Date Due    Hepatitis C screen  1965    HIV screen  02/28/1980    DTaP/Tdap/Td vaccine (1 - Tdap) 02/28/1984    Shingles Vaccine (1 of 2) 02/28/2015    Cervical cancer screen  11/25/2016    Flu vaccine (1) 09/01/2020    Lipid screen  06/24/2021    Potassium monitoring  06/24/2021    Creatinine monitoring  06/24/2021    Colon cancer screen colonoscopy  10/21/2021    Breast cancer screen  06/17/2022    Diabetes screen  06/24/2023    Hepatitis A vaccine  Aged Out    Hepatitis B vaccine  Aged Out    Hib vaccine  Aged Out    Meningococcal (ACWY) vaccine  Aged Out    Pneumococcal 0-64 years Vaccine  Aged Out             (applicable per patient's age: Cancer Screenings, Depression Screening, Fall Risk Screening, Immunizations)    Hemoglobin A1C (%)   Date Value   06/24/2020 5.6   04/23/2019 5.4   10/18/2017 5.3     LDL Cholesterol (mg/dL)   Date Value   06/24/2020 90     LDL Calculated (mg/dL)   Date Value   05/03/2018 158     AST (U/L)   Date Value   06/24/2020 22     ALT (U/L)   Date Value   06/24/2020 28     BUN (mg/dL)   Date Value   06/24/2020 12      (goal A1C is < 7)   (goal LDL is <100) need 30-50% reduction from baseline     BP Readings from Last 3 Encounters:   11/30/20 122/80   10/23/20 124/80   10/06/20 112/75    (goal /80)      All Future Testing planned in [facility]:  Lab Frequency Next Occurrence   XR ABDOMEN (KUB) (SINGLE AP VIEW) Once 07/16/2021   EMG Once 10/30/2020       Next Visit Date:  Future Appointments   Date Time Provider Tez Albina   12/10/2020 10:00 AM Leonardo Elkins MD Neuro Spec [facility]   6/7/2021  6:00 PM ELIGIO aGn - CNP [facility] MED [facility]   7/22/2021  4:00 PM Ketty Duncan MD Alonn Lung [facility]            Patient Active Problem List:     Hyperlipidemia     HTN (hypertension)     History of adenomatous polyp of colon     NEIL (obstructive sleep apnea) Restless legs     Female genuine stress incontinence     Kidney stones     Ureteral stone with hydronephrosis     Renal colic on right side

## 2020-12-10 ENCOUNTER — OFFICE VISIT (OUTPATIENT)
Dept: NEUROLOGY | Age: 55
End: 2020-12-10
Payer: COMMERCIAL

## 2020-12-10 VITALS
HEIGHT: 62 IN | WEIGHT: 260 LBS | TEMPERATURE: 97.2 F | BODY MASS INDEX: 47.84 KG/M2 | DIASTOLIC BLOOD PRESSURE: 82 MMHG | HEART RATE: 76 BPM | SYSTOLIC BLOOD PRESSURE: 130 MMHG

## 2020-12-10 PROCEDURE — 95886 MUSC TEST DONE W/N TEST COMP: CPT | Performed by: PSYCHIATRY & NEUROLOGY

## 2020-12-10 PROCEDURE — 95911 NRV CNDJ TEST 9-10 STUDIES: CPT | Performed by: PSYCHIATRY & NEUROLOGY

## 2020-12-10 NOTE — PROGRESS NOTES
[facility] [address] [place] [address], [address] [place] [address]         Patient: [place] Address: [place] [address]   Sex: Female City: [facility]  Age: 54 State: [place]  Height: 62 inches ZIP: [id number]  Weight: 260 lbs Phone: [phone]  I. D.#: [id number] Physician: Marek Romo MD.   Ref. M.D.: Rocío Robles MD Test Date: 12/10/20         History/Comments: This is a 59-year-old right-handed  female with c/o numbness and tingling along with pain and paresthesias involving both hands for the past 5 years. She also has been having nocturnal awakenings with this symptomatology. Her pain gets aggravated with certain positions such as handwriting and driving. She gets some relief of shaking her hand. Temperature is measured and it was 97.2 °F.    Motor Nerve Study    Median Nerve  Rec Site:   APB Lat (ms) Norm Lat Amp (mV) Norm Amp Dist (mm) C.V. (m/s) Norm C.V.  STIM SITE L R   L R   L R L R    Wrist 8.0 10.2 <4.4  7.3 6.8 >4  70 79      Elbow 11.0 13.7   6.6 6.7   192 204 64.0 58.3 >50     Ulnar Nerve  Rec Site:   ADM Lat (ms) Norm Lat Amp (mV) Norm Amp Dist (mm) C.V. (m/s) Norm C.V.  STIM SITE L R   L R   L R L R    Wrist 3.0 3.0 <3.3  9.4 9.8 >6  70 70      B. Elbow 6.6 6.3   7.8 9.6   214 210 59.7 63.0 >50   A. Elbow 7.9 7.7   7.4 9.3   100 100 75.0 75.0          Sensory Nerve Study    Median Nerve  Rec Site:   2nd digit Norm Lat Pk Lat (ms)Amp (uV) Norm Amp Dist (mm) C.V. (m/s) Norm C.V.  STIM SITE   L R L R   L R L R    Wrist <3.5  2.9 5.1 3.6 3.0 >20  130 130 65.5 32.0 >50   NOTES: Left side:NR        Ulnar Nerve  Rec Site:   5th dig Norm Lat Pk Lat (ms)Amp (uV) Norm Amp Dist (mm) C.V. (m/s) Norm C.V.  STIM SITE   L R L R   L R L R    Wrist <3.1  2.8 2.7 18.3 17.0 >17  110 110 55.0 57.9 >50     S.  Radial Nerve  Rec Site:   snuff box Norm Lat Pk Lat (ms)Amp (uV) Norm Amp Dist (mm) C.V. (m/s) Norm C.V.  STIM SITE   L R L R   L R L R    Forearm <2.9  2.3 2.7 32.0 17.3 >15  100 100 65.9 58.8 >50         F-Wave Study    Right Median Nerve  Rec Site:   APB Latency Normal Amplitude  Stim Site: Wrist ms Latency mV  M wave 8.9    15.3  F wave 34.3  <31  0.9  F-M 25.4         EMG Study    Name   Ins Act Fibs PSW Fascics Polyph MU Amp MU Dur Recruit TEXT  R. FDI Normal norm none none none none norm norm norm  R. Abd. Fransisco.Br. abn inc sm none none none norm norm norm  R. Abd. Dig. Mn. Normal norm none none none none norm norm norm  R. FDI Normal norm none none none none norm norm norm  R. Abd. Dig. Mn. Normal norm none none none none norm norm norm  R. Brachioradi Normal norm none none none none norm norm norm  R. Triceps Normal norm none none none none norm norm norm  R. Biceps Brac Normal norm none none none none norm norm norm  R. FDI Normal norm none none none none norm norm norm  R. Cerv PSPup. Normal norm none none none none     R. CervPSPlow. Normal norm none none none none       L. FDI Normal norm none none none none norm norm norm  L. Abd. Fransisco.Br. Normal norm none none none none norm norm norm  L. Abd. Dig. Mn. Normal norm none none none none norm norm norm  L. FDI Normal norm none none none none norm norm norm  L. Abd. Dig. Mn. Normal norm none none none none norm norm norm  L. Brachioradi Normal norm none none none none norm norm norm  L. Triceps Normal norm none none none none norm norm norm  L. Biceps Brac Normal norm none none none none norm norm norm  L. Cerv PSPup. Normal norm none none none none     L. CervPSPlow. Normal norm none none none none         Summary/ Electrodiagnostic Interpretation:     Summary of electrodiagnostic data:  1. Bilateral superficial radial sensory potentials demonstrated normal peak latency, amplitude and conduction velocity. 2. Right median sensory potential demonstrated significantly prolonged peak latency with reduced amplitude and reduced conduction velocity.   3. Right ulnar sensory potential demonstrated normal peak latency, amplitude and conduction velocity. 4. Right median motor potential demonstrated prolonged distal latency with normal amplitude and conduction velocity. 5. Right median F wave latency is significantly prolonged. 6. Right ulnar motor potential demonstrated normal distal latency, amplitude and conduction velocity. There is no velocity drop across the elbow segment. 7. Left median sensory potential demonstrated significantly prolonged peak latency with reduced amplitude and reduced conduction velocity. 8. Left ulnar sensory potential demonstrated normal peak latency, amplitude and conduction velocity. 9. Left median motor potential demonstrated prolonged distal latency with normal amplitude and conduction velocity. 10. Left ulnar motor potential demonstrated normal distal latency, amplitude and conduction velocity. There is no velocity drop across the elbow segment. 11. Needle recording using monopolar needle was performed in a sampling of C5-T1 innervated limb and paraspinal muscles in bilateral upper extremities. 12. Needle study of right upper extremity demonstrated increased insertional activity with small fibrillations in right APB muscle. Rest of the muscles demonstrated normal-appearing motor units without any abnormal spontaneous activity in the muscles tested as stated above. 13. Needle study of left upper extremity demonstrated normal-appearing motor units without any abnormal spontaneous activity in the muscles tested as stated above. Electrodiagnostic interpretation:   There is electrophysiologic evidence of median neuropathy at both wrists suggestive of bilateral carpal tunnel syndrome of moderately severe intensity (right severe than left).  The study did not demonstrate any electrodiagnostic evidence of ulnar neuropathy, radial neuropathy, brachial plexopathy or cervical radiculopathy in the nerves and muscles tested.   Clinical correlation is recommended. Please feel free to contact me should you have any questions or concerns regarding results of any of the above studies. Again, thank you for referring this patient and for allowing me to participate in the care of your patient. With sincere appreciation,      Judith Schultz M.D. Dictated, but not edited  Board Certified Neurologist      LEGEND :-  *EMG: electromyography *NCS: nerve conduction study*EHL : extensor hallucis longus *Med. Gastroc : medial head of the gastrocnemius*Abd.  [place]. : abductor hallucis brevis*ADQ : abductor digiti quinti*Tib. ant. : tibialis anterior  *Tib. post. : tibialis posterior*abn : abnormal *fibs : fibrillations  *FDI: first  dorsal interosseous*ADM: abductor digiti minimi*APB: abductor pollicis brevis *FCU: flexor corpi ulnaris*EIP: extensor indicis proprius*EDC: extensor digitorum communis *ECR: extensor carpi radialis  *PSW : positive sharp waves  *Fascics : fasciculations *MUAP : motor unit action potential*CTS : carpal tunnel syndrome *SNAP : sensory nerve action potential  *CMAP : compound muscle action potential

## 2020-12-11 RX ORDER — DULOXETIN HYDROCHLORIDE 30 MG/1
30 CAPSULE, DELAYED RELEASE ORAL DAILY
Qty: 30 CAPSULE | Refills: 1 | Status: SHIPPED | OUTPATIENT
Start: 2020-12-11 | End: 2021-02-11 | Stop reason: SDUPTHER

## 2020-12-11 RX ORDER — PREGABALIN 225 MG/1
225 CAPSULE ORAL 2 TIMES DAILY
Qty: 60 CAPSULE | Refills: 1 | Status: SHIPPED | OUTPATIENT
Start: 2020-12-11 | End: 2021-02-11 | Stop reason: SDUPTHER

## 2020-12-11 NOTE — TELEPHONE ENCOUNTER
oarrs reviewed, pt discussed with me no longer seeing pain management, following with neurology in [facility] plans referral to [address] [place] [address] musculoskeletal center.

## 2021-01-18 ENCOUNTER — TELEPHONE (OUTPATIENT)
Dept: FAMILY MEDICINE CLINIC | Age: 56
End: 2021-01-18

## 2021-01-18 DIAGNOSIS — J06.9 VIRAL URI: Primary | ICD-10-CM

## 2021-01-18 NOTE — TELEPHONE ENCOUNTER
Loretta called back and did notice within the last hour that she is not able to smell anything. She will also need a doctors note. Can she get a call back?

## 2021-01-18 NOTE — TELEPHONE ENCOUNTER
Loretta was sent home from work because she got sick at work. She said yesterday she did vomit, had a headache and was dizzy. No fever that she knew of. Can she get tested for COVID?     Health Maintenance   Topic Date Due    Hepatitis C screen  1965    HIV screen  02/28/1980    DTaP/Tdap/Td vaccine (1 - Tdap) 02/28/1984    Shingles Vaccine (1 of 2) 02/28/2015    Cervical cancer screen  11/25/2016    Flu vaccine (1) 09/01/2020    Lipid screen  06/24/2021    Potassium monitoring  06/24/2021    Creatinine monitoring  06/24/2021    Colon cancer screen colonoscopy  10/21/2021    Breast cancer screen  06/17/2022    Diabetes screen  06/24/2023    Hepatitis A vaccine  Aged Out    Hepatitis B vaccine  Aged Out    Hib vaccine  Aged Out    Meningococcal (ACWY) vaccine  Aged Out    Pneumococcal 0-64 years Vaccine  Aged Out             (applicable per patient's age: Cancer Screenings, Depression Screening, Fall Risk Screening, Immunizations)    Hemoglobin A1C (%)   Date Value   06/24/2020 5.6   04/23/2019 5.4   10/18/2017 5.3     LDL Cholesterol (mg/dL)   Date Value   06/24/2020 90     LDL Calculated (mg/dL)   Date Value   05/03/2018 158     AST (U/L)   Date Value   06/24/2020 22     ALT (U/L)   Date Value   06/24/2020 28     BUN (mg/dL)   Date Value   06/24/2020 12      (goal A1C is < 7)   (goal LDL is <100) need 30-50% reduction from baseline     BP Readings from Last 3 Encounters:   12/10/20 130/82   11/30/20 122/80   10/23/20 124/80    (goal /80)      All Future Testing planned in [facility]:  Lab Frequency Next Occurrence   XR ABDOMEN (KUB) (SINGLE AP VIEW) Once 07/16/2021   EMG Once 10/30/2020       Next Visit Date:  Future Appointments   Date Time Provider Tez Montemayor   3/11/2021  4:40 PM Teddy Rosario MD Neuro Spec [facility]   6/7/2021  6:00 PM ELIGIO Manriquez - CNP [facility]   7/22/2021  4:00 PM MD Marilou Solorio [facility]            Patient Active Problem

## 2021-01-19 ENCOUNTER — VIRTUAL VISIT (OUTPATIENT)
Dept: PRIMARY CARE CLINIC | Age: 56
End: 2021-01-19
Payer: COMMERCIAL

## 2021-01-19 ENCOUNTER — HOSPITAL ENCOUNTER (OUTPATIENT)
Dept: PREADMISSION TESTING | Age: 56
Setting detail: SPECIMEN
Discharge: HOME | End: 2021-01-19
Payer: COMMERCIAL

## 2021-01-19 DIAGNOSIS — R43.2 LOSS OF TASTE: ICD-10-CM

## 2021-01-19 DIAGNOSIS — J06.9 VIRAL UPPER RESPIRATORY TRACT INFECTION: Primary | ICD-10-CM

## 2021-01-19 PROCEDURE — U0003 INFECTIOUS AGENT DETECTION BY NUCLEIC ACID (DNA OR RNA); SEVERE ACUTE RESPIRATORY SYNDROME CORONAVIRUS 2 (SARS-COV-2) (CORONAVIRUS DISEASE [COVID-19]), AMPLIFIED PROBE TECHNIQUE, MAKING USE OF HIGH THROUGHPUT TECHNOLOGIES AS DESCRIBED BY CMS-2020-01-R: HCPCS

## 2021-01-19 PROCEDURE — C9803 HOPD COVID-19 SPEC COLLECT: HCPCS

## 2021-01-19 PROCEDURE — 99442 PR PHYS/QHP TELEPHONE EVALUATION 11-20 MIN: CPT | Performed by: NURSE PRACTITIONER

## 2021-01-19 NOTE — PATIENT INSTRUCTIONS
You may be receiving a survey from [employer] regarding your visit today. You may get this in the mail, through your MyChart or in your email. Please complete the survey to enable us to provide the highest quality of care to you and your family. If you cannot score us as very good ( 5 Stars) on any question, please feel free to call the office to discuss how we could have made your experience exceptional.     Thank You! ELIGIO Nair-CNP  [address]  [place], [place]

## 2021-01-19 NOTE — PROGRESS NOTES
within the next 24 hours.     Patient identification was verified at the start of the visit: Yes    Total Time: minutes: 11-20 minutes    Note: not billable if this call serves to triage the patient into an appointment for the relevant concern      Danisha DEL VALLE CNP

## 2021-01-21 LAB — SARS-COV-2, NAA: DETECTED

## 2021-01-22 ENCOUNTER — VIRTUAL VISIT (OUTPATIENT)
Dept: PRIMARY CARE CLINIC | Age: 56
End: 2021-01-22
Payer: COMMERCIAL

## 2021-01-22 DIAGNOSIS — I10 ESSENTIAL HYPERTENSION: ICD-10-CM

## 2021-01-22 DIAGNOSIS — R43.2 LOSS OF TASTE: Primary | ICD-10-CM

## 2021-01-22 PROCEDURE — 99442 PR PHYS/QHP TELEPHONE EVALUATION 11-20 MIN: CPT | Performed by: NURSE PRACTITIONER

## 2021-01-22 ASSESSMENT — PATIENT HEALTH QUESTIONNAIRE - PHQ9
2. FEELING DOWN, DEPRESSED OR HOPELESS: 0
SUM OF ALL RESPONSES TO PHQ QUESTIONS 1-9: 0
SUM OF ALL RESPONSES TO PHQ QUESTIONS 1-9: 0
1. LITTLE INTEREST OR PLEASURE IN DOING THINGS: 0

## 2021-01-22 NOTE — PATIENT INSTRUCTIONS
You may be receiving a survey from [employer] regarding your visit today. You may get this in the mail, through your MyChart or in your email. Please complete the survey to enable us to provide the highest quality of care to you and your family. If you cannot score us as very good ( 5 Stars) on any question, please feel free to call the office to discuss how we could have made your experience exceptional.     Thank You! ELIGIO De Los Santos-CNP  [address]  [place], [place]

## 2021-01-22 NOTE — PROGRESS NOTES
Kunal Hsieh is a 54 y.o. female evaluated via telephone on 1/22/2021. Consent:  She and/or health care decision maker is aware that that she may receive a bill for this telephone service, depending on her insurance coverage, and has provided verbal consent to proceed: Yes      Documentation:  I communicated with the patient and/or health care decision maker about covid 19 disease    Covid-19 symptom checklist    Fever   no  Cough  yes, once in awhile   Loss of smell/taste    yes  Severe headache   yes, not severe dull comes and goes  Weakness   no  Diarrhea  yes  Muscle pain   no  Shortness of breath  no  Abdominal pain  no  Rash  no  Sore throat   no  Vomiting  no  Red eye  no  Joint pain  no  Bruising/bleeding  no    Any travel in the last month? no    Have you been in contact with anyone expected/suspected to have or exposed to Covid-19   yes - coworkers were positive. Have you practiced good social distancing, avoiding person-to-person interactions except by telecommunication, and maintained the homebound/stay at home advisement with the current Covid-19 pandemic in [place]?    yes -   . Details of this discussion including any medical advice provided:       Medications encouraged during covid -19 viral infection are:     Tylenol or ibuprofen for pain/fever/body aches  Good hydration  Vitamin C 500 or 1000 mg daily (for immunity boost)  Vitamin D3 2000 international units daily (for muscles including heart)  Zinc 50 mg daily  (mineral to decrease virus uptake in bowels)    Reviewed guidelines with patient for monoclonal antibody infusion and pt does not give consent and has chosen not to receive this treatment. She feels her disease is minor at this time and will take the chance and hope it will not turn into further disease progression or hospitalization. She is aware if shortness of breath occurs or any further difficulty to seek treatment in ER.      Pt high risk factors include: htn, age 54 and BMI 47.55, chronic neuropathic pain,   I affirm this is a Patient Initiated Episode with a Patient who has not had a related appointment within my department in the past 7 days or scheduled within the next 24 hours.     Patient identification was verified at the start of the visit: Yes    Total Time: minutes: 11-20 minutes    Note: not billable if this call serves to triage the patient into an appointment for the relevant concern      Luisito Cortes

## 2021-01-25 DIAGNOSIS — G25.81 RESTLESS LEG: ICD-10-CM

## 2021-01-25 DIAGNOSIS — E78.2 MIXED HYPERLIPIDEMIA: ICD-10-CM

## 2021-01-25 DIAGNOSIS — I10 ESSENTIAL HYPERTENSION: ICD-10-CM

## 2021-01-25 DIAGNOSIS — K21.9 GASTROESOPHAGEAL REFLUX DISEASE WITHOUT ESOPHAGITIS: ICD-10-CM

## 2021-01-25 RX ORDER — SPIRONOLACTONE 25 MG/1
25 TABLET ORAL DAILY
Qty: 90 TABLET | Refills: 1 | Status: SHIPPED | OUTPATIENT
Start: 2021-01-25 | End: 2021-06-07 | Stop reason: SDUPTHER

## 2021-01-25 RX ORDER — ROPINIROLE 1 MG/1
TABLET, FILM COATED ORAL
Qty: 90 TABLET | Refills: 3 | Status: SHIPPED | OUTPATIENT
Start: 2021-01-25 | End: 2021-06-07 | Stop reason: SDUPTHER

## 2021-01-25 RX ORDER — EZETIMIBE 10 MG/1
TABLET ORAL
Qty: 90 TABLET | Refills: 1 | Status: SHIPPED | OUTPATIENT
Start: 2021-01-25 | End: 2021-06-07 | Stop reason: SDUPTHER

## 2021-01-25 RX ORDER — OMEPRAZOLE 20 MG/1
CAPSULE, DELAYED RELEASE ORAL
Qty: 90 CAPSULE | Refills: 1 | Status: SHIPPED | OUTPATIENT
Start: 2021-01-25 | End: 2021-06-07 | Stop reason: SDUPTHER

## 2021-01-25 NOTE — TELEPHONE ENCOUNTER
Last OV: 11/30/2020   Next scheduled apt: 6/7/2021    Medication pending.       Health Maintenance   Topic Date Due    Hepatitis C screen  1965    HIV screen  02/28/1980    DTaP/Tdap/Td vaccine (1 - Tdap) 02/28/1984    Shingles Vaccine (1 of 2) 02/28/2015    Cervical cancer screen  11/25/2016    Flu vaccine (1) 09/01/2020    Lipid screen  06/24/2021    Potassium monitoring  06/24/2021    Creatinine monitoring  06/24/2021    Colon cancer screen colonoscopy  10/21/2021    Breast cancer screen  06/17/2022    Diabetes screen  06/24/2023    Hepatitis A vaccine  Aged Out    Hepatitis B vaccine  Aged Out    Hib vaccine  Aged Out    Meningococcal (ACWY) vaccine  Aged Out    Pneumococcal 0-64 years Vaccine  Aged Out             (applicable per patient's age: Cancer Screenings, Depression Screening, Fall Risk Screening, Immunizations)    Hemoglobin A1C (%)   Date Value   06/24/2020 5.6   04/23/2019 5.4   10/18/2017 5.3     LDL Cholesterol (mg/dL)   Date Value   06/24/2020 90     LDL Calculated (mg/dL)   Date Value   05/03/2018 158     AST (U/L)   Date Value   06/24/2020 22     ALT (U/L)   Date Value   06/24/2020 28     BUN (mg/dL)   Date Value   06/24/2020 12      (goal A1C is < 7)   (goal LDL is <100) need 30-50% reduction from baseline     BP Readings from Last 3 Encounters:   12/10/20 130/82   11/30/20 122/80   10/23/20 124/80    (goal /80)      All Future Testing planned in [facility]:  Lab Frequency Next Occurrence   XR ABDOMEN (KUB) (SINGLE AP VIEW) Once 07/16/2021   EMG Once 10/30/2020   COVID-19 Ambulatory Once 01/18/2021       Next Visit Date:  Future Appointments   Date Time Provider Tez Montemayor   3/11/2021  4:40 PM Shalini Hennessy MD Neuro Spec [facility]   6/7/2021  6:00 PM ELIGIO Lanza - CNP [facility]   7/22/2021  4:00 PM Patt Faith MD [facility]            Patient Active Problem List:     Hyperlipidemia     HTN (hypertension)     History of adenomatous polyp of colon     NEIL (obstructive sleep apnea)     Restless legs     Female genuine stress incontinence     Kidney stones     Ureteral stone with hydronephrosis     Renal colic on right side

## 2021-02-09 DIAGNOSIS — R74.8 ELEVATED CK: ICD-10-CM

## 2021-02-09 DIAGNOSIS — M54.17 LUMBOSACRAL RADICULOPATHY AT L4: ICD-10-CM

## 2021-02-09 DIAGNOSIS — M79.2 NEUROPATHIC PAIN: ICD-10-CM

## 2021-02-09 DIAGNOSIS — M79.7 FIBROMYALGIA: ICD-10-CM

## 2021-02-09 NOTE — TELEPHONE ENCOUNTER
Patient needs refills , pending         Also needs this to both mail order, [facility] for long term if able  and DM - she is almost out so DM sooner     Please advise

## 2021-02-11 RX ORDER — PREGABALIN 225 MG/1
225 CAPSULE ORAL 2 TIMES DAILY
Qty: 180 CAPSULE | Refills: 0 | Status: SHIPPED | OUTPATIENT
Start: 2021-02-11 | End: 2021-06-07 | Stop reason: SDUPTHER

## 2021-02-11 RX ORDER — DULOXETIN HYDROCHLORIDE 30 MG/1
30 CAPSULE, DELAYED RELEASE ORAL DAILY
Qty: 30 CAPSULE | Refills: 1 | Status: SHIPPED | OUTPATIENT
Start: 2021-02-11 | End: 2021-02-11 | Stop reason: SDUPTHER

## 2021-02-11 RX ORDER — PREGABALIN 225 MG/1
225 CAPSULE ORAL 2 TIMES DAILY
Qty: 60 CAPSULE | Refills: 1 | Status: SHIPPED | OUTPATIENT
Start: 2021-02-11 | End: 2021-02-11 | Stop reason: SDUPTHER

## 2021-02-11 RX ORDER — DULOXETIN HYDROCHLORIDE 30 MG/1
30 CAPSULE, DELAYED RELEASE ORAL DAILY
Qty: 90 CAPSULE | Refills: 1 | Status: SHIPPED | OUTPATIENT
Start: 2021-02-11 | End: 2021-06-07 | Stop reason: SDUPTHER

## 2021-04-13 DIAGNOSIS — M17.11 PRIMARY OSTEOARTHRITIS OF RIGHT KNEE: ICD-10-CM

## 2021-04-13 NOTE — TELEPHONE ENCOUNTER
Last OV: 1/22/2021   Next scheduled apt: Visit date not found      Medication pending.       Sure scripts request        Health Maintenance   Topic Date Due    Hepatitis C screen  Never done    HIV screen  Never done    COVID-19 Vaccine (1) Never done    DTaP/Tdap/Td vaccine (1 - Tdap) Never done    Shingles Vaccine (1 of 2) Never done    Cervical cancer screen  11/25/2016    Lipid screen  06/24/2021    Potassium monitoring  06/24/2021    Creatinine monitoring  06/24/2021    Flu vaccine (Season Ended) 09/01/2021    Colon cancer screen colonoscopy  10/21/2021    Breast cancer screen  06/17/2022    Diabetes screen  06/24/2023    Hepatitis A vaccine  Aged Out    Hepatitis B vaccine  Aged Out    Hib vaccine  Aged Out    Meningococcal (ACWY) vaccine  Aged Out    Pneumococcal 0-64 years Vaccine  Aged Out             (applicable per patient's age: Cancer Screenings, Depression Screening, Fall Risk Screening, Immunizations)    Hemoglobin A1C (%)   Date Value   06/24/2020 5.6   04/23/2019 5.4   10/18/2017 5.3     LDL Cholesterol (mg/dL)   Date Value   06/24/2020 90     LDL Calculated (mg/dL)   Date Value   05/03/2018 158     AST (U/L)   Date Value   06/24/2020 22     ALT (U/L)   Date Value   06/24/2020 28     BUN (mg/dL)   Date Value   06/24/2020 12      (goal A1C is < 7)   (goal LDL is <100) need 30-50% reduction from baseline     BP Readings from Last 3 Encounters:   12/10/20 130/82   11/30/20 122/80   10/23/20 124/80    (goal /80)      All Future Testing planned in [facility]:  Lab Frequency Next Occurrence   XR ABDOMEN (KUB) (SINGLE AP VIEW) Once 07/16/2021   EMG Once 10/30/2020   COVID-19 Ambulatory Once 01/18/2021       Next Visit Date:  Future Appointments   Date Time Provider Tez Montemayor   5/18/2021  4:00 PM [facility]   6/7/2021  6:00 PM Misty Bermeo APRN - CNP [facility]   7/22/2021  4:00 PM MD Julian Silva [facility] Patient Active Problem List:     Hyperlipidemia     HTN (hypertension)     History of adenomatous polyp of colon     NEIL (obstructive sleep apnea)     Restless legs     Female genuine stress incontinence     Kidney stones     Ureteral stone with hydronephrosis     Renal colic on right side

## 2021-04-15 RX ORDER — NAPROXEN 375 MG/1
TABLET ORAL
Qty: 180 TABLET | Refills: 1 | Status: SHIPPED | OUTPATIENT
Start: 2021-04-15 | End: 2021-11-16

## 2021-06-07 ENCOUNTER — OFFICE VISIT (OUTPATIENT)
Dept: FAMILY MEDICINE CLINIC | Age: 56
End: 2021-06-07
Payer: COMMERCIAL

## 2021-06-07 VITALS
DIASTOLIC BLOOD PRESSURE: 70 MMHG | SYSTOLIC BLOOD PRESSURE: 120 MMHG | BODY MASS INDEX: 47.55 KG/M2 | HEART RATE: 90 BPM | WEIGHT: 260 LBS | OXYGEN SATURATION: 97 %

## 2021-06-07 DIAGNOSIS — I10 ESSENTIAL HYPERTENSION: ICD-10-CM

## 2021-06-07 DIAGNOSIS — G56.03 BILATERAL CARPAL TUNNEL SYNDROME: ICD-10-CM

## 2021-06-07 DIAGNOSIS — Z78.0 POSTMENOPAUSAL ESTROGEN DEFICIENCY: ICD-10-CM

## 2021-06-07 DIAGNOSIS — G25.81 RESTLESS LEG: ICD-10-CM

## 2021-06-07 DIAGNOSIS — R74.8 ELEVATED CK: ICD-10-CM

## 2021-06-07 DIAGNOSIS — E04.2 MULTIPLE THYROID NODULES: ICD-10-CM

## 2021-06-07 DIAGNOSIS — E78.2 MIXED HYPERLIPIDEMIA: ICD-10-CM

## 2021-06-07 DIAGNOSIS — M79.2 NEUROPATHIC PAIN: ICD-10-CM

## 2021-06-07 DIAGNOSIS — Z00.00 ANNUAL PHYSICAL EXAM: Primary | ICD-10-CM

## 2021-06-07 DIAGNOSIS — E55.9 VITAMIN D DEFICIENCY: ICD-10-CM

## 2021-06-07 DIAGNOSIS — K21.9 GASTROESOPHAGEAL REFLUX DISEASE WITHOUT ESOPHAGITIS: ICD-10-CM

## 2021-06-07 DIAGNOSIS — M79.7 FIBROMYALGIA: ICD-10-CM

## 2021-06-07 DIAGNOSIS — M54.17 LUMBOSACRAL RADICULOPATHY AT L4: ICD-10-CM

## 2021-06-07 DIAGNOSIS — Z12.31 BREAST CANCER SCREENING BY MAMMOGRAM: ICD-10-CM

## 2021-06-07 PROCEDURE — 99396 PREV VISIT EST AGE 40-64: CPT | Performed by: NURSE PRACTITIONER

## 2021-06-07 RX ORDER — EZETIMIBE 10 MG/1
TABLET ORAL
Qty: 90 TABLET | Refills: 1 | Status: SHIPPED | OUTPATIENT
Start: 2021-06-07 | End: 2021-12-13 | Stop reason: SDUPTHER

## 2021-06-07 RX ORDER — SPIRONOLACTONE 25 MG/1
25 TABLET ORAL DAILY
Qty: 90 TABLET | Refills: 1 | Status: SHIPPED | OUTPATIENT
Start: 2021-06-07 | End: 2021-12-13 | Stop reason: SDUPTHER

## 2021-06-07 RX ORDER — MONTELUKAST SODIUM 10 MG/1
10 TABLET ORAL NIGHTLY
Qty: 90 TABLET | Refills: 1 | Status: SHIPPED | OUTPATIENT
Start: 2021-06-07 | End: 2021-12-13 | Stop reason: SDUPTHER

## 2021-06-07 RX ORDER — ROPINIROLE 1 MG/1
TABLET, FILM COATED ORAL
Qty: 90 TABLET | Refills: 3 | Status: SHIPPED | OUTPATIENT
Start: 2021-06-07 | End: 2021-12-13 | Stop reason: SDUPTHER

## 2021-06-07 RX ORDER — METOPROLOL TARTRATE 50 MG/1
50 TABLET, FILM COATED ORAL 2 TIMES DAILY
Qty: 180 TABLET | Refills: 1 | Status: SHIPPED | OUTPATIENT
Start: 2021-06-07 | End: 2021-12-13 | Stop reason: SDUPTHER

## 2021-06-07 RX ORDER — OMEPRAZOLE 20 MG/1
CAPSULE, DELAYED RELEASE ORAL
Qty: 90 CAPSULE | Refills: 1 | Status: SHIPPED | OUTPATIENT
Start: 2021-06-07 | End: 2021-12-13 | Stop reason: SDUPTHER

## 2021-06-07 RX ORDER — PREGABALIN 225 MG/1
225 CAPSULE ORAL 2 TIMES DAILY
Qty: 180 CAPSULE | Refills: 0 | Status: SHIPPED | OUTPATIENT
Start: 2021-06-07 | End: 2021-09-20 | Stop reason: SDUPTHER

## 2021-06-07 RX ORDER — DULOXETIN HYDROCHLORIDE 30 MG/1
30 CAPSULE, DELAYED RELEASE ORAL DAILY
Qty: 90 CAPSULE | Refills: 1 | Status: SHIPPED | OUTPATIENT
Start: 2021-06-07 | End: 2021-09-20 | Stop reason: SDUPTHER

## 2021-06-07 RX ORDER — DOXAZOSIN MESYLATE 1 MG/1
TABLET ORAL
Qty: 90 TABLET | Refills: 3 | Status: SHIPPED | OUTPATIENT
Start: 2021-06-07 | End: 2022-06-20 | Stop reason: SDUPTHER

## 2021-06-07 RX ORDER — ROSUVASTATIN CALCIUM 10 MG/1
TABLET, COATED ORAL
Qty: 90 TABLET | Refills: 3 | Status: SHIPPED | OUTPATIENT
Start: 2021-06-07 | End: 2021-09-20 | Stop reason: DRUGHIGH

## 2021-06-07 SDOH — ECONOMIC STABILITY: FOOD INSECURITY: WITHIN THE PAST 12 MONTHS, THE FOOD YOU BOUGHT JUST DIDN'T LAST AND YOU DIDN'T HAVE MONEY TO GET MORE.: NEVER TRUE

## 2021-06-07 SDOH — ECONOMIC STABILITY: FOOD INSECURITY: WITHIN THE PAST 12 MONTHS, YOU WORRIED THAT YOUR FOOD WOULD RUN OUT BEFORE YOU GOT MONEY TO BUY MORE.: NEVER TRUE

## 2021-06-07 ASSESSMENT — SOCIAL DETERMINANTS OF HEALTH (SDOH): HOW HARD IS IT FOR YOU TO PAY FOR THE VERY BASICS LIKE FOOD, HOUSING, MEDICAL CARE, AND HEATING?: NOT HARD AT ALL

## 2021-06-07 NOTE — PROGRESS NOTES
tablet TAKE 1 TABLET DAILY AT BEDTIME Yes ELIGIO Gonzalez CNP   DULoxetine (CYMBALTA) 30 MG extended release capsule Take 1 capsule by mouth daily Yes ELIGIO Gonzalez CNP   ezetimibe (ZETIA) 10 MG tablet TAKE 1 TABLET DAILY Yes ELIGIO Gonzalez CNP   metoprolol tartrate (LOPRESSOR) 50 MG tablet Take 1 tablet by mouth 2 times daily Yes ELIGIO Gonzalez CNP   montelukast (SINGULAIR) 10 MG tablet Take 1 tablet by mouth nightly Yes ELIGIO Gonzalez CNP   omeprazole (PRILOSEC) 20 MG delayed release capsule TAKE 1 CAPSULE DAILY in am on empty stomach Yes ELIGIO Gonzalez CNP   pregabalin (LYRICA) 225 MG capsule Take 1 capsule by mouth 2 times daily for 60 days. Yes ELIGIO Gonzalez CNP   rOPINIRole (REQUIP) 1 MG tablet TAKE 1 TABLET NIGHTLY FOR RESTLESS LEGS Yes ELIGIO Gonzalez CNP   rosuvastatin (CRESTOR) 10 MG tablet TAKE 1 TABLET NIGHTLY Yes ELIGIO Gonzalez CNP   spironolactone (ALDACTONE) 25 MG tablet Take 1 tablet by mouth daily Yes ELIGIO Gonzalez CNP   naproxen (NAPROSYN) 375 MG tablet TAKE 1 TABLET TWICE A DAY WITH MEALS Yes ELIGIO Gonzalez CNP   Cholecalciferol (VITAMIN D3) 50 MCG (2000 UT) CAPS Take by mouth Yes Historical Provider, MD   Acetaminophen (TYLENOL PO) Take 500 mg by mouth every 6 hours as needed  Yes Historical Provider, MD   aspirin 81 MG EC tablet Take 81 mg by mouth daily  Yes Historical Provider, MD        Allergies   Allergen Reactions    Iv Contrast [Iodides] Swelling    Lisinopril Swelling     Swelling of upper lip    Simvastatin Other (See Comments)     Elevated CK/CKMB.        Past Medical History:   Diagnosis Date    Allergic rhinitis     Arthritis     COVID-19 01/21/2021    not hospitalized : h/a, loss smell, tired abd ache,  no meds    Enlarged thyroid 2016    nodules check annually    Fibromyalgia     GERD (gastroesophageal reflux disease)     Headache(784.0)     migranes at times    Hyperlipidemia     Hypertension  Hypertension     NEIL (obstructive sleep apnea) 07/08/2016    Restless leg syndrome     Restless legs 07/08/2016    Sleep apnea        Past Surgical History:   Procedure Laterality Date    BLADDER SURGERY  9/9/14    bladder sling    CHOLECYSTECTOMY      CYSTOSCOPY Right 08/10/2018    Dr Fitzpatrick Slight- stent placement    HYSTERECTOMY      Total    KNEE SURGERY      LITHOTRIPSY Right 08/21/2018    stent placement    OTHER SURGICAL HISTORY  03-    Coaptite (Dr. Bel Olson)    [place] CYSTO/URETERO W/LITHOTRIPSY &[facility] STENT INSRT Right 8/21/2018    CYSTOSCOPY URETEROSCOPY LASER-HLL performed by Antonio Aldridge MD at [facility] &[facility] STENT INSRT Right 8/21/2018    CYSTOSCOPY STENT Kandis Vikas performed by Antonio Aldridge MD at [address] [facility] [facility] [address] OFFICE/OUTPT [address] [facility] Right 8/10/2018    CYSTOSCOPY STENT INSERTION performed by Antonio Aldridge MD at [address] History     Socioeconomic History    Marital status: Single     Spouse name: Not on file    Number of children: Not on file    Years of education: Not on file    Highest education level: Not on file   Occupational History    Not on file   Tobacco Use    Smoking status: Never Smoker    Smokeless tobacco: Never Used   Vaping Use    Vaping Use: Never used   Substance and Sexual Activity    Alcohol use: No    Drug use: No    Sexual activity: Not on file   Other Topics Concern    Not on file   Social History Narrative    Not on file     Social Determinants of Health     Financial Resource Strain: Low Risk     Difficulty of Paying Living Expenses: Not hard at all   Food Insecurity: No Food Insecurity    Worried About [address] in the Last Year: Never true    [address] in the Last Year: Never true   Transportation Needs:     Lack of Transportation (Medical):      Lack of Transportation (Non-Medical):    Physical Activity:     Days of Exercise per Week:     Minutes of Exercise per Session:    Stress:     Feeling of Stress :    Social Connections:     Frequency of Communication with Friends and Family:     Frequency of Social Gatherings with Friends and Family:     Attends Christianity Services:     Active Member of Clubs or Organizations:     Attends Club or Organization Meetings:     Marital Status:    Intimate Partner Violence:     Fear of Current or Ex-Partner:     Emotionally Abused:     Physically Abused:     Sexually Abused:         Family History   Problem Relation Age of Onset    Heart Disease Mother     High Blood Pressure Father     Heart Disease Father     High Blood Pressure Sister     High Blood Pressure Brother     Cancer Sister         Breast       ADVANCE DIRECTIVE: N, <no information>    Vitals:    06/07/21 1756   BP: 120/70   Site: Left Upper Arm   Position: Sitting   Cuff Size: Medium Adult   Pulse: 90   SpO2: 97%   Weight: 260 lb (117.9 kg)     Estimated body mass index is 47.55 kg/m² as calculated from the following:    Height as of 12/10/20: 5' 2\" (1.575 m). Weight as of this encounter: 260 lb (117.9 kg). Physical Exam  Vitals and nursing note reviewed. Constitutional:       General: She is not in acute distress. Appearance: Normal appearance. She is well-developed. HENT:      Head: Normocephalic and atraumatic. Right Ear: Tympanic membrane and external ear normal.      Left Ear: Tympanic membrane and external ear normal.   Eyes:      General: No scleral icterus. Pupils: Pupils are equal, round, and reactive to light. Cardiovascular:      Rate and Rhythm: Normal rate and regular rhythm. Heart sounds: Normal heart sounds. No murmur heard. Pulmonary:      Effort: Pulmonary effort is normal. No respiratory distress. Breath sounds: Normal breath sounds. No wheezing. Abdominal:      Palpations: Abdomen is soft. Tenderness: There is no abdominal tenderness.    Musculoskeletal: General: Tenderness present. Normal range of motion. Cervical back: Normal range of motion and neck supple. Comments: Tingling both hands in thumb and 1,2 digits. Positive tinel sign neg finkelstein. Lymphadenopathy:      Cervical: No cervical adenopathy. Skin:     General: Skin is warm and dry. Findings: No rash. Neurological:      Mental Status: She is alert and oriented to person, place, and time. Psychiatric:         Behavior: Behavior normal.         Thought Content: Thought content normal.         Judgment: Judgment normal.         No flowsheet data found.     Lab Results   Component Value Date    CHOL 176 06/24/2020    CHOL 247 11/20/2019    CHOL 254 04/23/2019    CHOL 249 05/03/2018    CHOL 277 11/05/2015    CHOL 263 02/13/2014    TRIG 152 06/24/2020    TRIG 139 11/20/2019    TRIG 335 04/23/2019    HDL 56 06/24/2020    HDL 69 11/20/2019    HDL 78 04/23/2019    LDLCHOLESTEROL 90 06/24/2020    LDLCHOLESTEROL 150 11/20/2019    LDLCHOLESTEROL 109 04/23/2019    LDLCALC 158 05/03/2018    LDLCALC 167 11/05/2015    LDLCALC 157 02/13/2014    GLUCOSE 107 06/24/2020    GLUCOSE 95 02/14/2012    LABA1C 5.6 06/24/2020    LABA1C 5.4 04/23/2019    LABA1C 5.3 10/18/2017       The 10-year ASCVD risk score (Erika Quijano et al., 2013) is: 2.3%    Values used to calculate the score:      Age: 64 years      Sex: Female      Is Non- : No      Diabetic: No      Tobacco smoker: No      Systolic Blood Pressure: 884 mmHg      Is BP treated: Yes      HDL Cholesterol: 56 mg/dL      Total Cholesterol: 176 mg/dL    Immunization History   Administered Date(s) Administered    Influenza Vaccine, unspecified formulation 10/24/2016    Influenza, Quadv, IM, PF (6 mo and older Fluzone, Flulaval, Fluarix, and 3 yrs and older Afluria) 11/08/2017, 11/07/2018       Health Maintenance   Topic Date Due    Hepatitis C screen  Never done    COVID-19 Vaccine (1) Never done    HIV screen  Never done    Future  -     Hepatic Function Panel; Future  7. Essential hypertension  -     doxazosin (CARDURA) 1 MG tablet; TAKE 1 TABLET DAILY AT BEDTIME, Disp-90 tablet, R-3Normal  -     metoprolol tartrate (LOPRESSOR) 50 MG tablet; Take 1 tablet by mouth 2 times daily, Disp-180 tablet, R-1Normal  -     montelukast (SINGULAIR) 10 MG tablet; Take 1 tablet by mouth nightly, Disp-90 tablet, R-1Normal  -     spironolactone (ALDACTONE) 25 MG tablet; Take 1 tablet by mouth daily, Disp-90 tablet, R-1Normal  -     Hepatic Function Panel; Future  8. Gastroesophageal reflux disease without esophagitis  -     omeprazole (PRILOSEC) 20 MG delayed release capsule; TAKE 1 CAPSULE DAILY in am on empty stomach, Disp-90 capsule, R-1Normal  9. Restless leg  -     rOPINIRole (REQUIP) 1 MG tablet; TAKE 1 TABLET NIGHTLY FOR RESTLESS LEGS, Disp-90 tablet, R-3Normal  10. Breast cancer screening by mammogram  -     [facility] YASMEEN DIGITAL SCREEN BILATERAL; Future  11. Postmenopausal estrogen deficiency  -     [facility] YASMEEN DIGITAL SCREEN BILATERAL; Future  12. Multiple thyroid nodules  -     TSH with Reflex; Future  13. Vitamin D deficiency  -     Vitamin D 25 Hydroxy; Future  14. Bilateral carpal tunnel syndrome  -     External Referral To Orthopedic Surgery      Return in about 3 months (around 9/7/2021) for lyrica. An electronic signature was used to authenticate this note.     --ELIGIO Mckenzie - CNP on 6/10/2021 at 11:25 PM

## 2021-06-08 ENCOUNTER — HOSPITAL ENCOUNTER (OUTPATIENT)
Dept: ULTRASOUND IMAGING | Age: 56
Discharge: HOME | End: 2021-06-10
Payer: COMMERCIAL

## 2021-06-08 DIAGNOSIS — E04.1 THYROID NODULE: ICD-10-CM

## 2021-06-08 PROCEDURE — 76536 US EXAM OF HEAD AND NECK: CPT

## 2021-06-10 ASSESSMENT — ENCOUNTER SYMPTOMS
EYES NEGATIVE: 1
RHINORRHEA: 0
CHEST TIGHTNESS: 0

## 2021-06-11 ENCOUNTER — TELEPHONE (OUTPATIENT)
Dept: PRIMARY CARE CLINIC | Age: 56
End: 2021-06-11

## 2021-06-11 NOTE — TELEPHONE ENCOUNTER
----- Message from Adonis Lopez sent at 6/11/2021  3:25 PM EDT -----  Subject: Medication Problem    QUESTIONS  Name of Medication? pregabalin (LYRICA) 225 MG capsule  Patient-reported dosage and instructions? unknown - didn't speak with   patient  What question or problem do you have with the medication? Alix - [facility]   pharmacy tech called requesting to speak with clinical staff to get   clarification on directions and quantity for this medication. Preferred Pharmacy? [facility] [address], [address] [phone] Stacy Echeverria [phone]  Pharmacy phone number (if available)? [phone]  Additional Information for Provider? Caller also wanted to advise provider   of allergy alert for rosuvastatin Requested callback at [phone]   option 2 -- REF#? [id number]   ---------------------------------------------------------------------------  --------------  CALL BACK INFO  What is the best way for the office to contact you? OK to leave message on   voicemail  Preferred Call Back Phone Number? [phone]  ---------------------------------------------------------------------------  --------------  SCRIPT ANSWERS  Relationship to Patient? Third Party  Representative Name?  [place] [employer] [facility]

## 2021-07-12 ENCOUNTER — HOSPITAL ENCOUNTER (OUTPATIENT)
Dept: MAMMOGRAPHY | Age: 56
Discharge: HOME | End: 2021-07-14
Payer: COMMERCIAL

## 2021-07-12 DIAGNOSIS — Z78.0 POSTMENOPAUSAL ESTROGEN DEFICIENCY: ICD-10-CM

## 2021-07-12 DIAGNOSIS — Z12.31 BREAST CANCER SCREENING BY MAMMOGRAM: ICD-10-CM

## 2021-07-12 PROCEDURE — 77063 BREAST TOMOSYNTHESIS BI: CPT

## 2021-07-21 ENCOUNTER — HOSPITAL ENCOUNTER (OUTPATIENT)
Dept: GENERAL RADIOLOGY | Age: 56
Discharge: HOME | End: 2021-07-23
Payer: COMMERCIAL

## 2021-07-21 ENCOUNTER — HOSPITAL ENCOUNTER (OUTPATIENT)
Age: 56
Discharge: HOME | End: 2021-07-23
Payer: COMMERCIAL

## 2021-07-21 DIAGNOSIS — N20.0 KIDNEY STONES: Primary | ICD-10-CM

## 2021-07-21 DIAGNOSIS — N20.0 KIDNEY STONES: ICD-10-CM

## 2021-07-21 PROCEDURE — 74018 RADEX ABDOMEN 1 VIEW: CPT

## 2021-07-22 ENCOUNTER — OFFICE VISIT (OUTPATIENT)
Dept: UROLOGY | Age: 56
End: 2021-07-22
Payer: COMMERCIAL

## 2021-07-22 VITALS
SYSTOLIC BLOOD PRESSURE: 142 MMHG | HEIGHT: 62 IN | BODY MASS INDEX: 47.66 KG/M2 | DIASTOLIC BLOOD PRESSURE: 92 MMHG | WEIGHT: 259 LBS

## 2021-07-22 DIAGNOSIS — N20.0 KIDNEY STONES: Primary | ICD-10-CM

## 2021-07-22 PROCEDURE — 99213 OFFICE O/P EST LOW 20 MIN: CPT | Performed by: UROLOGY

## 2021-07-22 NOTE — PROGRESS NOTES
HPI:        Patient is a 64 y.o. female in no acute distress. She is alert and oriented to person, place, and time. History  Referral from the ER for right ureteral stone.  She did present to the ER on 7/27/17 with complaints of right flank pain and nausea.  While at the ER she did have a CT scan did show a 3 mm bladder stone with right hydronephrosis and right hydroureter.  She does have 2 additional punctate nonobstructing stones in the right kidney.  There are no calcifications in the left.  She states when she did leave the ER she did pass her stone.  She did bring this with her to today's visit. Geovany Rm being evaluated in the ER she has not experienced any pain, nausea or vomiting, fevers, dysuria, gross hematuria, frequency, or urgency.  She denies any previous stone history.  She has seen urology in the past for evaluation of stress incontinence. NATY ANGELES [facility] gynecologist did perform one treatment of urethral bulking.  She did have a mid urethral sling placed 3 years ago. Yadira Downey has not had any new or worsening urinary incontinence since.     Right HLL 8/21/2018      Currently  Patient is here today for annual follow-up. Patient did get a recent KUB. This film was independently reviewed. Radiology does feel that she has punctate stones in both kidneys. This is similar in appearance to last year's film. I do have trouble visualizing the stones on independent review. Patient does have a significant amount of stool in her colon. Patient is otherwise doing well. No recent gross hematuria dysuria. She has no nausea vomiting or flank pain. She has no new or worsening lower urinary tract symptoms. She has had no spontaneous stone passage. She did have one episode of urinary tract infection. This is associated with microscopic hematuria.     Past Medical History:   Diagnosis Date    Allergic rhinitis     Arthritis     COVID-19 01/21/2021    not hospitalized : h/a, loss smell, tired abd ache,  no meds  Enlarged thyroid 2016    nodules check annually    Fibromyalgia     GERD (gastroesophageal reflux disease)     Headache(784.0)     migranes at times    Hyperlipidemia     Hypertension     Hypertension     NEIL (obstructive sleep apnea) 07/08/2016    Restless leg syndrome     Restless legs 07/08/2016    Sleep apnea      Past Surgical History:   Procedure Laterality Date    BLADDER SURGERY  9/9/14    bladder sling    CHOLECYSTECTOMY      CYSTOSCOPY Right 08/10/2018    Dr Edy Vyas- stent placement    HYSTERECTOMY      Total    KNEE SURGERY      LITHOTRIPSY Right 08/21/2018    stent placement    OTHER SURGICAL HISTORY  03-    Coaptite (Dr. Renny Cruz)    DE CYSTO/URETERO W/LITHOTRIPSY &[facility] STENT INSRT Right 8/21/2018    CYSTOSCOPY URETEROSCOPY LASER-HLL performed by Emeka Martinez MD at [facility] &[facility] [address] Right 8/21/2018    CYSTOSCOPY STENT Nickie Decatur performed by Emeka Martinez MD at [address] [facility] [address] OFFICE/OUTPT [facility] [facility] Right 8/10/2018    CYSTOSCOPY STENT INSERTION performed by Emeka Martinez MD at [address]       Outpatient Encounter Medications as of 7/22/2021   Medication Sig Dispense Refill    doxazosin (CARDURA) 1 MG tablet TAKE 1 TABLET DAILY AT BEDTIME 90 tablet 3    DULoxetine (CYMBALTA) 30 MG extended release capsule Take 1 capsule by mouth daily 90 capsule 1    ezetimibe (ZETIA) 10 MG tablet TAKE 1 TABLET DAILY 90 tablet 1    metoprolol tartrate (LOPRESSOR) 50 MG tablet Take 1 tablet by mouth 2 times daily 180 tablet 1    montelukast (SINGULAIR) 10 MG tablet Take 1 tablet by mouth nightly 90 tablet 1    omeprazole (PRILOSEC) 20 MG delayed release capsule TAKE 1 CAPSULE DAILY in am on empty stomach 90 capsule 1    pregabalin (LYRICA) 225 MG capsule Take 1 capsule by mouth 2 times daily for 60 days.  180 capsule 0    rOPINIRole (REQUIP) 1 MG tablet TAKE 1 TABLET NIGHTLY FOR RESTLESS LEGS 90 tablet 3    spironolactone (ALDACTONE) 25 MG tablet Take 1 tablet by mouth daily 90 tablet 1    naproxen (NAPROSYN) 375 MG tablet TAKE 1 TABLET TWICE A DAY WITH MEALS 180 tablet 1    Cholecalciferol (VITAMIN D3) 50 MCG (2000 UT) CAPS Take by mouth daily       Acetaminophen (TYLENOL PO) Take 500 mg by mouth every 6 hours as needed       aspirin 81 MG EC tablet Take 81 mg by mouth daily       rosuvastatin (CRESTOR) 10 MG tablet TAKE 1 TABLET NIGHTLY (Patient not taking: Reported on 7/22/2021) 90 tablet 3     No facility-administered encounter medications on file as of 7/22/2021. Current Outpatient Medications on File Prior to Visit   Medication Sig Dispense Refill    doxazosin (CARDURA) 1 MG tablet TAKE 1 TABLET DAILY AT BEDTIME 90 tablet 3    DULoxetine (CYMBALTA) 30 MG extended release capsule Take 1 capsule by mouth daily 90 capsule 1    ezetimibe (ZETIA) 10 MG tablet TAKE 1 TABLET DAILY 90 tablet 1    metoprolol tartrate (LOPRESSOR) 50 MG tablet Take 1 tablet by mouth 2 times daily 180 tablet 1    montelukast (SINGULAIR) 10 MG tablet Take 1 tablet by mouth nightly 90 tablet 1    omeprazole (PRILOSEC) 20 MG delayed release capsule TAKE 1 CAPSULE DAILY in am on empty stomach 90 capsule 1    pregabalin (LYRICA) 225 MG capsule Take 1 capsule by mouth 2 times daily for 60 days.  180 capsule 0    rOPINIRole (REQUIP) 1 MG tablet TAKE 1 TABLET NIGHTLY FOR RESTLESS LEGS 90 tablet 3    spironolactone (ALDACTONE) 25 MG tablet Take 1 tablet by mouth daily 90 tablet 1    naproxen (NAPROSYN) 375 MG tablet TAKE 1 TABLET TWICE A DAY WITH MEALS 180 tablet 1    Cholecalciferol (VITAMIN D3) 50 MCG (2000 UT) CAPS Take by mouth daily       Acetaminophen (TYLENOL PO) Take 500 mg by mouth every 6 hours as needed       aspirin 81 MG EC tablet Take 81 mg by mouth daily       rosuvastatin (CRESTOR) 10 MG tablet TAKE 1 TABLET NIGHTLY (Patient not taking: Reported on 7/22/2021) 90 tablet 3     No current facility-administered medications on file prior to visit. Iv contrast [iodides], Lisinopril, and Simvastatin  Family History   Problem Relation Age of Onset    Heart Disease Mother     High Blood Pressure Father     Heart Disease Father     High Blood Pressure Sister     High Blood Pressure Brother     Cancer Sister         Breast     Social History     Tobacco Use   Smoking Status Never Smoker   Smokeless Tobacco Never Used       Social History     Substance and Sexual Activity   Alcohol Use No       Review of Systems    BP (!) 142/92 (Site: Left Upper Arm, Position: Sitting, Cuff Size: Large Adult)   Ht 5' 2\" (1.575 m)   Wt 259 lb (117.5 kg)   LMP  (LMP Unknown)   BMI 47.37 kg/m²       PHYSICAL EXAM:  Constitutional: Patient resting comfortably, in no acute distress. Neuro: Alert and oriented to person place and time. Cranial nerves grossly intact. Psych: Mood and affect normal.  Skin: Warm, dry  HEENT: normocephalic, atraumatic  Lymphatics: No palpable lymphadenopathy  Lungs: Respiratory effort normal, unlabored  Cardiovascular:  Normal peripheral pulses  Abdomen: Soft, non-tender, non-distended with no organomegaly or palpable masses. : No CVA tenderness. Bladder non-tender and not distended. Pelvic: deferred    Lab Results   Component Value Date    BUN 12 06/24/2020     Lab Results   Component Value Date    CREATININE 0.59 06/24/2020       ASSESSMENT:  This is a 64 y.o. female with the following diagnoses:   Diagnosis Orders   1. Kidney stones  XR ABDOMEN (KUB) (SINGLE AP VIEW)         PLAN:  We will plan for repeat KUB in 1 year. We did go over stone prevention from dietary standpoint. Patient will call sooner with issues.

## 2021-08-04 LAB
ALBUMIN SERPL-MCNC: ABNORMAL G/DL
ALP BLD-CCNC: ABNORMAL U/L
ALT SERPL-CCNC: ABNORMAL U/L
ANION GAP SERPL CALCULATED.3IONS-SCNC: 16 MMOL/L
AST SERPL-CCNC: ABNORMAL U/L
BASOPHILS ABSOLUTE: NORMAL
BASOPHILS RELATIVE PERCENT: NORMAL
BILIRUB SERPL-MCNC: ABNORMAL MG/DL
BUN BLDV-MCNC: 22 MG/DL
CALCIUM SERPL-MCNC: 9.3 MG/DL
CHLORIDE BLD-SCNC: 106 MMOL/L
CO2: 23 MMOL/L
CREAT SERPL-MCNC: 0.7 MG/DL
EOSINOPHILS ABSOLUTE: NORMAL
EOSINOPHILS RELATIVE PERCENT: NORMAL
GFR CALCULATED: ABNORMAL
GLUCOSE BLD-MCNC: 99 MG/DL
HCT VFR BLD CALC: 40.9 % (ref 36–46)
HEMOGLOBIN: 13.7 G/DL (ref 12–16)
LYMPHOCYTES ABSOLUTE: NORMAL
LYMPHOCYTES RELATIVE PERCENT: NORMAL
MCH RBC QN AUTO: 28.6 PG
MCHC RBC AUTO-ENTMCNC: 33.5 G/DL
MCV RBC AUTO: 85.5 FL
MONOCYTES ABSOLUTE: NORMAL
MONOCYTES RELATIVE PERCENT: NORMAL
NEUTROPHILS ABSOLUTE: NORMAL
NEUTROPHILS RELATIVE PERCENT: NORMAL
PLATELET # BLD: 245 K/ΜL
PMV BLD AUTO: 7.9 FL
POTASSIUM SERPL-SCNC: 3.8 MMOL/L
RBC # BLD: 4.8 10^6/ΜL
SODIUM BLD-SCNC: 141 MMOL/L
TOTAL PROTEIN: ABNORMAL
WBC # BLD: 6.3 10^3/ML

## 2021-09-20 ENCOUNTER — OFFICE VISIT (OUTPATIENT)
Dept: FAMILY MEDICINE CLINIC | Age: 56
End: 2021-09-20
Payer: COMMERCIAL

## 2021-09-20 VITALS
WEIGHT: 265 LBS | HEART RATE: 61 BPM | OXYGEN SATURATION: 98 % | SYSTOLIC BLOOD PRESSURE: 110 MMHG | DIASTOLIC BLOOD PRESSURE: 76 MMHG | BODY MASS INDEX: 48.47 KG/M2

## 2021-09-20 DIAGNOSIS — R74.8 ELEVATED CK: ICD-10-CM

## 2021-09-20 DIAGNOSIS — E78.2 MIXED HYPERLIPIDEMIA: ICD-10-CM

## 2021-09-20 DIAGNOSIS — R25.2 EXERCISE-INDUCED LEG CRAMPS: ICD-10-CM

## 2021-09-20 DIAGNOSIS — M54.17 LUMBOSACRAL RADICULOPATHY AT L4: ICD-10-CM

## 2021-09-20 DIAGNOSIS — M79.7 FIBROMYALGIA: ICD-10-CM

## 2021-09-20 DIAGNOSIS — M79.2 NEUROPATHIC PAIN: ICD-10-CM

## 2021-09-20 DIAGNOSIS — I10 ESSENTIAL HYPERTENSION: Primary | ICD-10-CM

## 2021-09-20 DIAGNOSIS — G25.81 RLS (RESTLESS LEGS SYNDROME): ICD-10-CM

## 2021-09-20 PROCEDURE — 99214 OFFICE O/P EST MOD 30 MIN: CPT | Performed by: NURSE PRACTITIONER

## 2021-09-20 RX ORDER — ROSUVASTATIN CALCIUM 10 MG/1
TABLET, COATED ORAL
Qty: 90 TABLET | Refills: 0 | Status: SHIPPED
Start: 2021-09-20 | End: 2021-12-13 | Stop reason: ALTCHOICE

## 2021-09-20 RX ORDER — PREGABALIN 225 MG/1
225 CAPSULE ORAL 2 TIMES DAILY
Qty: 180 CAPSULE | Refills: 0 | Status: SHIPPED | OUTPATIENT
Start: 2021-09-20 | End: 2021-12-13 | Stop reason: SDUPTHER

## 2021-09-20 RX ORDER — DULOXETIN HYDROCHLORIDE 30 MG/1
30 CAPSULE, DELAYED RELEASE ORAL DAILY
Qty: 90 CAPSULE | Refills: 1 | Status: SHIPPED | OUTPATIENT
Start: 2021-09-20 | End: 2022-04-14

## 2021-09-20 ASSESSMENT — ENCOUNTER SYMPTOMS
RHINORRHEA: 0
EYES NEGATIVE: 1
COUGH: 0
ABDOMINAL DISTENTION: 0
SHORTNESS OF BREATH: 0

## 2021-09-20 NOTE — PROGRESS NOTES
[facility] PRIMARY CARE [address] [address]  Dept: [phone]  Dept Fax: [phone]    Last encounter 6/7/2021    Check-Up (Pt states that it is going well, still has some aches in the legs but not nearly as bad as before the medication.) and Medication Reaction (Pt quit taking the atorvastatin due to after starting it was causing aches in her arms.)       HPI:   Fran Wagner is a 64 y.o. female who presentstoday for her medical conditions/complaints as noted below. Fran Wagner is c/o of Check-Up (Pt states that it is going well, still has some aches in the legs but not nearly as bad as before the medication.) and Medication Reaction (Pt quit taking the atorvastatin due to after starting it was causing aches in her arms.)      HPI  Established patient    crestor some myalgia. Stopped taking , discussed to restart with lower dose 1-2 days a week pt agreeable, also started zetia which she is tolerating well.     singulair helps with breathing/environmental allergies. prilosec for GERD effective. Hypertension on cardura lopressor and aldactone. No chest pain, no dizziness, no shortness of breath, weight stable. RLS on lyrica and requip recent added with much improvement. cymbalta medication for mood /concentration also helps with myalgias.          Reviewed prior notes None  Reviewed previous Labs, Imaging and Hospital Records    Past Medical History:   Diagnosis Date    Allergic rhinitis     Arthritis     COVID-19 01/21/2021    not hospitalized : h/a, loss smell, tired abd ache,  no meds    Enlarged thyroid 2016    nodules check annually    Fibromyalgia     GERD (gastroesophageal reflux disease)     Headache(784.0)     migranes at times    Hyperlipidemia     Hypertension     Hypertension     NEIL (obstructive sleep apnea) 07/08/2016    Restless leg syndrome     Restless legs 07/08/2016    Sleep apnea       Past Surgical History: Procedure Laterality Date    BLADDER SURGERY  9/9/14    bladder sling    CHOLECYSTECTOMY      CYSTOSCOPY Right 08/10/2018    Dr Lori Ryan- stent placement    HYSTERECTOMY      Total    KNEE SURGERY      LITHOTRIPSY Right 08/21/2018    stent placement    OTHER SURGICAL HISTORY  03-    Coaptite (Dr. Cherylene Miller)    [place] CYSTO/URETERO W/LITHOTRIPSY &INDWELL STENT INSRT Right 8/21/2018    CYSTOSCOPY URETEROSCOPY LASER-HLL performed by Tae Cast MD at [facility] INSRT Right 8/21/2018    CYSTOSCOPY STENT Warnell Husbands performed by Tae Cast MD at [address] OFFICE/OUTPT [facility] Right 8/10/2018    CYSTOSCOPY STENT INSERTION performed by Tae Cast MD at [address]          Family History   Problem Relation Age of Onset    Heart Disease Mother     High Blood Pressure Father     Heart Disease Father     High Blood Pressure Sister     High Blood Pressure Brother     Cancer Sister         Breast       Social History     Tobacco Use    Smoking status: Never Smoker    Smokeless tobacco: Never Used   Substance Use Topics    Alcohol use: No      Current Outpatient Medications   Medication Sig Dispense Refill    rosuvastatin (CRESTOR) 10 MG tablet Take 1 tab once a week 90 tablet 0    pregabalin (LYRICA) 225 MG capsule Take 1 capsule by mouth 2 times daily for 60 days.  180 capsule 0    DULoxetine (CYMBALTA) 30 MG extended release capsule Take 1 capsule by mouth daily 90 capsule 1    doxazosin (CARDURA) 1 MG tablet TAKE 1 TABLET DAILY AT BEDTIME 90 tablet 3    ezetimibe (ZETIA) 10 MG tablet TAKE 1 TABLET DAILY 90 tablet 1    metoprolol tartrate (LOPRESSOR) 50 MG tablet Take 1 tablet by mouth 2 times daily 180 tablet 1    montelukast (SINGULAIR) 10 MG tablet Take 1 tablet by mouth nightly 90 tablet 1    omeprazole (PRILOSEC) 20 MG delayed release capsule TAKE 1 CAPSULE DAILY in am on empty stomach 90 capsule 1    rOPINIRole (REQUIP) 1 MG tablet TAKE 1 TABLET NIGHTLY FOR RESTLESS LEGS 90 tablet 3    spironolactone (ALDACTONE) 25 MG tablet Take 1 tablet by mouth daily 90 tablet 1    naproxen (NAPROSYN) 375 MG tablet TAKE 1 TABLET TWICE A DAY WITH MEALS 180 tablet 1    Cholecalciferol (VITAMIN D3) 50 MCG (2000 UT) CAPS Take by mouth daily       Acetaminophen (TYLENOL PO) Take 500 mg by mouth every 6 hours as needed       aspirin 81 MG EC tablet Take 81 mg by mouth daily        No current facility-administered medications for this visit. Allergies   Allergen Reactions    Iv Contrast [Iodides] Swelling    Lisinopril Swelling     Swelling of upper lip    Simvastatin Other (See Comments)     Elevated CK/CKMB. Health Maintenance   Topic Date Due    Hepatitis C screen  Never done    COVID-19 Vaccine (1) Never done    HIV screen  Never done    DTaP/Tdap/Td vaccine (1 - Tdap) Never done    Shingles Vaccine (1 of 2) Never done    Lipid screen  06/24/2021    Flu vaccine (1) 09/01/2021    Colon cancer screen colonoscopy  10/21/2021    Potassium monitoring  08/04/2022    Creatinine monitoring  08/04/2022    Breast cancer screen  07/12/2023    Hepatitis A vaccine  Aged Out    Hepatitis B vaccine  Aged Out    Hib vaccine  Aged Out    Meningococcal (ACWY) vaccine  Aged Out    Pneumococcal 0-64 years Vaccine  Aged Out       Subjective:      Review of Systems   Constitutional: Negative for activity change, chills and fever. HENT: Negative for congestion and rhinorrhea. Eyes: Negative. Respiratory: Negative for cough and shortness of breath. Cardiovascular: Negative for chest pain and leg swelling. Gastrointestinal: Negative for abdominal distention. Endocrine: Negative for cold intolerance and heat intolerance. Genitourinary: Negative for difficulty urinating. Musculoskeletal: Positive for myalgias. Skin: Negative for rash. Neurological: Negative for dizziness and headaches. Psychiatric/Behavioral: The patient is not nervous/anxious. Objective:     Physical Exam  Vitals and nursing note reviewed. Constitutional:       General: She is not in acute distress. Appearance: Normal appearance. She is well-developed. HENT:      Head: Normocephalic and atraumatic. Right Ear: Tympanic membrane and external ear normal.      Left Ear: Tympanic membrane and external ear normal.      Nose: No congestion. Mouth/Throat:      Mouth: Mucous membranes are moist.   Eyes:      General: No scleral icterus. Pupils: Pupils are equal, round, and reactive to light. Neck:      Vascular: No carotid bruit (neg chris ). Cardiovascular:      Rate and Rhythm: Normal rate and regular rhythm. Heart sounds: Normal heart sounds. No murmur heard. Comments: Posterior tibial pulse right food diminished 1+, left 2+ cap refill < 3 seconds. Pulmonary:      Effort: Pulmonary effort is normal. No respiratory distress. Breath sounds: Normal breath sounds. No wheezing. Abdominal:      Palpations: Abdomen is soft. Tenderness: There is no abdominal tenderness. Musculoskeletal:         General: Normal range of motion. Cervical back: Normal range of motion and neck supple. Lymphadenopathy:      Cervical: No cervical adenopathy. Skin:     General: Skin is warm and dry. Neurological:      Mental Status: She is alert and oriented to person, place, and time. Psychiatric:         Behavior: Behavior normal.         Thought Content:  Thought content normal.         Judgment: Judgment normal.       /76 (Site: Left Upper Arm, Position: Sitting, Cuff Size: Large Adult)   Pulse 61   Wt 265 lb (120.2 kg)   LMP  (LMP Unknown)   SpO2 98%   BMI 48.47 kg/m²     Data:     Lab Results   Component Value Date     08/04/2021    K 3.8 08/04/2021     08/04/2021    CO2 23 08/04/2021    BUN 22 08/04/2021    CREATININE 0.7 08/04/2021    GLUCOSE 99 08/04/2021 GLUCOSE 95 02/14/2012    PROT 6.8 10/27/2020    LABALBU 4.5 06/24/2020    LABALBU 4.6 02/14/2012    BILITOT 0.35 06/24/2020    ALKPHOS 89 06/24/2020    AST 22 06/24/2020    ALT 28 06/24/2020     Lab Results   Component Value Date    WBC 6.3 08/04/2021    RBC 4.8 08/04/2021    HGB 13.7 08/04/2021    HCT 40.9 08/04/2021    MCV 85.5 08/04/2021    MCH 28.6 08/04/2021    MCHC 33.5 08/04/2021    RDW 13.5 06/23/2020     08/04/2021    MPV 7.9 08/04/2021     Lab Results   Component Value Date    TSH 1.39 06/24/2020     Lab Results   Component Value Date    CHOL 176 06/24/2020    CHOL 249 05/03/2018    HDL 56 06/24/2020    LABA1C 5.6 06/24/2020          Assessment & Plan       1. Essential hypertension  Stable and controlled    2. Mixed hyperlipidemia  Intolerant of crestor daily will restart 1 day a week and advance slowly    - rosuvastatin (CRESTOR) 10 MG tablet; Take 1 tab once a week  Dispense: 90 tablet; Refill: 0    3. Lumbosacral radiculopathy at L4  Chronic   - pregabalin (LYRICA) 225 MG capsule; Take 1 capsule by mouth 2 times daily for 60 days. Dispense: 180 capsule; Refill: 0  - DULoxetine (CYMBALTA) 30 MG extended release capsule; Take 1 capsule by mouth daily  Dispense: 90 capsule; Refill: 1    4. Elevated CK  eval by neurology and pain management in past.   - pregabalin (LYRICA) 225 MG capsule; Take 1 capsule by mouth 2 times daily for 60 days. Dispense: 180 capsule; Refill: 0  - DULoxetine (CYMBALTA) 30 MG extended release capsule; Take 1 capsule by mouth daily  Dispense: 90 capsule; Refill: 1    5. Fibromyalgia    - pregabalin (LYRICA) 225 MG capsule; Take 1 capsule by mouth 2 times daily for 60 days. Dispense: 180 capsule; Refill: 0  - DULoxetine (CYMBALTA) 30 MG extended release capsule; Take 1 capsule by mouth daily  Dispense: 90 capsule; Refill: 1    6. Neuropathic pain    - pregabalin (LYRICA) 225 MG capsule; Take 1 capsule by mouth 2 times daily for 60 days. Dispense: 180 capsule;  Refill: 0  - DULoxetine (CYMBALTA) 30 MG extended release capsule; Take 1 capsule by mouth daily  Dispense: 90 capsule; Refill: 1    7. Exercise-induced leg cramps    - VL LOWER EXTREMITY ARTERIAL SEGMENTAL PRESSURES W PPG; Future    8. RLS (restless legs syndrome)    - VL LOWER EXTREMITY ARTERIAL SEGMENTAL PRESSURES W PPG; Future                  Completed Refills   Requested Prescriptions     Signed Prescriptions Disp Refills    rosuvastatin (CRESTOR) 10 MG tablet 90 tablet 0     Sig: Take 1 tab once a week    pregabalin (LYRICA) 225 MG capsule 180 capsule 0     Sig: Take 1 capsule by mouth 2 times daily for 60 days.  DULoxetine (CYMBALTA) 30 MG extended release capsule 90 capsule 1     Sig: Take 1 capsule by mouth daily     No follow-ups on file. Orders Placed This Encounter   Medications    rosuvastatin (CRESTOR) 10 MG tablet     Sig: Take 1 tab once a week     Dispense:  90 tablet     Refill:  0    pregabalin (LYRICA) 225 MG capsule     Sig: Take 1 capsule by mouth 2 times daily for 60 days. Dispense:  180 capsule     Refill:  0    DULoxetine (CYMBALTA) 30 MG extended release capsule     Sig: Take 1 capsule by mouth daily     Dispense:  90 capsule     Refill:  1     Orders Placed This Encounter   Procedures    VL LOWER EXTREMITY ARTERIAL SEGMENTAL PRESSURES W PPG     Standing Status:   Future     Standing Expiration Date:   9/20/2022     Order Specific Question:   Reason for exam:     Answer:   rule out arterial insufficiency with leg cramps and pain, RLS , after walking worse cramps on requip         Loretta received counseling on the following healthy behaviors: nutrition, exercise and medication adherence  Reviewed prior labs and health maintenance. Continue current medications, diet and exercise. Discussed use, benefit, and side effects of prescribed medications. Barriers to medication compliance addressed. Patient given educational materials - see patient instructions. All patient questions answered. Patient voiced understanding. On this date 9/20/2021 I have spent 26 minutes reviewing previous notes, test results and face to face with the patient discussing the diagnosis and importance of compliance with the treatment plan as well as documenting on the day of the visit.      Electronically signed by ELIGIO Reynoso CNP on 9/22/2021 at 10:05 PM

## 2021-09-29 ENCOUNTER — HOSPITAL ENCOUNTER (OUTPATIENT)
Dept: VASCULAR LAB | Age: 56
Discharge: HOME | End: 2021-10-01
Payer: COMMERCIAL

## 2021-09-29 DIAGNOSIS — G25.81 RLS (RESTLESS LEGS SYNDROME): ICD-10-CM

## 2021-09-29 DIAGNOSIS — R25.2 EXERCISE-INDUCED LEG CRAMPS: ICD-10-CM

## 2021-09-29 PROCEDURE — 93923 UPR/LXTR ART STDY 3+ LVLS: CPT

## 2021-11-13 DIAGNOSIS — M17.11 PRIMARY OSTEOARTHRITIS OF RIGHT KNEE: ICD-10-CM

## 2021-11-16 RX ORDER — NAPROXEN 375 MG/1
TABLET ORAL
Qty: 180 TABLET | Refills: 1 | Status: SHIPPED | OUTPATIENT
Start: 2021-11-16 | End: 2022-06-20 | Stop reason: SDUPTHER

## 2021-12-13 ENCOUNTER — OFFICE VISIT (OUTPATIENT)
Dept: FAMILY MEDICINE CLINIC | Age: 56
End: 2021-12-13
Payer: COMMERCIAL

## 2021-12-13 VITALS
HEIGHT: 62 IN | DIASTOLIC BLOOD PRESSURE: 78 MMHG | HEART RATE: 71 BPM | OXYGEN SATURATION: 97 % | WEIGHT: 259 LBS | SYSTOLIC BLOOD PRESSURE: 122 MMHG | BODY MASS INDEX: 47.66 KG/M2

## 2021-12-13 DIAGNOSIS — M79.2 NEUROPATHIC PAIN: ICD-10-CM

## 2021-12-13 DIAGNOSIS — I10 ESSENTIAL HYPERTENSION: ICD-10-CM

## 2021-12-13 DIAGNOSIS — M54.17 LUMBOSACRAL RADICULOPATHY AT L4: Primary | ICD-10-CM

## 2021-12-13 DIAGNOSIS — R74.8 ELEVATED CK: ICD-10-CM

## 2021-12-13 DIAGNOSIS — G25.81 RESTLESS LEG: ICD-10-CM

## 2021-12-13 DIAGNOSIS — K21.9 GASTROESOPHAGEAL REFLUX DISEASE WITHOUT ESOPHAGITIS: ICD-10-CM

## 2021-12-13 DIAGNOSIS — M79.7 FIBROMYALGIA: ICD-10-CM

## 2021-12-13 DIAGNOSIS — E78.2 MIXED HYPERLIPIDEMIA: ICD-10-CM

## 2021-12-13 PROCEDURE — 99214 OFFICE O/P EST MOD 30 MIN: CPT | Performed by: NURSE PRACTITIONER

## 2021-12-13 RX ORDER — ROPINIROLE 1 MG/1
TABLET, FILM COATED ORAL
Qty: 180 TABLET | Refills: 1 | Status: SHIPPED | OUTPATIENT
Start: 2021-12-13 | End: 2022-01-05 | Stop reason: SDUPTHER

## 2021-12-13 RX ORDER — METOPROLOL TARTRATE 50 MG/1
50 TABLET, FILM COATED ORAL 2 TIMES DAILY
Qty: 180 TABLET | Refills: 1 | Status: SHIPPED | OUTPATIENT
Start: 2021-12-13 | End: 2022-06-20 | Stop reason: SDUPTHER

## 2021-12-13 RX ORDER — OMEPRAZOLE 20 MG/1
CAPSULE, DELAYED RELEASE ORAL
Qty: 90 CAPSULE | Refills: 1 | Status: SHIPPED | OUTPATIENT
Start: 2021-12-13 | End: 2022-06-20 | Stop reason: SDUPTHER

## 2021-12-13 RX ORDER — MONTELUKAST SODIUM 10 MG/1
10 TABLET ORAL NIGHTLY
Qty: 90 TABLET | Refills: 1 | Status: SHIPPED | OUTPATIENT
Start: 2021-12-13 | End: 2022-06-20 | Stop reason: SDUPTHER

## 2021-12-13 RX ORDER — EZETIMIBE 10 MG/1
TABLET ORAL
Qty: 90 TABLET | Refills: 1 | Status: SHIPPED | OUTPATIENT
Start: 2021-12-13 | End: 2022-06-20 | Stop reason: SDUPTHER

## 2021-12-13 RX ORDER — SPIRONOLACTONE 25 MG/1
25 TABLET ORAL DAILY
Qty: 90 TABLET | Refills: 1 | Status: SHIPPED | OUTPATIENT
Start: 2021-12-13 | End: 2022-06-20 | Stop reason: SDUPTHER

## 2021-12-13 RX ORDER — PREGABALIN 225 MG/1
225 CAPSULE ORAL 2 TIMES DAILY
Qty: 180 CAPSULE | Refills: 0 | Status: SHIPPED | OUTPATIENT
Start: 2021-12-13 | End: 2022-01-03 | Stop reason: SDUPTHER

## 2021-12-13 ASSESSMENT — ENCOUNTER SYMPTOMS
COUGH: 0
EYES NEGATIVE: 1
SHORTNESS OF BREATH: 0
SORE THROAT: 0
RHINORRHEA: 0
WHEEZING: 0

## 2021-12-13 NOTE — PROGRESS NOTES
[address] [place] [address] PRIMARY CARE [address] [address]  Dept: [phone]  Dept Fax: [phone]    Last encounter 9/20/2021    3 Month Follow-Up (Pt states Lyrica has really helped. Still has some pains in left leg that comes and goes) and Other (Pt states her restless legs have become worse lately. They are now very restless during the day. Meds are helping during the nighttime)       HPI:   Yadira Goodwin is a 64 y.o. female who presentstoday for her medical conditions/complaints as noted below. Yadira Goodwin is c/o of 3 Month Follow-Up (Pt states Lyrica has really helped. Still has some pains in left leg that comes and goes) and Other (Pt states her restless legs have become worse lately. They are now very restless during the day. Meds are helping during the nighttime)      HPI  Established patient      htn well controlled  On beta blocker    Pt doing well overall  Trouble with 2 things: left leg pain comes and goes in calf and shin area . L4-5 radiculopathy pt  On lyrica 225 mg bid dosing. Works well, hx pain management in past.   Lab Results   Component Value Date    CREATININE 0.7 08/04/2021       Pt with requip 1 mg nightly having muscle cramps/spasm will increase to bid dosing    Offered zetia for cholesterol since pt intolerant of statin medications. Pt remains on cymbalta 30 mg daily  Spironolactone 25 mg daily. GERD omeprazole.        Reviewed prior notes None  Reviewed previous Labs, Imaging and Hospital Records    Past Medical History:   Diagnosis Date    Allergic rhinitis     Arthritis     COVID-19 01/21/2021    not hospitalized : h/a, loss smell, tired abd ache,  no meds    Enlarged thyroid 2016    nodules check annually    Fibromyalgia     GERD (gastroesophageal reflux disease)     Headache(784.0)     migranes at times    Hyperlipidemia     Hypertension     Hypertension     NEIL (obstructive sleep apnea) 07/08/2016    Restless leg syndrome  Restless legs 07/08/2016    Sleep apnea       Past Surgical History:   Procedure Laterality Date    BLADDER SURGERY  9/9/14    bladder sling    CHOLECYSTECTOMY      CYSTOSCOPY Right 08/10/2018    Dr Daniel Seek- stent placement    HYSTERECTOMY      Total    KNEE SURGERY      LITHOTRIPSY Right 08/21/2018    stent placement    OTHER SURGICAL HISTORY  03-    Coaptite (Dr. Brody Figures)    [place] CYSTO/URETERO W/LITHOTRIPSY &INDWELL STENT INSRT Right 8/21/2018    CYSTOSCOPY URETEROSCOPY LASER-HLL performed by Argelia Thornton MD at [facility] [address] INSRT Right 8/21/2018    CYSTOSCOPY STENT Cat Contreras performed by Argelia Thornton MD at [address] [facility] OFFICE/OUTPT [facility] Right 8/10/2018    CYSTOSCOPY STENT INSERTION performed by Argelia Thornton MD at [address] [facility]         Family History   Problem Relation Age of Onset    Heart Disease Mother     High Blood Pressure Father     Heart Disease Father     High Blood Pressure Sister     High Blood Pressure Brother     Cancer Sister         Breast       Social History     Tobacco Use    Smoking status: Never Smoker    Smokeless tobacco: Never Used   Substance Use Topics    Alcohol use: No      Current Outpatient Medications   Medication Sig Dispense Refill    pregabalin (LYRICA) 225 MG capsule Take 1 capsule by mouth 2 times daily for 60 days.  180 capsule 0    rOPINIRole (REQUIP) 1 MG tablet TAKE 1 TABLET NIGHTLY FOR RESTLESS LEGS 180 tablet 1    ezetimibe (ZETIA) 10 MG tablet TAKE 1 TABLET DAILY 90 tablet 1    metoprolol tartrate (LOPRESSOR) 50 MG tablet Take 1 tablet by mouth 2 times daily 180 tablet 1    montelukast (SINGULAIR) 10 MG tablet Take 1 tablet by mouth nightly 90 tablet 1    omeprazole (PRILOSEC) 20 MG delayed release capsule TAKE 1 CAPSULE DAILY in am on empty stomach 90 capsule 1    spironolactone (ALDACTONE) 25 MG tablet Take 1 tablet by mouth daily 90 tablet 1    naproxen (NAPROSYN) 375 MG tablet TAKE 1 TABLET TWICE DAILY  WITH MEALS 180 tablet 1    DULoxetine (CYMBALTA) 30 MG extended release capsule Take 1 capsule by mouth daily 90 capsule 1    doxazosin (CARDURA) 1 MG tablet TAKE 1 TABLET DAILY AT BEDTIME 90 tablet 3    Cholecalciferol (VITAMIN D3) 50 MCG (2000 UT) CAPS Take by mouth daily       Acetaminophen (TYLENOL PO) Take 500 mg by mouth every 6 hours as needed       aspirin 81 MG EC tablet Take 81 mg by mouth daily        No current facility-administered medications for this visit. Allergies   Allergen Reactions    Iv Contrast [Iodides] Swelling    Lisinopril Swelling     Swelling of upper lip    Simvastatin Other (See Comments)     Elevated CK/CKMB. Health Maintenance   Topic Date Due    Hepatitis C screen  Never done    COVID-19 Vaccine (1) Never done    HIV screen  Never done    DTaP/Tdap/Td vaccine (1 - Tdap) Never done    Shingles Vaccine (1 of 2) Never done    Flu vaccine (1) 09/01/2021    Colon cancer screen colonoscopy  10/21/2021    Potassium monitoring  08/04/2022    Creatinine monitoring  08/04/2022    Breast cancer screen  07/12/2023    Lipid screen  06/24/2025    Hepatitis A vaccine  Aged Out    Hepatitis B vaccine  Aged Out    Hib vaccine  Aged Out    Meningococcal (ACWY) vaccine  Aged Out    Pneumococcal 0-64 years Vaccine  Aged Out       Subjective:      Review of Systems   Constitutional: Negative for activity change, chills and fever. HENT: Negative for congestion, rhinorrhea and sore throat. Eyes: Negative. Respiratory: Negative for cough, shortness of breath and wheezing. Objective:     Physical Exam  Vitals and nursing note reviewed. Constitutional:       General: She is not in acute distress. Appearance: Normal appearance. She is well-developed. HENT:      Head: Normocephalic and atraumatic.       Right Ear: Tympanic membrane and external ear normal.      Left Ear: Tympanic membrane and external ear normal.      Nose: No congestion or rhinorrhea. Mouth/Throat:      Mouth: Mucous membranes are moist.   Eyes:      General: No scleral icterus. Pupils: Pupils are equal, round, and reactive to light. Neck:      Vascular: No carotid bruit (neg chris). Cardiovascular:      Rate and Rhythm: Normal rate and regular rhythm. Heart sounds: Normal heart sounds. No murmur heard. Pulmonary:      Effort: Pulmonary effort is normal. No respiratory distress. Breath sounds: Normal breath sounds. No wheezing. Abdominal:      Palpations: Abdomen is soft. Tenderness: There is no abdominal tenderness. Musculoskeletal:         General: Tenderness (lumbar spine Right leg radiculopathy) present. Normal range of motion. Cervical back: Normal range of motion and neck supple. Right lower leg: No edema. Left lower leg: No edema. Lymphadenopathy:      Cervical: No cervical adenopathy. Skin:     General: Skin is warm and dry. Findings: No rash. Neurological:      Mental Status: She is alert and oriented to person, place, and time. Psychiatric:         Behavior: Behavior normal.         Thought Content:  Thought content normal.         Judgment: Judgment normal.       /78   Pulse 71   Ht 5' 2\" (1.575 m)   Wt 259 lb (117.5 kg)   LMP  (LMP Unknown)   SpO2 97%   BMI 47.37 kg/m²     Data:     Lab Results   Component Value Date     08/04/2021    K 3.8 08/04/2021     08/04/2021    CO2 23 08/04/2021    BUN 22 08/04/2021    CREATININE 0.7 08/04/2021    GLUCOSE 99 08/04/2021    GLUCOSE 95 02/14/2012    PROT 6.8 10/27/2020    LABALBU 4.5 06/24/2020    LABALBU 4.6 02/14/2012    BILITOT 0.35 06/24/2020    ALKPHOS 89 06/24/2020    AST 22 06/24/2020    ALT 28 06/24/2020     Lab Results   Component Value Date    WBC 6.3 08/04/2021    RBC 4.8 08/04/2021    HGB 13.7 08/04/2021    HCT 40.9 08/04/2021    MCV 85.5 08/04/2021    MCH 28.6 08/04/2021    MCHC 33.5 08/04/2021    RDW 13.5 06/23/2020     08/04/2021    MPV 7.9 08/04/2021     Lab Results   Component Value Date    TSH 1.39 06/24/2020     Lab Results   Component Value Date    CHOL 176 06/24/2020    CHOL 249 05/03/2018    HDL 56 06/24/2020    LABA1C 5.6 06/24/2020          Assessment & Plan       1. Lumbosacral radiculopathy at L4  On lyrica good response   OARRS reviewed    2. Elevated CK  Chronically avoids statins    3. Fibromyalgia  On cymbalta/lyrica    4. Neuropathic pain    5. Restless leg  Increase requip to 2 x daily  - rOPINIRole (REQUIP) 1 MG tablet; TAKE 1 TABLET NIGHTLY FOR RESTLESS LEGS  Dispense: 180 tablet; Refill: 1    6. Mixed hyperlipidemia  Intolerant of statins  - ezetimibe (ZETIA) 10 MG tablet; TAKE 1 TABLET DAILY  Dispense: 90 tablet; Refill: 1    7. Essential hypertension  Stable   - metoprolol tartrate (LOPRESSOR) 50 MG tablet; Take 1 tablet by mouth 2 times daily  Dispense: 180 tablet; Refill: 1  - montelukast (SINGULAIR) 10 MG tablet; Take 1 tablet by mouth nightly  Dispense: 90 tablet; Refill: 1  - spironolactone (ALDACTONE) 25 MG tablet; Take 1 tablet by mouth daily  Dispense: 90 tablet; Refill: 1    8. Gastroesophageal reflux disease without esophagitis  Continue PPI  - omeprazole (PRILOSEC) 20 MG delayed release capsule; TAKE 1 CAPSULE DAILY in am on empty stomach  Dispense: 90 capsule; Refill: 1                            Completed Refills   Requested Prescriptions     Signed Prescriptions Disp Refills    pregabalin (LYRICA) 225 MG capsule 180 capsule 0     Sig: Take 1 capsule by mouth 2 times daily for 60 days.     rOPINIRole (REQUIP) 1 MG tablet 180 tablet 1     Sig: TAKE 1 TABLET NIGHTLY FOR RESTLESS LEGS    ezetimibe (ZETIA) 10 MG tablet 90 tablet 1     Sig: TAKE 1 TABLET DAILY    metoprolol tartrate (LOPRESSOR) 50 MG tablet 180 tablet 1     Sig: Take 1 tablet by mouth 2 times daily    montelukast (SINGULAIR) 10 MG tablet 90 tablet 1     Sig: Take 1 tablet by mouth nightly    omeprazole (PRILOSEC) 20 MG delayed release capsule 90 capsule 1     Sig: TAKE 1 CAPSULE DAILY in am on empty stomach    spironolactone (ALDACTONE) 25 MG tablet 90 tablet 1     Sig: Take 1 tablet by mouth daily     Return in about 3 months (around 3/13/2022) for HTN check monitoring lyrica . Orders Placed This Encounter   Medications    pregabalin (LYRICA) 225 MG capsule     Sig: Take 1 capsule by mouth 2 times daily for 60 days. Dispense:  180 capsule     Refill:  0    rOPINIRole (REQUIP) 1 MG tablet     Sig: TAKE 1 TABLET NIGHTLY FOR RESTLESS LEGS     Dispense:  180 tablet     Refill:  1    ezetimibe (ZETIA) 10 MG tablet     Sig: TAKE 1 TABLET DAILY     Dispense:  90 tablet     Refill:  1    metoprolol tartrate (LOPRESSOR) 50 MG tablet     Sig: Take 1 tablet by mouth 2 times daily     Dispense:  180 tablet     Refill:  1    montelukast (SINGULAIR) 10 MG tablet     Sig: Take 1 tablet by mouth nightly     Dispense:  90 tablet     Refill:  1    omeprazole (PRILOSEC) 20 MG delayed release capsule     Sig: TAKE 1 CAPSULE DAILY in am on empty stomach     Dispense:  90 capsule     Refill:  1    spironolactone (ALDACTONE) 25 MG tablet     Sig: Take 1 tablet by mouth daily     Dispense:  90 tablet     Refill:  1     No orders of the defined types were placed in this encounter. Loretta received counseling on the following healthy behaviors: nutrition, exercise and medication adherence  Reviewed prior labs and health maintenance. Continue current medications, diet and exercise. Discussed use, benefit, and side effects of prescribed medications. Barriers to medication compliance addressed. Patient given educational materials - see patient instructions. All patient questions answered. Patient voiced understanding.             On this date 12/13/2021 I have spent 26 minutes reviewing previous notes, test results and face to face with the patient discussing the diagnosis and importance of compliance with the treatment plan as well as documenting on the day of the visit.      Electronically signed by ELIGIO Bethea CNP on 12/17/2021 at 10:28 PM

## 2021-12-16 ENCOUNTER — TELEPHONE (OUTPATIENT)
Dept: FAMILY MEDICINE CLINIC | Age: 56
End: 2021-12-16

## 2021-12-16 NOTE — TELEPHONE ENCOUNTER
----- Message from Deysi Matute sent at 12/15/2021  5:17 PM EST -----  Subject: Medication Problem    QUESTIONS  Name of Medication? pregabalin (LYRICA) 225 MG capsule  Patient-reported dosage and instructions? Take 1 capsule by mouth 2 times   daily for 60 days. What question or problem do you have with the medication? Would like to   clarify quantity and day supply [id number] day supply reference number   [id number]  [facility]? [facility] [address], [address] [facility] [phone] Sophie Riley [phone]  Pharmacy phone number (if available)? [phone]  Additional Information for Provider?   ---------------------------------------------------------------------------  --------------  CALL BACK INFO  What is the best way for the office to contact you? OK to leave message on   voicemail  Preferred Call Back Phone Number? [phone]  ---------------------------------------------------------------------------  --------------  SCRIPT ANSWERS  Relationship to Patient? Third Party  Representative Name?  Praraysa- [facility]

## 2022-01-03 DIAGNOSIS — M54.17 LUMBOSACRAL RADICULOPATHY AT L4: ICD-10-CM

## 2022-01-03 DIAGNOSIS — R74.8 ELEVATED CK: ICD-10-CM

## 2022-01-03 DIAGNOSIS — M79.7 FIBROMYALGIA: ICD-10-CM

## 2022-01-03 DIAGNOSIS — M79.2 NEUROPATHIC PAIN: ICD-10-CM

## 2022-01-03 DIAGNOSIS — B35.1 TOENAIL FUNGUS: ICD-10-CM

## 2022-01-03 RX ORDER — PREGABALIN 225 MG/1
225 CAPSULE ORAL 2 TIMES DAILY
Qty: 180 CAPSULE | Refills: 0 | Status: SHIPPED | OUTPATIENT
Start: 2022-01-03 | End: 2022-03-14 | Stop reason: SDUPTHER

## 2022-01-03 NOTE — TELEPHONE ENCOUNTER
Loretta said she had called and spoke with [facility] regarding her lyrica. She said they do not have that prescription on file. Can we send this again for her? Mail order to [facility]    Last check up 12/13/21. She also had gotten a prescription for toenail fungus and wanted to know if she can get a refill to the local pharmacy.     penlac 8% solution    Dm-yosvany    Health Maintenance   Topic Date Due    Hepatitis C screen  Never done    COVID-19 Vaccine (1) Never done    HIV screen  Never done    DTaP/Tdap/Td vaccine (1 - Tdap) Never done    Shingles Vaccine (1 of 2) Never done    Flu vaccine (1) 09/01/2021    Colon cancer screen colonoscopy  10/21/2021    Depression Screen  01/22/2022    Potassium monitoring  08/04/2022    Creatinine monitoring  08/04/2022    Breast cancer screen  07/12/2023    Lipid screen  06/24/2025    Hepatitis A vaccine  Aged Out    Hepatitis B vaccine  Aged Out    Hib vaccine  Aged Out    Meningococcal (ACWY) vaccine  Aged Out    Pneumococcal 0-64 years Vaccine  Aged Out             (applicable per patient's age: Cancer Screenings, Depression Screening, Fall Risk Screening, Immunizations)    Hemoglobin A1C (%)   Date Value   06/24/2020 5.6   04/23/2019 5.4   10/18/2017 5.3     LDL Cholesterol (mg/dL)   Date Value   06/24/2020 90     LDL Calculated (mg/dL)   Date Value   05/03/2018 158     AST (U/L)   Date Value   06/24/2020 22     ALT (U/L)   Date Value   06/24/2020 28     BUN (mg/dL)   Date Value   08/04/2021 22 (H)      (goal A1C is < 7)   (goal LDL is <100) need 30-50% reduction from baseline     BP Readings from Last 3 Encounters:   12/13/21 122/78   09/20/21 110/76   07/22/21 (!) 142/92    (goal /80)      All Future Testing planned in [facility]:  Lab Frequency Next Occurrence   COVID-19 Ambulatory Once 01/18/2021   Comprehensive Metabolic Panel Once 51/95/2329   Lipid Panel Once 06/07/2021   Hepatic Function Panel Once 11/01/2021   TSH with Reflex Once 06/07/2021   Vitamin D 25 Hydroxy Once 11/01/2021   XR ABDOMEN (KUB) (SINGLE AP VIEW) Once 07/22/2022       Next Visit Date:  Future Appointments   Date Time Provider Tez Montemayor   3/14/2022  2:20 PM ELIGIO Brody - JOSIANE ADLER [facility]   7/28/2022  2:00 PM TRELL Lopez urol [facility]            Patient Active Problem List:     Hyperlipidemia     HTN (hypertension)     History of adenomatous polyp of colon     NEIL (obstructive sleep apnea)     Restless legs     Female genuine stress incontinence     Kidney stones     Ureteral stone with hydronephrosis     Renal colic on right side

## 2022-01-04 ENCOUNTER — TELEPHONE (OUTPATIENT)
Dept: FAMILY MEDICINE CLINIC | Age: 57
End: 2022-01-04

## 2022-01-04 DIAGNOSIS — G25.81 RESTLESS LEG: ICD-10-CM

## 2022-01-04 NOTE — TELEPHONE ENCOUNTER
requip 1 mg    [facility]    Last check up 12/13/21. Loretta said at her last appointment Wing Dozier wanted to increase this to BID. It was only sent in for once. Can we please resend a new prescription in for her?     Health Maintenance   Topic Date Due    Hepatitis C screen  Never done    COVID-19 Vaccine (1) Never done    HIV screen  Never done    DTaP/Tdap/Td vaccine (1 - Tdap) Never done    Shingles Vaccine (1 of 2) Never done    Flu vaccine (1) 09/01/2021    Colon cancer screen colonoscopy  10/21/2021    Depression Screen  01/22/2022    Potassium monitoring  08/04/2022    Creatinine monitoring  08/04/2022    Breast cancer screen  07/12/2023    Lipid screen  06/24/2025    Hepatitis A vaccine  Aged Out    Hepatitis B vaccine  Aged Out    Hib vaccine  Aged Out    Meningococcal (ACWY) vaccine  Aged Out    Pneumococcal 0-64 years Vaccine  Aged Out             (applicable per patient's age: Cancer Screenings, Depression Screening, Fall Risk Screening, Immunizations)    Hemoglobin A1C (%)   Date Value   06/24/2020 5.6   04/23/2019 5.4   10/18/2017 5.3     LDL Cholesterol (mg/dL)   Date Value   06/24/2020 90     LDL Calculated (mg/dL)   Date Value   05/03/2018 158     AST (U/L)   Date Value   06/24/2020 22     ALT (U/L)   Date Value   06/24/2020 28     BUN (mg/dL)   Date Value   08/04/2021 22 (H)      (goal A1C is < 7)   (goal LDL is <100) need 30-50% reduction from baseline     BP Readings from Last 3 Encounters:   12/13/21 122/78   09/20/21 110/76   07/22/21 (!) 142/92    (goal /80)      All Future Testing planned in [facility]:  Lab Frequency Next Occurrence   COVID-19 Ambulatory Once 01/18/2021   Comprehensive Metabolic Panel Once 14/70/2965   Lipid Panel Once 06/07/2021   Hepatic Function Panel Once 11/01/2021   TSH with Reflex Once 06/07/2021   Vitamin D 25 Hydroxy Once 11/01/2021   XR ABDOMEN (KUB) (SINGLE AP VIEW) Once 07/22/2022       Next Visit Date:  Future Appointments   Date Time Provider Tez Montemayor   3/14/2022  2:20 PM ELIGIO Figueroa - CNP [facility]   7/28/2022  2:00 PM TRELL Castro [facility]            Patient Active Problem List:     Hyperlipidemia     HTN (hypertension)     History of adenomatous polyp of colon     NEIL (obstructive sleep apnea)     Restless legs     Female genuine stress incontinence     Kidney stones     Ureteral stone with hydronephrosis     Renal colic on right side

## 2022-01-05 RX ORDER — ROPINIROLE 1 MG/1
1 TABLET, FILM COATED ORAL 2 TIMES DAILY
Qty: 180 TABLET | Refills: 1 | Status: SHIPPED | OUTPATIENT
Start: 2022-01-05 | End: 2022-03-14 | Stop reason: SDUPTHER

## 2022-03-14 ENCOUNTER — OFFICE VISIT (OUTPATIENT)
Dept: FAMILY MEDICINE CLINIC | Age: 57
End: 2022-03-14
Payer: COMMERCIAL

## 2022-03-14 VITALS
WEIGHT: 264 LBS | BODY MASS INDEX: 48.58 KG/M2 | SYSTOLIC BLOOD PRESSURE: 114 MMHG | HEART RATE: 76 BPM | OXYGEN SATURATION: 96 % | DIASTOLIC BLOOD PRESSURE: 80 MMHG | HEIGHT: 62 IN

## 2022-03-14 DIAGNOSIS — M79.2 NEUROPATHIC PAIN: ICD-10-CM

## 2022-03-14 DIAGNOSIS — M79.7 FIBROMYALGIA: ICD-10-CM

## 2022-03-14 DIAGNOSIS — M54.17 LUMBOSACRAL RADICULOPATHY AT L4: ICD-10-CM

## 2022-03-14 DIAGNOSIS — G25.81 RESTLESS LEG: ICD-10-CM

## 2022-03-14 DIAGNOSIS — R74.8 ELEVATED CK: ICD-10-CM

## 2022-03-14 DIAGNOSIS — B35.1 TOENAIL FUNGUS: ICD-10-CM

## 2022-03-14 PROCEDURE — 99213 OFFICE O/P EST LOW 20 MIN: CPT | Performed by: NURSE PRACTITIONER

## 2022-03-14 RX ORDER — PREGABALIN 225 MG/1
225 CAPSULE ORAL 2 TIMES DAILY
Qty: 180 CAPSULE | Refills: 0 | Status: SHIPPED | OUTPATIENT
Start: 2022-03-14 | End: 2022-06-20 | Stop reason: SDUPTHER

## 2022-03-14 RX ORDER — ROPINIROLE 1 MG/1
1 TABLET, FILM COATED ORAL 2 TIMES DAILY
Qty: 180 TABLET | Refills: 1 | Status: SHIPPED | OUTPATIENT
Start: 2022-03-14 | End: 2022-10-14

## 2022-03-14 ASSESSMENT — ENCOUNTER SYMPTOMS
SORE THROAT: 0
EYES NEGATIVE: 1
COUGH: 0
SHORTNESS OF BREATH: 0
ABDOMINAL DISTENTION: 0
RHINORRHEA: 0

## 2022-03-14 ASSESSMENT — PATIENT HEALTH QUESTIONNAIRE - PHQ9
SUM OF ALL RESPONSES TO PHQ QUESTIONS 1-9: 0
SUM OF ALL RESPONSES TO PHQ QUESTIONS 1-9: 0
1. LITTLE INTEREST OR PLEASURE IN DOING THINGS: 0
SUM OF ALL RESPONSES TO PHQ9 QUESTIONS 1 & 2: 0
SUM OF ALL RESPONSES TO PHQ QUESTIONS 1-9: 0
SUM OF ALL RESPONSES TO PHQ QUESTIONS 1-9: 0
2. FEELING DOWN, DEPRESSED OR HOPELESS: 0

## 2022-03-14 NOTE — PATIENT INSTRUCTIONS
You may be receiving a survey from [facility] regarding your visit today. You may get this in the mail, through your MyChart or in your email. Please complete the survey to enable us to provide the highest quality of care to you and your family. If you cannot score us as very good ( 5 Stars) on any question, please feel free to call the office to discuss how we could have made your experience exceptional.     Thank You! FRANSISCO Levy  [address] Teresita Jimenez LPN        Phone: [phone]  Fax: [address] [facility] Office Hours:  Monday: [facility] office location [phone] ([phone]) Offering additional late hours the first Monday of the month until 7 pm.   Tuesday: 8-5 Wednesday: 8-5 Thursday:  Additional hours offered 2 Thursdays a month. Please call to inquire those dates.    Fridays: 7:30-4:30

## 2022-03-14 NOTE — PROGRESS NOTES
[facility]  [facility]  [address] [place] [address]  Ebonie August [address]  Dept: [phone]     Subjective:  Holli Kaiser is a 62 y.o. female presenting today for routine follow up of hypertension, hyperlipidemia and impaired fasting glucose and recent labs. She denies chest pain, shortness of breath or palpitations. She denies headaches, vision changes and lightheadedness. She denies myalgias. She reports numbness and tingling in the hands and feet. She has been compliant with diet and exercise. She has been compliant with her medications. She is tolerating medications. · Hypertension: well controlled with current medications   · Medications: continue current medication doses     · Hyperlipidemia: well controlled with current medications   · Medications: intolerant of statin, on zetia    · Recommended low cholesterol diet     · Impaired fasting glucose:  stable with normal A1c  · Recommend low carb diet    · Nutrition Status:  Obesity (BMI 30-40 kg/m2)   · Recommend continue current healthy lifestyle with diet and regular exercise     Has leg cramps and peripheral neuropathy in legs on cymbalta ,  lyrica and requip. Tolerating well. OARRS reviewed. Toenail fungus topical penlac not covered by insurance. Lumbar radiculopathy with hx pain management with Dr. Denezl Butt. Pt with L4-5 radiculopathy. Has not missed work. CURRENT ALLERGIES: Iv contrast [iodides], Lisinopril, and Simvastatin    SOCIAL HISTORY:   Social History     Tobacco Use    Smoking status: Never Smoker    Smokeless tobacco: Never Used   Vaping Use    Vaping Use: Never used   Substance Use Topics    Alcohol use: No    Drug use: No       HPI       Medication List          Accurate as of March 14, 2022  2:51 PM. If you have any questions, ask your nurse or doctor.             CONTINUE taking these medications    aspirin 81 MG EC tablet     ciclopirox 8 % solution  Commonly known as: Penlac  Apply to the affected nails nightly remove medication from nail every 7 days with rubbing alcohol     doxazosin 1 MG tablet  Commonly known as: CARDURA  TAKE 1 TABLET DAILY AT BEDTIME     DULoxetine 30 MG extended release capsule  Commonly known as: CYMBALTA  Take 1 capsule by mouth daily     ezetimibe 10 MG tablet  Commonly known as: Zetia  TAKE 1 TABLET DAILY     metoprolol tartrate 50 MG tablet  Commonly known as: LOPRESSOR  Take 1 tablet by mouth 2 times daily     montelukast 10 MG tablet  Commonly known as: SINGULAIR  Take 1 tablet by mouth nightly     naproxen 375 MG tablet  Commonly known as: NAPROSYN  TAKE 1 TABLET TWICE DAILY  WITH MEALS     omeprazole 20 MG delayed release capsule  Commonly known as: PRILOSEC  TAKE 1 CAPSULE DAILY in am on empty stomach     pregabalin 225 MG capsule  Commonly known as: LYRICA  Take 1 capsule by mouth 2 times daily for 60 days. rOPINIRole 1 MG tablet  Commonly known as: REQUIP  Take 1 tablet by mouth 2 times daily TAKE 1 TABLET NIGHTLY FOR RESTLESS LEGS     spironolactone 25 MG tablet  Commonly known as: Aldactone  Take 1 tablet by mouth daily     TYLENOL PO     Vitamin D3 50 MCG (2000 UT) Caps               Review of Systems   Constitutional: Negative for activity change, chills and fever. HENT: Negative for congestion, rhinorrhea and sore throat. Eyes: Negative. Respiratory: Negative for cough and shortness of breath. Cardiovascular: Negative for chest pain. Gastrointestinal: Negative for abdominal distention. Genitourinary: Negative for difficulty urinating. Musculoskeletal: Positive for arthralgias and myalgias. Skin: Negative for rash. Neurological: Negative for dizziness and headaches. Psychiatric/Behavioral: The patient is not nervous/anxious. Objective:  /80   Pulse 76   Ht 5' 2\" (1.575 m)   Wt 264 lb (119.7 kg)   LMP  (LMP Unknown)   SpO2 96%   BMI 48.29 kg/m²   Physical Exam  Vitals and nursing note reviewed.    Constitutional: General: She is not in acute distress. Appearance: Normal appearance. She is well-developed. HENT:      Head: Normocephalic and atraumatic. Right Ear: External ear normal.      Left Ear: External ear normal.      Nose: No congestion. Mouth/Throat:      Mouth: Mucous membranes are moist.   Eyes:      General: No scleral icterus. Pupils: Pupils are equal, round, and reactive to light. Cardiovascular:      Rate and Rhythm: Normal rate and regular rhythm. Heart sounds: Normal heart sounds. No murmur heard. Pulmonary:      Effort: Pulmonary effort is normal. No respiratory distress. Breath sounds: Normal breath sounds. No wheezing. Abdominal:      Palpations: Abdomen is soft. Tenderness: There is no abdominal tenderness. Musculoskeletal:         General: Normal range of motion. Cervical back: Normal range of motion and neck supple. Right lower leg: No edema. Left lower leg: No edema. Comments: Neg rachel's sign   Lymphadenopathy:      Cervical: No cervical adenopathy. Skin:     General: Skin is warm and dry. Findings: No rash. Neurological:      Mental Status: She is alert and oriented to person, place, and time. Psychiatric:         Behavior: Behavior normal.         Thought Content: Thought content normal.         Judgment: Judgment normal.       1. Lumbosacral radiculopathy at L4    - pregabalin (LYRICA) 225 MG capsule; Take 1 capsule by mouth 2 times daily for 60 days. Dispense: 180 capsule; Refill: 0    2. Elevated CK  chronic  - pregabalin (LYRICA) 225 MG capsule; Take 1 capsule by mouth 2 times daily for 60 days. Dispense: 180 capsule; Refill: 0    3. Fibromyalgia    - pregabalin (LYRICA) 225 MG capsule; Take 1 capsule by mouth 2 times daily for 60 days. Dispense: 180 capsule; Refill: 0    4. Neuropathic pain    - pregabalin (LYRICA) 225 MG capsule; Take 1 capsule by mouth 2 times daily for 60 days. Dispense: 180 capsule;  Refill: 0    5. Restless leg    - rOPINIRole (REQUIP) 1 MG tablet; Take 1 tablet by mouth 2 times daily FOR RESTLESS LEGS  Dispense: 180 tablet; Refill: 1    6. Toenail fungus  Topical medication not covered.          Diagnostic Data:  Lab Results   Component Value Date    GLUCOSE 99 08/04/2021    LABA1C 5.6 06/24/2020     Lab Results   Component Value Date    BUN 22 (H) 08/04/2021    CREATININE 0.7 08/04/2021     08/04/2021    K 3.8 08/04/2021    CALCIUM 9.3 08/04/2021     08/04/2021    CO2 23 08/04/2021    LABGLOM >60 06/24/2020     Lab Results   Component Value Date    CHOL 176 06/24/2020    HDL 56 06/24/2020    LDLCHOLESTEROL 90 06/24/2020    LDLCALC 158 05/03/2018    TRIG 152 (H) 06/24/2020    ALT 28 06/24/2020    AST 22 06/24/2020     Lab Results   Component Value Date    TSH 1.39 06/24/2020         · Health Maintenance:  · Discussed health maintenance issues: patient is up to date for health maintenance items  · Discussed immunization schedule: patient is up to date for vaccinations    · Follow Up  · Labs: BMP, A1c, fasting lipids, LFT's and thyroid levels to be drawn in 6 months  · Follow up appt to be scheduled in 6 months to f/u labs    Electronically signed by ELIGIO Richardson CNP on 3/14/2022 at 2:51 PM

## 2022-03-17 ENCOUNTER — HOSPITAL ENCOUNTER (OUTPATIENT)
Age: 57
Discharge: HOME | End: 2022-03-17
Payer: COMMERCIAL

## 2022-03-17 ENCOUNTER — TELEPHONE (OUTPATIENT)
Dept: PRIMARY CARE CLINIC | Age: 57
End: 2022-03-17

## 2022-03-17 DIAGNOSIS — R35.0 FREQUENCY OF URINATION: Primary | ICD-10-CM

## 2022-03-17 DIAGNOSIS — R35.0 FREQUENCY OF URINATION: ICD-10-CM

## 2022-03-17 LAB
-: ABNORMAL
BACTERIA: ABNORMAL
BILIRUBIN URINE: NEGATIVE
CASTS UA: ABNORMAL /LPF
COLOR: YELLOW
COMMENT UA: ABNORMAL
EPITHELIAL CELLS UA: ABNORMAL /HPF
GLUCOSE URINE: NEGATIVE
KETONES, URINE: NEGATIVE
LEUKOCYTE ESTERASE, URINE: ABNORMAL
NITRITE, URINE: NEGATIVE
PH UA: 5 (ref 5–8)
PROTEIN UA: NEGATIVE
RBC UA: ABNORMAL /HPF (ref 0–2)
SPECIFIC GRAVITY UA: 1.02 (ref 1–1.03)
TURBIDITY: ABNORMAL
URINE HGB: ABNORMAL
UROBILINOGEN, URINE: NORMAL
WBC UA: ABNORMAL /HPF

## 2022-03-17 PROCEDURE — 87186 SC STD MICRODIL/AGAR DIL: CPT

## 2022-03-17 PROCEDURE — 87088 URINE BACTERIA CULTURE: CPT

## 2022-03-17 PROCEDURE — 81001 URINALYSIS AUTO W/SCOPE: CPT

## 2022-03-17 PROCEDURE — 87086 URINE CULTURE/COLONY COUNT: CPT

## 2022-03-17 NOTE — TELEPHONE ENCOUNTER
Patient saw you Monday and forgot to mention she has pain with urination, frequency, nausea and pressure

## 2022-03-18 RX ORDER — CEPHALEXIN 500 MG/1
500 CAPSULE ORAL 3 TIMES DAILY
Qty: 21 CAPSULE | Refills: 0 | Status: SHIPPED | OUTPATIENT
Start: 2022-03-18 | End: 2022-03-25

## 2022-03-18 RX ORDER — PHENAZOPYRIDINE HYDROCHLORIDE 100 MG/1
100 TABLET, FILM COATED ORAL 3 TIMES DAILY PRN
Qty: 9 TABLET | Refills: 0 | Status: SHIPPED | OUTPATIENT
Start: 2022-03-18 | End: 2022-03-21

## 2022-03-19 LAB
CULTURE: ABNORMAL
SPECIMEN DESCRIPTION: ABNORMAL

## 2022-03-29 ENCOUNTER — OFFICE VISIT (OUTPATIENT)
Dept: UROLOGY | Age: 57
End: 2022-03-29
Payer: COMMERCIAL

## 2022-03-29 VITALS
SYSTOLIC BLOOD PRESSURE: 146 MMHG | TEMPERATURE: 97.3 F | HEART RATE: 63 BPM | DIASTOLIC BLOOD PRESSURE: 88 MMHG | BODY MASS INDEX: 48.4 KG/M2 | HEIGHT: 62 IN | WEIGHT: 263 LBS

## 2022-03-29 DIAGNOSIS — N20.0 KIDNEY STONES: Primary | ICD-10-CM

## 2022-03-29 PROCEDURE — 99214 OFFICE O/P EST MOD 30 MIN: CPT | Performed by: PHYSICIAN ASSISTANT

## 2022-03-29 RX ORDER — TRAMADOL HYDROCHLORIDE 50 MG/1
TABLET ORAL
COMMUNITY
Start: 2022-03-26 | End: 2022-06-20

## 2022-03-29 RX ORDER — ONDANSETRON 4 MG/1
TABLET, ORALLY DISINTEGRATING ORAL
COMMUNITY
Start: 2022-03-26 | End: 2022-06-20

## 2022-03-29 ASSESSMENT — ENCOUNTER SYMPTOMS
SHORTNESS OF BREATH: 0
COLOR CHANGE: 0
NAUSEA: 0
ABDOMINAL PAIN: 0
APNEA: 0
COUGH: 0
WHEEZING: 0
BACK PAIN: 0
CONSTIPATION: 0
EYE REDNESS: 0
VOMITING: 0

## 2022-03-29 NOTE — PROGRESS NOTES
HPI:    Patient is a 62 y.o. female in no acute distress. She is alert and oriented to person, place, and time. History  Referral from the ER for right ureteral stone.  She did present to the ER on 7/27/17 with complaints of right flank pain and nausea.  While at the ER she did have a CT scan did show a 3 mm bladder stone with right hydronephrosis and right hydroureter.  She does have 2 additional punctate nonobstructing stones in the right kidney.  There are no calcifications in the left.  She states when she did leave the ER she did pass her stone.  She did bring this with her to today's visit. Gabriela Lozano being evaluated in the ER she has not experienced any pain, nausea or vomiting, fevers, dysuria, gross hematuria, frequency, or urgency.  She denies any previous stone history.  She has seen urology in the past for evaluation of stress incontinence. Delma Joseph gynecologist did perform one treatment of urethral bulking.  She did have a mid urethral sling placed 3 years ago. [facility] has not had any new or worsening urinary incontinence since.     Right HLL 8/21/2018    3/2022 -spontaneously passed 2 stones    Today:  Patient is here today for follow-up kidney stones  stones. Patient states that she was out of town on 3/26/2022 and developed left-sided flank pain. Patient did go to my [facility] for evaluation. Patient did have a CT abdomen pelvis without contrast.  Patient did bring this disc with her today. This was independently reviewed showing two 3 mm left UVJ stones. Patient had associated left hydroureteronephrosis. Patient also has bilateral renal calculi, largest is on the right which is ~5-6mm. Patient's creatinine was 0.74, BUN 17. White blood cell count was 6.09. On urinalysis patient had 3-5 RBCs per high-power field, 0-5 WBCs per high-power field, trace leukocyte esterase and negative nitrates.   Patient does note that she finished a course of Keflex recently for an E. coli urinary tract infection on culture 3/17/2022. She did state that she ended up passing 2 stones and feels much better. Past Medical History:   Diagnosis Date    Allergic rhinitis     Arthritis     COVID-19 01/21/2021    not hospitalized : h/a, loss smell, tired abd ache,  no meds    Enlarged thyroid 2016    nodules check annually    Fibromyalgia     GERD (gastroesophageal reflux disease)     Headache(784.0)     migranes at times    Hyperlipidemia     Hypertension     Hypertension     NEIL (obstructive sleep apnea) 07/08/2016    Restless leg syndrome     Restless legs 07/08/2016    Sleep apnea      Past Surgical History:   Procedure Laterality Date    BLADDER SURGERY  9/9/14    bladder sling    CHOLECYSTECTOMY      CYSTOSCOPY Right 08/10/2018    Dr Obinna Au- stent placement    HYSTERECTOMY      Total    KNEE SURGERY      LITHOTRIPSY Right 08/21/2018    stent placement    OTHER SURGICAL HISTORY  03-    Coaptite (Dr. Leon Ayon)    CA CYSTO/URETERO W/LITHOTRIPSY &[facility]WE[facility] STENT INSRT Right 8/21/2018    CYSTOSCOPY URETEROSCOPY LASER-HLL performed by Soledad Gonzalez MD at [facility] &[facility]WE[facility] Right 8/21/2018    CYSTOSCOPY STENT Trenda Ten performed by Soledad Gonzalez MD at [address] [facility] [address] OFFICE/OUTPT [address] [facility] Right 8/10/2018    CYSTOSCOPY STENT INSERTION performed by Soledad Gonzalez MD at [address]       Outpatient Encounter Medications as of 3/29/2022   Medication Sig Dispense Refill    traMADol (ULTRAM) 50 MG tablet TAKE 1 TABLET BY MOUTH EVERY 6 HOURS AS NEEDED FOR PAIN for up to 3 (THREE) days      ondansetron (ZOFRAN-ODT) 4 MG disintegrating tablet dissolve 1 (ONE) tablet BY MOUTH EVERY 6 HOURS AS NEEDED for nausea/vomitting for up to 7 (SEVEN) days      pregabalin (LYRICA) 225 MG capsule Take 1 capsule by mouth 2 times daily for 60 days.  180 capsule 0    rOPINIRole (REQUIP) 1 MG tablet Take 1 tablet by mouth 2 times daily FOR RESTLESS LEGS 180 tablet 1    ezetimibe (ZETIA) 10 MG tablet TAKE 1 TABLET DAILY 90 tablet 1    metoprolol tartrate (LOPRESSOR) 50 MG tablet Take 1 tablet by mouth 2 times daily 180 tablet 1    montelukast (SINGULAIR) 10 MG tablet Take 1 tablet by mouth nightly 90 tablet 1    omeprazole (PRILOSEC) 20 MG delayed release capsule TAKE 1 CAPSULE DAILY in am on empty stomach 90 capsule 1    spironolactone (ALDACTONE) 25 MG tablet Take 1 tablet by mouth daily 90 tablet 1    naproxen (NAPROSYN) 375 MG tablet TAKE 1 TABLET TWICE DAILY  WITH MEALS 180 tablet 1    DULoxetine (CYMBALTA) 30 MG extended release capsule Take 1 capsule by mouth daily 90 capsule 1    doxazosin (CARDURA) 1 MG tablet TAKE 1 TABLET DAILY AT BEDTIME 90 tablet 3    Cholecalciferol (VITAMIN D3) 50 MCG (2000 UT) CAPS Take by mouth daily       Acetaminophen (TYLENOL PO) Take 500 mg by mouth every 6 hours as needed       aspirin 81 MG EC tablet Take 81 mg by mouth daily       ciclopirox (PENLAC) 8 % solution Apply to the affected nails nightly remove medication from nail every 7 days with rubbing alcohol (Patient not taking: Reported on 3/14/2022) 1 each 0     No facility-administered encounter medications on file as of 3/29/2022. Current Outpatient Medications on File Prior to Visit   Medication Sig Dispense Refill    traMADol (ULTRAM) 50 MG tablet TAKE 1 TABLET BY MOUTH EVERY 6 HOURS AS NEEDED FOR PAIN for up to 3 (THREE) days      ondansetron (ZOFRAN-ODT) 4 MG disintegrating tablet dissolve 1 (ONE) tablet BY MOUTH EVERY 6 HOURS AS NEEDED for nausea/vomitting for up to 7 (SEVEN) days      pregabalin (LYRICA) 225 MG capsule Take 1 capsule by mouth 2 times daily for 60 days.  180 capsule 0    rOPINIRole (REQUIP) 1 MG tablet Take 1 tablet by mouth 2 times daily FOR RESTLESS LEGS 180 tablet 1    ezetimibe (ZETIA) 10 MG tablet TAKE 1 TABLET DAILY 90 tablet 1    metoprolol tartrate (LOPRESSOR) 50 MG tablet Take 1 tablet by mouth 2 times daily 180 tablet 1    montelukast (SINGULAIR) 10 MG tablet Take 1 tablet by mouth nightly 90 tablet 1    omeprazole (PRILOSEC) 20 MG delayed release capsule TAKE 1 CAPSULE DAILY in am on empty stomach 90 capsule 1    spironolactone (ALDACTONE) 25 MG tablet Take 1 tablet by mouth daily 90 tablet 1    naproxen (NAPROSYN) 375 MG tablet TAKE 1 TABLET TWICE DAILY  WITH MEALS 180 tablet 1    DULoxetine (CYMBALTA) 30 MG extended release capsule Take 1 capsule by mouth daily 90 capsule 1    doxazosin (CARDURA) 1 MG tablet TAKE 1 TABLET DAILY AT BEDTIME 90 tablet 3    Cholecalciferol (VITAMIN D3) 50 MCG (2000 UT) CAPS Take by mouth daily       Acetaminophen (TYLENOL PO) Take 500 mg by mouth every 6 hours as needed       aspirin 81 MG EC tablet Take 81 mg by mouth daily       ciclopirox (PENLAC) 8 % solution Apply to the affected nails nightly remove medication from nail every 7 days with rubbing alcohol (Patient not taking: Reported on 3/14/2022) 1 each 0     No current facility-administered medications on file prior to visit. Iv contrast [iodides], Lisinopril, and Simvastatin  Family History   Problem Relation Age of Onset    Heart Disease Mother     High Blood Pressure Father     Heart Disease Father     High Blood Pressure Sister     High Blood Pressure Brother     Cancer Sister         Breast     Social History     Tobacco Use   Smoking Status Never Smoker   Smokeless Tobacco Never Used       Social History     Substance and Sexual Activity   Alcohol Use No       Review of Systems   Constitutional: Negative for appetite change, chills and fever. Eyes: Negative for redness and visual disturbance. Respiratory: Negative for apnea, cough, shortness of breath and wheezing. Cardiovascular: Negative for chest pain and leg swelling. Gastrointestinal: Negative for abdominal pain, constipation, nausea and vomiting.    Genitourinary: Negative for difficulty urinating, dyspareunia, dysuria, enuresis, flank pain, frequency, hematuria, pelvic pain, urgency, vaginal bleeding and vaginal discharge. Musculoskeletal: Negative for back pain, joint swelling and myalgias. Skin: Negative for color change, rash and wound. Neurological: Negative for dizziness, tremors and numbness. Hematological: Negative for adenopathy. Does not bruise/bleed easily. Psychiatric/Behavioral: Negative for sleep disturbance. BP (!) 146/88 (Site: Right Lower Arm, Position: Sitting, Cuff Size: Medium Adult)   Pulse 63   Temp 97.3 °F (36.3 °C)   Ht 5' 2\" (1.575 m)   Wt 263 lb (119.3 kg)   LMP  (LMP Unknown)   BMI 48.10 kg/m²       PHYSICAL EXAM:  Constitutional: Patient resting comfortably, in no acute distress. Neuro: Alert and oriented to person place and time  Psych: Mood and affect normal.  HEENT: normocephalic, atraumatic  Lungs: Respiratory effort normal, unlabored  Abdomen: Soft, non-tender, non-distended  : No CVA tenderness. Pelvic: deferred     Lab Results   Component Value Date    BUN 22 (H) 08/04/2021     Lab Results   Component Value Date    CREATININE 0.7 08/04/2021       ASSESSMENT:   Diagnosis Orders   1. Kidney stones  XR ABDOMEN (KUB) (SINGLE AP VIEW)       PLAN:  Patient instructed to drink at least 80 ounces of \"good fluids\" daily (water, juice, Gatoraide, milk) and to avoid/minimize intake of \"bad fluids\" (soda pop, coffee, tea, alcohol, energy drinks). Also advised to avoid excessive consumption of sodium and animal protein to decrease risk of recurrent kidney stones. We did discuss with her that she has a 5 to 6 mm stone on the right. On previous x-ray this was not clearly visualized. We did offer to obtain KUB today to see if she would be a candidate for ESWL. At this point she wishes to wait and be reevaluated in 6 months. Follow-up in 6 months with KUB, at that time we can discuss ESWL versus HL L for her right-sided kidney stone.

## 2022-04-13 DIAGNOSIS — R74.8 ELEVATED CK: ICD-10-CM

## 2022-04-13 DIAGNOSIS — M79.7 FIBROMYALGIA: ICD-10-CM

## 2022-04-13 DIAGNOSIS — M79.2 NEUROPATHIC PAIN: ICD-10-CM

## 2022-04-13 DIAGNOSIS — M54.17 LUMBOSACRAL RADICULOPATHY AT L4: ICD-10-CM

## 2022-04-14 RX ORDER — DULOXETIN HYDROCHLORIDE 30 MG/1
CAPSULE, DELAYED RELEASE ORAL
Qty: 90 CAPSULE | Refills: 1 | Status: SHIPPED | OUTPATIENT
Start: 2022-04-14 | End: 2022-10-14

## 2022-06-20 ENCOUNTER — OFFICE VISIT (OUTPATIENT)
Dept: FAMILY MEDICINE CLINIC | Age: 57
End: 2022-06-20
Payer: COMMERCIAL

## 2022-06-20 VITALS
SYSTOLIC BLOOD PRESSURE: 112 MMHG | HEIGHT: 62 IN | DIASTOLIC BLOOD PRESSURE: 70 MMHG | BODY MASS INDEX: 48.21 KG/M2 | OXYGEN SATURATION: 95 % | WEIGHT: 262 LBS | HEART RATE: 67 BPM

## 2022-06-20 DIAGNOSIS — M54.17 LUMBOSACRAL RADICULOPATHY AT L4: ICD-10-CM

## 2022-06-20 DIAGNOSIS — M17.11 PRIMARY OSTEOARTHRITIS OF RIGHT KNEE: ICD-10-CM

## 2022-06-20 DIAGNOSIS — Z80.3 FAMILY HISTORY OF BREAST CANCER IN SISTER: ICD-10-CM

## 2022-06-20 DIAGNOSIS — E78.2 MIXED HYPERLIPIDEMIA: ICD-10-CM

## 2022-06-20 DIAGNOSIS — M79.2 NEUROPATHIC PAIN: ICD-10-CM

## 2022-06-20 DIAGNOSIS — M79.7 FIBROMYALGIA: ICD-10-CM

## 2022-06-20 DIAGNOSIS — K21.9 GASTROESOPHAGEAL REFLUX DISEASE WITHOUT ESOPHAGITIS: ICD-10-CM

## 2022-06-20 DIAGNOSIS — Z12.39 BREAST CANCER SCREENING, HIGH RISK PATIENT: ICD-10-CM

## 2022-06-20 DIAGNOSIS — Z00.00 ENCOUNTER FOR WELL ADULT EXAM WITHOUT ABNORMAL FINDINGS: Primary | ICD-10-CM

## 2022-06-20 DIAGNOSIS — R74.8 ELEVATED CK: ICD-10-CM

## 2022-06-20 DIAGNOSIS — I10 ESSENTIAL HYPERTENSION: ICD-10-CM

## 2022-06-20 PROCEDURE — 99396 PREV VISIT EST AGE 40-64: CPT | Performed by: NURSE PRACTITIONER

## 2022-06-20 RX ORDER — OMEPRAZOLE 20 MG/1
CAPSULE, DELAYED RELEASE ORAL
Qty: 90 CAPSULE | Refills: 1 | Status: SHIPPED | OUTPATIENT
Start: 2022-06-20

## 2022-06-20 RX ORDER — EFINACONAZOLE 100 MG/ML
SOLUTION TOPICAL
COMMUNITY
Start: 2022-06-01

## 2022-06-20 RX ORDER — NAPROXEN 375 MG/1
TABLET ORAL
Qty: 180 TABLET | Refills: 0 | Status: SHIPPED | OUTPATIENT
Start: 2022-06-20 | End: 2022-10-14

## 2022-06-20 RX ORDER — PREGABALIN 225 MG/1
225 CAPSULE ORAL 2 TIMES DAILY
Qty: 180 CAPSULE | Refills: 0 | Status: SHIPPED | OUTPATIENT
Start: 2022-06-20 | End: 2022-09-12 | Stop reason: SDUPTHER

## 2022-06-20 RX ORDER — MONTELUKAST SODIUM 10 MG/1
10 TABLET ORAL NIGHTLY
Qty: 90 TABLET | Refills: 1 | Status: SHIPPED | OUTPATIENT
Start: 2022-06-20

## 2022-06-20 RX ORDER — METOPROLOL TARTRATE 50 MG/1
50 TABLET, FILM COATED ORAL 2 TIMES DAILY
Qty: 180 TABLET | Refills: 1 | Status: SHIPPED | OUTPATIENT
Start: 2022-06-20

## 2022-06-20 RX ORDER — DOXAZOSIN MESYLATE 1 MG/1
TABLET ORAL
Qty: 90 TABLET | Refills: 3 | Status: SHIPPED | OUTPATIENT
Start: 2022-06-20

## 2022-06-20 RX ORDER — SPIRONOLACTONE 25 MG/1
25 TABLET ORAL DAILY
Qty: 90 TABLET | Refills: 1 | Status: SHIPPED | OUTPATIENT
Start: 2022-06-20

## 2022-06-20 RX ORDER — EZETIMIBE 10 MG/1
TABLET ORAL
Qty: 90 TABLET | Refills: 1 | Status: SHIPPED | OUTPATIENT
Start: 2022-06-20

## 2022-06-20 SDOH — ECONOMIC STABILITY: FOOD INSECURITY: WITHIN THE PAST 12 MONTHS, THE FOOD YOU BOUGHT JUST DIDN'T LAST AND YOU DIDN'T HAVE MONEY TO GET MORE.: NEVER TRUE

## 2022-06-20 SDOH — ECONOMIC STABILITY: FOOD INSECURITY: WITHIN THE PAST 12 MONTHS, YOU WORRIED THAT YOUR FOOD WOULD RUN OUT BEFORE YOU GOT MONEY TO BUY MORE.: NEVER TRUE

## 2022-06-20 ASSESSMENT — SOCIAL DETERMINANTS OF HEALTH (SDOH): HOW HARD IS IT FOR YOU TO PAY FOR THE VERY BASICS LIKE FOOD, HOUSING, MEDICAL CARE, AND HEATING?: NOT HARD AT ALL

## 2022-06-20 NOTE — PATIENT INSTRUCTIONS
You may be receiving a survey from [facility] regarding your visit today. You may get this in the mail, through your MyChart or in your email. Please complete the survey to enable us to provide the highest quality of care to you and your family. If you cannot score us as very good ( 5 Stars) on any question, please feel free to call the office to discuss how we could have made your experience exceptional.     Thank You! ELIGIO Walker-CNP  [address] Marleni Khan LPN        Phone: [phone]  Fax: [phone] [facility] Office Hours:  Monday: Daryle Pearson office location [phone] ([phone]) Offering additional late hours the first Monday of the month until 7 pm.   Tuesday: 8-5 Wednesday: 8-5 Thursday:  Additional hours offered 2 Thursdays a month. Please call to inquire those dates. Fridays: 7:30-4:30      cpap Mirena in [place] ( old Ambrocio Ogden)   Full mask send pic of # on it and product name please.      Skin tags check over the counter products if you want to see dermatology [facility]/Dr. Bartholomew)     Knee osteoarthritis- Dr. Dena Ross check on viscous injections in knees versus naproxen

## 2022-06-20 NOTE — PROGRESS NOTES
Well Adult Note  Name: Beltran Pablo Date: 2022   MRN: [id number] Sex: Female   Age: 62 y.o. Ethnicity: Non- / Non    : 1965 Race: White (non-)      Olya Gleason is here for well adult exam.  History:    62year old   Right handed  Single  2 children : boy and girl both adults. No nicotine use. Alcohol : wine rarely     Childhood vaccines done; hx chickenpox, mumps. Shingles and Tdap  vaccine encouraged.      covid-19 disease 2021 stayed at home and did okay. No fever. Has not decided on vaccine yet.      DR. Loren Schwartz in  for urinary incontinence. 1. TVT Exact sling. 2. Cystourethroscopy.     NEIL on cpap with full mask. Compliant with use. [place]    ENT thyroid ultrasound is due soon     CTS done chris with good result. Cpap machine new machine, Dasko  Full mask. , heated and humidified. - [employer] on original products [place] in [place]. htn- no chest pain, no dizziness, some swelling in legs at times: on cardura , lopressor and spironolactone    Hypercholesterolemia with chronic elevation in CPK, trial zetia. Lab Results   Component Value Date    LDLCALC 158 2018    LDLCHOLESTEROL 90 2020     Environmental allergies: on singulair    Chronic lumbar radiculopathy: RLS =on lyrica, cymbalta OARRS reviewed. Review of Systems   Constitutional: Negative for activity change, appetite change, chills and fever. HENT: Negative for congestion, ear pain and sore throat. Eyes: Negative. Respiratory: Negative for cough, chest tightness, shortness of breath and wheezing. Cardiovascular: Negative for chest pain, palpitations and leg swelling. Gastrointestinal: Negative for abdominal distention. Endocrine: Negative for cold intolerance and heat intolerance. Genitourinary: Negative for difficulty urinating. Musculoskeletal: Negative for arthralgias and joint swelling. Skin: Negative.     Neurological: Negative for dizziness and headaches. Hematological: Negative for adenopathy. Psychiatric/Behavioral: Negative for behavioral problems and sleep disturbance. Allergies   Allergen Reactions    Iv Contrast [Iodides] Swelling    Lisinopril Swelling     Swelling of upper lip    Simvastatin Other (See Comments)     Elevated CK/CKMB. Prior to Visit Medications    Medication Sig Taking? Authorizing Provider   IVETH DUGAN  Yes Historical Provider, MD   pregabalin (LYRICA) 225 MG capsule Take 1 capsule by mouth 2 times daily for 60 days.  Yes ELIGIO Jacome CNP   metoprolol tartrate (LOPRESSOR) 50 MG tablet Take 1 tablet by mouth 2 times daily Yes ELIGIO Jacome CNP   ezetimibe (ZETIA) 10 MG tablet TAKE 1 TABLET DAILY Yes ELIGIO Jacome CNP   montelukast (SINGULAIR) 10 MG tablet Take 1 tablet by mouth nightly Yes ELIGIO Jacome CNP   omeprazole (PRILOSEC) 20 MG delayed release capsule TAKE 1 CAPSULE DAILY in am on empty stomach Yes ELIGIO Jacome CNP   spironolactone (ALDACTONE) 25 MG tablet Take 1 tablet by mouth daily Yes ELIGIO Jacome CNP   doxazosin (CARDURA) 1 MG tablet TAKE 1 TABLET DAILY AT BEDTIME Yes ELIGIO Jacome CNP   naproxen (NAPROSYN) 375 MG tablet TAKE 1 TABLET TWICE DAILY  WITH MEALS Yes ELIGIO Jacome CNP   DULoxetine (CYMBALTA) 30 MG extended release capsule TAKE 1 CAPSULE DAILY Yes ELIGIO Jacome CNP   rOPINIRole (REQUIP) 1 MG tablet Take 1 tablet by mouth 2 times daily FOR RESTLESS LEGS Yes ELIGIO Jacome CNP   Cholecalciferol (VITAMIN D3) 50 MCG (2000 UT) CAPS Take by mouth daily  Yes Historical Provider, MD   Acetaminophen (TYLENOL PO) Take 500 mg by mouth every 6 hours as needed  Yes Historical Provider, MD   aspirin 81 MG EC tablet Take 81 mg by mouth daily  Yes Historical Provider, MD         Past Medical History:   Diagnosis Date    Allergic rhinitis     Arthritis     COVID-19 01/21/2021    not hospitalized : h/a, loss smell, tired abd ache,  no meds    Enlarged thyroid 2016    nodules check annually    Fibromyalgia     GERD (gastroesophageal reflux disease)     Headache(784.0)     migranes at times    Hyperlipidemia     Hypertension     Hypertension     NEIL (obstructive sleep apnea) 07/08/2016    Restless leg syndrome     Restless legs 07/08/2016    Sleep apnea        Past Surgical History:   Procedure Laterality Date    BLADDER SURGERY  9/9/14    bladder sling    CHOLECYSTECTOMY      CYSTOSCOPY Right 08/10/2018    Dr Trixie Jean- stent placement    HYSTERECTOMY (CERVIX STATUS UNKNOWN)      Total    KNEE SURGERY      LITHOTRIPSY Right 08/21/2018    stent placement    OTHER SURGICAL HISTORY  03-    Coaptite (Dr. Priya Moseley)    [place] CYSTO/URETERO W/LITHOTRIPSY &INDWELL STENT INSRT Right 8/21/2018    CYSTOSCOPY URETEROSCOPY LASER-HLL performed by Lee Lomas MD at [facility] INSRT Right 8/21/2018    CYSTOSCOPY STENT Aminata Ellis performed by Lee Lomas MD at [address] [facility] OFFICE/OUTPT [facility] Right 8/10/2018    CYSTOSCOPY STENT INSERTION performed by Lee Lomas MD at [address] [facility]            Family History   Problem Relation Age of Onset    Heart Disease Mother     High Blood Pressure Father     Heart Disease Father     High Blood Pressure Sister     High Blood Pressure Brother     Cancer Sister         Breast       Social History     Tobacco Use    Smoking status: Never Smoker    Smokeless tobacco: Never Used   Vaping Use    Vaping Use: Never used   Substance Use Topics    Alcohol use: No    Drug use: No       Objective   /70   Pulse 67   Ht 5' 2\" (1.575 m)   Wt 262 lb (118.8 kg)   LMP  (LMP Unknown)   SpO2 95%   BMI 47.92 kg/m²   Wt Readings from Last 3 Encounters:   06/20/22 262 lb (118.8 kg)   03/29/22 263 lb (119.3 kg)   03/14/22 264 lb (119.7 kg)     There were no vitals filed for this visit.      Physical Exam  Vitals and nursing note reviewed. Constitutional:       General: She is not in acute distress. Appearance: Normal appearance. She is well-developed. HENT:      Head: Normocephalic and atraumatic. Right Ear: External ear normal.      Left Ear: External ear normal.      Nose: No congestion. Mouth/Throat:      Mouth: Mucous membranes are moist.   Eyes:      General: No scleral icterus. Pupils: Pupils are equal, round, and reactive to light. Neck:      Vascular: No carotid bruit. Cardiovascular:      Rate and Rhythm: Normal rate and regular rhythm. Heart sounds: Normal heart sounds. No murmur heard. Pulmonary:      Effort: Pulmonary effort is normal. No respiratory distress. Breath sounds: Normal breath sounds. No wheezing. Abdominal:      Palpations: Abdomen is soft. Tenderness: There is no abdominal tenderness. Musculoskeletal:         General: Tenderness (TPT lumbar spine with radiculopathy) present. Normal range of motion. Cervical back: Normal range of motion and neck supple. Right lower leg: No edema. Left lower leg: No edema. Lymphadenopathy:      Cervical: No cervical adenopathy. Skin:     General: Skin is warm and dry. Findings: No rash. Neurological:      Mental Status: She is alert and oriented to person, place, and time. Psychiatric:         Behavior: Behavior normal.         Thought Content: Thought content normal.         Judgment: Judgment normal.           Assessment   Plan   1. Encounter for well adult exam without abnormal findings  2. Essential hypertension  -     metoprolol tartrate (LOPRESSOR) 50 MG tablet; Take 1 tablet by mouth 2 times daily, Disp-180 tablet, R-1Normal  -     montelukast (SINGULAIR) 10 MG tablet; Take 1 tablet by mouth nightly, Disp-90 tablet, R-1Normal  -     spironolactone (ALDACTONE) 25 MG tablet;  Take 1 tablet by mouth daily, Disp-90 tablet, R-1Normal  -     doxazosin (CARDURA) 1 MG tablet; TAKE 1 TABLET DAILY AT BEDTIME, Disp-90 tablet, R-3Normal  3. Lumbosacral radiculopathy at L4  -     pregabalin (LYRICA) 225 MG capsule; Take 1 capsule by mouth 2 times daily for 60 days. , Disp-180 capsule, R-0Normal  4. Elevated CK  -     pregabalin (LYRICA) 225 MG capsule; Take 1 capsule by mouth 2 times daily for 60 days. , Disp-180 capsule, R-0Normal  5. Fibromyalgia  -     pregabalin (LYRICA) 225 MG capsule; Take 1 capsule by mouth 2 times daily for 60 days. , Disp-180 capsule, R-0Normal  6. Neuropathic pain  -     pregabalin (LYRICA) 225 MG capsule; Take 1 capsule by mouth 2 times daily for 60 days. , Disp-180 capsule, R-0Normal  7. Mixed hyperlipidemia  -     ezetimibe (ZETIA) 10 MG tablet; TAKE 1 TABLET DAILY, Disp-90 tablet, R-1Normal  8. Gastroesophageal reflux disease without esophagitis  -     omeprazole (PRILOSEC) 20 MG delayed release capsule; TAKE 1 CAPSULE DAILY in am on empty stomach, Disp-90 capsule, R-1Normal  9. Breast cancer screening, high risk patient  -     [facility] YASMEEN DIGITAL SCREEN BILATERAL; Future  10. Family history of breast cancer in sister  -     [facility] YASMEEN DIGITAL SCREEN BILATERAL; Future  11.  Primary osteoarthritis of right knee  -     naproxen (NAPROSYN) 375 MG tablet; TAKE 1 TABLET TWICE DAILY  WITH MEALS, Disp-180 tablet, R-0Normal         Personalized Preventive Plan   Current Health Maintenance Status  Immunization History   Administered Date(s) Administered    Influenza Vaccine, unspecified formulation 10/24/2016    Influenza, Quadv, IM, PF (6 mo and older Fluzone, Flulaval, Fluarix, and 3 yrs and older Afluria) 11/08/2017, 11/07/2018        Health Maintenance   Topic Date Due    COVID-19 Vaccine (1) Never done    HIV screen  Never done    Hepatitis C screen  Never done    DTaP/Tdap/Td vaccine (1 - Tdap) Never done    Shingles vaccine (1 of 2) Never done    Colorectal Cancer Screen  10/21/2021    Flu vaccine (Season Ended) 09/01/2022    Depression Screen  03/14/2023    Breast cancer screen  07/12/2023    Lipids  06/24/2025    Hepatitis A vaccine  Aged Out    Hepatitis B vaccine  Aged Out    Hib vaccine  Aged Out    Meningococcal (ACWY) vaccine  Aged Out    Pneumococcal 0-64 years Vaccine  Aged Out     Recommendations for Pin digital Due: see orders and patient instructions/AVS.    Return in about 3 months (around 9/20/2022).

## 2022-06-24 ASSESSMENT — ENCOUNTER SYMPTOMS
ABDOMINAL DISTENTION: 0
WHEEZING: 0
SHORTNESS OF BREATH: 0
EYES NEGATIVE: 1
COUGH: 0
SORE THROAT: 0
CHEST TIGHTNESS: 0

## 2022-07-05 NOTE — PROGRESS NOTES
Call pt to find out where she did her original sleep study, wants to change companies and for supplies I need details of original order, I do not have copy of original sleep study. See notes from last OV>   Call pt and inquire details; may need retitration.

## 2022-07-07 NOTE — PROGRESS NOTES
I know there is no sleep study in the media of her chart or record for us, but pt has a sleep apnea machine from a company now which has to have orders with it. May need to call the company if pt unable to remember.    It is in my notes of the last OV>       Thanks

## 2022-07-08 ENCOUNTER — PATIENT MESSAGE (OUTPATIENT)
Dept: PRIMARY CARE CLINIC | Age: 57
End: 2022-07-08

## 2022-07-11 NOTE — TELEPHONE ENCOUNTER
From: So Potter  To: Pollo Due  Sent: 7/8/2022 9:57 PM EDT  Subject: cpap mask    This is the other mask I have from [facility]

## 2022-07-12 NOTE — TELEPHONE ENCOUNTER
Thanks for the info, we are having trouble locating your original sleep study, do you know when that was done? If it has been over 10 years, likely you will need to repeat it.      Let me know    thanks

## 2022-07-13 ENCOUNTER — HOSPITAL ENCOUNTER (OUTPATIENT)
Dept: ULTRASOUND IMAGING | Age: 57
Discharge: HOME | End: 2022-07-15
Payer: COMMERCIAL

## 2022-07-13 ENCOUNTER — HOSPITAL ENCOUNTER (OUTPATIENT)
Dept: MAMMOGRAPHY | Age: 57
Discharge: HOME | End: 2022-07-15
Payer: COMMERCIAL

## 2022-07-13 DIAGNOSIS — Z80.3 FAMILY HISTORY OF BREAST CANCER IN SISTER: ICD-10-CM

## 2022-07-13 DIAGNOSIS — Z12.39 BREAST CANCER SCREENING, HIGH RISK PATIENT: ICD-10-CM

## 2022-07-13 DIAGNOSIS — E04.1 THYROID NODULE: ICD-10-CM

## 2022-07-13 PROCEDURE — 77063 BREAST TOMOSYNTHESIS BI: CPT

## 2022-07-13 PROCEDURE — 76536 US EXAM OF HEAD AND NECK: CPT

## 2022-09-12 ENCOUNTER — OFFICE VISIT (OUTPATIENT)
Dept: FAMILY MEDICINE CLINIC | Age: 57
End: 2022-09-12
Payer: COMMERCIAL

## 2022-09-12 VITALS
DIASTOLIC BLOOD PRESSURE: 70 MMHG | HEART RATE: 75 BPM | SYSTOLIC BLOOD PRESSURE: 110 MMHG | OXYGEN SATURATION: 97 % | BODY MASS INDEX: 48.21 KG/M2 | WEIGHT: 262 LBS | HEIGHT: 62 IN

## 2022-09-12 DIAGNOSIS — M79.7 FIBROMYALGIA: ICD-10-CM

## 2022-09-12 DIAGNOSIS — G47.33 OSA (OBSTRUCTIVE SLEEP APNEA): ICD-10-CM

## 2022-09-12 DIAGNOSIS — R74.8 ELEVATED CK: ICD-10-CM

## 2022-09-12 DIAGNOSIS — M79.2 NEUROPATHIC PAIN: ICD-10-CM

## 2022-09-12 DIAGNOSIS — M54.17 LUMBOSACRAL RADICULOPATHY AT L4: ICD-10-CM

## 2022-09-12 DIAGNOSIS — I10 ESSENTIAL HYPERTENSION: ICD-10-CM

## 2022-09-12 DIAGNOSIS — E78.2 MIXED HYPERLIPIDEMIA: Primary | ICD-10-CM

## 2022-09-12 PROCEDURE — 99213 OFFICE O/P EST LOW 20 MIN: CPT | Performed by: NURSE PRACTITIONER

## 2022-09-12 RX ORDER — PREGABALIN 225 MG/1
225 CAPSULE ORAL 2 TIMES DAILY
Qty: 180 CAPSULE | Refills: 0 | Status: SHIPPED | OUTPATIENT
Start: 2022-09-12 | End: 2022-12-11

## 2022-09-12 NOTE — PROGRESS NOTES
[facility]  [facility]IMARY [facility]ARE [address] [id number]  Dept: [phone]  Dept Fax: [phone]    Last encounter 6/20/2022    Hypertension (3 month check up), Hyperlipidemia, and Sleep Apnea (Pt was asking for orders for a new cpap machine due to hers being old. She would like to use somewhere local, such as [employer] in [place])       HPI:   Truong Parker is a 62 y.o. female who presentstoday for her medical conditions/complaints as noted below. Truong Parker is c/o of Hypertension (3 month check up), Hyperlipidemia, and Sleep Apnea (Pt was asking for orders for a new cpap machine due to hers being old. She would like to use somewhere local, such as [employer] in [place])      HPI  Established patient    Pt with OA chris knees and gel injections in right knee at this time. Walking with no assistive devices  On naprosyn 375 mg bid and cymbalta     Dr. Priscilla Franklin hx in past.   Muscle bx was next step. Did not complete any further. Pain management. Pt leg pains well controlled with lyrica, also on cymbalta. Twitching has improved. On requip 1 mg bid       LYrica discussed wean down due to anticipation of leg surgery will not be able to use both this and narcotic. Need to consider as she moves closer to replacement   Also on cymbalta 30 mg daily  OARRS REVIEWED     Sleep apnea needs new equipment , 2016 titration done last. Weight stable. Pt benefits with improved sleep , energy and htn control. Htn on metoprolol , cardura and spironolactone. No dizziness, no chest pain, no headaches.      Vitamin D deficiency on supplement    Hypercholesterolemia on zetia, no statin due to chronic elevation in CK           Reviewed prior notes None  Reviewed previous Labs, Imaging, and Hospital Records    Past Medical History:   Diagnosis Date    Allergic rhinitis     Arthritis     COVID-19 01/21/2021    not hospitalized : h/a, loss smell, tired abd ache,  no meds    Enlarged thyroid 2016 nodules check annually    Fibromyalgia     GERD (gastroesophageal reflux disease)     Headache(784.0)     migranes at times    Hyperlipidemia     Hypertension     Hypertension     NEIL (obstructive sleep apnea) 07/08/2016    Restless leg syndrome     Restless legs 07/08/2016    Sleep apnea       Past Surgical History:   Procedure Laterality Date    BLADDER SURGERY  9/9/14    bladder sling    CHOLECYSTECTOMY      CYSTOSCOPY Right 08/10/2018    Dr Vicki Rodriguez- stent placement    HYSTERECTOMY (CERVIX STATUS UNKNOWN)      Total    KNEE SURGERY      LITHOTRIPSY Right 08/21/2018    stent placement    OTHER SURGICAL HISTORY  03-    Coaptite (Dr. Ying Vazquez)    [place] CYSTO/URETERO W/LITHOTRIPSY &INDWELL STENT INSRT Right 8/21/2018    CYSTOSCOPY URETEROSCOPY LASER-HLL performed by Nancy Bueno MD at [address] &INDWELL STENT INSRT Right 8/21/2018    CYSTOSCOPY STENT [facility] [place] performed by Nancy Bueno MD at [address] [place] [address] Dr OFFICE/OUTPT [address] [facility] Right 8/10/2018    CYSTOSCOPY STENT INSERTION performed by Nancy Bueno MD at [address] [facility]         Family History   Problem Relation Age of Onset    Heart Disease Mother     High Blood Pressure Father     Heart Disease Father     High Blood Pressure Sister     High Blood Pressure Brother     Cancer Sister         Breast       Social History     Tobacco Use    Smoking status: Never    Smokeless tobacco: Never   Substance Use Topics    Alcohol use: No      Current Outpatient Medications   Medication Sig Dispense Refill    pregabalin (LYRICA) 225 MG capsule Take 1 capsule by mouth in the morning and 1 capsule in the evening. Do all this for 90 days. L4 radiculopathy.  180 capsule 0    JUBLIA 10 % SOLN       metoprolol tartrate (LOPRESSOR) 50 MG tablet Take 1 tablet by mouth 2 times daily 180 tablet 1    ezetimibe (ZETIA) 10 MG tablet TAKE 1 TABLET DAILY 90 tablet 1    montelukast (SINGULAIR) 10 MG tablet Take 1 Negative for abdominal distention and diarrhea. Endocrine: Negative for cold intolerance and heat intolerance. Genitourinary:  Negative for difficulty urinating. Musculoskeletal:  Positive for myalgias. Negative for arthralgias. Skin:  Negative for rash. Neurological:  Negative for dizziness and light-headedness. Psychiatric/Behavioral:  Positive for decreased concentration and sleep disturbance. Negative for agitation, self-injury and suicidal ideas. The patient is nervous/anxious. Objective:     Physical Exam  Vitals and nursing note reviewed. Constitutional:       General: She is not in acute distress. Appearance: Normal appearance. She is well-developed. HENT:      Head: Normocephalic and atraumatic. Right Ear: Tympanic membrane and external ear normal.      Left Ear: Tympanic membrane and external ear normal.      Nose: No congestion. Mouth/Throat:      Mouth: Mucous membranes are moist.   Eyes:      General: No scleral icterus. Pupils: Pupils are equal, round, and reactive to light. Neck:      Vascular: No carotid bruit (neg chris). Cardiovascular:      Rate and Rhythm: Normal rate and regular rhythm. Heart sounds: Normal heart sounds. No murmur heard. Pulmonary:      Effort: Pulmonary effort is normal. No respiratory distress. Breath sounds: Normal breath sounds. No wheezing. Abdominal:      Palpations: Abdomen is soft. Tenderness: There is no abdominal tenderness. Musculoskeletal:         General: Normal range of motion. Cervical back: Normal range of motion and neck supple. Right lower leg: No edema. Left lower leg: No edema. Lymphadenopathy:      Cervical: No cervical adenopathy. Skin:     General: Skin is warm and dry. Findings: No rash. Neurological:      Mental Status: She is alert and oriented to person, place, and time. Psychiatric:         Behavior: Behavior normal.         Thought Content:  Thought content normal.         Judgment: Judgment normal.     /70 (Site: Right Upper Arm, Position: Sitting)   Pulse 75   Ht 5' 2\" (1.575 m)   Wt 262 lb (118.8 kg)   LMP  (LMP Unknown)   SpO2 97%   BMI 47.92 kg/m²     Data:     Lab Results   Component Value Date/Time     08/04/2021 12:00 AM    K 3.8 08/04/2021 12:00 AM     08/04/2021 12:00 AM    CO2 23 08/04/2021 12:00 AM    BUN 22 08/04/2021 12:00 AM    CREATININE 0.7 08/04/2021 12:00 AM    GLUCOSE 99 08/04/2021 12:00 AM    GLUCOSE 95 02/14/2012 10:05 AM    PROT 6.8 10/27/2020 01:32 PM    LABALBU 4.5 06/24/2020 11:31 AM    LABALBU 4.6 02/14/2012 10:05 AM    BILITOT 0.35 06/24/2020 11:31 AM    ALKPHOS 89 06/24/2020 11:31 AM    AST 22 06/24/2020 11:31 AM    ALT 28 06/24/2020 11:31 AM     Lab Results   Component Value Date/Time    WBC 6.3 08/04/2021 12:00 AM    RBC 4.8 08/04/2021 12:00 AM    HGB 13.7 08/04/2021 12:00 AM    HCT 40.9 08/04/2021 12:00 AM    MCV 85.5 08/04/2021 12:00 AM    MCH 28.6 08/04/2021 12:00 AM    MCHC 33.5 08/04/2021 12:00 AM    RDW 13.5 06/23/2020 10:57 AM     08/04/2021 12:00 AM    MPV 7.9 08/04/2021 12:00 AM     Lab Results   Component Value Date/Time    TSH 1.39 06/24/2020 11:31 AM     Lab Results   Component Value Date/Time    CHOL 176 06/24/2020 11:31 AM    CHOL 249 05/03/2018 12:00 AM    HDL 56 06/24/2020 11:31 AM    LABA1C 5.6 06/24/2020 11:31 AM          Assessment & Plan       1. Lumbosacral radiculopathy at L4    - pregabalin (LYRICA) 225 MG capsule; Take 1 capsule by mouth in the morning and 1 capsule in the evening. Do all this for 90 days. L4 radiculopathy. Dispense: 180 capsule; Refill: 0    2. Elevated CK    - pregabalin (LYRICA) 225 MG capsule; Take 1 capsule by mouth in the morning and 1 capsule in the evening. Do all this for 90 days. L4 radiculopathy. Dispense: 180 capsule; Refill: 0    3. Fibromyalgia    - pregabalin (LYRICA) 225 MG capsule; Take 1 capsule by mouth in the morning and 1 capsule in the evening. Do all this for 90 days. L4 radiculopathy. Dispense: 180 capsule; Refill: 0    4. Neuropathic pain    - pregabalin (LYRICA) 225 MG capsule; Take 1 capsule by mouth in the morning and 1 capsule in the evening. Do all this for 90 days. L4 radiculopathy. Dispense: 180 capsule; Refill: 0    5. Essential hypertension  Well controlled       6. Mixed hyperlipidemia  On zetia, elevated ck , no statins  7. NEIL (obstructive sleep apnea)  Needs new equipment- refer to pt for company. Completed Refills   Requested Prescriptions     Signed Prescriptions Disp Refills    pregabalin (LYRICA) 225 MG capsule 180 capsule 0     Sig: Take 1 capsule by mouth in the morning and 1 capsule in the evening. Do all this for 90 days. L4 radiculopathy. No follow-ups on file. Orders Placed This Encounter   Medications    pregabalin (LYRICA) 225 MG capsule     Sig: Take 1 capsule by mouth in the morning and 1 capsule in the evening. Do all this for 90 days. L4 radiculopathy. Dispense:  180 capsule     Refill:  0     No orders of the defined types were placed in this encounter. On this date 9/12/2022 I have spent 27 minutes reviewing previous notes, test results and face to face with the patient discussing the diagnosis and importance of compliance with the treatment plan as well as documenting on the day of the visit.      Electronically signed by ELIGIO Austin CNP on 9/21/2022 at 11:00 PM

## 2022-09-12 NOTE — PATIENT INSTRUCTIONS
SURVEY:    You may be receiving a survey from [employer] regarding your visit today. Please complete the survey to enable us to provide the highest quality of care to you and your family. If you cannot score us a very good on any question, please call the office to discuss how we could have made your experience a very good one. Thank you. Phone: [phone]  Fax: [phone] Office Hours:  Monday: Fransico De Guzman office location 8-5 ([phone]) Offering additional late hours the first Monday of the month until 7 pm.   Tuesday: 8-5 Wednesday: 8-5 Thursday:  Additional hours offered 2 Thursdays a month. Please call to inquire those dates. Fridays: 7:30-4:30       Wegovy (ozempic)  1 x week injection, consider for wt loss management.      Any family hx Papillary thyroid cancer (MEN 1/2 medullary endocrine neoplasia)

## 2022-09-21 ASSESSMENT — ENCOUNTER SYMPTOMS
SORE THROAT: 0
ABDOMINAL DISTENTION: 0
RHINORRHEA: 0
DIARRHEA: 0
SHORTNESS OF BREATH: 0
EYES NEGATIVE: 1
CHEST TIGHTNESS: 0

## 2022-09-28 ENCOUNTER — HOSPITAL ENCOUNTER (OUTPATIENT)
Dept: GENERAL RADIOLOGY | Age: 57
Discharge: HOME | End: 2022-09-30
Payer: COMMERCIAL

## 2022-09-28 ENCOUNTER — HOSPITAL ENCOUNTER (OUTPATIENT)
Age: 57
Discharge: HOME | End: 2022-09-30
Payer: COMMERCIAL

## 2022-09-28 DIAGNOSIS — N20.0 KIDNEY STONES: ICD-10-CM

## 2022-09-28 PROCEDURE — 74018 RADEX ABDOMEN 1 VIEW: CPT

## 2022-09-29 ENCOUNTER — OFFICE VISIT (OUTPATIENT)
Dept: UROLOGY | Age: 57
End: 2022-09-29
Payer: COMMERCIAL

## 2022-09-29 VITALS
BODY MASS INDEX: 48.4 KG/M2 | DIASTOLIC BLOOD PRESSURE: 92 MMHG | WEIGHT: 263 LBS | HEIGHT: 62 IN | SYSTOLIC BLOOD PRESSURE: 162 MMHG

## 2022-09-29 DIAGNOSIS — N20.0 KIDNEY STONES: Primary | ICD-10-CM

## 2022-09-29 PROCEDURE — 99213 OFFICE O/P EST LOW 20 MIN: CPT | Performed by: PHYSICIAN ASSISTANT

## 2022-09-29 ASSESSMENT — ENCOUNTER SYMPTOMS
EYE REDNESS: 0
COUGH: 0
ABDOMINAL PAIN: 0
COLOR CHANGE: 0
CONSTIPATION: 0
APNEA: 0
NAUSEA: 0
WHEEZING: 0
SHORTNESS OF BREATH: 0
VOMITING: 0
BACK PAIN: 0

## 2022-09-29 NOTE — PATIENT INSTRUCTIONS
STONE PREVENTION    To prevent future stone formation:    1) DO drink ~65-80 oz (2-2.5L) of water per day to stay adequately hydrated     2) AVOID/LIMIT intake of \"bad fluids\": soda/pop, coffee, tea, alcohol, energy drinks, any beverage with caffeine can act to dehydrate you       \"BAD FLUIDS\" DO NOT COUNT TOWARD THE 65-80oz of water    3) REDUCE consumption of sodium/salt - DO NOT salt your food, read labels (lunch meats, canned soups, prepared meals are high in salt), choose low salt options    4) DO NOT EAT animal protein/meat more than 2 meals a day - this includes red meat, pork, poultry and fish    SURVEY:    You may be receiving a survey from [employer] regarding your visit today. Please complete the survey to enable us to provide the highest quality of care to you and your family. If you cannot score us a very good on any question, please call the office to discuss how we could have made your experience a very good one. Thank you.

## 2022-09-29 NOTE — PROGRESS NOTES
HPI:    Patient is a 62 y.o. female in no acute distress. She is alert and oriented to person, place, and time. History  Referral from the ER for right ureteral stone. She did present to the ER on 7/27/17 with complaints of right flank pain and nausea. While at the ER she did have a CT scan did show a 3 mm bladder stone with right hydronephrosis and right hydroureter. She does have 2 additional punctate nonobstructing stones in the right kidney. There are no calcifications in the left. She states when she did leave the ER she did pass her stone. She did bring this with her to today's visit. Since being evaluated in the ER she has not experienced any pain, nausea or vomiting, fevers, dysuria, gross hematuria, frequency, or urgency. She denies any previous stone history. She has seen urology in the past for evaluation of stress incontinence. Her gynecologist did perform one treatment of urethral bulking. She did have a mid urethral sling placed 3 years ago. She has not had any new or worsening urinary incontinence since. Right HLL 8/21/2018    3/2022 -spontaneously passed 2 stones    Today:  Patient is here today for follow-up kidney stones. She denies any additional stone passage over the last 6 months. She denies any fever, chills, gross hematuria, flank pain, dysuria. She denies any nocturia. Patient had KUB prior visit today which independent reviewed showing possible punctate stones bilaterally. Patient does have stool/gas overlying the right kidney shadow therefore is difficult to determine any significant calculi.  (Previously seen 5 to 6 mm stone on the right unable to be visualized on KUB)    Past Medical History:   Diagnosis Date    Allergic rhinitis     Arthritis     COVID-19 01/21/2021    not hospitalized : h/a, loss smell, tired abd ache,  no meds    Enlarged thyroid 2016    nodules check annually    Fibromyalgia     GERD (gastroesophageal reflux disease)     Headache(784.0) migranes at times    Hyperlipidemia     Hypertension     Hypertension     NEIL (obstructive sleep apnea) 07/08/2016    Restless leg syndrome     Restless legs 07/08/2016    Sleep apnea      Past Surgical History:   Procedure Laterality Date    BLADDER SURGERY  9/9/14    bladder sling    CHOLECYSTECTOMY      CYSTOSCOPY Right 08/10/2018    Dr Daniel Seek- stent placement    HYSTERECTOMY (CERVIX STATUS UNKNOWN)      Total    KNEE SURGERY      LITHOTRIPSY Right 08/21/2018    stent placement    OTHER SURGICAL HISTORY  03-    [facility] (Dr. Brody Figures)    DE CYSTO/URETERO W/LITHOTRIPSY &[facility] STENT INSRT Right 8/21/2018    CYSTOSCOPY URETEROSCOPY LASER-HLL performed by Argelia Thornton MD at [address] &INDWELL [address] Right 8/21/2018    CYSTOSCOPY STENT Cat Contreras performed by Argelia Thornton MD at [address] OFFICE/OUTPT [facility] Right 8/10/2018    CYSTOSCOPY STENT INSERTION performed by Argelia Thornton MD at [address]       Outpatient Encounter Medications as of 9/29/2022   Medication Sig Dispense Refill    pregabalin (LYRICA) 225 MG capsule Take 1 capsule by mouth in the morning and 1 capsule in the evening. Do all this for 90 days. L4 radiculopathy.  180 capsule 0    JUBLIA 10 % SOLN       metoprolol tartrate (LOPRESSOR) 50 MG tablet Take 1 tablet by mouth 2 times daily 180 tablet 1    ezetimibe (ZETIA) 10 MG tablet TAKE 1 TABLET DAILY 90 tablet 1    montelukast (SINGULAIR) 10 MG tablet Take 1 tablet by mouth nightly 90 tablet 1    omeprazole (PRILOSEC) 20 MG delayed release capsule TAKE 1 CAPSULE DAILY in am on empty stomach 90 capsule 1    spironolactone (ALDACTONE) 25 MG tablet Take 1 tablet by mouth daily 90 tablet 1    doxazosin (CARDURA) 1 MG tablet TAKE 1 TABLET DAILY AT BEDTIME 90 tablet 3    naproxen (NAPROSYN) 375 MG tablet TAKE 1 TABLET TWICE DAILY  WITH MEALS 180 tablet 0    DULoxetine (CYMBALTA) 30 MG extended release capsule TAKE 1 CAPSULE DAILY 90 capsule 1    rOPINIRole (REQUIP) 1 MG tablet Take 1 tablet by mouth 2 times daily FOR RESTLESS LEGS 180 tablet 1    Cholecalciferol (VITAMIN D3) 50 MCG (2000 UT) CAPS Take by mouth daily       Acetaminophen (TYLENOL PO) Take 500 mg by mouth every 6 hours as needed       aspirin 81 MG EC tablet Take 81 mg by mouth daily        No facility-administered encounter medications on file as of 9/29/2022. Current Outpatient Medications on File Prior to Visit   Medication Sig Dispense Refill    pregabalin (LYRICA) 225 MG capsule Take 1 capsule by mouth in the morning and 1 capsule in the evening. Do all this for 90 days. L4 radiculopathy. 180 capsule 0    JUBLIA 10 % SOLN       metoprolol tartrate (LOPRESSOR) 50 MG tablet Take 1 tablet by mouth 2 times daily 180 tablet 1    ezetimibe (ZETIA) 10 MG tablet TAKE 1 TABLET DAILY 90 tablet 1    montelukast (SINGULAIR) 10 MG tablet Take 1 tablet by mouth nightly 90 tablet 1    omeprazole (PRILOSEC) 20 MG delayed release capsule TAKE 1 CAPSULE DAILY in am on empty stomach 90 capsule 1    spironolactone (ALDACTONE) 25 MG tablet Take 1 tablet by mouth daily 90 tablet 1    doxazosin (CARDURA) 1 MG tablet TAKE 1 TABLET DAILY AT BEDTIME 90 tablet 3    naproxen (NAPROSYN) 375 MG tablet TAKE 1 TABLET TWICE DAILY  WITH MEALS 180 tablet 0    DULoxetine (CYMBALTA) 30 MG extended release capsule TAKE 1 CAPSULE DAILY 90 capsule 1    rOPINIRole (REQUIP) 1 MG tablet Take 1 tablet by mouth 2 times daily FOR RESTLESS LEGS 180 tablet 1    Cholecalciferol (VITAMIN D3) 50 MCG (2000 UT) CAPS Take by mouth daily       Acetaminophen (TYLENOL PO) Take 500 mg by mouth every 6 hours as needed       aspirin 81 MG EC tablet Take 81 mg by mouth daily        No current facility-administered medications on file prior to visit.      Iv contrast [iodides], Lisinopril, and Simvastatin  Family History   Problem Relation Age of Onset    Heart Disease Mother     High Blood Pressure Father Heart Disease Father     High Blood Pressure Sister     High Blood Pressure Brother     Cancer Sister         Breast     Social History     Tobacco Use   Smoking Status Never   Smokeless Tobacco Never       Social History     Substance and Sexual Activity   Alcohol Use No       Review of Systems   Constitutional:  Negative for appetite change, chills and fever. Eyes:  Negative for redness and visual disturbance. Respiratory:  Negative for apnea, cough, shortness of breath and wheezing. Cardiovascular:  Negative for chest pain and leg swelling. Gastrointestinal:  Negative for abdominal pain, constipation, nausea and vomiting. Genitourinary:  Negative for difficulty urinating, dyspareunia, dysuria, enuresis, flank pain, frequency, hematuria, pelvic pain, urgency, vaginal bleeding and vaginal discharge. Musculoskeletal:  Negative for back pain, joint swelling and myalgias. Skin:  Negative for color change, rash and wound. Neurological:  Negative for dizziness, tremors and numbness. Hematological:  Negative for adenopathy. Does not bruise/bleed easily. Psychiatric/Behavioral:  Negative for sleep disturbance. BP (!) 162/92   Ht 5' 2\" (1.575 m)   Wt 263 lb (119.3 kg)   LMP  (LMP Unknown)   BMI 48.10 kg/m²       PHYSICAL EXAM:  Constitutional: Patient resting comfortably, in no acute distress. Neuro: Alert and oriented to person place and time. Psych: Mood and affect normal.  HEENT: normocephalic, atraumatic  Lungs: Respiratory effort normal, unlabored  Abdomen: Soft, non-tender, non-distended  : No CVA tenderness. Pelvic: deferred     Lab Results   Component Value Date    BUN 22 (H) 08/04/2021     Lab Results   Component Value Date    CREATININE 0.7 08/04/2021       ASSESSMENT:   Diagnosis Orders   1.  Kidney stones  XR ABDOMEN (KUB) (SINGLE AP VIEW)          PLAN:  Patient instructed to drink at least 80 ounces of \"good fluids\" daily (water, juice, Gatoraide, milk) and to avoid/minimize intake of \"bad fluids\" (soda pop, coffee, tea, alcohol, energy drinks). Also advised to avoid excessive consumption of sodium and animal protein to decrease risk of recurrent kidney stones. We did offer her HL L today.   She declined    Follow-up in 1 year with [facility]

## 2022-10-03 NOTE — PROGRESS NOTES
Patient would like orders sent to [facility] in [place]. Attempted to reach company.  Left VM to return call to the office

## 2022-10-13 DIAGNOSIS — R74.8 ELEVATED CK: ICD-10-CM

## 2022-10-13 DIAGNOSIS — M79.2 NEUROPATHIC PAIN: ICD-10-CM

## 2022-10-13 DIAGNOSIS — M54.17 LUMBOSACRAL RADICULOPATHY AT L4: ICD-10-CM

## 2022-10-13 DIAGNOSIS — M79.7 FIBROMYALGIA: ICD-10-CM

## 2022-10-13 DIAGNOSIS — G25.81 RESTLESS LEG: ICD-10-CM

## 2022-10-13 DIAGNOSIS — M17.11 PRIMARY OSTEOARTHRITIS OF RIGHT KNEE: ICD-10-CM

## 2022-10-14 RX ORDER — ROPINIROLE 1 MG/1
TABLET, FILM COATED ORAL
Qty: 180 TABLET | Refills: 1 | Status: SHIPPED | OUTPATIENT
Start: 2022-10-14

## 2022-10-14 RX ORDER — DULOXETIN HYDROCHLORIDE 30 MG/1
CAPSULE, DELAYED RELEASE ORAL
Qty: 90 CAPSULE | Refills: 1 | Status: SHIPPED | OUTPATIENT
Start: 2022-10-14

## 2022-10-14 RX ORDER — NAPROXEN 375 MG/1
TABLET ORAL
Qty: 180 TABLET | Refills: 0 | Status: SHIPPED | OUTPATIENT
Start: 2022-10-14

## 2022-10-25 DIAGNOSIS — G47.30 SLEEP APNEA, UNSPECIFIED TYPE: Primary | ICD-10-CM

## 2022-10-25 DIAGNOSIS — I10 PRIMARY HYPERTENSION: ICD-10-CM

## 2022-10-25 DIAGNOSIS — G25.81 RESTLESS LEGS: ICD-10-CM

## 2022-10-25 NOTE — PROGRESS NOTES
Orders placed for sleep apnea titration, no numbers for orders on prior sleep study info from 2016 severe apnea with desat to 78%     Also inform patient of retitration needed and form needs filled out at office for pulmonology to read study. She will also need to see pulmonology in follow up to continue cpap monitoring , must be done within 3 months after titration to pay for supplies and monitor. Pt also is on high dose med for her back and restless leg should consider to start wean some related to future planned knee replacement with Dr. Keyona Nguyen. Would have her also see dr. Merlinda Eke pain management at [facility] to follow and wean. Thanks. (I need to put referral in to Merlinda Eke yet if pt agreeable back to me)         Updated 10/29/22     All referrals to Dr. Merlinda Eke and pulmonologist done. Sleep study retitration ordered. Pt plannind knee replacement which will need pain control.  Pt likely will have to wean

## 2022-10-27 NOTE — PROGRESS NOTES
Pt informed and willing to having retitration as well as Pulmonology follow-up. Also agreeable in  having a consult with Liana, however happy with the meds she is on for her restless leg and back, states helps very much and able to go to work.

## 2022-11-30 ENCOUNTER — HOSPITAL ENCOUNTER (OUTPATIENT)
Dept: SLEEP CENTER | Age: 57
Discharge: HOME | End: 2022-11-30
Payer: COMMERCIAL

## 2022-11-30 DIAGNOSIS — G25.81 RESTLESS LEGS: ICD-10-CM

## 2022-11-30 DIAGNOSIS — I10 PRIMARY HYPERTENSION: ICD-10-CM

## 2022-11-30 DIAGNOSIS — G47.30 SLEEP APNEA, UNSPECIFIED TYPE: ICD-10-CM

## 2022-11-30 PROCEDURE — 95811 POLYSOM 6/>YRS CPAP 4/> PARM: CPT

## 2022-12-01 ASSESSMENT — SLEEP AND FATIGUE QUESTIONNAIRES
NORMAL AMOUNT OF SLEEP PER NIGHT: 6.5
HOW LIKELY ARE YOU TO NOD OFF OR FALL ASLEEP WHEN YOU ARE A PASSENGER IN A CAR FOR AN HOUR WITHOUT A BREAK: 0
NUMBER OF TIMES YOU WAKE PER NIGHT: 0
ESS TOTAL SCORE: 10
USUAL AMOUNT OF TIME TO FALL ASLEEP (MIN): 5
HOW LIKELY ARE YOU TO NOD OFF OR FALL ASLEEP WHILE SITTING AND TALKING TO SOMEONE: 0
WHAT TIME DO YOU USUALLY WAKE UP: 14400
HOW LIKELY ARE YOU TO NOD OFF OR FALL ASLEEP IN A CAR, WHILE STOPPED FOR A FEW MINUTES IN TRAFFIC: 0
HOW LIKELY ARE YOU TO NOD OFF OR FALL ASLEEP WHILE LYING DOWN TO REST IN THE AFTERNOON WHEN CIRCUMSTANCES PERMIT: 3
DO YOU SNORE: NO
HOW MANY NAPS DO YOU TAKE PER WEEK: 2
WHAT TIME DO YOU USUALLY GO TO BED: 77400
HOW LIKELY ARE YOU TO NOD OFF OR FALL ASLEEP WHILE SITTING QUIETLY AFTER LUNCH WITHOUT ALCOHOL: 1
HOW LIKELY ARE YOU TO NOD OFF OR FALL ASLEEP WHILE SITTING AND READING: 3
HOW LIKELY ARE YOU TO NOD OFF OR FALL ASLEEP WHILE WATCHING TV: 2
HOW LIKELY ARE YOU TO NOD OFF OR FALL ASLEEP WHILE SITTING INACTIVE IN A PUBLIC PLACE: 1

## 2022-12-06 NOTE — PATIENT INSTRUCTIONS
birth control pills less effective. Ask your doctor about using a non-hormone method of birth control (such as a condom, diaphragm, spermicide) to prevent pregnancy while taking amoxicillin and clavulanate potassium. What is amoxicillin and clavulanate potassium? Amoxicillin is an antibiotic in a group of drugs called penicillins. Amoxicillin fights bacteria in the body. Clavulanate potassium is a form of clavulanic acid, which is similar to penicillin. Clavulanate potassium fights bacteria that is often resistant to penicillins and other antibiotics. The combination of amoxicillin and clavulanate potassium is used to treat many different infections caused by bacteria, such as sinusitis, pneumonia, ear infections, bronchitis, urinary tract infections, and infections of the skin. Amoxicillin and clavulanate potassium may also be used for purposes not listed in this medication guide. What should I discuss with my healthcare provider before taking amoxicillin and clavulanate potassium? Do not use this medication if you are allergic to amoxicillin or clavulanate potassium, or if you have ever had liver problems caused by this medication. Do not use if you are allergic to any other penicillin antibiotic, such as amoxicillin (Amoxil, Augmentin, Dispermox, Moxatag), ampicillin (Principen, Unasyn), dicloxacillin (Dycill, Dynapen), oxacillin (Bactocill), or penicillin (Bicillin L-A, PC Pen VK, Pfizerpen)), and others. To make sure you can safely take this medicine, tell your doctor if you have any of these other conditions:  · liver disease (or a history of hepatitis or jaundice);  · kidney disease;  · mononucleosis; or  · if you are allergic to a cephalosporin antibiotic, such as cefdinir (Omnicef), cefprozil (Cefzil), cefuroxime (Ceftin), cephalexin (Keflex), and others. FDA pregnancy category B. This medication is not expected to be harmful to an unborn baby.  Tell your doctor if you are pregnant or plan to amoxicillin and clavulanate potassium. Shake the liquid form of this medicine well just before you measure a dose. To be sure you get the correct dose, measure the liquid with a marked measuring spoon or medicine cup, not with a regular table spoon. If you do not have a dose-measuring device, ask your pharmacist for one. Take this medication for the full prescribed length of time. Your symptoms may improve before the infection is completely cleared. Skipping doses may also increase your risk of further infection that is resistant to antibiotics. Amoxicillin and clavulanate potassium will not treat a viral infection such as the common cold or flu. This medication can cause false results with certain lab tests for glucose (sugar) in the urine. Tell any doctor who treats you that you are using amoxicillin and clavulanate potassium. Store the tablets at room temperature away from moisture and heat. Store the liquid  in the refrigerator. Throw away any unused liquid after 10 days. What happens if I miss a dose? Take the missed dose as soon as you remember. Skip the missed dose if it is almost time for your next scheduled dose. Do not take extra medicine to make up the missed dose. What happens if I overdose? Seek emergency medical attention or call the Poison Help line at 1-607.201.6158. Overdose can cause nausea, vomiting, stomach pain, diarrhea, skin rash, drowsiness, and hyperactivity. What should I avoid while taking amoxicillin and clavulanate potassium? Antibiotic medicines can cause diarrhea, which may be a sign of a new infection. If you have diarrhea that is watery or has blood in it, stop taking this medication and call your doctor. Do not use any medicine to stop the diarrhea unless your doctor has told you to. What are the possible side effects of amoxicillin and clavulanate potassium?   Get emergency medical help if you have any of these signs of an allergic reaction: hives; difficulty breathing; finger. ¨ Drop or squirt the medicine inside the lower lid. ¨ Close your eye for 30 to 60 seconds to let the drops or ointment move around. ¨ Do not touch the ointment or dropper tip to your eyelashes or any other surface. · Do not share towels, pillows, or washcloths while you have pinkeye. When should you call for help? Call your doctor now or seek immediate medical care if:  ? · You have pain in your eye, not just irritation on the surface. ? · You have a change in vision or loss of vision. ? · You have an increase in discharge from the eye.   ? · Your eye has not started to improve or begins to get worse within 48 hours after you start using antibiotics. ? · Pinkeye lasts longer than 7 days. ? Watch closely for changes in your health, and be sure to contact your doctor if you have any problems. Where can you learn more? Go to https://[email or web address]. org and sign in to your [employer] account. Enter Y392 in the [employer] box to learn more about \"Pinkeye: Care Instructions. \"     If you do not have an account, please click on the \"Sign Up Now\" link. Current as of: March 20, 2017  Content Version: 11.5  © 8467-2969 [employer], [employer]. Care instructions adapted under license by [facility] ([facility]). If you have questions about a medical condition or this instruction, always ask your healthcare professional. [place] [address] any warranty or liability for your use of this information. Sinusitis: Care Instructions  Your Care Instructions    Sinusitis is an infection of the lining of the sinus cavities in your head. Sinusitis often follows a cold. It causes pain and pressure in your head and face. In most cases, sinusitis gets better on its own in 1 to 2 weeks. But some mild symptoms may last for several weeks. Sometimes antibiotics are needed. Follow-up care is a key part of your treatment and safety.  Be sure to make and go to all appointments, and call PROVIDER:[TOKEN:[[id number]],FOLLOWUP:[4-6 Days]]

## 2022-12-12 ENCOUNTER — OFFICE VISIT (OUTPATIENT)
Dept: FAMILY MEDICINE CLINIC | Age: 57
End: 2022-12-12

## 2022-12-12 VITALS
DIASTOLIC BLOOD PRESSURE: 72 MMHG | SYSTOLIC BLOOD PRESSURE: 122 MMHG | HEART RATE: 80 BPM | BODY MASS INDEX: 48.58 KG/M2 | OXYGEN SATURATION: 96 % | HEIGHT: 62 IN | WEIGHT: 264 LBS

## 2022-12-12 DIAGNOSIS — E55.9 VITAMIN D DEFICIENCY: ICD-10-CM

## 2022-12-12 DIAGNOSIS — E78.2 MIXED HYPERLIPIDEMIA: ICD-10-CM

## 2022-12-12 DIAGNOSIS — K21.9 GASTROESOPHAGEAL REFLUX DISEASE WITHOUT ESOPHAGITIS: ICD-10-CM

## 2022-12-12 DIAGNOSIS — R74.8 ELEVATED CK: ICD-10-CM

## 2022-12-12 DIAGNOSIS — Z13.29 SCREENING FOR THYROID DISORDER: ICD-10-CM

## 2022-12-12 DIAGNOSIS — M79.2 NEUROPATHIC PAIN: ICD-10-CM

## 2022-12-12 DIAGNOSIS — G25.81 RESTLESS LEG: ICD-10-CM

## 2022-12-12 DIAGNOSIS — M79.7 FIBROMYALGIA: ICD-10-CM

## 2022-12-12 DIAGNOSIS — M54.17 LUMBOSACRAL RADICULOPATHY AT L4: Primary | ICD-10-CM

## 2022-12-12 DIAGNOSIS — I10 ESSENTIAL HYPERTENSION: ICD-10-CM

## 2022-12-12 DIAGNOSIS — M17.11 PRIMARY OSTEOARTHRITIS OF RIGHT KNEE: ICD-10-CM

## 2022-12-12 NOTE — PROGRESS NOTES
[facility]  [facility] [address] [id number]  Dept: [phone]  Dept Fax: [phone]    Last encounter 9/12/2022    Chronic Pain (Med check/follow-up: Lyrica)       HPI:   Sonya Aschoff is a 62 y.o. female who presentstoday for her medical conditions/complaints as noted below. Sonya Aschoff is c/o of Chronic Pain (Med check/follow-up: Lyrica)      HPI  Established patient    61 YO female presents for a check up. Works at [employer] on [address], she relieves other people's breaks so she is walking around more. She recently had a sleep study to re-certify for supplies for her CPAP. Has not received machine yet. Recommended seeing ENT for obstructive features of sleep apnea, she is not agreeable to this yet, she feels she has several bills that need paid. Compliant with her medications, no missed doses. aware lyrica high dose increases risks with sleep apnea and will refer to pain management for medication evaluation,   Pt likely will need  to lower doses or adjust in anticipation of upcoming knee replacement surgery planned by Dr. Jose Anaya orthopedic in [address] [place] [address] within the next year. Pt was with Dr. Daryl Ureña pain management until 2020 then as covid 19 pandemic started pt did not travel to [facility]. Pt with hx lumbar facet medial branch nerve block on the left at the L3 and L4 levels, as well as the L5 dorsal ramus under fluoroscopic guidance for Lumbosacral spondylosis last injection noted in media from 10/22/2019     Saw Dr Jose Anaya for knee injections last month. She has been wearing a knee brace. She questions if she can post-pone seeing pain mtg and ENT until next year with new INS deductible. Patient also seen Dr. Champ Catalan neurologist in past due to chronically elevated CK in the past. , did not have muscle biopsy done but considered with pt. Pt not on statins due to this CK level, pt has not done labs and they are overdue.  I have asked her to please do labs . Also pt wants to consider weight loss options with BMI 48.29 which also aggravates her back and leg issues. Hypercholesterolemia not managed with a statin.      Reviewed prior notes Orthopedics  Reviewed previous Labs, Imaging, and Hospital Records    Past Medical History:   Diagnosis Date    Allergic rhinitis     Arthritis     COVID-19 01/21/2021    not hospitalized : h/a, loss smell, tired abd ache,  no meds    Enlarged thyroid 2016    nodules check annually    Fibromyalgia     GERD (gastroesophageal reflux disease)     Headache(784.0)     migranes at times    Hyperlipidemia     Hypertension     Hypertension     NEIL (obstructive sleep apnea) 07/08/2016    Restless leg syndrome     Restless legs 07/08/2016    Sleep apnea       Past Surgical History:   Procedure Laterality Date    BLADDER SURGERY  9/9/14    bladder sling    CHOLECYSTECTOMY      CYSTOSCOPY Right 08/10/2018    Dr Greta Aguilera- stent placement    HYSTERECTOMY (CERVIX STATUS UNKNOWN)      Total    KNEE SURGERY      LITHOTRIPSY Right 08/21/2018    stent placement    OTHER SURGICAL HISTORY  03-    Coaptite (Dr. Derrell Abdul)    [place] CYSTO/URETERO W/LITHOTRIPSY &[place] STENT INSRT Right 8/21/2018    CYSTOSCOPY URETEROSCOPY LASER-HLL performed by Antwan Rivas MD at [facility] &[facility] STENT INSRT Right 8/21/2018    CYSTOSCOPY STENT Leeroy Plater performed by Antwan Rivas MD at [address] [facility] OFFICE/OUTPT [address] [place] [address] Right 8/10/2018    CYSTOSCOPY STENT INSERTION performed by Antwan Rivas MD at [facility]         Family History   Problem Relation Age of Onset    Heart Disease Mother     High Blood Pressure Father     Heart Disease Father     High Blood Pressure Sister     High Blood Pressure Brother     Cancer Sister         Breast       Social History     Tobacco Use    Smoking status: Never    Smokeless tobacco: Never   Substance Use Topics    Alcohol use: No      Current Outpatient Medications   Medication Sig Dispense Refill    DULoxetine (CYMBALTA) 30 MG extended release capsule TAKE 1 CAPSULE DAILY 90 capsule 1    rOPINIRole (REQUIP) 1 MG tablet TAKE 1 TABLET TWICE A DAY  FOR RESTLESS LEGS 180 tablet 1    naproxen (NAPROSYN) 375 MG tablet TAKE 1 TABLET TWICE DAILY  WITH MEALS 180 tablet 0    pregabalin (LYRICA) 225 MG capsule Take 1 capsule by mouth in the morning and 1 capsule in the evening. Do all this for 90 days. L4 radiculopathy. 180 capsule 0    metoprolol tartrate (LOPRESSOR) 50 MG tablet Take 1 tablet by mouth 2 times daily 180 tablet 1    ezetimibe (ZETIA) 10 MG tablet TAKE 1 TABLET DAILY 90 tablet 1    montelukast (SINGULAIR) 10 MG tablet Take 1 tablet by mouth nightly 90 tablet 1    omeprazole (PRILOSEC) 20 MG delayed release capsule TAKE 1 CAPSULE DAILY in am on empty stomach 90 capsule 1    spironolactone (ALDACTONE) 25 MG tablet Take 1 tablet by mouth daily 90 tablet 1    doxazosin (CARDURA) 1 MG tablet TAKE 1 TABLET DAILY AT BEDTIME 90 tablet 3    Cholecalciferol (VITAMIN D3) 50 MCG (2000 UT) CAPS Take by mouth daily       Acetaminophen (TYLENOL PO) Take 500 mg by mouth every 6 hours as needed       aspirin 81 MG EC tablet Take 81 mg by mouth daily       JUBLIA 10 % SOLN  (Patient not taking: Reported on 12/12/2022)       No current facility-administered medications for this visit. Allergies   Allergen Reactions    Iv Contrast [Iodides] Swelling    Hydrocodone     Iodinated Diagnostic Agents Swelling    Lisinopril Swelling     Swelling of upper lip    Simvastatin Other (See Comments)     Elevated CK/CKMB.        Health Maintenance   Topic Date Due    COVID-19 Vaccine (1) Never done    HIV screen  Never done    Hepatitis C screen  Never done    DTaP/Tdap/Td vaccine (1 - Tdap) Never done    Shingles vaccine (1 of 2) Never done    Colorectal Cancer Screen  10/21/2021    Flu vaccine (1) 08/01/2022    Depression Screen  03/14/2023    Breast cancer screen  07/13/2024    Lipids  12/16/2027    Hepatitis A vaccine  Aged Out    Hib vaccine  Aged Out    Meningococcal (ACWY) vaccine  Aged Out    Pneumococcal 0-64 years Vaccine  Aged Out       Subjective:      Review of Systems   Constitutional:  Negative for activity change, chills and fever. HENT:  Negative for congestion, ear pain, rhinorrhea, sinus pressure and sore throat. Eyes: Negative. Respiratory:  Negative for cough. Cardiovascular:  Negative for chest pain and leg swelling. Gastrointestinal:  Negative for abdominal distention, constipation and nausea. Endocrine: Negative for cold intolerance and heat intolerance. Genitourinary:  Negative for difficulty urinating. Musculoskeletal:  Negative for arthralgias. Skin:  Negative for rash. Neurological:  Negative for dizziness and headaches. Hematological:  Negative for adenopathy. Psychiatric/Behavioral:  Negative for decreased concentration and self-injury. The patient is not nervous/anxious. Objective:     Physical Exam  Vitals and nursing note reviewed. Constitutional:       General: She is not in acute distress. Appearance: She is well-developed. HENT:      Head: Normocephalic and atraumatic. Right Ear: External ear normal.      Left Ear: External ear normal.   Eyes:      General: No scleral icterus. Pupils: Pupils are equal, round, and reactive to light. Cardiovascular:      Rate and Rhythm: Normal rate and regular rhythm. Heart sounds: Normal heart sounds. No murmur heard. Pulmonary:      Effort: Pulmonary effort is normal. No respiratory distress. Breath sounds: Normal breath sounds. No wheezing. Abdominal:      Palpations: Abdomen is soft. Tenderness: There is no abdominal tenderness. Musculoskeletal:         General: Normal range of motion. Cervical back: Normal range of motion and neck supple. Lymphadenopathy:      Cervical: No cervical adenopathy.    Skin:     General: Skin is warm and dry. Neurological:      Mental Status: She is alert and oriented to person, place, and time. Psychiatric:         Behavior: Behavior normal.         Thought Content: Thought content normal.         Judgment: Judgment normal.     /72 (Site: Right Upper Arm, Position: Sitting)   Pulse 80   Ht 5' 2\" (1.575 m)   Wt 264 lb (119.7 kg)   LMP  (LMP Unknown)   SpO2 96%   BMI 48.29 kg/m²     Data:     Lab Results   Component Value Date/Time     12/16/2022 03:42 PM    K 4.1 12/16/2022 03:42 PM     12/16/2022 03:42 PM    CO2 24 12/16/2022 03:42 PM    BUN 19 12/16/2022 03:42 PM    CREATININE 0.69 12/16/2022 03:42 PM    GLUCOSE 83 12/16/2022 03:42 PM    GLUCOSE 95 02/14/2012 10:05 AM    PROT 7.5 12/16/2022 03:42 PM    LABALBU 4.3 12/16/2022 03:42 PM    LABALBU 4.6 02/14/2012 10:05 AM    BILITOT 0.3 12/16/2022 03:42 PM    ALKPHOS 98 12/16/2022 03:42 PM    AST 29 12/16/2022 03:42 PM    ALT 30 12/16/2022 03:42 PM     Lab Results   Component Value Date/Time    WBC 6.5 12/16/2022 03:42 PM    RBC 5.04 12/16/2022 03:42 PM    HGB 14.5 12/16/2022 03:42 PM    HCT 42.7 12/16/2022 03:42 PM    MCV 84.7 12/16/2022 03:42 PM    MCH 28.8 12/16/2022 03:42 PM    MCHC 34.0 12/16/2022 03:42 PM    RDW 13.4 12/16/2022 03:42 PM     12/16/2022 03:42 PM    MPV 7.9 08/04/2021 12:00 AM     Lab Results   Component Value Date/Time    TSH 1.88 12/16/2022 03:42 PM     Lab Results   Component Value Date/Time    CHOL 233 12/16/2022 03:42 PM    CHOL 249 05/03/2018 12:00 AM    HDL 49 12/16/2022 03:42 PM    LABA1C 5.6 06/24/2020 11:31 AM          Assessment & Plan       1. Lumbosacral radiculopathy at L4    - Comprehensive Metabolic Panel; Future    2. Elevated CK    - CK; Future    3. Fibromyalgia  LYrica  4. Neuropathic pain  lyrica  5. Restless leg  Requip     6. Primary osteoarthritis of right knee  Uses brace, planned replacement     7.  Essential hypertension  Stable   Cardura, metoprolol, spironolactone  - Comprehensive Metabolic Panel; Future  - CBC with Auto Differential; Future    8. Mixed hyperlipidemia  Lifestyle,   - Lipid Panel; Future  - Comprehensive Metabolic Panel; Future    9. Gastroesophageal reflux disease without esophagitis  PPI  10. Screening for thyroid disorder    - TSH with Reflex; Future    11. Vitamin D deficiency    - Vitamin D 25 Hydroxy; Future                Completed Refills   Requested Prescriptions      No prescriptions requested or ordered in this encounter     Return in about 3 months (around 3/12/2023). No orders of the defined types were placed in this encounter. Orders Placed This Encounter   Procedures    CK     Standing Status:   Future     Number of Occurrences:   1     Standing Expiration Date:   12/12/2023    Lipid Panel     Standing Status:   Future     Number of Occurrences:   1     Standing Expiration Date:   12/12/2023     Order Specific Question:   Is Patient Fasting?/# of Hours     Answer:   yes    Comprehensive Metabolic Panel     Standing Status:   Future     Number of Occurrences:   1     Standing Expiration Date:   12/12/2023    CBC with Auto Differential     Standing Status:   Future     Number of Occurrences:   1     Standing Expiration Date:   12/12/2023    TSH with Reflex     Standing Status:   Future     Number of Occurrences:   1     Standing Expiration Date:   12/12/2023    Vitamin D 25 Hydroxy     Standing Status:   Future     Number of Occurrences:   1     Standing Expiration Date:   12/12/2023                  On this date 12/12/2022 I have spent 22 minutes reviewing previous notes, test results and face to face with the patient discussing the diagnosis and importance of compliance with the treatment plan as well as documenting on the day of the visit.      Electronically signed by ELIGIO Castle CNP on 12/18/2022 at 12:02 AM

## 2022-12-16 ENCOUNTER — HOSPITAL ENCOUNTER (OUTPATIENT)
Age: 57
Discharge: HOME | End: 2022-12-16
Payer: COMMERCIAL

## 2022-12-16 DIAGNOSIS — M54.17 LUMBOSACRAL RADICULOPATHY AT L4: ICD-10-CM

## 2022-12-16 DIAGNOSIS — R74.8 ELEVATED CK: ICD-10-CM

## 2022-12-16 DIAGNOSIS — E55.9 VITAMIN D DEFICIENCY: ICD-10-CM

## 2022-12-16 DIAGNOSIS — I10 ESSENTIAL HYPERTENSION: ICD-10-CM

## 2022-12-16 DIAGNOSIS — Z13.29 SCREENING FOR THYROID DISORDER: ICD-10-CM

## 2022-12-16 DIAGNOSIS — E78.2 MIXED HYPERLIPIDEMIA: ICD-10-CM

## 2022-12-16 DIAGNOSIS — R74.8 ELEVATED CK: Primary | ICD-10-CM

## 2022-12-16 LAB
ABSOLUTE EOS #: 0.2 K/UL (ref 0–0.4)
ABSOLUTE LYMPH #: 2.7 K/UL (ref 1–4.8)
ABSOLUTE MONO #: 0.6 K/UL (ref 0–1)
ALBUMIN SERPL-MCNC: 4.3 G/DL (ref 3.5–5.2)
ALP BLD-CCNC: 98 U/L (ref 35–104)
ALT SERPL-CCNC: 30 U/L (ref 5–33)
ANION GAP SERPL CALCULATED.3IONS-SCNC: 11 MMOL/L (ref 9–17)
AST SERPL-CCNC: 29 U/L
BASOPHILS # BLD: 0 % (ref 0–2)
BASOPHILS ABSOLUTE: 0 K/UL (ref 0–0.2)
BILIRUB SERPL-MCNC: 0.3 MG/DL (ref 0.3–1.2)
BUN BLDV-MCNC: 19 MG/DL (ref 6–20)
BUN/CREAT BLD: 28 (ref 9–20)
CALCIUM SERPL-MCNC: 9.2 MG/DL (ref 8.6–10.4)
CHLORIDE BLD-SCNC: 103 MMOL/L (ref 98–107)
CHOLESTEROL/HDL RATIO: 4.8
CHOLESTEROL: 233 MG/DL
CO2: 24 MMOL/L (ref 20–31)
CREAT SERPL-MCNC: 0.69 MG/DL (ref 0.5–0.9)
DIFFERENTIAL TYPE: YES
EOSINOPHILS RELATIVE PERCENT: 3 % (ref 0–5)
GFR SERPL CREATININE-BSD FRML MDRD: >60 ML/MIN/1.73M2
GLUCOSE BLD-MCNC: 83 MG/DL (ref 70–99)
HCT VFR BLD CALC: 42.7 % (ref 36–46)
HDLC SERPL-MCNC: 49 MG/DL
HEMOGLOBIN: 14.5 G/DL (ref 12–16)
LACTATE DEHYDROGENASE: 345 U/L (ref 135–214)
LDL CHOLESTEROL: 144 MG/DL (ref 0–130)
LYMPHOCYTES # BLD: 41 % (ref 15–40)
MCH RBC QN AUTO: 28.8 PG (ref 26–34)
MCHC RBC AUTO-ENTMCNC: 34 G/DL (ref 31–37)
MCV RBC AUTO: 84.7 FL (ref 80–100)
MONOCYTES # BLD: 9 % (ref 4–8)
PATIENT FASTING?: YES
PDW BLD-RTO: 13.4 % (ref 12.1–15.2)
PLATELET # BLD: 266 K/UL (ref 140–450)
POTASSIUM SERPL-SCNC: 4.1 MMOL/L (ref 3.7–5.3)
RBC # BLD: 5.04 M/UL (ref 4–5.2)
SEG NEUTROPHILS: 47 % (ref 47–75)
SEGMENTED NEUTROPHILS ABSOLUTE COUNT: 3.1 K/UL (ref 2.5–7)
SODIUM BLD-SCNC: 138 MMOL/L (ref 135–144)
TOTAL CK: 535 U/L (ref 26–192)
TOTAL PROTEIN: 7.5 G/DL (ref 6.4–8.3)
TRIGL SERPL-MCNC: 198 MG/DL
TROPONIN, HIGH SENSITIVITY: 11 NG/L (ref 0–14)
TSH SERPL DL<=0.05 MIU/L-ACNC: 1.88 UIU/ML (ref 0.3–5)
VITAMIN D 25-HYDROXY: 31.9 NG/ML
WBC # BLD: 6.5 K/UL (ref 3.5–11)

## 2022-12-16 PROCEDURE — 82306 VITAMIN D 25 HYDROXY: CPT

## 2022-12-16 PROCEDURE — 82550 ASSAY OF CK (CPK): CPT

## 2022-12-16 PROCEDURE — 36415 COLL VENOUS BLD VENIPUNCTURE: CPT

## 2022-12-16 PROCEDURE — 80061 LIPID PANEL: CPT

## 2022-12-16 PROCEDURE — 80053 COMPREHEN METABOLIC PANEL: CPT

## 2022-12-16 PROCEDURE — 85025 COMPLETE CBC W/AUTO DIFF WBC: CPT

## 2022-12-16 PROCEDURE — 84443 ASSAY THYROID STIM HORMONE: CPT

## 2022-12-16 PROCEDURE — 84484 ASSAY OF TROPONIN QUANT: CPT

## 2022-12-16 PROCEDURE — 83615 LACTATE (LD) (LDH) ENZYME: CPT

## 2022-12-18 ASSESSMENT — ENCOUNTER SYMPTOMS
COUGH: 0
EYES NEGATIVE: 1
ABDOMINAL DISTENTION: 0
SINUS PRESSURE: 0
NAUSEA: 0
CONSTIPATION: 0
SORE THROAT: 0
RHINORRHEA: 0

## 2022-12-21 DIAGNOSIS — M79.2 NEUROPATHIC PAIN: ICD-10-CM

## 2022-12-21 DIAGNOSIS — M79.7 FIBROMYALGIA: ICD-10-CM

## 2022-12-21 DIAGNOSIS — M54.17 LUMBOSACRAL RADICULOPATHY AT L4: ICD-10-CM

## 2022-12-21 DIAGNOSIS — R74.8 ELEVATED CK: ICD-10-CM

## 2022-12-21 NOTE — TELEPHONE ENCOUNTER
Rx Pending. Last Rx 9/12/2022    Patient wants script sent to [facility] in [facility] or [facility].

## 2022-12-22 RX ORDER — PREGABALIN 225 MG/1
225 CAPSULE ORAL 2 TIMES DAILY
Qty: 60 CAPSULE | Refills: 0 | Status: SHIPPED | OUTPATIENT
Start: 2022-12-22 | End: 2023-03-22

## 2022-12-26 DIAGNOSIS — R74.8 ELEVATED CK: Primary | ICD-10-CM

## 2022-12-29 ENCOUNTER — HOSPITAL ENCOUNTER (OUTPATIENT)
Age: 57
Discharge: HOME | End: 2022-12-29
Payer: COMMERCIAL

## 2022-12-29 ENCOUNTER — OFFICE VISIT (OUTPATIENT)
Dept: PAIN MANAGEMENT | Age: 57
End: 2022-12-29
Payer: COMMERCIAL

## 2022-12-29 VITALS
OXYGEN SATURATION: 96 % | HEART RATE: 83 BPM | WEIGHT: 265 LBS | RESPIRATION RATE: 19 BRPM | BODY MASS INDEX: 48.47 KG/M2

## 2022-12-29 DIAGNOSIS — M79.2 NEUROPATHIC PAIN: ICD-10-CM

## 2022-12-29 DIAGNOSIS — R74.8 ELEVATED CK: ICD-10-CM

## 2022-12-29 DIAGNOSIS — M79.18 MYOFASCIAL PAIN SYNDROME: Primary | ICD-10-CM

## 2022-12-29 DIAGNOSIS — E66.01 CLASS 3 SEVERE OBESITY WITH BODY MASS INDEX (BMI) OF 45.0 TO 49.9 IN ADULT, UNSPECIFIED OBESITY TYPE, UNSPECIFIED WHETHER SERIOUS COMORBIDITY PRESENT (HCC): ICD-10-CM

## 2022-12-29 DIAGNOSIS — M79.7 FIBROMYALGIA: ICD-10-CM

## 2022-12-29 LAB
AMMONIA: 19 UMOL/L (ref 11–51)
ANION GAP SERPL CALCULATED.3IONS-SCNC: 10 MMOL/L (ref 9–17)
BUN BLDV-MCNC: 19 MG/DL (ref 6–20)
BUN/CREAT BLD: 26 (ref 9–20)
CALCIUM SERPL-MCNC: 9.2 MG/DL (ref 8.6–10.4)
CHLORIDE BLD-SCNC: 100 MMOL/L (ref 98–107)
CO2: 27 MMOL/L (ref 20–31)
CREAT SERPL-MCNC: 0.74 MG/DL (ref 0.5–0.9)
CREATININE URINE: 167.8 MG/DL (ref 28–217)
GFR SERPL CREATININE-BSD FRML MDRD: >60 ML/MIN/1.73M2
GLUCOSE BLD-MCNC: 94 MG/DL (ref 70–99)
PHOSPHORUS: 4 MG/DL (ref 2.6–4.5)
POTASSIUM SERPL-SCNC: 4.5 MMOL/L (ref 3.7–5.3)
SODIUM BLD-SCNC: 137 MMOL/L (ref 135–144)
TOTAL CK: 423 U/L (ref 26–192)

## 2022-12-29 PROCEDURE — 80048 BASIC METABOLIC PNL TOTAL CA: CPT

## 2022-12-29 PROCEDURE — 86225 DNA ANTIBODY NATIVE: CPT

## 2022-12-29 PROCEDURE — 82550 ASSAY OF CK (CPK): CPT

## 2022-12-29 PROCEDURE — 99204 OFFICE O/P NEW MOD 45 MIN: CPT | Performed by: STUDENT IN AN ORGANIZED HEALTH CARE EDUCATION/TRAINING PROGRAM

## 2022-12-29 PROCEDURE — 82140 ASSAY OF AMMONIA: CPT

## 2022-12-29 PROCEDURE — 36415 COLL VENOUS BLD VENIPUNCTURE: CPT

## 2022-12-29 PROCEDURE — 84100 ASSAY OF PHOSPHORUS: CPT

## 2022-12-29 PROCEDURE — 82570 ASSAY OF URINE CREATININE: CPT

## 2022-12-29 PROCEDURE — 86038 ANTINUCLEAR ANTIBODIES: CPT

## 2022-12-29 RX ORDER — PREGABALIN 225 MG/1
225 CAPSULE ORAL 2 TIMES DAILY
Qty: 180 CAPSULE | Refills: 0 | Status: SHIPPED | OUTPATIENT
Start: 2022-12-29 | End: 2023-03-29

## 2022-12-29 NOTE — PROGRESS NOTES
Chronic Pain Clinic Note     Encounter Date: 12/29/2022     SUBJECTIVE:  Chief Complaint   Patient presents with    Back Pain       History of Present Illness:   Fermin Loyola is a 62 y.o. female who presents with lumbar back pain, leg pain previously followed with pain management Dr Kristen Ruvalcaba     Medication Refill: N/A    Current Complaints of Pain:   Location: lumbar   Radiation: occasional BLE  Severity: moderate  Pain Numerical Score -    Average: 4-5     Highest: 8  Lowest: 2-3  Character/Quality: Complains of pain that is aching  Timing: Increases w/prolonged sitting/standing/walking  Associated symptoms: none  Numbness:   Weakness:   Exacerbating factors: prolonged activity  Alleviating factors: heat, medication  Length of time pain has been present: Started on - years ago  Inciting event/injury: none  Bowel/Bladder incontinence: none  Falls: no  Physical Therapy: yes, multiple times    History of Interventions:   Surgery: No previous lumbar/cervical surgeries  Injections: Lumbar epidurals    Imaging:    Lumbar MRI 5/3/2019    FINDINGS: The lumbar vertebral bodies are numbered assuming the last complete    intervertebral disc space is L5-S1, with five lumbar vertebral bodies numbered    accordingly. The lumbar vertebral body heights are normal. The lumbar vertebral alignment is    normal. There is no significant spondylolisthesis. The conus medullaris appears normal and terminates at the level of L2. The individual intervertebral disc spaces are as follows:       T12-L1: There is no significant posterior disc abnormality, spinal canal, or    neural foraminal stenosis. L1-L2: There is no significant posterior disc abnormality, spinal canal, or    neural foraminal stenosis. L2-L3: There is no significant posterior disc abnormality, spinal canal, or    neural foraminal stenosis.        L3-L4: There is no significant posterior disc abnormality, spinal canal, or    neural foraminal stenosis. L4-L5: There is mild disc bulging. There is no significant spinal canal or    neural foraminal stenosis. L5-S1: There is mild disc desiccation and disc bulging. There is no significant    spinal canal stenosis. There is no significant neural foraminal stenosis. The partially visualized intra-abdominal and pelvic soft tissues are    unremarkable.        Past Medical History:   Diagnosis Date    Allergic rhinitis     Arthritis     COVID-19 01/21/2021    not hospitalized : h/a, loss smell, tired abd ache,  no meds    Enlarged thyroid 2016    nodules check annually    Fibromyalgia     GERD (gastroesophageal reflux disease)     Headache(784.0)     migranes at times    Hyperlipidemia     Hypertension     Hypertension     NEIL (obstructive sleep apnea) 07/08/2016    Restless leg syndrome     Restless legs 07/08/2016    Sleep apnea        Past Surgical History:   Procedure Laterality Date    BLADDER SURGERY  9/9/14    bladder sling    CHOLECYSTECTOMY      CYSTOSCOPY Right 08/10/2018    Dr Apolinar Gallagher- stent placement    HYSTERECTOMY (CERVIX STATUS UNKNOWN)      Total    KNEE SURGERY      LITHOTRIPSY Right 08/21/2018    stent placement    OTHER SURGICAL HISTORY  03-    Coaptite (Dr. Kevin Gastelum)    [place] CYSTO/URETERO W/LITHOTRIPSY &[place] STENT INSRT Right 8/21/2018    CYSTOSCOPY URETEROSCOPY LASER-HLL performed by Hilda Mcgill MD at [facility] &[facility] [address] INSRT Right 8/21/2018    CYSTOSCOPY STENT Hood Apo performed by Hilda Mcgill MD at [address] OFFICE/OUTPT [facility] Right 8/10/2018    CYSTOSCOPY STENT INSERTION performed by Hilda Mcgill MD at [address]         Family History   Problem Relation Age of Onset    Heart Disease Mother     High Blood Pressure Father     Heart Disease Father     High Blood Pressure Sister     High Blood Pressure Brother     Cancer Sister         Breast       Social History Socioeconomic History    Marital status: Single     Spouse name: Not on file    Number of children: Not on file    Years of education: Not on file    Highest education level: Not on file   Occupational History    Not on file   Tobacco Use    Smoking status: Never    Smokeless tobacco: Never   Vaping Use    Vaping Use: Never used   Substance and Sexual Activity    Alcohol use: No    Drug use: No    Sexual activity: Not on file   Other Topics Concern    Not on file   Social History Narrative    Not on file     Social Determinants of Health     Financial Resource Strain: Low Risk     Difficulty of Paying Living Expenses: Not hard at all   Food Insecurity: No Food Insecurity    Worried About Running Out of Food in the Last Year: Never true    Ran Out of Food in the Last Year: Never true   Transportation Needs: Not on file   Physical Activity: Not on file   Stress: Not on file   Social Connections: Not on file   Intimate Partner Violence: Not on file   Housing Stability: Not on file       Medications & Allergies:   Current Outpatient Medications   Medication Instructions    Acetaminophen (TYLENOL PO) 500 mg, Oral, EVERY 6 HOURS PRN    aspirin 81 mg, Oral, DAILY    Cholecalciferol (VITAMIN D3) 50 MCG (2000 UT) CAPS Oral, DAILY    doxazosin (CARDURA) 1 MG tablet TAKE 1 TABLET DAILY AT BEDTIME    DULoxetine (CYMBALTA) 30 MG extended release capsule TAKE 1 CAPSULE DAILY    ezetimibe (ZETIA) 10 MG tablet TAKE 1 TABLET DAILY    JUBLIA 10 % SOLN No dose, route, or frequency recorded.     metoprolol tartrate (LOPRESSOR) 50 mg, Oral, 2 TIMES DAILY    montelukast (SINGULAIR) 10 mg, Oral, NIGHTLY    naproxen (NAPROSYN) 375 MG tablet TAKE 1 TABLET TWICE DAILY  WITH MEALS    omeprazole (PRILOSEC) 20 MG delayed release capsule TAKE 1 CAPSULE DAILY in am on empty stomach    pregabalin (LYRICA) 225 mg, Oral, 2 TIMES DAILY, L4 radiculopathy    rOPINIRole (REQUIP) 1 MG tablet TAKE 1 TABLET TWICE A DAY  FOR RESTLESS LEGS spironolactone (ALDACTONE) 25 mg, Oral, DAILY       Allergies   Allergen Reactions    Iv Contrast [Iodides] Swelling    Hydrocodone     Iodinated Contrast Media Swelling    Lisinopril Swelling     Swelling of upper lip    Simvastatin Other (See Comments)     Elevated CK/CKMB. Review of Systems:   Constitutional: negative for weight changes or fevers  Cardiovascular: negative for chest pain, palpitations, irregular heart beat  Respiratory: negative for dyspnea, cough, wheezing  Gastrointestinal: negative for constipation, diarrhea, nausea  Genitourinary: negative for bowel/bladder incontinence   Musculoskeletal: positive for low back pain  Neurological: negative for radicular leg pain, leg weakness, positive for numbness/tingling  Behavioral/Psych: negative for anxiety/depression   Hematological: negative for abnormal bleeding, anticoagulation use or antiplatelet use  All other systems reviewed and are negative    OBJECTIVE:    Vitals:    12/29/22 1158   Pulse: 83   Resp: 19   SpO2: 96%       PHYSICAL EXAM    GENERAL: No acute distress, pleasant, well-appearing  HEENT: Normocephalic, atraumatic, Pupils equal and round  CARDIOVASCULAR: Well perfused, No peripheral cyanosis  PULMONARY: Good chest wall excursion, breathing unlabored  PSYCH: Appropriate affect and insight, non-pressured speech  SKIN: No rashes or lesions  MUSCULOSKELETAL:  Inspection: The back and extremities are symmetric and aligned. Muscle bulk is normal in appearance. Palpation: There is tenderness to palpation along the lumbar paraspinal musculature bilaterally  Lumbar range of motion is full  NEUROMUSCULAR:  Patient ambulates unassisted  Gait is nonantalgic  Sensation to light touch is intact in lower extremities  Strength is full in lower extremities  No ankle clonus    DIAGNOSIS:    ICD-10-CM    1. Myofascial pain syndrome  M79.18       2. Fibromyalgia  M79.7 pregabalin (LYRICA) 225 MG capsule      3.  Neuropathic pain  M79.2 pregabalin (LYRICA) 225 MG capsule      4. Class 3 severe obesity with body mass index (BMI) of 45.0 to 49.9 in adult, unspecified obesity type, unspecified whether serious comorbidity present ([facility]  E66.01     Z68.42            ASSESSMENT:    Eyad Cueto is a 62 y. o.female who presents with chronic bilateral leg pain and total body pain. To review, patient has had symptoms for several years without injuries or trauma. She denies neck or low back surgeries. The patient's history and physical examination are consistent with myofascial pain syndrome versus possible fibromyalgia. Patient was previously seeing pain management and underwent several lumbar epidurals with minimal relief in her pain and symptoms. She was started on Cymbalta and Lyrica which significantly helped with her nerve pain. Therefore, I will take over Lyrica prescribing from her primary care physician. She does have an elevated CK which I am not sure the underlying etiology however she may benefit from a neuromuscular neurology referral for further work-up. Neurologically, it appears the patient has full strength and normal sensation. There is no evidence of radiculopathy or myelopathy on examination. There are no red flags in the patient's history. PLAN:  Medications: For nonopioid therapy, the following medications were prescribed:    -Continue Lyrica to 225 mg twice daily    Opioid therapy:  -Not indicated    Interventions:  -Consider right genicular nerve block versus peripheral nerve stimulator in future    Imaging:  -Reviewed lumbar MRI with patient in room    Behavioral Therapies:  -Continue daily stretching and home exercise program    Referrals:  -Consider referral to neuromuscular neurology for elevated CK levels    Follow-up Plan:  -3 months    Patient was offered intervention where appropriate. Multi-modal Pain Therapy: The patient was explicitly considered for multimodal and interdisciplinary therapy.  Non-opioid and non-pharmacological opportunities to enhance analgesia and quality of life have been and will continue to be pursued. Ronna Pierson, DO  Interventional Pain Management/PM&R   [facility]    Orders Placed This Encounter    pregabalin (LYRICA) 225 MG capsule     Sig: Take 1 capsule by mouth in the morning and 1 capsule in the evening. Do all this for 90 days. L4 radiculopathy.      Dispense:  180 capsule     Refill:  0     Transitioning back to pain management for wean down

## 2022-12-29 NOTE — PATIENT INSTRUCTIONS
SURVEY:    You may be receiving a survey from [employer] regarding your visit today. Please complete the survey to enable us to provide the highest quality of care to you and your family. If you cannot score us a very good on any question, please call the office to discuss how we could have made your experience a very good one. Thank you.   MD Ailin Robin MD Dagoberto Leaks, MD Claudine Hudson, DO  Terra Contreras, MAYUR Calixto, [facility], [address] [place] [address]

## 2022-12-30 LAB
ANTI DNA DOUBLE STRANDED: 9.4 IU/ML
ANTI-NUCLEAR ANTIBODY (ANA): NEGATIVE
ENA ANTIBODIES SCREEN: 0.1 U/ML

## 2023-01-08 DIAGNOSIS — R74.8 ELEVATED CK: Primary | ICD-10-CM

## 2023-01-08 DIAGNOSIS — M79.2 NEUROPATHIC PAIN: ICD-10-CM

## 2023-01-08 DIAGNOSIS — G25.81 RESTLESS LEGS: ICD-10-CM

## 2023-01-08 DIAGNOSIS — M54.17 LUMBOSACRAL RADICULOPATHY AT L4: ICD-10-CM

## 2023-01-21 DIAGNOSIS — E78.2 MIXED HYPERLIPIDEMIA: ICD-10-CM

## 2023-01-21 DIAGNOSIS — I10 ESSENTIAL HYPERTENSION: ICD-10-CM

## 2023-01-21 DIAGNOSIS — M17.11 PRIMARY OSTEOARTHRITIS OF RIGHT KNEE: ICD-10-CM

## 2023-01-23 RX ORDER — NAPROXEN 375 MG/1
TABLET ORAL
Qty: 180 TABLET | Refills: 0 | Status: SHIPPED | OUTPATIENT
Start: 2023-01-23

## 2023-01-23 RX ORDER — METOPROLOL TARTRATE 50 MG/1
TABLET, FILM COATED ORAL
Qty: 180 TABLET | Refills: 1 | Status: SHIPPED | OUTPATIENT
Start: 2023-01-23

## 2023-01-23 RX ORDER — MONTELUKAST SODIUM 10 MG/1
TABLET ORAL
Qty: 90 TABLET | Refills: 1 | Status: SHIPPED | OUTPATIENT
Start: 2023-01-23

## 2023-01-23 RX ORDER — EZETIMIBE 10 MG/1
TABLET ORAL
Qty: 90 TABLET | Refills: 1 | Status: SHIPPED | OUTPATIENT
Start: 2023-01-23

## 2023-01-23 RX ORDER — SPIRONOLACTONE 25 MG/1
TABLET ORAL
Qty: 90 TABLET | Refills: 1 | Status: SHIPPED | OUTPATIENT
Start: 2023-01-23

## 2023-02-13 ENCOUNTER — OFFICE VISIT (OUTPATIENT)
Dept: FAMILY MEDICINE CLINIC | Age: 58
End: 2023-02-13
Payer: COMMERCIAL

## 2023-02-13 VITALS
HEIGHT: 62 IN | WEIGHT: 264 LBS | DIASTOLIC BLOOD PRESSURE: 68 MMHG | SYSTOLIC BLOOD PRESSURE: 110 MMHG | BODY MASS INDEX: 48.58 KG/M2 | OXYGEN SATURATION: 95 % | HEART RATE: 75 BPM

## 2023-02-13 DIAGNOSIS — I10 ESSENTIAL HYPERTENSION: ICD-10-CM

## 2023-02-13 DIAGNOSIS — E55.9 VITAMIN D DEFICIENCY: ICD-10-CM

## 2023-02-13 DIAGNOSIS — R74.8 ELEVATED CK: ICD-10-CM

## 2023-02-13 DIAGNOSIS — M79.7 FIBROMYALGIA: ICD-10-CM

## 2023-02-13 DIAGNOSIS — E78.2 MIXED HYPERLIPIDEMIA: ICD-10-CM

## 2023-02-13 DIAGNOSIS — G25.81 RESTLESS LEG: ICD-10-CM

## 2023-02-13 DIAGNOSIS — K21.9 GASTROESOPHAGEAL REFLUX DISEASE WITHOUT ESOPHAGITIS: ICD-10-CM

## 2023-02-13 DIAGNOSIS — M17.11 PRIMARY OSTEOARTHRITIS OF RIGHT KNEE: Primary | ICD-10-CM

## 2023-02-13 PROCEDURE — 3074F SYST BP LT 130 MM HG: CPT | Performed by: NURSE PRACTITIONER

## 2023-02-13 PROCEDURE — 3078F DIAST BP <80 MM HG: CPT | Performed by: NURSE PRACTITIONER

## 2023-02-13 PROCEDURE — 99214 OFFICE O/P EST MOD 30 MIN: CPT | Performed by: NURSE PRACTITIONER

## 2023-02-13 SDOH — ECONOMIC STABILITY: INCOME INSECURITY: HOW HARD IS IT FOR YOU TO PAY FOR THE VERY BASICS LIKE FOOD, HOUSING, MEDICAL CARE, AND HEATING?: NOT HARD AT ALL

## 2023-02-13 SDOH — ECONOMIC STABILITY: FOOD INSECURITY: WITHIN THE PAST 12 MONTHS, YOU WORRIED THAT YOUR FOOD WOULD RUN OUT BEFORE YOU GOT MONEY TO BUY MORE.: NEVER TRUE

## 2023-02-13 SDOH — ECONOMIC STABILITY: HOUSING INSECURITY
IN THE LAST 12 MONTHS, WAS THERE A TIME WHEN YOU DID NOT HAVE A STEADY PLACE TO SLEEP OR SLEPT IN A SHELTER (INCLUDING NOW)?: NO

## 2023-02-13 SDOH — ECONOMIC STABILITY: FOOD INSECURITY: WITHIN THE PAST 12 MONTHS, THE FOOD YOU BOUGHT JUST DIDN'T LAST AND YOU DIDN'T HAVE MONEY TO GET MORE.: NEVER TRUE

## 2023-02-13 ASSESSMENT — PATIENT HEALTH QUESTIONNAIRE - PHQ9
SUM OF ALL RESPONSES TO PHQ QUESTIONS 1-9: 0
SUM OF ALL RESPONSES TO PHQ QUESTIONS 1-9: 0
1. LITTLE INTEREST OR PLEASURE IN DOING THINGS: 0
SUM OF ALL RESPONSES TO PHQ QUESTIONS 1-9: 0
SUM OF ALL RESPONSES TO PHQ QUESTIONS 1-9: 0
2. FEELING DOWN, DEPRESSED OR HOPELESS: 0
SUM OF ALL RESPONSES TO PHQ9 QUESTIONS 1 & 2: 0

## 2023-02-13 NOTE — PATIENT INSTRUCTIONS
Phone: [phone]  Fax: [phone] Office Hours:  Monday: [place] office location [phone] ([phone]) Offering additional late hours the first Monday of the month until 7 pm.   Tuesday: 8-5 Wednesday: 8-5 Thursday:  Additional hours offered 2 Thursdays a month. Please call to inquire those dates. Fridays: 7:30-4:30   SURVEY:    You may be receiving a survey from [employer] regarding your visit today. Please complete the survey to enable us to provide the highest quality of care to you and your family. If you cannot score us a very good on any question, please call the office to discuss how we could have made your experience a very good one. Thank you.

## 2023-02-13 NOTE — PROGRESS NOTES
[facility]  [facility] [place]  [address] [address]  Dept: [phone]  Dept Fax: [phone]    Last encounter 12/12/2022    Back Pain (2 month f/u. Pt is seeing pain management)       HPI:   Loretta Puri is a 57 y.o. female who presentstoday for her medical conditions/complaints as noted below.  Loretta Puri is c/o of Back Pain (2 month f/u. Pt is seeing pain management)      HPI  Established patient    Pt with elevated CPK chronically   Since 2013  Hx also ACEI reaction and CT dye reaction in the same year.   No allergy reaction s since.     Good electrolyte balance  Chronically on Lyrica and cymbalta for leg discomfort (sometimes is goes down entire leg into calf into shin more on left than right sometimes on right. Sitting and has warm sensation in lateral legs like \"icy hot\"  and intense - moving legs and rubbing them help.   If missing requip med will not stay still   Ache feels it is whole body at times.   Not as often.     Email to [email or web address]  Dr. Basil Waters, [place] [address]   2/16/23     Cholesterol managed with Zetia.   Pt uses naprosyn for osteoarthritis  Pt with spironolactone.     Hypertension on metoprolol and doxazosin    Vitamin D deficiency on supplement     Cymbalta, requip , lyrica all taken for leg discomfort. Pt established with pain management     GERD on prilosec     Reviewed prior notes  pain management  and Neurology  Reviewed previous Labs, Imaging, and Hospital Records    Past Medical History:   Diagnosis Date    Allergic rhinitis     Arthritis     COVID-19 01/21/2021    not hospitalized : h/a, loss smell, tired abd ache,  no meds    Enlarged thyroid 2016    nodules check annually    Fibromyalgia     GERD (gastroesophageal reflux disease)     Headache(784.0)     migranes at times    Hyperlipidemia     Hypertension     Hypertension     NEIL (obstructive sleep apnea) 07/08/2016    Restless leg syndrome     Restless legs  07/08/2016    Sleep apnea       Past Surgical History:   Procedure Laterality Date    BLADDER SURGERY  9/9/14    bladder sling    CHOLECYSTECTOMY      CYSTOSCOPY Right 08/10/2018    Dr Lopez- stent placement    HYSTERECTOMY (CERVIX STATUS UNKNOWN)      Total    KNEE SURGERY      LITHOTRIPSY Right 08/21/2018    stent placement    OTHER SURGICAL HISTORY  03-    Coaptite (Dr. Kang)    VT CYSTO/URETERO W/LITHOTRIPSY &INDWELL STENT INSRT Right 8/21/2018    CYSTOSCOPY URETEROSCOPY LASER-HLL performed by Kenny Lopez MD at [facility] OR    VT CYSTO/URETERO W/LITHOTRIPSY &INDWELL STENT INSRT Right 8/21/2018    CYSTOSCOPY STENT INSERTION-EXCHANGE performed by Kenny Lopez MD at [facility] OR    VT OFFICE/OUTPT VISIT,PROCEDURE ONLY Right 8/10/2018    CYSTOSCOPY STENT INSERTION performed by Kenny Lopez MD at [facility] OR    TUBAL LIGATION         Family History   Problem Relation Age of Onset    Heart Disease Mother     High Blood Pressure Father     Heart Disease Father     High Blood Pressure Sister     High Blood Pressure Brother     Cancer Sister         Breast       Social History     Tobacco Use    Smoking status: Never    Smokeless tobacco: Never   Substance Use Topics    Alcohol use: No      Current Outpatient Medications   Medication Sig Dispense Refill    naproxen (NAPROSYN) 375 MG tablet TAKE 1 TABLET TWICE DAILY  WITH MEALS 180 tablet 0    montelukast (SINGULAIR) 10 MG tablet TAKE 1 TABLET NIGHTLY 90 tablet 1    ezetimibe (ZETIA) 10 MG tablet TAKE 1 TABLET DAILY 90 tablet 1    spironolactone (ALDACTONE) 25 MG tablet TAKE 1 TABLET DAILY 90 tablet 1    metoprolol tartrate (LOPRESSOR) 50 MG tablet TAKE 1 TABLET TWICE A  tablet 1    pregabalin (LYRICA) 225 MG capsule Take 1 capsule by mouth in the morning and 1 capsule in the evening. Do all this for 90 days. L4 radiculopathy. 180 capsule 0    DULoxetine (CYMBALTA) 30 MG extended release capsule TAKE 1 CAPSULE DAILY 90 capsule 1    rOPINIRole (REQUIP)  1 MG tablet TAKE 1 TABLET TWICE A DAY  FOR RESTLESS LEGS 180 tablet 1    omeprazole (PRILOSEC) 20 MG delayed release capsule TAKE 1 CAPSULE DAILY in am on empty stomach 90 capsule 1    doxazosin (CARDURA) 1 MG tablet TAKE 1 TABLET DAILY AT BEDTIME 90 tablet 3    Cholecalciferol (VITAMIN D3) 50 MCG (2000 UT) CAPS Take by mouth daily       Acetaminophen (TYLENOL PO) Take 500 mg by mouth every 6 hours as needed       aspirin 81 MG EC tablet Take 81 mg by mouth daily       JUBLIA 10 % SOLN  (Patient not taking: No sig reported)       No current facility-administered medications for this visit. Allergies   Allergen Reactions    Iv Contrast [Iodides] Swelling    Hydrocodone     Iodinated Contrast Media Swelling    Lisinopril Swelling     Swelling of upper lip    Simvastatin Other (See Comments)     Elevated CK/CKMB. Health Maintenance   Topic Date Due    COVID-19 Vaccine (1) Never done    HIV screen  Never done    Hepatitis C screen  Never done    DTaP/Tdap/Td vaccine (1 - Tdap) Never done    Shingles vaccine (1 of 2) Never done    Colorectal Cancer Screen  10/21/2021    Flu vaccine (1) 08/01/2022    Depression Screen  02/13/2024    Breast cancer screen  07/13/2024    Lipids  12/16/2027    Hepatitis A vaccine  Aged Out    Hib vaccine  Aged Out    Meningococcal (ACWY) vaccine  Aged Out    Pneumococcal 0-64 years Vaccine  Aged Out       Subjective:      Review of Systems   Constitutional:  Negative for activity change, chills and fever. HENT:  Negative for congestion, nosebleeds and sore throat. Eyes: Negative. Respiratory:  Negative for cough, chest tightness and shortness of breath. Cardiovascular:  Negative for chest pain and leg swelling. Gastrointestinal:  Negative for abdominal distention, constipation and diarrhea. Endocrine: Negative for cold intolerance and heat intolerance. Genitourinary:  Negative for difficulty urinating and frequency. Musculoskeletal:  Positive for myalgias. Negative for arthralgias, gait problem and neck pain. Skin:  Negative for rash. Neurological:  Negative for dizziness and headaches. Hematological:  Negative for adenopathy. Psychiatric/Behavioral:  Negative for agitation and sleep disturbance. The patient is not nervous/anxious. Objective:     Physical Exam  Vitals and nursing note reviewed. Constitutional:       General: She is not in acute distress. Appearance: Normal appearance. She is well-developed. HENT:      Head: Normocephalic and atraumatic. Right Ear: Tympanic membrane and external ear normal.      Left Ear: Tympanic membrane and external ear normal.      Nose: No congestion. Mouth/Throat:      Mouth: Mucous membranes are moist.      Pharynx: No posterior oropharyngeal erythema. Eyes:      General: No scleral icterus. Pupils: Pupils are equal, round, and reactive to light. Neck:      Vascular: No carotid bruit (neg chris). Cardiovascular:      Rate and Rhythm: Normal rate and regular rhythm. Heart sounds: Normal heart sounds. No murmur heard. Pulmonary:      Effort: Pulmonary effort is normal. No respiratory distress. Breath sounds: Normal breath sounds. No wheezing. Abdominal:      Palpations: Abdomen is soft. Tenderness: There is no abdominal tenderness. Musculoskeletal:         General: Tenderness (bilateral legs neg rachel's) present. Normal range of motion. Cervical back: Normal range of motion and neck supple. Right lower leg: No edema. Left lower leg: No edema. Lymphadenopathy:      Cervical: No cervical adenopathy. Skin:     General: Skin is warm and dry. Findings: No rash. Neurological:      Mental Status: She is alert and oriented to person, place, and time. Psychiatric:         Behavior: Behavior normal.         Thought Content:  Thought content normal.         Judgment: Judgment normal.     /68 (Site: Left Upper Arm, Position: Sitting)   Pulse 75   Ht 5' 2\" (1.575 m)   Wt 264 lb (119.7 kg)   LMP  (LMP Unknown)   SpO2 95%   BMI 48.29 kg/m²     Data:     Lab Results   Component Value Date/Time     12/29/2022 09:42 AM    K 4.5 12/29/2022 09:42 AM     12/29/2022 09:42 AM    CO2 27 12/29/2022 09:42 AM    BUN 19 12/29/2022 09:42 AM    CREATININE 0.74 12/29/2022 09:42 AM    GLUCOSE 94 12/29/2022 09:42 AM    GLUCOSE 95 02/14/2012 10:05 AM    PROT 7.5 12/16/2022 03:42 PM    LABALBU 4.3 12/16/2022 03:42 PM    LABALBU 4.6 02/14/2012 10:05 AM    BILITOT 0.3 12/16/2022 03:42 PM    ALKPHOS 98 12/16/2022 03:42 PM    AST 29 12/16/2022 03:42 PM    ALT 30 12/16/2022 03:42 PM     Lab Results   Component Value Date/Time    WBC 6.5 12/16/2022 03:42 PM    RBC 5.04 12/16/2022 03:42 PM    HGB 14.5 12/16/2022 03:42 PM    HCT 42.7 12/16/2022 03:42 PM    MCV 84.7 12/16/2022 03:42 PM    MCH 28.8 12/16/2022 03:42 PM    MCHC 34.0 12/16/2022 03:42 PM    RDW 13.4 12/16/2022 03:42 PM     12/16/2022 03:42 PM    MPV 7.9 08/04/2021 12:00 AM     Lab Results   Component Value Date/Time    TSH 1.88 12/16/2022 03:42 PM     Lab Results   Component Value Date/Time    CHOL 233 12/16/2022 03:42 PM    CHOL 249 05/03/2018 12:00 AM    HDL 49 12/16/2022 03:42 PM    LABA1C 5.6 06/24/2020 11:31 AM          Assessment & Plan       1. Primary osteoarthritis of right knee  Continue naprosyn and cymbalta    2. Essential hypertension  stable    3. Mixed hyperlipidemia  On zetia, stay active    4. Elevated CK  Chronic no chest pain  5. Gastroesophageal reflux disease without esophagitis  On PPI    6. Fibromyalgia  With pain management     7. Vitamin D deficiency  On supplement     8. Restless leg  On requip, no anemia                 Completed Refills   Requested Prescriptions      No prescriptions requested or ordered in this encounter     No follow-ups on file. No orders of the defined types were placed in this encounter.     No orders of the defined types were placed in this encounter. On this date 2/13/2023 I have spent 28 minutes reviewing previous notes, test results and face to face with the patient discussing the diagnosis and importance of compliance with the treatment plan as well as documenting on the day of the visit.      Electronically signed by ELIGIO Mata CNP on 2/24/2023 at 8:36 PM

## 2023-02-24 ASSESSMENT — ENCOUNTER SYMPTOMS
CONSTIPATION: 0
CHEST TIGHTNESS: 0
SHORTNESS OF BREATH: 0
DIARRHEA: 0
COUGH: 0
ABDOMINAL DISTENTION: 0
SORE THROAT: 0
EYES NEGATIVE: 1

## 2023-03-02 ENCOUNTER — HOSPITAL ENCOUNTER (EMERGENCY)
Age: 58
Discharge: HOME | End: 2023-03-02
Attending: FAMILY MEDICINE
Payer: COMMERCIAL

## 2023-03-02 ENCOUNTER — APPOINTMENT (OUTPATIENT)
Dept: CT IMAGING | Age: 58
End: 2023-03-02
Payer: COMMERCIAL

## 2023-03-02 ENCOUNTER — TELEPHONE (OUTPATIENT)
Dept: UROLOGY | Age: 58
End: 2023-03-02

## 2023-03-02 ENCOUNTER — HOSPITAL ENCOUNTER (OUTPATIENT)
Age: 58
Setting detail: OBSERVATION
Discharge: HOME | End: 2023-03-03
Attending: FAMILY MEDICINE | Admitting: FAMILY MEDICINE
Payer: COMMERCIAL

## 2023-03-02 VITALS
BODY MASS INDEX: 48.4 KG/M2 | HEIGHT: 62 IN | OXYGEN SATURATION: 96 % | DIASTOLIC BLOOD PRESSURE: 93 MMHG | RESPIRATION RATE: 16 BRPM | WEIGHT: 263 LBS | TEMPERATURE: 97.8 F | HEART RATE: 65 BPM | SYSTOLIC BLOOD PRESSURE: 154 MMHG

## 2023-03-02 DIAGNOSIS — R10.9 RIGHT FLANK PAIN: Primary | ICD-10-CM

## 2023-03-02 DIAGNOSIS — R52 INTRACTABLE PAIN: ICD-10-CM

## 2023-03-02 DIAGNOSIS — N13.2 HYDRONEPHROSIS WITH RENAL AND URETERAL CALCULOUS OBSTRUCTION: Primary | ICD-10-CM

## 2023-03-02 LAB
-: ABNORMAL
ABSOLUTE EOS #: 0.2 K/UL (ref 0–0.4)
ABSOLUTE LYMPH #: 2 K/UL (ref 1–4.8)
ABSOLUTE MONO #: 0.7 K/UL (ref 0–1)
ANION GAP SERPL CALCULATED.3IONS-SCNC: 12 MMOL/L (ref 9–17)
BACTERIA: ABNORMAL
BASOPHILS # BLD: 0 % (ref 0–2)
BASOPHILS ABSOLUTE: 0 K/UL (ref 0–0.2)
BILIRUBIN URINE: NEGATIVE
BUN SERPL-MCNC: 19 MG/DL (ref 6–20)
BUN/CREAT BLD: 23 (ref 9–20)
CALCIUM SERPL-MCNC: 9.3 MG/DL (ref 8.6–10.4)
CHLORIDE SERPL-SCNC: 106 MMOL/L (ref 98–107)
CO2 SERPL-SCNC: 22 MMOL/L (ref 20–31)
COLOR: YELLOW
COMMENT UA: ABNORMAL
CREAT SERPL-MCNC: 0.82 MG/DL (ref 0.5–0.9)
DIFFERENTIAL TYPE: YES
EOSINOPHILS RELATIVE PERCENT: 2 % (ref 0–5)
EPITHELIAL CELLS UA: ABNORMAL /HPF
GFR SERPL CREATININE-BSD FRML MDRD: >60 ML/MIN/1.73M2
GLUCOSE SERPL-MCNC: 107 MG/DL (ref 70–99)
GLUCOSE UR STRIP.AUTO-MCNC: NEGATIVE MG/DL
HCT VFR BLD AUTO: 42.2 % (ref 36–46)
HGB BLD-MCNC: 14.5 G/DL (ref 12–16)
KETONES UR STRIP.AUTO-MCNC: NEGATIVE MG/DL
LEUKOCYTE ESTERASE UR QL STRIP.AUTO: ABNORMAL
LYMPHOCYTES # BLD: 24 % (ref 15–40)
MCH RBC QN AUTO: 28.9 PG (ref 26–34)
MCHC RBC AUTO-ENTMCNC: 34.5 G/DL (ref 31–37)
MCV RBC AUTO: 83.9 FL (ref 80–100)
MONOCYTES # BLD: 8 % (ref 4–8)
NITRITE UR QL STRIP.AUTO: NEGATIVE
PDW BLD-RTO: 12.6 % (ref 12.1–15.2)
PLATELET # BLD AUTO: 232 K/UL (ref 140–450)
POTASSIUM SERPL-SCNC: 4.4 MMOL/L (ref 3.7–5.3)
PROT UR STRIP.AUTO-MCNC: 5 MG/DL (ref 5–8)
PROT UR STRIP.AUTO-MCNC: ABNORMAL MG/DL
RBC # BLD: 5.02 M/UL (ref 4–5.2)
RBC CLUMPS #/AREA URNS AUTO: ABNORMAL /HPF (ref 0–2)
RENAL EPITHELIAL, UA: ABNORMAL /HPF
SEG NEUTROPHILS: 66 % (ref 47–75)
SEGMENTED NEUTROPHILS ABSOLUTE COUNT: 5.3 K/UL (ref 2.5–7)
SODIUM SERPL-SCNC: 140 MMOL/L (ref 135–144)
SPECIFIC GRAVITY UA: 1.03 (ref 1–1.03)
TURBIDITY: ABNORMAL
URINE HGB: ABNORMAL
UROBILINOGEN, URINE: NORMAL
WBC # BLD AUTO: 8.1 K/UL (ref 3.5–11)
WBC UA: ABNORMAL /HPF

## 2023-03-02 PROCEDURE — 85025 COMPLETE CBC W/AUTO DIFF WBC: CPT

## 2023-03-02 PROCEDURE — 99284 EMERGENCY DEPT VISIT MOD MDM: CPT

## 2023-03-02 PROCEDURE — 96376 TX/PRO/DX INJ SAME DRUG ADON: CPT

## 2023-03-02 PROCEDURE — 96375 TX/PRO/DX INJ NEW DRUG ADDON: CPT

## 2023-03-02 PROCEDURE — 6360000002 HC RX W HCPCS: Performed by: FAMILY MEDICINE

## 2023-03-02 PROCEDURE — 74176 CT ABD & PELVIS W/O CONTRAST: CPT

## 2023-03-02 PROCEDURE — 2580000003 HC RX 258: Performed by: FAMILY MEDICINE

## 2023-03-02 PROCEDURE — G0378 HOSPITAL OBSERVATION PER HR: HCPCS

## 2023-03-02 PROCEDURE — 6370000000 HC RX 637 (ALT 250 FOR IP): Performed by: FAMILY MEDICINE

## 2023-03-02 PROCEDURE — 87086 URINE CULTURE/COLONY COUNT: CPT

## 2023-03-02 PROCEDURE — 93005 ELECTROCARDIOGRAM TRACING: CPT | Performed by: FAMILY MEDICINE

## 2023-03-02 PROCEDURE — 96374 THER/PROPH/DIAG INJ IV PUSH: CPT

## 2023-03-02 PROCEDURE — 96361 HYDRATE IV INFUSION ADD-ON: CPT

## 2023-03-02 PROCEDURE — 94761 N-INVAS EAR/PLS OXIMETRY MLT: CPT

## 2023-03-02 PROCEDURE — G0379 DIRECT REFER HOSPITAL OBSERV: HCPCS

## 2023-03-02 PROCEDURE — 80048 BASIC METABOLIC PNL TOTAL CA: CPT

## 2023-03-02 PROCEDURE — 96365 THER/PROPH/DIAG IV INF INIT: CPT

## 2023-03-02 PROCEDURE — 81001 URINALYSIS AUTO W/SCOPE: CPT

## 2023-03-02 RX ORDER — TAMSULOSIN HYDROCHLORIDE 0.4 MG/1
0.4 CAPSULE ORAL ONCE
Status: DISCONTINUED | OUTPATIENT
Start: 2023-03-02 | End: 2023-03-03 | Stop reason: HOSPADM

## 2023-03-02 RX ORDER — POTASSIUM CHLORIDE 7.45 MG/ML
10 INJECTION INTRAVENOUS PRN
Status: DISCONTINUED | OUTPATIENT
Start: 2023-03-02 | End: 2023-03-03 | Stop reason: HOSPADM

## 2023-03-02 RX ORDER — ROPINIROLE 0.25 MG/1
1 TABLET, FILM COATED ORAL NIGHTLY
Status: DISCONTINUED | OUTPATIENT
Start: 2023-03-02 | End: 2023-03-03

## 2023-03-02 RX ORDER — SODIUM CHLORIDE 0.9 % (FLUSH) 0.9 %
5-40 SYRINGE (ML) INJECTION PRN
Status: DISCONTINUED | OUTPATIENT
Start: 2023-03-02 | End: 2023-03-03

## 2023-03-02 RX ORDER — 0.9 % SODIUM CHLORIDE 0.9 %
1000 INTRAVENOUS SOLUTION INTRAVENOUS ONCE
Status: COMPLETED | OUTPATIENT
Start: 2023-03-02 | End: 2023-03-02

## 2023-03-02 RX ORDER — SODIUM CHLORIDE 0.9 % (FLUSH) 0.9 %
5-40 SYRINGE (ML) INJECTION EVERY 12 HOURS SCHEDULED
Status: DISCONTINUED | OUTPATIENT
Start: 2023-03-02 | End: 2023-03-03

## 2023-03-02 RX ORDER — TAMSULOSIN HYDROCHLORIDE 0.4 MG/1
0.4 CAPSULE ORAL ONCE
Status: COMPLETED | OUTPATIENT
Start: 2023-03-02 | End: 2023-03-02

## 2023-03-02 RX ORDER — DULOXETIN HYDROCHLORIDE 30 MG/1
30 CAPSULE, DELAYED RELEASE ORAL DAILY
Status: DISCONTINUED | OUTPATIENT
Start: 2023-03-02 | End: 2023-03-03

## 2023-03-02 RX ORDER — KETOROLAC TROMETHAMINE 15 MG/ML
15 INJECTION, SOLUTION INTRAMUSCULAR; INTRAVENOUS EVERY 6 HOURS PRN
Status: DISCONTINUED | OUTPATIENT
Start: 2023-03-02 | End: 2023-03-03

## 2023-03-02 RX ORDER — TAMSULOSIN HYDROCHLORIDE 0.4 MG/1
0.4 CAPSULE ORAL ONCE
COMMUNITY
End: 2023-03-09 | Stop reason: SDUPTHER

## 2023-03-02 RX ORDER — PREGABALIN 75 MG/1
225 CAPSULE ORAL 2 TIMES DAILY
Status: DISCONTINUED | OUTPATIENT
Start: 2023-03-02 | End: 2023-03-03

## 2023-03-02 RX ORDER — SPIRONOLACTONE 25 MG/1
25 TABLET ORAL DAILY
Status: DISCONTINUED | OUTPATIENT
Start: 2023-03-02 | End: 2023-03-03

## 2023-03-02 RX ORDER — FENTANYL CITRATE 50 UG/ML
100 INJECTION, SOLUTION INTRAMUSCULAR; INTRAVENOUS ONCE
Status: COMPLETED | OUTPATIENT
Start: 2023-03-02 | End: 2023-03-02

## 2023-03-02 RX ORDER — SODIUM CHLORIDE 9 MG/ML
INJECTION, SOLUTION INTRAVENOUS PRN
Status: DISCONTINUED | OUTPATIENT
Start: 2023-03-02 | End: 2023-03-03

## 2023-03-02 RX ORDER — ONDANSETRON 2 MG/ML
4 INJECTION INTRAMUSCULAR; INTRAVENOUS EVERY 6 HOURS PRN
Status: DISCONTINUED | OUTPATIENT
Start: 2023-03-02 | End: 2023-03-03 | Stop reason: HOSPADM

## 2023-03-02 RX ORDER — ONDANSETRON 2 MG/ML
4 INJECTION INTRAMUSCULAR; INTRAVENOUS ONCE
Status: COMPLETED | OUTPATIENT
Start: 2023-03-02 | End: 2023-03-02

## 2023-03-02 RX ORDER — EZETIMIBE 10 MG/1
10 TABLET ORAL NIGHTLY
Status: DISCONTINUED | OUTPATIENT
Start: 2023-03-02 | End: 2023-03-03

## 2023-03-02 RX ORDER — DOXAZOSIN MESYLATE 1 MG/1
1 TABLET ORAL DAILY
Status: DISCONTINUED | OUTPATIENT
Start: 2023-03-02 | End: 2023-03-02

## 2023-03-02 RX ORDER — MONTELUKAST SODIUM 10 MG/1
10 TABLET ORAL NIGHTLY
Status: DISCONTINUED | OUTPATIENT
Start: 2023-03-02 | End: 2023-03-03

## 2023-03-02 RX ORDER — SODIUM CHLORIDE, SODIUM LACTATE, POTASSIUM CHLORIDE, CALCIUM CHLORIDE 600; 310; 30; 20 MG/100ML; MG/100ML; MG/100ML; MG/100ML
INJECTION, SOLUTION INTRAVENOUS CONTINUOUS
Status: DISCONTINUED | OUTPATIENT
Start: 2023-03-02 | End: 2023-03-03

## 2023-03-02 RX ORDER — KETOROLAC TROMETHAMINE 30 MG/ML
30 INJECTION, SOLUTION INTRAMUSCULAR; INTRAVENOUS ONCE
Status: COMPLETED | OUTPATIENT
Start: 2023-03-02 | End: 2023-03-02

## 2023-03-02 RX ORDER — PANTOPRAZOLE SODIUM 40 MG/1
40 TABLET, DELAYED RELEASE ORAL
Status: DISCONTINUED | OUTPATIENT
Start: 2023-03-03 | End: 2023-03-03

## 2023-03-02 RX ORDER — POTASSIUM CHLORIDE 20 MEQ/1
40 TABLET, EXTENDED RELEASE ORAL PRN
Status: DISCONTINUED | OUTPATIENT
Start: 2023-03-02 | End: 2023-03-03 | Stop reason: HOSPADM

## 2023-03-02 RX ORDER — ONDANSETRON 4 MG/1
4 TABLET, ORALLY DISINTEGRATING ORAL EVERY 8 HOURS PRN
Status: DISCONTINUED | OUTPATIENT
Start: 2023-03-02 | End: 2023-03-03 | Stop reason: HOSPADM

## 2023-03-02 RX ORDER — METOPROLOL TARTRATE 50 MG/1
50 TABLET, FILM COATED ORAL 2 TIMES DAILY
Status: DISCONTINUED | OUTPATIENT
Start: 2023-03-02 | End: 2023-03-03

## 2023-03-02 RX ADMIN — CEFTRIAXONE 1000 MG: 1 INJECTION, POWDER, FOR SOLUTION INTRAMUSCULAR; INTRAVENOUS at 21:08

## 2023-03-02 RX ADMIN — FENTANYL CITRATE 100 MCG: 50 INJECTION, SOLUTION INTRAMUSCULAR; INTRAVENOUS at 11:41

## 2023-03-02 RX ADMIN — FENTANYL CITRATE 100 MCG: 50 INJECTION, SOLUTION INTRAMUSCULAR; INTRAVENOUS at 15:12

## 2023-03-02 RX ADMIN — TAMSULOSIN HYDROCHLORIDE 0.4 MG: 0.4 CAPSULE ORAL at 13:35

## 2023-03-02 RX ADMIN — MONTELUKAST 10 MG: 10 TABLET, FILM COATED ORAL at 20:16

## 2023-03-02 RX ADMIN — SODIUM CHLORIDE, POTASSIUM CHLORIDE, SODIUM LACTATE AND CALCIUM CHLORIDE: 600; 310; 30; 20 INJECTION, SOLUTION INTRAVENOUS at 21:01

## 2023-03-02 RX ADMIN — KETOROLAC TROMETHAMINE 30 MG: 30 INJECTION, SOLUTION INTRAMUSCULAR at 11:12

## 2023-03-02 RX ADMIN — METOPROLOL TARTRATE 50 MG: 50 TABLET, FILM COATED ORAL at 20:16

## 2023-03-02 RX ADMIN — ONDANSETRON 4 MG: 2 INJECTION INTRAMUSCULAR; INTRAVENOUS at 21:05

## 2023-03-02 RX ADMIN — ONDANSETRON 4 MG: 2 INJECTION INTRAMUSCULAR; INTRAVENOUS at 11:13

## 2023-03-02 RX ADMIN — SODIUM CHLORIDE 1000 ML: 9 INJECTION, SOLUTION INTRAVENOUS at 11:44

## 2023-03-02 RX ADMIN — KETOROLAC TROMETHAMINE 15 MG: 15 INJECTION, SOLUTION INTRAMUSCULAR; INTRAVENOUS at 21:02

## 2023-03-02 RX ADMIN — PREGABALIN 225 MG: 75 CAPSULE ORAL at 20:16

## 2023-03-02 RX ADMIN — HYDROMORPHONE HYDROCHLORIDE 1 MG: 1 INJECTION, SOLUTION INTRAMUSCULAR; INTRAVENOUS; SUBCUTANEOUS at 12:22

## 2023-03-02 RX ADMIN — ROPINIROLE HYDROCHLORIDE 1 MG: 0.25 TABLET, FILM COATED ORAL at 20:16

## 2023-03-02 ASSESSMENT — PAIN DESCRIPTION - PAIN TYPE
TYPE: ACUTE PAIN

## 2023-03-02 ASSESSMENT — PAIN SCALES - GENERAL
PAINLEVEL_OUTOF10: 10
PAINLEVEL_OUTOF10: 10
PAINLEVEL_OUTOF10: 5
PAINLEVEL_OUTOF10: 8
PAINLEVEL_OUTOF10: 7
PAINLEVEL_OUTOF10: 8
PAINLEVEL_OUTOF10: 8
PAINLEVEL_OUTOF10: 10
PAINLEVEL_OUTOF10: 10

## 2023-03-02 ASSESSMENT — PAIN DESCRIPTION - DESCRIPTORS
DESCRIPTORS: CRAMPING
DESCRIPTORS: ACHING;CRAMPING
DESCRIPTORS: ACHING;SHARP
DESCRIPTORS: SHOOTING;ACHING
DESCRIPTORS: SHARP
DESCRIPTORS: SHARP;ACHING
DESCRIPTORS: ACHING;STABBING;PENETRATING

## 2023-03-02 ASSESSMENT — PAIN DESCRIPTION - ORIENTATION
ORIENTATION: RIGHT
ORIENTATION: RIGHT;LOWER
ORIENTATION: RIGHT;LOWER
ORIENTATION: RIGHT

## 2023-03-02 ASSESSMENT — PAIN - FUNCTIONAL ASSESSMENT
PAIN_FUNCTIONAL_ASSESSMENT: ACTIVITIES ARE NOT PREVENTED
PAIN_FUNCTIONAL_ASSESSMENT: PREVENTS OR INTERFERES SOME ACTIVE ACTIVITIES AND ADLS
PAIN_FUNCTIONAL_ASSESSMENT: PREVENTS OR INTERFERES SOME ACTIVE ACTIVITIES AND ADLS
PAIN_FUNCTIONAL_ASSESSMENT: ACTIVITIES ARE NOT PREVENTED
PAIN_FUNCTIONAL_ASSESSMENT: ACTIVITIES ARE NOT PREVENTED
PAIN_FUNCTIONAL_ASSESSMENT: NONE - DENIES PAIN

## 2023-03-02 ASSESSMENT — LIFESTYLE VARIABLES
HOW OFTEN DO YOU HAVE A DRINK CONTAINING ALCOHOL: NEVER
HOW MANY STANDARD DRINKS CONTAINING ALCOHOL DO YOU HAVE ON A TYPICAL DAY: PATIENT DOES NOT DRINK
HOW MANY STANDARD DRINKS CONTAINING ALCOHOL DO YOU HAVE ON A TYPICAL DAY: 1 OR 2
HOW OFTEN DO YOU HAVE A DRINK CONTAINING ALCOHOL: MONTHLY OR LESS

## 2023-03-02 ASSESSMENT — PAIN DESCRIPTION - LOCATION
LOCATION: FLANK
LOCATION: BACK
LOCATION: FLANK

## 2023-03-02 ASSESSMENT — PAIN DESCRIPTION - FREQUENCY
FREQUENCY: CONTINUOUS

## 2023-03-02 ASSESSMENT — PAIN DESCRIPTION - ONSET
ONSET: ON-GOING
ONSET: SUDDEN
ONSET: ON-GOING

## 2023-03-02 NOTE — PROGRESS NOTES
Pt becomes hypoxic after receiving pain medicine and becoming drowsy. Pt reports she has sleep apnea. Pt placed on oxygen 2 liters, pulse on 96%.

## 2023-03-02 NOTE — PROGRESS NOTES
Pt arrives via personal vehicle. Pt complains of right flank pain and is moaning with any movement. Pt is alert and oriented.

## 2023-03-02 NOTE — DISCHARGE INSTRUCTIONS
Go directly to [facility], go to the emergency room entrance and advise them that you are being direct admitted, and they will direct you to your room.  Do not go home or make other stops, or delay going to the other facility.

## 2023-03-03 ENCOUNTER — APPOINTMENT (OUTPATIENT)
Dept: GENERAL RADIOLOGY | Age: 58
End: 2023-03-03
Attending: FAMILY MEDICINE
Payer: COMMERCIAL

## 2023-03-03 ENCOUNTER — ANESTHESIA (OUTPATIENT)
Dept: OPERATING ROOM | Age: 58
End: 2023-03-03
Payer: COMMERCIAL

## 2023-03-03 ENCOUNTER — ANESTHESIA EVENT (OUTPATIENT)
Dept: OPERATING ROOM | Age: 58
End: 2023-03-03
Payer: COMMERCIAL

## 2023-03-03 VITALS
OXYGEN SATURATION: 91 % | BODY MASS INDEX: 48.55 KG/M2 | RESPIRATION RATE: 16 BRPM | TEMPERATURE: 97.4 F | DIASTOLIC BLOOD PRESSURE: 59 MMHG | HEART RATE: 71 BPM | WEIGHT: 263.8 LBS | SYSTOLIC BLOOD PRESSURE: 115 MMHG | HEIGHT: 62 IN

## 2023-03-03 LAB
ABSOLUTE EOS #: 0.1 K/UL (ref 0–0.44)
ABSOLUTE IMMATURE GRANULOCYTE: <0.03 K/UL (ref 0–0.3)
ABSOLUTE LYMPH #: 2.22 K/UL (ref 1.1–3.7)
ABSOLUTE MONO #: 1.03 K/UL (ref 0.1–1.2)
ANION GAP SERPL CALCULATED.3IONS-SCNC: 7 MMOL/L (ref 9–17)
BASOPHILS # BLD: 0 % (ref 0–2)
BASOPHILS ABSOLUTE: <0.03 K/UL (ref 0–0.2)
BUN SERPL-MCNC: 22 MG/DL (ref 6–20)
BUN/CREAT BLD: 26 (ref 9–20)
CALCIUM SERPL-MCNC: 9 MG/DL (ref 8.6–10.4)
CHLORIDE SERPL-SCNC: 107 MMOL/L (ref 98–107)
CO2 SERPL-SCNC: 27 MMOL/L (ref 20–31)
CREAT SERPL-MCNC: 0.85 MG/DL (ref 0.5–0.9)
EKG ATRIAL RATE: 65 BPM
EKG P AXIS: 42 DEGREES
EKG P-R INTERVAL: 182 MS
EKG Q-T INTERVAL: 402 MS
EKG QRS DURATION: 82 MS
EKG QTC CALCULATION (BAZETT): 418 MS
EKG R AXIS: -12 DEGREES
EKG T AXIS: 48 DEGREES
EKG VENTRICULAR RATE: 65 BPM
EOSINOPHILS RELATIVE PERCENT: 1 % (ref 1–4)
GFR SERPL CREATININE-BSD FRML MDRD: >60 ML/MIN/1.73M2
GLUCOSE SERPL-MCNC: 98 MG/DL (ref 70–99)
HCT VFR BLD AUTO: 37.1 % (ref 36.3–47.1)
HGB BLD-MCNC: 12.4 G/DL (ref 11.9–15.1)
IMMATURE GRANULOCYTES: 0 %
LYMPHOCYTES # BLD: 25 % (ref 24–43)
MCH RBC QN AUTO: 29.4 PG (ref 25.2–33.5)
MCHC RBC AUTO-ENTMCNC: 33.4 G/DL (ref 28.4–34.8)
MCV RBC AUTO: 87.9 FL (ref 82.6–102.9)
MICROORGANISM SPEC CULT: NORMAL
MONOCYTES # BLD: 12 % (ref 3–12)
NRBC AUTOMATED: 0 PER 100 WBC
PDW BLD-RTO: 13.2 % (ref 11.8–14.4)
PLATELET # BLD AUTO: 194 K/UL (ref 138–453)
PMV BLD AUTO: 9.4 FL (ref 8.1–13.5)
POTASSIUM SERPL-SCNC: 4.4 MMOL/L (ref 3.7–5.3)
RBC # BLD: 4.22 M/UL (ref 3.95–5.11)
SEG NEUTROPHILS: 62 % (ref 36–65)
SEGMENTED NEUTROPHILS ABSOLUTE COUNT: 5.52 K/UL (ref 1.5–8.1)
SODIUM SERPL-SCNC: 141 MMOL/L (ref 135–144)
SPECIMEN DESCRIPTION: NORMAL
WBC # BLD AUTO: 8.9 K/UL (ref 3.5–11.3)

## 2023-03-03 PROCEDURE — C1769 GUIDE WIRE: HCPCS | Performed by: UROLOGY

## 2023-03-03 PROCEDURE — 2500000003 HC RX 250 WO HCPCS: Performed by: NURSE ANESTHETIST, CERTIFIED REGISTERED

## 2023-03-03 PROCEDURE — 3700000001 HC ADD 15 MINUTES (ANESTHESIA): Performed by: UROLOGY

## 2023-03-03 PROCEDURE — C2617 STENT, NON-COR, TEM W/O DEL: HCPCS | Performed by: UROLOGY

## 2023-03-03 PROCEDURE — 96361 HYDRATE IV INFUSION ADD-ON: CPT

## 2023-03-03 PROCEDURE — 3209999900 FLUORO FOR SURGICAL PROCEDURES

## 2023-03-03 PROCEDURE — 7100000010 HC PHASE II RECOVERY - FIRST 15 MIN: Performed by: UROLOGY

## 2023-03-03 PROCEDURE — 3600000003 HC SURGERY LEVEL 3 BASE: Performed by: UROLOGY

## 2023-03-03 PROCEDURE — 2709999900 HC NON-CHARGEABLE SUPPLY: Performed by: UROLOGY

## 2023-03-03 PROCEDURE — 3600000013 HC SURGERY LEVEL 3 ADDTL 15MIN: Performed by: UROLOGY

## 2023-03-03 PROCEDURE — 94761 N-INVAS EAR/PLS OXIMETRY MLT: CPT

## 2023-03-03 PROCEDURE — 93010 ELECTROCARDIOGRAM REPORT: CPT | Performed by: INTERNAL MEDICINE

## 2023-03-03 PROCEDURE — 6360000002 HC RX W HCPCS: Performed by: NURSE ANESTHETIST, CERTIFIED REGISTERED

## 2023-03-03 PROCEDURE — 6370000000 HC RX 637 (ALT 250 FOR IP): Performed by: UROLOGY

## 2023-03-03 PROCEDURE — 36415 COLL VENOUS BLD VENIPUNCTURE: CPT

## 2023-03-03 PROCEDURE — 80048 BASIC METABOLIC PNL TOTAL CA: CPT

## 2023-03-03 PROCEDURE — 6360000002 HC RX W HCPCS: Performed by: UROLOGY

## 2023-03-03 PROCEDURE — G0378 HOSPITAL OBSERVATION PER HR: HCPCS

## 2023-03-03 PROCEDURE — 85025 COMPLETE CBC W/AUTO DIFF WBC: CPT

## 2023-03-03 PROCEDURE — 3700000000 HC ANESTHESIA ATTENDED CARE: Performed by: UROLOGY

## 2023-03-03 PROCEDURE — 7100000011 HC PHASE II RECOVERY - ADDTL 15 MIN: Performed by: UROLOGY

## 2023-03-03 DEVICE — URETERAL STENT WITH SIDE HOLES 6FX24CM
Type: IMPLANTABLE DEVICE | Site: URETER | Status: FUNCTIONAL
Brand: TRIA™ FIRM

## 2023-03-03 RX ORDER — CEPHALEXIN 500 MG/1
500 CAPSULE ORAL 3 TIMES DAILY
Qty: 21 CAPSULE | Refills: 0 | Status: SHIPPED | OUTPATIENT
Start: 2023-03-03 | End: 2023-03-10

## 2023-03-03 RX ORDER — SODIUM CHLORIDE 0.9 % (FLUSH) 0.9 %
5-40 SYRINGE (ML) INJECTION PRN
Status: DISCONTINUED | OUTPATIENT
Start: 2023-03-03 | End: 2023-03-03 | Stop reason: HOSPADM

## 2023-03-03 RX ORDER — FENTANYL CITRATE 50 UG/ML
INJECTION, SOLUTION INTRAMUSCULAR; INTRAVENOUS PRN
Status: DISCONTINUED | OUTPATIENT
Start: 2023-03-03 | End: 2023-03-03 | Stop reason: SDUPTHER

## 2023-03-03 RX ORDER — METOPROLOL TARTRATE 50 MG/1
50 TABLET, FILM COATED ORAL 2 TIMES DAILY
Status: DISCONTINUED | OUTPATIENT
Start: 2023-03-03 | End: 2023-03-03 | Stop reason: HOSPADM

## 2023-03-03 RX ORDER — SODIUM CHLORIDE 0.9 % (FLUSH) 0.9 %
5-40 SYRINGE (ML) INJECTION EVERY 12 HOURS SCHEDULED
Status: DISCONTINUED | OUTPATIENT
Start: 2023-03-03 | End: 2023-03-03 | Stop reason: HOSPADM

## 2023-03-03 RX ORDER — CEFAZOLIN SODIUM IN 0.9 % NACL 2 G/100 ML
2000 PLASTIC BAG, INJECTION (ML) INTRAVENOUS ONCE
Status: COMPLETED | OUTPATIENT
Start: 2023-03-03 | End: 2023-03-03

## 2023-03-03 RX ORDER — PANTOPRAZOLE SODIUM 40 MG/1
40 TABLET, DELAYED RELEASE ORAL
Status: DISCONTINUED | OUTPATIENT
Start: 2023-03-03 | End: 2023-03-03 | Stop reason: HOSPADM

## 2023-03-03 RX ORDER — SODIUM CHLORIDE 9 MG/ML
INJECTION, SOLUTION INTRAVENOUS PRN
Status: DISCONTINUED | OUTPATIENT
Start: 2023-03-03 | End: 2023-03-03 | Stop reason: HOSPADM

## 2023-03-03 RX ORDER — PROPOFOL 10 MG/ML
INJECTION, EMULSION INTRAVENOUS PRN
Status: DISCONTINUED | OUTPATIENT
Start: 2023-03-03 | End: 2023-03-03 | Stop reason: SDUPTHER

## 2023-03-03 RX ORDER — LIDOCAINE HYDROCHLORIDE 20 MG/ML
INJECTION, SOLUTION EPIDURAL; INFILTRATION; INTRACAUDAL; PERINEURAL PRN
Status: DISCONTINUED | OUTPATIENT
Start: 2023-03-03 | End: 2023-03-03 | Stop reason: SDUPTHER

## 2023-03-03 RX ORDER — EZETIMIBE 10 MG/1
10 TABLET ORAL NIGHTLY
Status: DISCONTINUED | OUTPATIENT
Start: 2023-03-03 | End: 2023-03-03 | Stop reason: HOSPADM

## 2023-03-03 RX ORDER — ONDANSETRON 2 MG/ML
4 INJECTION INTRAMUSCULAR; INTRAVENOUS
Status: DISCONTINUED | OUTPATIENT
Start: 2023-03-03 | End: 2023-03-03 | Stop reason: HOSPADM

## 2023-03-03 RX ORDER — FENTANYL CITRATE 50 UG/ML
50 INJECTION, SOLUTION INTRAMUSCULAR; INTRAVENOUS EVERY 5 MIN PRN
Status: DISCONTINUED | OUTPATIENT
Start: 2023-03-03 | End: 2023-03-03 | Stop reason: HOSPADM

## 2023-03-03 RX ORDER — LIDOCAINE HYDROCHLORIDE 20 MG/ML
JELLY TOPICAL PRN
Status: DISCONTINUED | OUTPATIENT
Start: 2023-03-03 | End: 2023-03-03 | Stop reason: ALTCHOICE

## 2023-03-03 RX ORDER — DULOXETIN HYDROCHLORIDE 30 MG/1
30 CAPSULE, DELAYED RELEASE ORAL DAILY
Status: DISCONTINUED | OUTPATIENT
Start: 2023-03-03 | End: 2023-03-03 | Stop reason: HOSPADM

## 2023-03-03 RX ORDER — ONDANSETRON 2 MG/ML
INJECTION INTRAMUSCULAR; INTRAVENOUS PRN
Status: DISCONTINUED | OUTPATIENT
Start: 2023-03-03 | End: 2023-03-03 | Stop reason: SDUPTHER

## 2023-03-03 RX ORDER — ROPINIROLE 0.25 MG/1
1 TABLET, FILM COATED ORAL NIGHTLY
Status: DISCONTINUED | OUTPATIENT
Start: 2023-03-03 | End: 2023-03-03 | Stop reason: HOSPADM

## 2023-03-03 RX ORDER — DEXAMETHASONE SODIUM PHOSPHATE 4 MG/ML
INJECTION, SOLUTION INTRA-ARTICULAR; INTRALESIONAL; INTRAMUSCULAR; INTRAVENOUS; SOFT TISSUE PRN
Status: DISCONTINUED | OUTPATIENT
Start: 2023-03-03 | End: 2023-03-03 | Stop reason: SDUPTHER

## 2023-03-03 RX ORDER — GLYCOPYRROLATE 0.2 MG/ML
INJECTION INTRAMUSCULAR; INTRAVENOUS PRN
Status: DISCONTINUED | OUTPATIENT
Start: 2023-03-03 | End: 2023-03-03 | Stop reason: SDUPTHER

## 2023-03-03 RX ORDER — KETOROLAC TROMETHAMINE 15 MG/ML
15 INJECTION, SOLUTION INTRAMUSCULAR; INTRAVENOUS EVERY 6 HOURS PRN
Status: DISCONTINUED | OUTPATIENT
Start: 2023-03-03 | End: 2023-03-03 | Stop reason: HOSPADM

## 2023-03-03 RX ORDER — SODIUM CHLORIDE, SODIUM LACTATE, POTASSIUM CHLORIDE, CALCIUM CHLORIDE 600; 310; 30; 20 MG/100ML; MG/100ML; MG/100ML; MG/100ML
INJECTION, SOLUTION INTRAVENOUS CONTINUOUS
Status: DISCONTINUED | OUTPATIENT
Start: 2023-03-03 | End: 2023-03-03 | Stop reason: HOSPADM

## 2023-03-03 RX ORDER — KETOROLAC TROMETHAMINE 30 MG/ML
INJECTION, SOLUTION INTRAMUSCULAR; INTRAVENOUS PRN
Status: DISCONTINUED | OUTPATIENT
Start: 2023-03-03 | End: 2023-03-03 | Stop reason: SDUPTHER

## 2023-03-03 RX ORDER — PREGABALIN 75 MG/1
225 CAPSULE ORAL 2 TIMES DAILY
Status: DISCONTINUED | OUTPATIENT
Start: 2023-03-03 | End: 2023-03-03 | Stop reason: HOSPADM

## 2023-03-03 RX ORDER — SPIRONOLACTONE 25 MG/1
25 TABLET ORAL DAILY
Status: DISCONTINUED | OUTPATIENT
Start: 2023-03-03 | End: 2023-03-03 | Stop reason: HOSPADM

## 2023-03-03 RX ORDER — FENTANYL CITRATE 50 UG/ML
25 INJECTION, SOLUTION INTRAMUSCULAR; INTRAVENOUS EVERY 5 MIN PRN
Status: DISCONTINUED | OUTPATIENT
Start: 2023-03-03 | End: 2023-03-03 | Stop reason: HOSPADM

## 2023-03-03 RX ORDER — MONTELUKAST SODIUM 10 MG/1
10 TABLET ORAL NIGHTLY
Status: DISCONTINUED | OUTPATIENT
Start: 2023-03-03 | End: 2023-03-03 | Stop reason: HOSPADM

## 2023-03-03 RX ORDER — EPHEDRINE SULFATE/0.9% NACL/PF 25 MG/5 ML
SYRINGE (ML) INTRAVENOUS PRN
Status: DISCONTINUED | OUTPATIENT
Start: 2023-03-03 | End: 2023-03-03 | Stop reason: SDUPTHER

## 2023-03-03 RX ADMIN — PANTOPRAZOLE SODIUM 40 MG: 40 TABLET, DELAYED RELEASE ORAL at 09:36

## 2023-03-03 RX ADMIN — SPIRONOLACTONE 25 MG: 25 TABLET, FILM COATED ORAL at 09:34

## 2023-03-03 RX ADMIN — PREGABALIN 225 MG: 75 CAPSULE ORAL at 09:37

## 2023-03-03 RX ADMIN — DEXAMETHASONE SODIUM PHOSPHATE 4 MG: 4 INJECTION, SOLUTION INTRAMUSCULAR; INTRAVENOUS at 08:02

## 2023-03-03 RX ADMIN — METOPROLOL TARTRATE 50 MG: 50 TABLET, FILM COATED ORAL at 09:34

## 2023-03-03 RX ADMIN — LIDOCAINE HYDROCHLORIDE 80 MG: 20 INJECTION, SOLUTION EPIDURAL; INFILTRATION; INTRACAUDAL; PERINEURAL at 07:57

## 2023-03-03 RX ADMIN — EPHEDRINE SULFATE 15 MG: 5 INJECTION INTRAVENOUS at 08:13

## 2023-03-03 RX ADMIN — ONDANSETRON 4 MG: 2 INJECTION INTRAMUSCULAR; INTRAVENOUS at 08:02

## 2023-03-03 RX ADMIN — Medication 2000 MG: at 07:51

## 2023-03-03 RX ADMIN — FENTANYL CITRATE 25 MCG: 50 INJECTION INTRAMUSCULAR; INTRAVENOUS at 08:21

## 2023-03-03 RX ADMIN — DULOXETINE HYDROCHLORIDE 30 MG: 30 CAPSULE, DELAYED RELEASE ORAL at 09:36

## 2023-03-03 RX ADMIN — FENTANYL CITRATE 25 MCG: 50 INJECTION INTRAMUSCULAR; INTRAVENOUS at 08:04

## 2023-03-03 RX ADMIN — EPHEDRINE SULFATE 10 MG: 5 INJECTION INTRAVENOUS at 08:11

## 2023-03-03 RX ADMIN — KETOROLAC TROMETHAMINE 30 MG: 30 INJECTION, SOLUTION INTRAMUSCULAR; INTRAVENOUS at 08:01

## 2023-03-03 RX ADMIN — PROPOFOL 170 MG: 10 INJECTION, EMULSION INTRAVENOUS at 07:57

## 2023-03-03 RX ADMIN — GLYCOPYRROLATE 0.4 MG: 1 INJECTION INTRAMUSCULAR; INTRAVENOUS at 08:08

## 2023-03-03 ASSESSMENT — ENCOUNTER SYMPTOMS
NAUSEA: 1
VOMITING: 1

## 2023-03-03 ASSESSMENT — PAIN DESCRIPTION - DESCRIPTORS: DESCRIPTORS: ACHING

## 2023-03-03 ASSESSMENT — PAIN DESCRIPTION - FREQUENCY: FREQUENCY: CONTINUOUS

## 2023-03-03 ASSESSMENT — PAIN DESCRIPTION - ONSET: ONSET: ON-GOING

## 2023-03-03 ASSESSMENT — PAIN DESCRIPTION - ORIENTATION: ORIENTATION: RIGHT

## 2023-03-03 ASSESSMENT — PAIN SCALES - GENERAL
PAINLEVEL_OUTOF10: 0
PAINLEVEL_OUTOF10: 2

## 2023-03-03 ASSESSMENT — PAIN DESCRIPTION - PAIN TYPE: TYPE: ACUTE PAIN

## 2023-03-03 ASSESSMENT — PAIN - FUNCTIONAL ASSESSMENT: PAIN_FUNCTIONAL_ASSESSMENT: ACTIVITIES ARE NOT PREVENTED

## 2023-03-03 ASSESSMENT — PAIN DESCRIPTION - LOCATION: LOCATION: FLANK

## 2023-03-03 NOTE — H&P (VIEW-ONLY)
Transportation Needs: Unknown    Lack of Transportation (Medical): Not on file    Lack of Transportation (Non-Medical): No   Physical Activity: Not on file   Stress: Not on file   Social Connections: Not on file   Intimate Partner Violence: Not on file   Housing Stability: Unknown    Unable to Pay for Housing in the Last Year: Not on file    Number of Places Lived in the Last Year: Not on file    Unstable Housing in the Last Year: No       Family History:    Family History   Problem Relation Age of Onset    Heart Disease Mother     High Blood Pressure Father     Heart Disease Father     High Blood Pressure Sister     High Blood Pressure Brother     Cancer Sister         Breast       ROS:  Constitutional:  Negative for appetite change, chills and fever. Eyes:  Negative for redness and visual disturbance. Respiratory:  Negative for cough, shortness of breath and wheezing. Cardiovascular:  Negative for chest pain and leg swelling. Gastrointestinal:  Negative for abdominal pain, constipation, nausea and vomiting. Genitourinary:  Negative for decreased urine volume, difficulty urinating, dysuria, enuresis, flank pain, frequency, hematuria, penile discharge, penile pain, scrotal swelling, testicular pain and urgency. Musculoskeletal:  Negative for back pain, joint swelling and myalgias. Skin:  Negative for color change, rash and wound. Neurological:  Negative for dizziness, tremors and numbness. Hematological:  Negative for adenopathy. Does not bruise/bleed easily.      Physical Exam:      This a 62 y.o. female   Patient Vitals for the past 24 hrs:   BP Temp Temp src Pulse Resp SpO2 Height Weight   03/03/23 0502 -- -- -- -- -- -- -- 263 lb 12.8 oz (119.7 kg)   03/03/23 0036 134/87 97.4 °F (36.3 °C) Temporal 68 18 94 % -- --   03/02/23 1945 (!) 140/88 96.9 °F (36.1 °C) Temporal 84 16 93 % 5' 2\" (1.575 m) 263 lb 4.8 oz (119.4 kg)   03/02/23 1930 -- -- -- -- -- -- 5' 2\" (1.575 m) 263 lb 4.8 oz (119.4 kg)

## 2023-03-03 NOTE — DISCHARGE SUMMARY
Discharge Summary    Tommy Singer  :  1965  MRN:  [id number]    Admit date:  3/2/2023      Discharge date: 3/3/2023     Admitting Physician:  Mari Vasquez MD    Discharge Diagnoses:    Principal Problem:    Hydronephrosis with urinary obstruction due to ureteral calculus  Resolved Problems:    * No resolved hospital problems. *      Hospital Course:   Tommy Singer is a 62 y.o. female admitted with hydronephrosis and ureteral calculi. She presented  to the [facility] emergency room with complaints of right flank pain. Patient stated she has history of kidney stones. She described her pain as sharp and stabbing. She complained of nausea and vomiting. During patient's evaluation she was afebrile. She was negative for UTI. CT study showed an 8 mm moderate to severe right hydronephrosis in the right UVJ. Patient was transferred here to [facility] for urology work-up. Patient underwent cystoscopy with stent placement today. She is tolerated well. Hemodynamically she is stable. Plan will be to discharge home today she will follow-up with urology as an outpatient. Consultants:  Dr. Kalpesh Shipley, urology    Procedures:  Cystoscopy with stent placement    Complications: none    Discharge Condition: fair    Exam:  GEN:    Awake, alert and oriented x3. EYES:   EOMI, pupils equal   NECK: Supple. No lymphadenopathy. No carotid bruit  CVS:     regular rate and rhythm, no audible murmur  PULM:  CTA, no wheezes, rales or rhonchi, no acute respiratory distress  ABD:     Bowels sounds normal.  Abdomen is soft. No distention. no tenderness to palpation. EXT:     no edema bilaterally . No calf tenderness. NEURO: Moves all extremities. Motor and sensory are grossly intact  SKIN:    No rashes.   No skin lesions    Significant Diagnostic Studies:   Lab Results   Component Value Date    WBC 8.9 2023    HGB 12.4 2023     2023       Lab Results   Component Value Date    BUN 22 (H) 03/03/2023    CREATININE 0.85 03/03/2023     03/03/2023    K 4.4 03/03/2023    CALCIUM 9.0 03/03/2023     03/03/2023    CO2 27 03/03/2023    LABGLOM >60 03/03/2023       Lab Results   Component Value Date    WBCUA 2 TO 5 03/02/2023    RBCUA 5 TO 10 03/02/2023    EPITHUA 10 TO 20 03/02/2023    LEUKOCYTESUR 2+ (A) 03/02/2023    SPECGRAV 1.030 03/02/2023    GLUCOSEU NEGATIVE 03/02/2023    KETUA NEGATIVE 03/02/2023    PROTEINU 1+ (A) 03/02/2023    HGBUR 3+ (A) 03/02/2023    CASTUA 0 TO 2 HYALINE 03/17/2022    CRYSTUA NOT REPORTED 10/02/2018    BACTERIA 1+ (A) 03/02/2023    YEAST NOT REPORTED 10/02/2018       CT ABDOMEN PELVIS WO CONTRAST Additional Contrast? None    Result Date: 3/2/2023  EXAM: CT ABDOMEN PELVIS WO CONTRAST Comparison: 10/2/2018 CLINICAL INDICATION: Flank pain. TECHNIQUE: Axial images through the abdomen and pelvis were obtained without intravenous contrast. Coronal and sagittal reconstructions were obtained. Dose reduction techniques were achieved by using automated exposure control and/or adjustment of mA and/or kV according to patient size and/or use of iterative reconstruction technique. FINDINGS: Please note that evaluation of the viscera/vascular structures, and sensitivity for detection of focal lesions, is limited without intravenous contrast. LOWER CHEST: Nonspecific trace groundglass airspace opacity in the partially visualized posterior medial left lung base. No focal consolidation in the visualized lung bases. Trace hiatal hernia. Borderline cardiomegaly. LIVER: Unremarkable. GALLBLADDER: Prior cholecystectomy. BILE DUCTS: Unremarkable. PANCREAS: Unremarkable. SPLEEN: Unremarkable. ADRENALS: Unremarkable. KIDNEYS/URETERS: 8 mm right UVJ stone resulting in moderate-severe right hydroureteronephrosis. Right perinephric stranding noted, correlate with urinalysis for infection of the right urinary collecting system. Nonobstructing 5 mm right kidney stone.  A couple punctate 2 mm nonobstructing left kidney stones. BLADDER: Bladder is decompressed. No significant bladder wall thickening. PELVIC STRUCTURES: Prior hysterectomy. No adnexal masses. No significant pelvic free fluid. GI TRACT: Evaluation of bowel limited by fecal contents and lack of distention. No evidence of bowel obstruction. Normal appendix. Colonic diverticulosis without evidence of acute diverticulitis. VASCULAR STRUCTURES: Unremarkable. LYMPH NODES: No pathologic lymphadenopathy by CT size criteria. PERITONEUM: No free air. No abscess. SOFT TISSUES: Unremarkable. OSSEOUS STRUCTURES: No acute osseous abnormality. Degenerative grade 1 anterolisthesis of L4 on L5 is new compared to prior, due to severe lower lumbar facet arthropathy. No fracture or dislocation. Osteopenia.     1. 8 mm right UVJ stone resulting in moderate-severe right hydroureteronephrosis. Right perinephric stranding, correlate with urinalysis for infection of the right urinary collecting system. 2. Nonobstructing 5 mm right kidney stone and a couple punctate 2 mm nonobstructing left kidney stones. 3. Prior hysterectomy and cholecystectomy. 4. Colonic diverticulosis without evidence of acute diverticulitis. 5. Normal appendix. 6. Degenerative grade 1 anterolisthesis of L4 on L5 is new compared to prior, due to severe lower lumbar facet arthropathy.     FLUORO FOR SURGICAL PROCEDURES    Result Date: 3/3/2023  Radiology exam is complete. No Radiologist dictation. Please follow up with ordering provider.       Assessment and Plan:  Patient Active Problem List    Diagnosis Date Noted    HTN (hypertension) 03/15/2013    Hyperlipidemia 03/08/2013    Hydronephrosis with urinary obstruction due to ureteral calculus 03/02/2023    Kidney stones 08/10/2018    Ureteral stone with hydronephrosis 08/10/2018    Renal colic on right side 08/10/2018    NEIL (obstructive sleep apnea) 07/08/2016    Restless legs 07/08/2016    History of adenomatous polyp of colon 06/03/2016     Female genuine stress incontinence 08/15/2014        Discharge Medications:         Medication List        START taking these medications      cephALEXin 500 MG capsule  Commonly known as: KEFLEX  Take 1 capsule by mouth 3 times daily for 7 days            CONTINUE taking these medications      aspirin 81 MG EC tablet     doxazosin 1 MG tablet  Commonly known as: CARDURA  TAKE 1 TABLET DAILY AT BEDTIME     DULoxetine 30 MG extended release capsule  Commonly known as: CYMBALTA  TAKE 1 CAPSULE DAILY     ezetimibe 10 MG tablet  Commonly known as: ZETIA  TAKE 1 TABLET DAILY     metoprolol tartrate 50 MG tablet  Commonly known as: LOPRESSOR  TAKE 1 TABLET TWICE A DAY     montelukast 10 MG tablet  Commonly known as: SINGULAIR  TAKE 1 TABLET NIGHTLY     naproxen 375 MG tablet  Commonly known as: NAPROSYN  TAKE 1 TABLET TWICE DAILY  WITH MEALS     omeprazole 20 MG delayed release capsule  Commonly known as: PRILOSEC  TAKE 1 CAPSULE DAILY in am on empty stomach     pregabalin 225 MG capsule  Commonly known as: LYRICA  Take 1 capsule by mouth in the morning and 1 capsule in the evening. Do all this for 90 days. L4 radiculopathy. rOPINIRole 1 MG tablet  Commonly known as: REQUIP  TAKE 1 TABLET TWICE A DAY  FOR RESTLESS LEGS     spironolactone 25 MG tablet  Commonly known as: ALDACTONE  TAKE 1 TABLET DAILY     tamsulosin 0.4 MG capsule  Commonly known as: FLOMAX     Vitamin D3 50 MCG (2000 UT) Caps               Where to Get Your Medications        These medications were sent to [address] [place] [address] [address] [facility] Carvajal, [address] [place] [address]      Phone: [phone]   cephALEXin 500 MG capsule         Patient Instructions:    Activity: activity as tolerated  Diet: encourage fluids  Wound Care: none needed  Other: None    Disposition:   Discharge to Home    Follow up:  Patient will be followed by ELIGIO Manzanares CNP in 1-2 weeks; Dr. Danay Guerrero MEASURES on Discharge (if applicable)  ACE/ARB in CHF: NA  Statin in MI: NA  ASA in MI: NA  Statin in CVA: NA  Antiplatelet in CVA: NA    Total time spent on discharge services: 40 minutes    Including the following activities:  Evaluation and Management of patient  Discussion with patient and/or surrogate about current care plan  Coordination with Case Management and/or   Coordination of care with Consultants (if applicable)   Coordination of care with Receiving Facility Physician (if applicable)  Completion of DME forms (if applicable)  Preparation of Discharge Summary  Preparation of Medication Reconciliation  Preparation of Discharge Prescriptions    Signed:  ELIGIO Woodruff - CNP, APRN, NP-C  3/3/2023, 12:14 PM

## 2023-03-03 NOTE — ED PROVIDER NOTES
[address] [place] [address]      Pt Name: Hayder Boles  MRN: [id number]  [place] 1965  Date of evaluation: 3/2/2023  Provider: Geraldo Eric MD    CHIEF COMPLAINT       Chief Complaint   Patient presents with    Flank Pain     Right sided - started this morning around 0600 - hx of kidney stones          HISTORY OF PRESENT ILLNESS      Amy L Tanner Kawasaki is a 62 y.o. female who presents to the emergency department via private vehicle, patient complaining of right flank pain, onset 0530 hrs. this morning, described as sharp stabbing pain rating it 10 out of 10, with some nausea and vomiting earlier, pain consistent with prior history of kidney stones. Nothing is helped her symptoms. REVIEW OF SYSTEMS       Review of Systems   Gastrointestinal:  Positive for nausea and vomiting. Genitourinary:  Positive for flank pain. All other systems reviewed and are negative.       PAST MEDICAL HISTORY     Past Medical History:   Diagnosis Date    Allergic rhinitis     Arthritis     COVID-19 01/21/2021    not hospitalized : h/a, loss smell, tired abd ache,  no meds    Enlarged thyroid 2016    nodules check annually    Fibromyalgia     GERD (gastroesophageal reflux disease)     Headache(784.0)     migranes at times    Hyperlipidemia     Hypertension     Hypertension     NEIL (obstructive sleep apnea) 07/08/2016    Restless leg syndrome     Restless legs 07/08/2016    Sleep apnea          SURGICAL HISTORY       Past Surgical History:   Procedure Laterality Date    BLADDER SURGERY  9/9/14    bladder sling    CHOLECYSTECTOMY      CYSTOSCOPY Right 08/10/2018    Dr Macarena Navarro- stent placement    HYSTERECTOMY (CERVIX STATUS UNKNOWN)      Total    KNEE SURGERY      LITHOTRIPSY Right 08/21/2018    stent placement    OTHER SURGICAL HISTORY  03-    Coaptite (Dr. Mortimer Duke)    HI CYSTO/URETERO W/LITHOTRIPSY &INDWELL STENT INSRT Right 8/21/2018    CYSTOSCOPY URETEROSCOPY LASER-HLL performed by Khadar Frias MD at [address] &INDWELL STENT INSRT Right 8/21/2018    CYSTOSCOPY STENT INSERTION-EXCHANGE performed by Khadar Frias MD at [address] OFFICE/OUTPT [facility] Right 8/10/2018    CYSTOSCOPY STENT INSERTION performed by Khadar Frias MD at [address]       Discharge Medication List as of 3/2/2023  5:28 PM        CONTINUE these medications which have NOT CHANGED    Details   tamsulosin (FLOMAX) 0.4 MG capsule Take 0.4 mg by mouth onceHistorical Med      naproxen (NAPROSYN) 375 MG tablet TAKE 1 TABLET TWICE DAILY  WITH MEALS, Disp-180 tablet, R-0Normal      montelukast (SINGULAIR) 10 MG tablet TAKE 1 TABLET NIGHTLY, Disp-90 tablet, R-1Normal      ezetimibe (ZETIA) 10 MG tablet TAKE 1 TABLET DAILY, Disp-90 tablet, R-1Normal      spironolactone (ALDACTONE) 25 MG tablet TAKE 1 TABLET DAILY, Disp-90 tablet, R-1Normal      metoprolol tartrate (LOPRESSOR) 50 MG tablet TAKE 1 TABLET TWICE A DAY, Disp-180 tablet, R-1Normal      pregabalin (LYRICA) 225 MG capsule Take 1 capsule by mouth in the morning and 1 capsule in the evening. Do all this for 90 days. L4 radiculopathy. , Disp-180 capsule, R-0Normal      DULoxetine (CYMBALTA) 30 MG extended release capsule TAKE 1 CAPSULE DAILY, Disp-90 capsule, R-1Normal      rOPINIRole (REQUIP) 1 MG tablet TAKE 1 TABLET TWICE A DAY  FOR RESTLESS LEGS, Disp-180 tablet, R-1Normal      omeprazole (PRILOSEC) 20 MG delayed release capsule TAKE 1 CAPSULE DAILY in am on empty stomach, Disp-90 capsule, R-1Normal      doxazosin (CARDURA) 1 MG tablet TAKE 1 TABLET DAILY AT BEDTIME, Disp-90 tablet, R-3Normal      Cholecalciferol (VITAMIN D3) 50 MCG (2000 UT) CAPS Take by mouth daily Historical Med      Acetaminophen (TYLENOL PO) Take 500 mg by mouth every 6 hours as needed Historical Med      aspirin 81 MG EC tablet Take 81 mg by mouth daily Historical Med             ALLERGIES       Iv contrast [iodides], Hydrocodone, Iodinated contrast media, Lisinopril, and Simvastatin    FAMILY HISTORY       Family History   Problem Relation Age of Onset    Heart Disease Mother     High Blood Pressure Father     Heart Disease Father     High Blood Pressure Sister     High Blood Pressure Brother     Cancer Sister         Breast          SOCIAL HISTORY       Social History     Tobacco Use    Smoking status: Never    Smokeless tobacco: Never   Vaping Use    Vaping Use: Never used   Substance Use Topics    Alcohol use: No     Comment: rarely    Drug use: No         PHYSICAL EXAM       ED Triage Vitals [03/02/23 1059]   BP Temp Temp Source Heart Rate Resp SpO2 Height Weight   (!) 161/94 97.8 °F (36.6 °C) Oral 64 18 95 % 5' 2\" (1.575 m) 263 lb (119.3 kg)       Physical Exam  Physical Exam   Constitutional: Patient is oriented to person, place, and time. Patient appears well-developed and well-nourished. Patient is active and cooperative. HENT:   Head: Normocephalic and atraumatic. Head is without contusion. Right Ear: Hearing and external ear normal. No drainage. Left Ear: Hearing and external ear normal. No drainage. Nose: Nose normal. No nasal deformity. No epistaxis. Mouth/Throat: Mucous membranes are not dry. Eyes: EOMI. Conjunctivae, sclera, and lids are normal. Right eye exhibits no discharge. Left eye exhibits no discharge. Neck: Full passive range of motion without pain and phonation normal.   Cardiovascular:  Normal rate, regular rhythm and intact distal pulses. Pulses: Right radial pulse  2+   Pulmonary/Chest: Effort normal. No tachypnea and no bradypnea. No wheezes, rhonchi, or rales. Abdominal: BMI 48.1, soft. Patient without distension or tenderness, some right flank tenderness  Musculoskeletal:   Negative acute trauma or deformity,  apparent full range of motion and normal strength all extremities appropriate to age.   Neurological: Patient is alert and oriented to person, place, and time. patient displays no tremor. Patient displays no seizure activity. .  Skin: Skin is warm and dry. Patient is not diaphoretic. Psychiatric: Patient has a normal mood and anxious though overall pleasant affect. Patient speech is normal and behavior is normal. Cognition and memory are normal.     DIAGNOSTIC RESULTS     EKG:   (Preop) EKG @ 1712 hrs -sinus rhythm rate 65 normal axis normal intervals, as compared to prior EKG dated 2/7/2017, no changed axis otherwise no changes morphology    RADIOLOGY:     Interpretation per the Radiologist below, if available at the time of this note:    CT ABDOMEN PELVIS WO CONTRAST Additional Contrast? None   Final Result      1. 8 mm right UVJ stone resulting in moderate-severe right    hydroureteronephrosis. Right perinephric stranding, correlate with urinalysis    for infection of the right urinary collecting system. 2. Nonobstructing 5 mm right kidney stone and a couple punctate 2 mm    nonobstructing left kidney stones. 3. Prior hysterectomy and cholecystectomy. 4. Colonic diverticulosis without evidence of acute diverticulitis. 5. Normal appendix. 6. Degenerative grade 1 anterolisthesis of L4 on L5 is new compared to prior,    due to severe lower lumbar facet arthropathy.                 LABS:  Labs Reviewed   URINALYSIS - Abnormal; Notable for the following components:       Result Value    Turbidity UA Cloudy (*)     Urine Hgb 3+ (*)     Protein, UA 1+ (*)     Leukocyte Esterase, Urine 2+ (*)     All other components within normal limits   MICROSCOPIC URINALYSIS - Abnormal; Notable for the following components:    Bacteria, UA 1+ (*)     All other components within normal limits   BASIC METABOLIC PANEL - Abnormal; Notable for the following components:    Glucose 107 (*)     Bun/Cre Ratio 23 (*)     All other components within normal limits   CULTURE, URINE   CBC WITH AUTO DIFFERENTIAL       All other labs were within normal range or not returned as of this dictation.       MIPS    Not applicable      EMERGENCY DEPARTMENT COURSE and DIFFERENTIAL DIAGNOSIS/MDM:     Patient history and physical exam taken at bedside, discussed symptoms and exam findings, discussed initial work-up to include IV access, blood and urine studies, reviewed allergies, give IV ketorolac and IV Zofran  and reevaluate, acknowledged    Initial lab work-up reviewed    Discussed patient is labs, patient still in significant pain, will give fentanyl 100 mcg IV x1 IV fluid bolus    Follow-up with patient, still showing significant pain, hydromorphone 1 mg IV ordered    Patient initially showing some overall improvement, Flomax p.o. ordered, however shortly afterwards pain again returned, will give third dose pain medication, will order CT abdomen pelvis without contrast    CT imaging reviewed, concerning for stone in the mid right ureter, awaiting formal read, with radiology report indicating 8 mm stone with moderate to severe hydronephrosis    Discussed with patient CT findings, will contact urology to discuss case, acknowledged    Discussed with urology NP Carson Monreal, regarding presentation work-up including CT findings, advised to have patient transferred to [address] [place] [address] admit to hospital service, she will arrange for stent placement tomorrow    Discussed with patient my conversation with urology, plan to transfer to [place], acknowledged    Case discussed with Dr. Alberto Bettencourt, hospitalist at [address] [place] [address], regarding presentation and work-up including conversation with urology above, excepted for transfer, does request to get an EKG at this facility    EKG as above    Patient is requesting private vehicle transport once bed assignment received, and later patient's significant other came to emergency room and agrees to take patient to [address] [place] [address] via private vehicle    1)  Number and Complexity of Problems  Problem List This Visit: Flank pain    Differential Diagnosis: Kidney stone, pyelonephritis, UTI, MSK    Diagnoses Considered but Do Not Suspect: N/A    Pertinent Comorbid Conditions: History kidney stones    2)  Data Reviewed  My EKG interpretation:  As above    Decision Rules/Scores utilized: N/A    Tests considered but not ordered and why: N/A    External Documents Reviewed: N/A    Imaging that is independently reviewed and interpreted by me as: As above    See more data below for the lab and radiology tests and orders. 3)  Treatment and Disposition    Patient repeat assessment:  As above    Disposition discussion with patient/family: Discharged with private vehicle transfer    Case discussed with consulting clinician:  As above    Social determinants of health impacting treatment or disposition: N/A    Shared Decision Making: N/A    Code Status Discussion: N/A      REASSESSMENT     As above      CRITICAL CARE TIME     Total Critical Care time was 0 minutes, excluding separately reportable procedures. There was a high probability of clinically significant/life threatening deterioration in the patient's condition which required my urgent intervention. PROCEDURES:  Unless otherwise noted below, none     Procedures    FINAL IMPRESSION      1. Hydronephrosis with renal and ureteral calculous obstruction    2. Intractable pain          DISPOSITION/PLAN     DISPOSITION Decision To Discharge 03/02/2023 05:27:18 PM      PATIENT REFERRED TO:  No follow-up provider specified.     DISCHARGE MEDICATIONS:  Discharge Medication List as of 3/2/2023  5:28 PM          (Please note that portions of this note were completed with a voice recognition program.  Efforts were made to edit the dictations but occasionally words are mis-transcribed.)    Jarod Bautista MD (electronically signed)  Attending Emergency Physician           Jarod Bautista MD  03/03/23 5114

## 2023-03-03 NOTE — ANESTHESIA POSTPROCEDURE EVALUATION
Department of Anesthesiology  Postprocedure Note    Patient: Fred Bojorquez  MRN: [id number]  YOB: 1965  Date of evaluation: 3/3/2023      Procedure Summary     Date: 03/03/23 Room / Location: [facility]    Anesthesia Start: 8803 Anesthesia Stop: 4515    Procedure: CYSTOSCOPY URETERAL STENT INSERTION (Right) Diagnosis:       Calculus of kidney      (HYDRONEPHROSIS WITH URINARY OBSTRUCTION DUE TO URETERAL CALCULUS)    Surgeons: Renee Rubio MD Responsible Provider: ELIGIO Marrero CRNA    Anesthesia Type: general ASA Status: 3          Anesthesia Type: No value filed.     Philip Phase I: Philip Score: 10    Philip Phase II: Philip Score: 10      Anesthesia Post Evaluation    Patient location during evaluation: PACU  Patient participation: complete - patient participated  Level of consciousness: awake and alert  Pain score: 0  Airway patency: patent  Nausea & Vomiting: no nausea and no vomiting  Complications: no  Cardiovascular status: blood pressure returned to baseline  Respiratory status: acceptable and room air  Hydration status: stable

## 2023-03-03 NOTE — PROGRESS NOTES
Discharge Criteria    Inpatients must meet Criteria 1 through 7. All other patients are either YES or N/A. If a NO is chosen then Anesthesia or Surgeon must be notified. 1.  Minimum 30 minutes after last dose of sedative medication, minimum 120 minutes after last dose of reversal agent. Yes      2. Systolic BP stable within 20 mmHg for 30 minutes & systolic BP between 90 & 086 or within 10 mmHg of baseline. Yes      3. Pulse between 60 and 100 or within 10 bpm of baseline. Yes      4. Spontaneous respiratory rate >/= 10 per minute. Yes      5. SaO2 >/= 95 or  >/= baseline. Yes      6. Able to cough and swallow or return to baseline function. Yes      7. Alert and oriented or return to baseline mental status. Yes      8. Demonstrates controlled, coordinated movements, ambulates with steady gait, or return to baseline activity function. N/A      9. Minimal or no pain or nausea, or at a level tolerable and acceptable to patient. Yes      10. Takes and retains oral fluids as allowed. N/A      11. Procedural / perioperative site stable. Minimal or no bleeding. Yes          12. If GI endoscopy procedure, minimal or no abdominal distention or passing flatus. N/A      13. Written discharge instructions and emergency telephone number provided. N/A      14. Accompanied by a responsible adult.     N/A

## 2023-03-03 NOTE — ANESTHESIA PRE PROCEDURE
Department of Anesthesiology  Preprocedure Note       Name:  Doroteo Diaz   Age:  62 y.o.  :  1965                                          MRN:  [id number]         Date:  3/3/2023      Surgeon: Jeri Quiroz):  Diamond oJya MD    Procedure: Procedure(s):  CYSTOSCOPY URETERAL STENT INSERTION    Medications prior to admission:   Prior to Admission medications    Medication Sig Start Date End Date Taking? Authorizing Provider   tamsulosin (FLOMAX) 0.4 MG capsule Take 0.4 mg by mouth once    Historical Provider, MD   naproxen (NAPROSYN) 375 MG tablet TAKE 1 TABLET TWICE DAILY  WITH MEALS 23   ELIGIO Rivero CNP   montelukast (SINGULAIR) 10 MG tablet TAKE 1 TABLET NIGHTLY 23   ELIGIO Rivero CNP   ezetimibe (ZETIA) 10 MG tablet TAKE 1 TABLET DAILY 23   ELIGIO Rivero CNP   spironolactone (ALDACTONE) 25 MG tablet TAKE 1 TABLET DAILY 23   ELIGIO Rivero CNP   metoprolol tartrate (LOPRESSOR) 50 MG tablet TAKE 1 TABLET TWICE A DAY 23   ELIGIO Rivero CNP   pregabalin (LYRICA) 225 MG capsule Take 1 capsule by mouth in the morning and 1 capsule in the evening. Do all this for 90 days. L4 radiculopathy.  12/29/22 3/29/23  Maninder Gaines,    DULoxetine (CYMBALTA) 30 MG extended release capsule TAKE 1 CAPSULE DAILY 10/14/22   ELIGIO Rivero CNP   rOPINIRole (REQUIP) 1 MG tablet TAKE 1 TABLET TWICE A DAY  FOR RESTLESS LEGS 10/14/22   ELIGIO Rivero CNP   omeprazole (PRILOSEC) 20 MG delayed release capsule TAKE 1 CAPSULE DAILY in am on empty stomach 22   ELIGIO Rivero CNP   doxazosin (CARDURA) 1 MG tablet TAKE 1 TABLET DAILY AT BEDTIME 22   ELIGIO Rivero CNP   Cholecalciferol (VITAMIN D3) 50 MCG (2000) CAPS Take by mouth daily     Historical Provider, MD   aspirin 81 MG EC tablet Take 81 mg by mouth daily     Historical Provider, MD       Current medications:    Current Facility-Administered Medications   Medication Dose Route Frequency Provider Last Rate Last Admin    ceFAZolin (ANCEF) 2000 mg in 0.9% sodium chloride 100 mL IVPB  2,000 mg IntraVENous Once Muna Escobar MD        [facility]] DULoxetine (CYMBALTA) extended release capsule 30 mg  30 mg Oral Daily Priya Cueto MD        [facility]] ezetimibe (ZETIA) tablet 10 mg  10 mg Oral Nightly Priya Cueto MD        [facility]] metoprolol tartrate (LOPRESSOR) tablet 50 mg  50 mg Oral BID Priya Cueto MD   50 mg at 03/02/23 2016    [[facility] Hold] montelukast (SINGULAIR) tablet 10 mg  10 mg Oral Nightly Priya Cueto MD   10 mg at 03/02/23 2016    [MAR Hold] pantoprazole (PROTONIX) tablet 40 mg  40 mg Oral QAM AC Priya Cueto MD        [MAR Hold] pregabalin (LYRICA) capsule 225 mg  225 mg Oral BID Priya Cueto MD   225 mg at 03/02/23 2016    [MAR Hold] rOPINIRole (REQUIP) tablet 1 mg  1 mg Oral Nightly Priya Cueto MD   1 mg at 03/02/23 2016    [MAR Hold] spironolactone (ALDACTONE) tablet 25 mg  25 mg Oral Daily Priya Cueto MD        [MAR Hold] tamsulosin (FLOMAX) capsule 0.4 mg  0.4 mg Oral Once MD [facility]] lactated ringers IV soln infusion   IntraVENous Continuous Dipakkumar Ashanti Camarena  mL/hr at 03/02/23 2101 New Bag at 03/02/23 2101    [MAR Hold] sodium chloride flush 0.9 % injection 5-40 mL  5-40 mL IntraVENous 2 times per day MD Kandi Shea [facility]] sodium chloride flush 0.9 % injection 5-40 mL  5-40 mL IntraVENous PRN Priya Cueto MD        [facility]] 0.9 % sodium chloride infusion   IntraVENous PRN Priya Cueto MD        [facility]] potassium chloride (KLOR-CON M) extended release tablet 40 mEq  40 mEq Oral PRN Priya Cueto MD        Or   [facility]] potassium bicarb-citric acid (EFFER-K) effervescent tablet 40 mEq  40 mEq Oral PRN Priya Cueto MD        Or   [facility]] potassium chloride 10 mEq/100 mL IVPB (Peripheral Line)  10 mEq IntraVENous PRN Hardy Lagos MD       • [MAR Hold] ondansetron (ZOFRAN-ODT) disintegrating tablet 4 mg  4 mg Oral Q8H PRN Hardy Lagos MD        Or   • [MAR Hold] ondansetron (ZOFRAN) injection 4 mg  4 mg IntraVENous Q6H PRN Hardy Lagos MD   4 mg at 03/02/23 2105   • [MAR Hold] cefTRIAXone (ROCEPHIN) 1,000 mg in sodium chloride 0.9 % 50 mL IVPB (mini-bag)  1,000 mg IntraVENous Q24H Hardy Lagos MD   Stopped at 03/02/23 2140   • [MAR Hold] ketorolac (TORADOL) injection 15 mg  15 mg IntraVENous Q6H PRN Hardy Lagos MD   15 mg at 03/02/23 2102       Allergies:    Allergies   Allergen Reactions   • Iv Contrast [Iodides] Swelling   • Hydrocodone    • Iodinated Contrast Media Swelling   • Lisinopril Swelling     Swelling of upper lip   • Simvastatin Other (See Comments)     Elevated CK/CKMB.       Problem List:    Patient Active Problem List   Diagnosis Code   • Hyperlipidemia E78.5   • HTN (hypertension) I10   • History of adenomatous polyp of colon Z86.010   • NEIL (obstructive sleep apnea) G47.33   • Restless legs G25.81   • Female genuine stress incontinence N39.3   • Kidney stones N20.0   • Ureteral stone with hydronephrosis N13.2   • Renal colic on right side N23   • Hydronephrosis with urinary obstruction due to ureteral calculus N13.2       Past Medical History:        Diagnosis Date   • Allergic rhinitis    • Arthritis    • COVID-19 01/21/2021    not hospitalized : h/a, loss smell, tired abd ache,  no meds   • Enlarged thyroid 2016    nodules check annually   • Fibromyalgia    • GERD (gastroesophageal reflux disease)    • Headache(784.0)     migranes at times   • Hyperlipidemia    • Hypertension    • Hypertension    • NEIL (obstructive sleep apnea) 07/08/2016   • Restless leg syndrome    • Restless legs 07/08/2016   • Sleep apnea        Past Surgical History:        Procedure Laterality Date   • BLADDER SURGERY  9/9/14    bladder sling   • CHOLECYSTECTOMY     • CYSTOSCOPY Right 08/10/2018      Richard Luevano- stent placement    HYSTERECTOMY (CERVIX STATUS UNKNOWN)      Total    KNEE SURGERY      LITHOTRIPSY Right 08/21/2018    stent placement    OTHER SURGICAL HISTORY  03-    [facility] (Dr. Lourdes García)   [address] [facility] STENT INSRT Right 8/21/2018    CYSTOSCOPY URETEROSCOPY LASER-HLL performed by Pollo Mckinney MD at [facility] [address] Right 8/21/2018    CYSTOSCOPY STENT INSERTION-EXCHANGE performed by Pollo Mckinney MD at [address] [facility] OFFICE/OUTPT [address] Right 8/10/2018    CYSTOSCOPY STENT INSERTION performed by Pollo Mckinney MD at [address] History:    Social History     Tobacco Use    Smoking status: Never    Smokeless tobacco: Never   Substance Use Topics    Alcohol use: No     Comment: rarely                                Counseling given: Not Answered      Vital Signs (Current):   Vitals:    03/02/23 1945 03/03/23 0036 03/03/23 0502 03/03/23 0630   BP: (!) 140/88 134/87  133/77   Pulse: 84 68  65   Resp: 16 18  18   Temp: 36.1 °C (96.9 °F) 36.3 °C (97.4 °F)  36.1 °C (97 °F)   TempSrc: Temporal Temporal  Temporal   SpO2: 93% 94%  93%   Weight: 263 lb 4.8 oz (119.4 kg)  263 lb 12.8 oz (119.7 kg)    Height: 5' 2\" (1.575 m)                                                 BP Readings from Last 3 Encounters:   03/03/23 133/77   03/02/23 (!) 154/93   02/13/23 110/68       NPO Status: Time of last liquid consumption: 2100                        Time of last solid consumption: 2100                        Date of last liquid consumption: 03/02/23                        Date of last solid food consumption: 03/02/23    BMI:   Wt Readings from Last 3 Encounters:   03/03/23 263 lb 12.8 oz (119.7 kg)   03/02/23 263 lb (119.3 kg)   02/13/23 264 lb (119.7 kg)     Body mass index is 48.25 kg/m².     CBC:   Lab Results   Component Value Date/Time    WBC 8.9 03/03/2023 05:50 AM    RBC 4.22 03/03/2023 05:50 AM    HGB 12.4 03/03/2023 05:50 AM    HCT 37.1 03/03/2023 05:50 AM    MCV 87.9 03/03/2023 05:50 AM    RDW 13.2 03/03/2023 05:50 AM     03/03/2023 05:50 AM       CMP:   Lab Results   Component Value Date/Time     03/03/2023 05:50 AM    K 4.4 03/03/2023 05:50 AM     03/03/2023 05:50 AM    CO2 27 03/03/2023 05:50 AM    BUN 22 03/03/2023 05:50 AM    CREATININE 0.85 03/03/2023 05:50 AM    GFRAA >60 06/24/2020 11:31 AM    LABGLOM >60 03/03/2023 05:50 AM    GLUCOSE 98 03/03/2023 05:50 AM    GLUCOSE 95 02/14/2012 10:05 AM    PROT 7.5 12/16/2022 03:42 PM    CALCIUM 9.0 03/03/2023 05:50 AM    BILITOT 0.3 12/16/2022 03:42 PM    ALKPHOS 98 12/16/2022 03:42 PM    AST 29 12/16/2022 03:42 PM    ALT 30 12/16/2022 03:42 PM       POC Tests: No results for input(s): POCGLU, POCNA, POCK, POCCL, POCBUN, POCHEMO, POCHCT in the last 72 hours.     Coags:   Lab Results   Component Value Date/Time    PROTIME 9.4 03/14/2014 09:09 AM    INR 0.9 03/14/2014 09:09 AM    APTT 25.9 03/14/2014 09:09 AM       HCG (If Applicable): No results found for: PREGTESTUR, PREGSERUM, HCG, HCGQUANT     ABGs: No results found for: PHART, PO2ART, QNC4MVQ, ABM8JAQ, BEART, S6GIBYSL     Type & Screen (If Applicable):  No results found for: LABABO, LABRH    Drug/Infectious Status (If Applicable):  No results found for: HIV, HEPCAB    COVID-19 Screening (If Applicable):   Lab Results   Component Value Date/Time    COVID19 Detected 01/19/2021 12:35 PM           Anesthesia Evaluation  Patient summary reviewed and Nursing notes reviewed no history of anesthetic complications:   Airway: Mallampati: I  TM distance: >3 FB   Neck ROM: full  Mouth opening: > = 3 FB   Dental: normal exam         Pulmonary:normal exam    (+) sleep apnea: on CPAP,                             Cardiovascular:    (+) hypertension: moderate,       ECG reviewed  Rhythm: regular  Rate: normal                    Neuro/Psych:   Negative Neuro/Psych ROS     (-) neuromuscular disease and headaches            ROS comment: migraine  - stopped after hyster GI/Hepatic/Renal:   (+) GERD: well controlled, renal disease: kidney stones, morbid obesity          Endo/Other: Negative Endo/Other ROS                    Abdominal:             Vascular: negative vascular ROS. Other Findings:           Anesthesia Plan      general     ASA 3       Induction: intravenous. Anesthetic plan and risks discussed with patient.                         ELIGIO Anders - CRNA   3/3/2023

## 2023-03-03 NOTE — PROGRESS NOTES
Pt arrived to floor from [facility] via personal car at [facility]. Pt ambulated to room independently without difficulty. VS obtained and assessment done. Navigator completed. Pt ambulate to bathroom and back to bed, output recorded. Pt noted to have no IV access on admission. Multiple attempts made by four different nurses before access was obtained. IV fluids started. PRN Toradol administered per pain and PRN Zofran per complaints of nausea. Medications administered as ordered. Pt updated on plan of care and denies any other needs at this time. Call light and bedside table are within reach, will monitor.

## 2023-03-03 NOTE — BRIEF OP NOTE
Brief Postoperative Note      Patient: Rosa Moore  YOB: 1965  MRN: [id number]    Date of Procedure: 3/3/2023    Pre-Op Diagnosis: HYDRONEPHROSIS WITH URINARY OBSTRUCTION DUE TO URETERAL CALCULUS    Post-Op Diagnosis: Same       Procedure(s):  CYSTOSCOPY URETERAL STENT INSERTION    Surgeon(s):  Juancarlos Boston MD    Assistant:  * No surgical staff found *    Anesthesia: General    Estimated Blood Loss (mL): Minimal    Complications: None    Specimens:   * No specimens in log *    Implants:  Implant Name Type Inv.  Item Serial No.  Lot No. LRB No. Used Action   STENT URET 6 FRX24 CM FIRM [employer] - [id number]  STENT URET 6 FRX24 CM FIRM [employer]  [employer] [facility]- [id number] 1 Implanted         Drains: * No LDAs found *    Findings: right ureteral obstruction    Electronically signed by Juancarlos Boston MD on 3/3/2023 at 8:22 AM

## 2023-03-03 NOTE — PROGRESS NOTES
Pt resting in bed, assessment and vitals complete, see flow sheet for documentation, patient denies pain at this time, states she hasn't had pain for a little while, surgery notified writer and will be getting patient, patient notified, all needs met, call light within reach.

## 2023-03-03 NOTE — H&P
History and Physical    Patient:  Rosa Moore  MRN: [id number]    Chief Complaint: Abdominal pain    History Obtained From:  patient, electronic medical record    PCP: ELIGIO Cedeno CNP    History of Present Illness: The patient is a 62 y.o. female who scented to the [facility] emergency room with complaints of right flank pain. Patient stated she has history of kidney stones. She described her pain as sharp and stabbing. She complained of nausea and vomiting. During patient's evaluation she was afebrile. She was negative for UTI. CT study showed an 8 mm moderate to severe right hydronephrosis in the right UVJ. Patient was transferred here to [facility] for urology work-up.     Past Medical History:        Diagnosis Date    Allergic rhinitis     Arthritis     COVID-19 01/21/2021    not hospitalized : h/a, loss smell, tired abd ache,  no meds    Enlarged thyroid 2016    nodules check annually    Fibromyalgia     GERD (gastroesophageal reflux disease)     Headache(784.0)     migranes at times    Hyperlipidemia     Hypertension     Hypertension     NEIL (obstructive sleep apnea) 07/08/2016    Restless leg syndrome     Restless legs 07/08/2016    Sleep apnea        Past Surgical History:        Procedure Laterality Date    BLADDER SURGERY  09/09/2014    bladder sling    CHOLECYSTECTOMY      CYSTOSCOPY Right 08/10/2018    Dr Emile Merlin- stent placement    CYSTOSCOPY W/ URETERAL STENT PLACEMENT Right 03/03/2023    Dr Charlotte Hagan ([facility])      Total    KNEE SURGERY      LITHOTRIPSY Right 08/21/2018    stent placement    OTHER SURGICAL HISTORY  03/21/2014    Coaptite (Dr. Robe Stacy)    [place] CYSTO/URETERO W/LITHOTRIPSY &INDWE[place] STENT INSRT Right 08/21/2018    CYSTOSCOPY URETEROSCOPY LASER-HLL performed by Juancarlos Boston MD at [address] &INDWELL STENT INSRT Right 08/21/2018    CYSTOSCOPY STENT INSERTION-EXCHANGE performed by Juancarlos Boston MD at [address] [place] OFFICE/OUTPT VISIT,PROCEDURE ONLY Right 08/10/2018    CYSTOSCOPY STENT INSERTION performed by Daryn Aguilar MD at [address]         Medications Prior to Admission:    Prior to Admission medications    Medication Sig Start Date End Date Taking? Authorizing Provider   tamsulosin (FLOMAX) 0.4 MG capsule Take 0.4 mg by mouth once    Historical Provider, MD   naproxen (NAPROSYN) 375 MG tablet TAKE 1 TABLET TWICE DAILY  WITH MEALS 1/23/23   Janeth Overall, APRN - CNP   montelukast (SINGULAIR) 10 MG tablet TAKE 1 TABLET NIGHTLY 1/23/23   Janeth Overall, APRN - CNP   ezetimibe (ZETIA) 10 MG tablet TAKE 1 TABLET DAILY 1/23/23   Janeth Overall, APRN - CNP   spironolactone (ALDACTONE) 25 MG tablet TAKE 1 TABLET DAILY 1/23/23   Janeth Overall, APRN - CNP   metoprolol tartrate (LOPRESSOR) 50 MG tablet TAKE 1 TABLET TWICE A DAY 1/23/23   Janeth Overall, APRN - CNP   pregabalin (LYRICA) 225 MG capsule Take 1 capsule by mouth in the morning and 1 capsule in the evening. Do all this for 90 days. L4 radiculopathy. 12/29/22 3/29/23  Maninder Gaines,    DULoxetine (CYMBALTA) 30 MG extended release capsule TAKE 1 CAPSULE DAILY 10/14/22   Janeth Overall, APRN - CNP   rOPINIRole (REQUIP) 1 MG tablet TAKE 1 TABLET TWICE A DAY  FOR RESTLESS LEGS 10/14/22   Janeth Overall, APRN - CNP   omeprazole (PRILOSEC) 20 MG delayed release capsule TAKE 1 CAPSULE DAILY in am on empty stomach 6/20/22   Janeth Overall, APRN - CNP   doxazosin (CARDURA) 1 MG tablet TAKE 1 TABLET DAILY AT BEDTIME 6/20/22   Janeth Overall, APRN - CNP   Cholecalciferol (VITAMIN D3) 50 MCG (2000 UT) CAPS Take by mouth daily     Historical Provider, MD   aspirin 81 MG EC tablet Take 81 mg by mouth daily     Historical Provider, MD       Allergies: Iv contrast [iodides], Hydrocodone, Iodinated contrast media, Lisinopril, and Simvastatin    Social History:   TOBACCO:   reports that she has never smoked.  She has never used smokeless tobacco.  ETOH:   reports no history of alcohol use. Family History:       Problem Relation Age of Onset    Heart Disease Mother     High Blood Pressure Father     Heart Disease Father     High Blood Pressure Sister     High Blood Pressure Brother     Cancer Sister         Breast       Allergies: Iv contrast [iodides], Hydrocodone, Iodinated contrast media, Lisinopril, and Simvastatin    Medications Prior to Admission:    Prior to Admission medications    Medication Sig Start Date End Date Taking? Authorizing Provider   tamsulosin (FLOMAX) 0.4 MG capsule Take 0.4 mg by mouth once    Historical Provider, MD   naproxen (NAPROSYN) 375 MG tablet TAKE 1 TABLET TWICE DAILY  WITH MEALS 1/23/23   ELIGIO Olivier CNP   montelukast (SINGULAIR) 10 MG tablet TAKE 1 TABLET NIGHTLY 1/23/23   ELIGIO Olivier CNP   ezetimibe (ZETIA) 10 MG tablet TAKE 1 TABLET DAILY 1/23/23   ELIGIO Olivier CNP   spironolactone (ALDACTONE) 25 MG tablet TAKE 1 TABLET DAILY 1/23/23   ELIGIO Olivier CNP   metoprolol tartrate (LOPRESSOR) 50 MG tablet TAKE 1 TABLET TWICE A DAY 1/23/23   ELIGIO Olivier CNP   pregabalin (LYRICA) 225 MG capsule Take 1 capsule by mouth in the morning and 1 capsule in the evening. Do all this for 90 days. L4 radiculopathy.  12/29/22 3/29/23  Maninder Gaines,    DULoxetine (CYMBALTA) 30 MG extended release capsule TAKE 1 CAPSULE DAILY 10/14/22   ELIGIO Olivier CNP   rOPINIRole (REQUIP) 1 MG tablet TAKE 1 TABLET TWICE A DAY  FOR RESTLESS LEGS 10/14/22   ELIGIO Olivier CNP   omeprazole (PRILOSEC) 20 MG delayed release capsule TAKE 1 CAPSULE DAILY in am on empty stomach 6/20/22   ELIGIO Olivier CNP   doxazosin (CARDURA) 1 MG tablet TAKE 1 TABLET DAILY AT BEDTIME 6/20/22   ELIGIO Olivier CNP   Cholecalciferol (VITAMIN D3) 50 MCG (2000 UT) CAPS Take by mouth daily     Historical Provider, MD   aspirin 81 MG EC tablet Take 81 mg by mouth daily     Historical Provider, MD       Review of Systems:  Constitutional:negative  for fevers, and negative for chills. Eyes: negative for visual disturbance   ENT: negative for sore throat, negative nasal congestion, and negative for earache  Respiratory: negative for shortness of breath, negative for cough, and negative for wheezing  Cardiovascular: negative for chest pain, negative for palpitations, and negative for syncope  Gastrointestinal: positive for abdominal pain, positive for nausea,positive for vomiting, negative for diarrhea, negative for constipation, and negative for hematochezia or melena  Genitourinary: negative for dysuria, negative for urinary urgency, negative for urinary frequency, and negative for hematuria  Skin: negative for skin rash, and negative for skin lesions  Neurological: negative for unilateral weakness, numbness or tingling. Physical Exam:    Vitals:   Temp: 97 °F (36.1 °C)  BP: 116/62  Resp: 16  Heart Rate: 81  SpO2: 93 %  24HR INTAKE/OUTPUT:    Intake/Output Summary (Last 24 hours) at 3/3/2023 0857  Last data filed at 3/3/2023 1860  Gross per 24 hour   Intake 1192.62 ml   Output 300 ml   Net 892.62 ml       Weight    Body mass index is 48.25 kg/m². Exam:  GEN:    Awake, alert and oriented x3. EYES:  EOMI, pupils equal   NECK: Supple. No lymphadenopathy. No carotid bruit  CVS:    regular rate and rhythm, no audible murmur  PULM:  CTA, no wheezes, rales or rhonchi, no acute respiratory distress  ABD:    Bowels sounds normal.  Abdomen is soft. No distention. no tenderness to palpation. EXT:   no edema bilaterally . No calf tenderness. NEURO: Moves all extremities. Motor and sensory are grossly intact  SKIN:  No rashes.   No skin lesions.    -----------------------------------------------------------------  Diagnostic Data:     DATA:    CBC:   Lab Results   Component Value Date    WBC 8.9 03/03/2023    RBC 4.22 03/03/2023    HGB 12.4 03/03/2023    HCT 37.1 03/03/2023    MCV 87.9 03/03/2023     03/03/2023 CMP:   Lab Results   Component Value Date    GLUCOSE 98 03/03/2023    BUN 22 (H) 03/03/2023    CREATININE 0.85 03/03/2023     03/03/2023    K 4.4 03/03/2023    CALCIUM 9.0 03/03/2023     03/03/2023    CO2 27 03/03/2023    PROT 7.5 12/16/2022    LABALBU 4.3 12/16/2022    BILITOT 0.3 12/16/2022    ALKPHOS 98 12/16/2022    ALT 30 12/16/2022    AST 29 12/16/2022       UA:   Lab Results   Component Value Date    COLORU Yellow 03/02/2023    CLARITYU clear 06/24/2019    SPECGRAV 1.030 03/02/2023    WBCUA 2 TO 5 03/02/2023    RBCUA 5 TO 10 03/02/2023    EPITHUA 10 TO 20 03/02/2023    LEUKOCYTESUR 2+ (A) 03/02/2023    GLUCOSEU NEGATIVE 03/02/2023    BLOODU moderate 08/14/2019    KETUA NEGATIVE 03/02/2023    PROTEINU 1+ (A) 03/02/2023    HGBUR 3+ (A) 03/02/2023    CASTUA 0 TO 2 HYALINE 03/17/2022    CRYSTUA NOT REPORTED 10/02/2018    BACTERIA 1+ (A) 03/02/2023    YEAST NOT REPORTED 10/02/2018       Lactic Acid:   No results found for: LACTA    D-Dimer:  No results found for: DDIMER    PT/INR:  Lab Results   Component Value Date/Time    PROTIME 9.4 03/14/2014 09:09 AM    INR 0.9 03/14/2014 09:09 AM       High Sensitivity Troponin:  No results for input(s): TROPHS in the last 72 hours. ABGs:   No results found for: PHART, PH, ZXD6STX, PCO2, PO2ART, PO2, DZV6TBL, HCO3, BEART, BE, THGBART, THB, SAT6QGE, E5ZFZOUP, O2SAT, FIO2        FLUORO FOR SURGICAL PROCEDURES   Final Result              Assessment:    Principal Problem:    Hydronephrosis with urinary obstruction due to ureteral calculus  Resolved Problems:    * No resolved hospital problems.  *      Patient Active Problem List    Diagnosis Date Noted    HTN (hypertension) 03/15/2013    Hyperlipidemia 03/08/2013    Hydronephrosis with urinary obstruction due to ureteral calculus 03/02/2023    Kidney stones 08/10/2018    Ureteral stone with hydronephrosis 48/09/4552    Renal colic on right side 57/68/2782    NEIL (obstructive sleep apnea) 07/08/2016    Restless legs 07/08/2016    History of adenomatous polyp of colon 06/03/2016    Female genuine stress incontinence 08/15/2014       Plan:     MEDICAL DECISION MAKING:    Primary Problem(s): Hydronephrosis with urinary obstruction due to ureteral calculus  Differential diagnoses: none  Condition is an undiagnosed new problem with uncertain prognosis  Condition is improving  Treatment plan: Consultation: Urology  Imaging: no further imaging studies ordered today  Medications:   IVF  Medication Monitoring / High Risk Medications: none   MASON  S/p cystoscopy with internal stent placement       Nutrition status:   obesity, non-morbid  Dietician consult initiated    Hospital Prophylaxis:   DVT: SCD's   Stress Ulcer: PPI     MDM Data:   Test interpretation:  My independent EKG interpretation: NSR  My independent X-ray interpretation:   reviewed CT findings  Management and/or test interpretation discussed with ER MD at time of admission  Consults and Nursing notes were personally reviewed, all current labs and imaging were personally reviewed, tests ordered: CBC, BMP, and history obtained by independent historian       Disposition:  Shared decision making: All test results, treatment options and disposition options were discussed with the patient today  Social determinants of health that may impact management: none  Code status: Full Code   Disposition: Discharge plan is home        Critical Care Time:  Total critical care time caring for this patient with life threatening, unstable organ failure, including direct patient contact, management of life support systems, review of data including imaging and labs, discussions with other team members and physicians at least 0 minutes so far today, excluding separately billable procedures.       [facility] Medication Reconciliation documentation:    [x] I have utilized all available immediate resources to obtain, update, or review the patient's current medications (including all prescriptions, over-the-counter products, herbals, cannabinoid products and bitamin/mineral/dietary/nutritional supplements. Juan 'yes\", Thomas Manual     []  The patient is not eligible for medication reconciliation; the patient is in an emergent medical situation where delaying treatment would jeopardize the patient's health    []  I did NOT confirm, update or review the patient's current list of medications today. [DOES NOT SATISFY MIPS PERFORMANCE]        [facility] Advanced Care Planning documentation:  [x] I have confirmed that the patient's Advance Care Plan is present, Code Status is documented, or surrogate decision maker is listed in the patient's medical record  [If \"yes\", STOP HERE]     [] The patient's Advance Care Plan is NOT present because:    []  I confirmed today that the patient does not wish or was not able to name a   surrogate decision maker or provide and advance care plan.    [] Hospice care is currently being provided or has been provided within the   calendar year. []  I did NOT confirm today the presence of an [address] or surrogate   decision maker documented within the patient's medical record. [DOES NOT SATISFY MIPS PERFORMANCE]    CORE MEASURES  DVT prophylaxis: SCD  Decubitus ulcer present on admission: No  CODE STATUS: FULL CODE  Nutrition Status: good   Physical therapy: No   Old Charts reviewed: Yes  EKG Reviewed:  Yes  Advance Directive Addressed: Yes    ELIGIO Esquivel CNP, ELIGIO, NP-C  3/3/2023, 8:57 AM

## 2023-03-03 NOTE — PROGRESS NOTES
Discharge instructions given to patient with copies, IV removed, patient states ride will be here in 30 min.

## 2023-03-03 NOTE — PLAN OF CARE
Problem: Discharge Planning  Goal: Discharge to home or other facility with appropriate resources  3/3/2023 1216 by Alexander Sanchez RN  Outcome: Completed  3/3/2023 0934 by Alexander Sanchez RN  Outcome: Progressing  Flowsheets (Taken 3/3/2023 0630)  Discharge to home or other facility with appropriate resources: Identify barriers to discharge with patient and caregiver  3/2/2023 2252 by Bella Servin RN  Outcome: Progressing     Problem: Safety - Adult  Goal: Free from fall injury  3/3/2023 1216 by Alexander Sanchez RN  Outcome: Completed  3/3/2023 0934 by Alexander Sanchez RN  Outcome: Progressing  3/2/2023 2252 by Bella Servin RN  Outcome: Progressing     Problem: Pain  Goal: Verbalizes/displays adequate comfort level or baseline comfort level  3/3/2023 1216 by Alexander Sanchez RN  Outcome: Completed  3/3/2023 0934 by Alexander Sanchez RN  Outcome: Progressing  Flowsheets (Taken 3/3/2023 0630)  Verbalizes/displays adequate comfort level or baseline comfort level:   Encourage patient to monitor pain and request assistance   Assess pain using appropriate pain scale   Administer analgesics based on type and severity of pain and evaluate response   Implement non-pharmacological measures as appropriate and evaluate response  3/2/2023 2252 by Bella Servin RN  Outcome: Progressing     Problem: Genitourinary - Adult  Goal: Absence of urinary retention  3/3/2023 1216 by Alexander Sanchez RN  Outcome: Completed  3/3/2023 0934 by Alexander Sanchez RN  Outcome: Progressing  Flowsheets (Taken 3/3/2023 0630)  Absence of urinary retention: Assess patients ability to void and empty bladder     Problem: Infection - Adult  Goal: Absence of infection at discharge  3/3/2023 1216 by Alexander Sanchez RN  Outcome: Completed  3/3/2023 0934 by Alexander Sanchez RN  Outcome: Progressing  Flowsheets (Taken 3/3/2023 0630)  Absence of infection at discharge: Assess and monitor for signs and symptoms of infection

## 2023-03-03 NOTE — CONSULTS
Urology Consultation    Patient:  Rosa Moore  MRN: [id number]  YOB: 1965  Consult requested from Dr. Mihir Dolan  for purpose of evaluation of right flank pain. CHIEF COMPLAINT:  right flank pain    HISTORY OF PRESENT ILLNESS:   The patient is a 62 y.o. female who presents with right flank pain. Pt was seen in the ED at outside facility for right flank pain. Pt had a CT scan performed upon admission. This film was independently reviewed. This does show an 8mm distal right ureteral calculus with proximal hydroureteronephrosis. Pt is well known to the  service and has an history of renal calculus. PT lab work normal yesterday in the ED but pain could not be controlled. Pt is currently pain controlled with IV pain medication. Patient's old records, notes and chart reviewed and summarized above.     Past Medical History:    Past Medical History:   Diagnosis Date    Allergic rhinitis     Arthritis     COVID-19 01/21/2021    not hospitalized : h/a, loss smell, tired abd ache,  no meds    Enlarged thyroid 2016    nodules check annually    Fibromyalgia     GERD (gastroesophageal reflux disease)     Headache(784.0)     migranes at times    Hyperlipidemia     Hypertension     Hypertension     NEIL (obstructive sleep apnea) 07/08/2016    Restless leg syndrome     Restless legs 07/08/2016    Sleep apnea        Past Surgical History:    Past Surgical History:   Procedure Laterality Date    BLADDER SURGERY  9/9/14    bladder sling    CHOLECYSTECTOMY      CYSTOSCOPY Right 08/10/2018    Dr Emile Merlin- stent placement    HYSTERECTOMY (CERVIX STATUS UNKNOWN)      Total    KNEE SURGERY      LITHOTRIPSY Right 08/21/2018    stent placement    OTHER SURGICAL HISTORY  03-    Coaptite (Dr. Robe Stacy)    [place] CYSTO/URETERO W/LITHOTRIPSY &[place]WE[place] STENT INSRT Right 8/21/2018    CYSTOSCOPY URETEROSCOPY LASER-HLL performed by Juancarlos Boston MD at [address] &[place]WE[place] [address] Right 8/21/2018 CYSTOSCOPY STENT INSERTION-EXCHANGE performed by Khadar Frias MD at [address] OFFICE/OUTPT [facility] Right 8/10/2018    CYSTOSCOPY STENT INSERTION performed by Khadar Frias MD at [address]         Medications:    Scheduled Meds:   DULoxetine  30 mg Oral Daily    ezetimibe  10 mg Oral Nightly    metoprolol tartrate  50 mg Oral BID    montelukast  10 mg Oral Nightly    pantoprazole  40 mg Oral QAM AC    pregabalin  225 mg Oral BID    rOPINIRole  1 mg Oral Nightly    spironolactone  25 mg Oral Daily    tamsulosin  0.4 mg Oral Once    sodium chloride flush  5-40 mL IntraVENous 2 times per day    cefTRIAXone (ROCEPHIN) IV  1,000 mg IntraVENous Q24H     Continuous Infusions:   lactated ringers IV soln 125 mL/hr at 03/02/23 2101    sodium chloride       PRN Meds:.sodium chloride flush, sodium chloride, potassium chloride **OR** potassium alternative oral replacement **OR** potassium chloride, ondansetron **OR** ondansetron, ketorolac    Allergies:   Iv contrast [iodides], Hydrocodone, Iodinated contrast media, Lisinopril, and Simvastatin    Social History:    Social History     Socioeconomic History    Marital status: Single     Spouse name: Not on file    Number of children: Not on file    Years of education: Not on file    Highest education level: Not on file   Occupational History    Not on file   Tobacco Use    Smoking status: Never    Smokeless tobacco: Never   Vaping Use    Vaping Use: Never used   Substance and Sexual Activity    Alcohol use: No     Comment: rarely    Drug use: No    Sexual activity: Not on file   Other Topics Concern    Not on file   Social History Narrative    Not on file     Social Determinants of Health     Financial Resource Strain: Low Risk     Difficulty of Paying Living Expenses: Not hard at all   Food Insecurity: No Food Insecurity    Worried About Running Out of Food in the Last Year: Never true    [address] in the Last Year: Never true Transportation Needs: Unknown    Lack of Transportation (Medical): Not on file    Lack of Transportation (Non-Medical): No   Physical Activity: Not on file   Stress: Not on file   Social Connections: Not on file   Intimate Partner Violence: Not on file   Housing Stability: Unknown    Unable to Pay for Housing in the Last Year: Not on file    Number of Places Lived in the Last Year: Not on file    Unstable Housing in the Last Year: No       Family History:    Family History   Problem Relation Age of Onset    Heart Disease Mother     High Blood Pressure Father     Heart Disease Father     High Blood Pressure Sister     High Blood Pressure Brother     Cancer Sister         Breast       ROS:  Constitutional:  Negative for appetite change, chills and fever. Eyes:  Negative for redness and visual disturbance. Respiratory:  Negative for cough, shortness of breath and wheezing. Cardiovascular:  Negative for chest pain and leg swelling. Gastrointestinal:  Negative for abdominal pain, constipation, nausea and vomiting. Genitourinary:  Negative for decreased urine volume, difficulty urinating, dysuria, enuresis, flank pain, frequency, hematuria, penile discharge, penile pain, scrotal swelling, testicular pain and urgency. Musculoskeletal:  Negative for back pain, joint swelling and myalgias. Skin:  Negative for color change, rash and wound. Neurological:  Negative for dizziness, tremors and numbness. Hematological:  Negative for adenopathy. Does not bruise/bleed easily.      Physical Exam:      This a 62 y.o. female   Patient Vitals for the past 24 hrs:   BP Temp Temp src Pulse Resp SpO2 Height Weight   03/03/23 0502 -- -- -- -- -- -- -- 263 lb 12.8 oz (119.7 kg)   03/03/23 0036 134/87 97.4 °F (36.3 °C) Temporal 68 18 94 % -- --   03/02/23 1945 (!) 140/88 96.9 °F (36.1 °C) Temporal 84 16 93 % 5' 2\" (1.575 m) 263 lb 4.8 oz (119.4 kg)   03/02/23 1930 -- -- -- -- -- -- 5' 2\" (1.575 m) 263 lb 4.8 oz (119.4 kg) Constitutional: Patient in no acute distress. Neuro: Alert and oriented to person, place and time. Psych: mood and affect normal  Abdomen: Soft, non-tender, non-distended with no CVA, flank pain.   Gu: urine clear    LABS:   Recent Labs     03/02/23  1115   WBC 8.1   HGB 14.5   HCT 42.2   MCV 83.9        Recent Labs     03/02/23  1115      K 4.4      CO2 22   BUN 19   CREATININE 0.82       Additional Lab/culture results:    Urinalysis:   Recent Labs     03/02/23  1115   COLORU Yellow   PHUR 5.0   WBCUA 2 TO 5   RBCUA 5 TO 10   BACTERIA 1+*   SPECGRAV 1.030   LEUKOCYTESUR 2+*   UROBILINOGEN Normal   BILIRUBINUR NEGATIVE        -----------------------------------------------------------------  Imaging Results: as above      Assessment and Plan   Impression:    Patient Active Problem List   Diagnosis    Hyperlipidemia    HTN (hypertension)    History of adenomatous polyp of colon    NEIL (obstructive sleep apnea)    Restless legs    Female genuine stress incontinence    Kidney stones    Ureteral stone with hydronephrosis    Renal colic on right side    Hydronephrosis with urinary obstruction due to ureteral calculus       Plan:   Admit for pain control  Emergent right ureteral stent  Definitive stone tx in the next 1-2 weeks with Teodoro Mishra MD  5:16 AM 3/3/2023

## 2023-03-03 NOTE — PROGRESS NOTES
Reassessment and vitals obtained at this time as charted. Pt resting in bed at this time. No changes from prior assessment. Pt denies any further needs at this time. Call light within reach, care ongoing.

## 2023-03-03 NOTE — PLAN OF CARE
Problem: Discharge Planning  Goal: Discharge to home or other facility with appropriate resources  3/3/2023 0934 by Bradley Chen RN  Outcome: Progressing  Flowsheets (Taken 3/3/2023 0630)  Discharge to home or other facility with appropriate resources: Identify barriers to discharge with patient and caregiver  3/2/2023 2252 by Gentry Bojorquez RN  Outcome: Progressing     Problem: Safety - Adult  Goal: Free from fall injury  3/3/2023 0934 by Bradley Chen RN  Outcome: Progressing  3/2/2023 2252 by Gentry Bojorquez RN  Outcome: Progressing     Problem: Pain  Goal: Verbalizes/displays adequate comfort level or baseline comfort level  3/3/2023 0934 by Bradley Chen RN  Outcome: Progressing  Flowsheets (Taken 3/3/2023 0630)  Verbalizes/displays adequate comfort level or baseline comfort level:   Encourage patient to monitor pain and request assistance   Assess pain using appropriate pain scale   Administer analgesics based on type and severity of pain and evaluate response   Implement non-pharmacological measures as appropriate and evaluate response  3/2/2023 2252 by Gentry Bojorquez RN  Outcome: Progressing     Problem: Genitourinary - Adult  Goal: Absence of urinary retention  Outcome: Progressing  Flowsheets (Taken 3/3/2023 0630)  Absence of urinary retention: Assess patients ability to void and empty bladder     Problem: Infection - Adult  Goal: Absence of infection at discharge  Outcome: Progressing  Flowsheets (Taken 3/3/2023 0630)  Absence of infection at discharge: Assess and monitor for signs and symptoms of infection

## 2023-03-03 NOTE — PROGRESS NOTES
Nutrition Assessment     Type and Reason for Visit: Initial    Nutrition Recommendations/Plan:   Continue current diet.   Provided kidney stone nutrition therapy.      Malnutrition Assessment:  Malnutrition Status: No malnutrition    Nutrition Assessment:  Impaired nutrient utilization r/t renal dysfucntion aeb calcium containing kidney stones. Pt reports of good PO. She stopped drinking 4 Pepsi's every day. Drinks water now.  Pt ate all of her lunch.      Nutrition Related Findings:   no malnutrition indices Wound Type: None    Current Nutrition Therapies:    ADULT DIET; Regular    Anthropometric Measures:  Height: 5' 2\" (157.5 cm)  Current Body Wt: 263 lb 12.8 oz (119.7 kg)   BMI: 48.2  Recent Labs     03/02/23  1115 03/03/23  0550    141   K 4.4 4.4    107   CO2 22 27   BUN 19 22*   CREATININE 0.82 0.85   GLUCOSE 107* 98      Lab Results   Component Value Date/Time    LABALBU 4.3 12/16/2022 03:42 PM    LABALBU 4.6 02/14/2012 10:05 AM       Nutrition Diagnosis:   Impaired nutrient utilization related to renal dysfunction as evidenced by other (comment) (calcium containint kidney stones per Pt)    Nutrition Interventions:   Food and/or Nutrient Delivery: Continue Current Diet  Nutrition Education/Counseling: Education completed (Kidney stone nutrition therapy)  Coordination of Nutrition Care: Continue to monitor while inpatient  Plan of Care discussed with: Patient    Goals:     Goals: Meet at least 75% of estimated needs       Nutrition Monitoring and Evaluation:   Behavioral-Environmental Outcomes: None Identified  Food/Nutrient Intake Outcomes: Food and Nutrient Intake  Physical Signs/Symptoms Outcomes: Biochemical Data, Weight, Fluid Status or Edema    Discharge Planning:    Continue current diet     MEGHANA GREENFIELD RD, LD  Contact: [phone]

## 2023-03-04 NOTE — OP NOTE
[address] [place] [place], [place] [address]                                OPERATIVE REPORT    PATIENT NAME: COLTEN Mir                    :        1965  MED REC NO:   [id number]                              ROOM:       7161  ACCOUNT NO:   [de-identified]                           ADMIT DATE: 2023  PROVIDER:     Ashlee Perry    DATE OF PROCEDURE:  2023    SURGEON:  Dr. Ashlee Perry. ASSISTANT:  None. PREOPERATIVE DIAGNOSIS:  Right ureteral calculus. POSTOPERATIVE DIAGNOSIS:  Right ureteral calculus. PROCEDURE PERFORMED:  Cystoscopy, right ureteral stent placement. ANESTHESIA:  General.    COMPLICATIONS:  None. ESTIMATED BLOOD LOSS:  Minimal    SPECIMENS:  None. PROSTHESIS:  A 6-Nepalese x 24-cm double-J ureteral stent. DISPOSITION:  Stable. FINDINGS:  Right ureteral obstruction. INDICATIONS:  This patient is a 26-year-old female with a CT-proven 8-mm  distal right ureteral stone, here now for temporizing therapy in the  form of stent placement. DESCRIPTION OF PROCEDURE:  The patient was taken back to the operating  room after informed consent including all risks, benefits, and  alternatives were obtained. The patient was transferred from the [facility]  onto the operating room table, where she was induced under general  anesthesia and given IV Ancef for preoperative antibiotic prophylaxis. To begin the case, she was prepped and draped in normal sterile fashion,  placed in dorsal lithotomy. She had a 22-Nepalese sheath with a 30-degree  lens passed through the urethra into the bladder. Once in the bladder,  we identified the right ureteral orifice. A 0.035-inch wire was passed  up. This was somewhat difficult to get past the distal stone into the  kidney, confirmed by a fluoroscopy.   Once this was done, we were able to  place a 6-Nepalese x 24-cm double-J ureteral stent over the Glidewire up  into the kidney. This was somewhat difficult to pass by the stone as  well. Once this was done, we removed the Glidewire. Proximal curl was  confirmed via fluoroscopy. Distal curl was confirmed via visualization. At this point in time, the patient was awoken from general anesthesia,  transferred to the [facility], and taken to the PACU in satisfactory  condition by Nursing and Anesthesia Teams. PLAN:  She will return to the floor. She can be discharged later from  the floor. We will plan for definitive stone therapy in the next one to  two weeks.         Jose Deluca    D: 03/03/2023 16:44:07       T: 03/03/2023 16:47:43     [id number]  Job#: [id number]     Doc#: [id number]    CC:

## 2023-03-06 ENCOUNTER — TELEPHONE (OUTPATIENT)
Dept: UROLOGY | Age: 58
End: 2023-03-06

## 2023-03-06 ENCOUNTER — TELEPHONE (OUTPATIENT)
Dept: FAMILY MEDICINE CLINIC | Age: 58
End: 2023-03-06

## 2023-03-06 NOTE — TELEPHONE ENCOUNTER
Care Transitions Initial Follow Up Call    Outreach made within 2 business days of discharge: Yes    Patient: Fabian Sheridan Patient : 1965   MRN: [id number]  Reason for Admission:  Hydronephrosis with urinary obstruction due to ureteral calculus  Discharge Date: 3/3/23       Spoke with: Patient    Discharge department/facility: [facility] Interactive Patient Contact:  Was patient able to fill all prescriptions: Yes  Was patient instructed to bring all medications to the follow-up visit: Yes  Is patient taking all medications as directed in the discharge summary?  Yes  Does patient understand their discharge instructions: Yes  Does patient have questions or concerns that need addressed prior to 7-14 day follow up office visit: no    Scheduled appointment with PCP within 7-14 days    Follow Up  Future Appointments   Date Time Provider Tez Montemayor   3/9/2023 11:30 AM SCHEDULE, [facility] SCHEDULE [facility]   3/27/2023  3:30 PM Mazin Kaiser MD [facility]   3/30/2023 11:15 AM DO ABDIRIZ[facility] PAIN Charlene Guzman   4/3/2023  2:40 PM ELIGIO Bethea - CNP [facility]   10/5/2023  2:30 PM Drucilla Bonine Tyree Frankel, PA-C [facility] W       Wojciech Conroy LPN

## 2023-03-06 NOTE — TELEPHONE ENCOUNTER
Pt does not want to make f/u with PCP at this time. She is f/u with urology on 3/9/23 and needs to go back in to have more stones blasted. Pt is searching for a musculoskeletal doctor in network that specializes in CK levels. She will let us know who she finds.

## 2023-03-09 ENCOUNTER — OFFICE VISIT (OUTPATIENT)
Dept: UROLOGY | Age: 58
End: 2023-03-09
Payer: COMMERCIAL

## 2023-03-09 VITALS
DIASTOLIC BLOOD PRESSURE: 82 MMHG | HEIGHT: 62 IN | WEIGHT: 260 LBS | BODY MASS INDEX: 47.84 KG/M2 | TEMPERATURE: 97.3 F | SYSTOLIC BLOOD PRESSURE: 120 MMHG | HEART RATE: 85 BPM

## 2023-03-09 DIAGNOSIS — Z96.0 URETERAL STENT RETAINED: ICD-10-CM

## 2023-03-09 DIAGNOSIS — N13.2 URETERAL STONE WITH HYDRONEPHROSIS: Primary | ICD-10-CM

## 2023-03-09 PROCEDURE — 99214 OFFICE O/P EST MOD 30 MIN: CPT | Performed by: NURSE PRACTITIONER

## 2023-03-09 PROCEDURE — 3074F SYST BP LT 130 MM HG: CPT | Performed by: NURSE PRACTITIONER

## 2023-03-09 PROCEDURE — 3079F DIAST BP 80-89 MM HG: CPT | Performed by: NURSE PRACTITIONER

## 2023-03-09 RX ORDER — ONDANSETRON 4 MG/1
4 TABLET, FILM COATED ORAL 3 TIMES DAILY PRN
Qty: 15 TABLET | Refills: 0 | Status: SHIPPED | OUTPATIENT
Start: 2023-03-09

## 2023-03-09 RX ORDER — KETOROLAC TROMETHAMINE 10 MG/1
10 TABLET, FILM COATED ORAL EVERY 6 HOURS PRN
Qty: 20 TABLET | Refills: 0 | Status: SHIPPED | OUTPATIENT
Start: 2023-03-09 | End: 2024-03-08

## 2023-03-09 RX ORDER — TAMSULOSIN HYDROCHLORIDE 0.4 MG/1
0.4 CAPSULE ORAL DAILY
Qty: 30 CAPSULE | Refills: 2 | Status: SHIPPED | OUTPATIENT
Start: 2023-03-09

## 2023-03-09 RX ORDER — HYDROCODONE BITARTRATE AND ACETAMINOPHEN 5; 325 MG/1; MG/1
1 TABLET ORAL EVERY 4 HOURS PRN
Qty: 30 TABLET | Refills: 0 | Status: SHIPPED | OUTPATIENT
Start: 2023-03-09 | End: 2023-03-14

## 2023-03-09 ASSESSMENT — ENCOUNTER SYMPTOMS
NAUSEA: 0
ABDOMINAL PAIN: 0
BACK PAIN: 0
COUGH: 0
SHORTNESS OF BREATH: 0
WHEEZING: 0
COLOR CHANGE: 0
EYE REDNESS: 0
VOMITING: 0
CONSTIPATION: 0
APNEA: 0

## 2023-03-09 NOTE — PATIENT INSTRUCTIONS
You may experience waves of pain and/or nausea. You may also experience burning with urination, frequency, urgency, bladder spasms, and blood in the urine. 1) take toradol every 6 hours if needed WITH FOOD (do not take naproxen)  2) take Flomax daily  3) drink at least 80 oz fluid (water, juice, Gatorade - NOT tea, coffee, soda pop) daily    For pain not controlled by toradol use norco as directed as needed. For nausea use zofran. Call our office [phone] or go to ER (if after normal office hours) if you develop fever, intractable vomiting, severe/intolerable pain.

## 2023-03-09 NOTE — PROGRESS NOTES
HPI:        Patient is a 62 y.o. female in no acute distress. She is alert and oriented to person, place, and time. History  8/2018 Right HLL     3/2022 spontaneously passed 2 stones    3/2023 right ureteral stent placed due to 8 mm ureteral stone    Today  Here today to discuss definitive therapy for known right ureteral stone. She did have a temporizing right ureteral stent placed on 3/2/2023. She denies fevers, nausea or vomiting. She is having intermittent gross hematuria and flank pain.     Past Medical History:   Diagnosis Date    Allergic rhinitis     Arthritis     COVID-19 01/21/2021    not hospitalized : h/a, loss smell, tired abd ache,  no meds    Enlarged thyroid 2016    nodules check annually    Fibromyalgia     GERD (gastroesophageal reflux disease)     Headache(784.0)     migranes at times    Hyperlipidemia     Hypertension     Hypertension     NEIL (obstructive sleep apnea) 07/08/2016    Restless leg syndrome     Restless legs 07/08/2016    Sleep apnea      Past Surgical History:   Procedure Laterality Date    BLADDER SURGERY  09/09/2014    bladder sling    CHOLECYSTECTOMY      CYSTOSCOPY Right 08/10/2018    Dr Michele Helms- stent placement    CYSTOSCOPY Right 3/3/2023    CYSTOSCOPY URETERAL STENT INSERTION performed by Jeyson Ag MD at [facility] Right 03/03/2023    Dr Sophia Sutton ([facility])      Total    KNEE SURGERY      LITHOTRIPSY Right 08/21/2018    stent placement    OTHER SURGICAL HISTORY  03/21/2014    Coaptite (Dr. Sharon Hardin)    [facility] CYSTO/URETERO W/LITHOTRIPSY &INDWELL STENT INSRT Right 08/21/2018    CYSTOSCOPY URETEROSCOPY LASER-HLL performed by Jeyson Ag MD at [facility] &INDWELL [address] Right 08/21/2018    CYSTOSCOPY STENT INSERTION-EXCHANGE performed by Jeyson Ag MD at [address] OFFICE/OUTPT VISIT,PROCEDURE ONLY Right 08/10/2018    CYSTOSCOPY STENT INSERTION performed by Gamal Montoya MD at [address] [address]       Outpatient Encounter Medications as of 3/9/2023   Medication Sig Dispense Refill    cephALEXin (KEFLEX) 500 MG capsule Take 1 capsule by mouth 3 times daily for 7 days 21 capsule 0    naproxen (NAPROSYN) 375 MG tablet TAKE 1 TABLET TWICE DAILY  WITH MEALS 180 tablet 0    montelukast (SINGULAIR) 10 MG tablet TAKE 1 TABLET NIGHTLY 90 tablet 1    ezetimibe (ZETIA) 10 MG tablet TAKE 1 TABLET DAILY 90 tablet 1    spironolactone (ALDACTONE) 25 MG tablet TAKE 1 TABLET DAILY 90 tablet 1    metoprolol tartrate (LOPRESSOR) 50 MG tablet TAKE 1 TABLET TWICE A  tablet 1    pregabalin (LYRICA) 225 MG capsule Take 1 capsule by mouth in the morning and 1 capsule in the evening. Do all this for 90 days. L4 radiculopathy. 180 capsule 0    DULoxetine (CYMBALTA) 30 MG extended release capsule TAKE 1 CAPSULE DAILY 90 capsule 1    rOPINIRole (REQUIP) 1 MG tablet TAKE 1 TABLET TWICE A DAY  FOR RESTLESS LEGS 180 tablet 1    omeprazole (PRILOSEC) 20 MG delayed release capsule TAKE 1 CAPSULE DAILY in am on empty stomach 90 capsule 1    doxazosin (CARDURA) 1 MG tablet TAKE 1 TABLET DAILY AT BEDTIME 90 tablet 3    Cholecalciferol (VITAMIN D3) 50 MCG (2000 UT) CAPS Take by mouth daily       aspirin 81 MG EC tablet Take 81 mg by mouth daily       tamsulosin (FLOMAX) 0.4 MG capsule Take 0.4 mg by mouth once (Patient not taking: Reported on 3/9/2023)       No facility-administered encounter medications on file as of 3/9/2023.       Current Outpatient Medications on File Prior to Visit   Medication Sig Dispense Refill    cephALEXin (KEFLEX) 500 MG capsule Take 1 capsule by mouth 3 times daily for 7 days 21 capsule 0    naproxen (NAPROSYN) 375 MG tablet TAKE 1 TABLET TWICE DAILY  WITH MEALS 180 tablet 0    montelukast (SINGULAIR) 10 MG tablet TAKE 1 TABLET NIGHTLY 90 tablet 1    ezetimibe (ZETIA) 10 MG tablet TAKE 1 TABLET DAILY 90 tablet 1    spironolactone (ALDACTONE) 25 MG tablet TAKE 1 TABLET DAILY 90 tablet 1    metoprolol tartrate (LOPRESSOR) 50 MG tablet TAKE 1 TABLET TWICE A  tablet 1    pregabalin (LYRICA) 225 MG capsule Take 1 capsule by mouth in the morning and 1 capsule in the evening. Do all this for 90 days. L4 radiculopathy. 180 capsule 0    DULoxetine (CYMBALTA) 30 MG extended release capsule TAKE 1 CAPSULE DAILY 90 capsule 1    rOPINIRole (REQUIP) 1 MG tablet TAKE 1 TABLET TWICE A DAY  FOR RESTLESS LEGS 180 tablet 1    omeprazole (PRILOSEC) 20 MG delayed release capsule TAKE 1 CAPSULE DAILY in am on empty stomach 90 capsule 1    doxazosin (CARDURA) 1 MG tablet TAKE 1 TABLET DAILY AT BEDTIME 90 tablet 3    Cholecalciferol (VITAMIN D3) 50 MCG (2000 UT) CAPS Take by mouth daily       aspirin 81 MG EC tablet Take 81 mg by mouth daily       tamsulosin (FLOMAX) 0.4 MG capsule Take 0.4 mg by mouth once (Patient not taking: Reported on 3/9/2023)       No current facility-administered medications on file prior to visit. Iv contrast [iodides], Hydrocodone, Iodinated contrast media, Lisinopril, and Simvastatin  Family History   Problem Relation Age of Onset    Heart Disease Mother     High Blood Pressure Father     Heart Disease Father     High Blood Pressure Sister     High Blood Pressure Brother     Cancer Sister         Breast     Social History     Tobacco Use   Smoking Status Never   Smokeless Tobacco Never       Social History     Substance and Sexual Activity   Alcohol Use No    Comment: rarely       Review of Systems   Constitutional:  Negative for appetite change, chills and fever. Eyes:  Negative for redness and visual disturbance. Respiratory:  Negative for apnea, cough, shortness of breath and wheezing. Cardiovascular:  Negative for chest pain and leg swelling. Gastrointestinal:  Negative for abdominal pain, constipation, nausea and vomiting. Genitourinary:  Positive for flank pain and hematuria.  Negative for difficulty urinating, dyspareunia, dysuria, enuresis, frequency, pelvic pain, urgency, vaginal bleeding and vaginal discharge. Musculoskeletal:  Negative for back pain, joint swelling and myalgias. Skin:  Negative for color change, rash and wound. Neurological:  Negative for dizziness, tremors and numbness. Hematological:  Negative for adenopathy. Does not bruise/bleed easily. Psychiatric/Behavioral:  Negative for sleep disturbance. /82 (Site: Right Upper Arm, Position: Sitting, Cuff Size: Large Adult)   Pulse 85   Temp 97.3 °F (36.3 °C)   Ht 5' 2\" (1.575 m)   Wt 260 lb (117.9 kg)   LMP  (LMP Unknown)   BMI 47.55 kg/m²       PHYSICAL EXAM:  Constitutional: Patient resting comfortably, in no acute distress. Neuro: Alert and oriented to person place and time. Cranial nerves grossly intact. Psych: Mood and affect normal.  Skin: Warm, dry  HEENT: normocephalic, atraumatic  Lymphatics: No palpable lymphadenopathy  Lungs: Respiratory effort normal, unlabored  Cardiovascular:  Normal peripheral pulses  Abdomen: Soft, non-tender, non-distended with no organomegaly or palpable masses. : No CVA tenderness. Bladder non-tender and not distended. Lab Results   Component Value Date    BUN 22 (H) 03/03/2023     Lab Results   Component Value Date    CREATININE 0.85 03/03/2023       ASSESSMENT:   Diagnosis Orders   1. Ureteral stone with hydronephrosis  HYDROcodone-acetaminophen (NORCO) 5-325 MG per tablet      2. Ureteral stent retained  HYDROcodone-acetaminophen ([address]) 5-325 MG per tablet              PLAN:  Discussed risks and benefits of HLL and stent placement (including anesthesia, bleeding, infection, injury to  tract, need for multiple procedures, and the risk of major complications such as ureteral perforation). We discussed the details of the procedure.  We did discuss what to expect post-operatively to include: hematuria for up to two weeks, stent pain, and pain after stent removal. Patient amenable to schedule RIGHT HLL and stent exchange. You may experience waves of pain and/or nausea. You may also experience burning with urination, frequency, urgency, bladder spasms, and blood in the urine. 1) take toradol every 6 hours if needed WITH FOOD (do not take naproxen)  2) take Flomax daily  3) drink at least 80 oz fluid (water, juice, Gatorade - NOT tea, coffee, soda pop) daily    For pain not controlled by toradol use norco as directed as needed. For nausea use zofran. Call our office [phone] or go to ER (if after normal office hours) if you develop fever, intractable vomiting, severe/intolerable pain.

## 2023-03-10 NOTE — PROGRESS NOTES
Patient instructed on the pre-operative, intra-operative, and post-operative process. Patient instructed on NPO status. Medication instructions and pre operative instruction sheet reviewed over the phone. Instructed pt to stop taking toradol and naproxen 7 days prior to surgery and to take lyrica, cymbalta, requip, metoprolol, and prilosec with a stephie ip of water prior to arriving to the hospital the day of surgery.

## 2023-03-20 DIAGNOSIS — K21.9 GASTROESOPHAGEAL REFLUX DISEASE WITHOUT ESOPHAGITIS: ICD-10-CM

## 2023-03-22 ENCOUNTER — ANESTHESIA EVENT (OUTPATIENT)
Dept: OPERATING ROOM | Age: 58
End: 2023-03-22
Payer: COMMERCIAL

## 2023-03-23 ENCOUNTER — HOSPITAL ENCOUNTER (OUTPATIENT)
Age: 58
Setting detail: OUTPATIENT SURGERY
Discharge: HOME | End: 2023-03-23
Attending: UROLOGY | Admitting: UROLOGY
Payer: COMMERCIAL

## 2023-03-23 ENCOUNTER — APPOINTMENT (OUTPATIENT)
Dept: GENERAL RADIOLOGY | Age: 58
End: 2023-03-23
Attending: UROLOGY
Payer: COMMERCIAL

## 2023-03-23 ENCOUNTER — ANESTHESIA (OUTPATIENT)
Dept: OPERATING ROOM | Age: 58
End: 2023-03-23
Payer: COMMERCIAL

## 2023-03-23 VITALS
HEIGHT: 62 IN | HEART RATE: 66 BPM | WEIGHT: 261.6 LBS | DIASTOLIC BLOOD PRESSURE: 85 MMHG | SYSTOLIC BLOOD PRESSURE: 136 MMHG | TEMPERATURE: 96.9 F | BODY MASS INDEX: 48.14 KG/M2 | RESPIRATION RATE: 16 BRPM | OXYGEN SATURATION: 97 %

## 2023-03-23 PROCEDURE — 7100000000 HC PACU RECOVERY - FIRST 15 MIN: Performed by: UROLOGY

## 2023-03-23 PROCEDURE — 3600000004 HC SURGERY LEVEL 4 BASE: Performed by: UROLOGY

## 2023-03-23 PROCEDURE — 3209999900 FLUORO FOR SURGICAL PROCEDURES

## 2023-03-23 PROCEDURE — 7100000001 HC PACU RECOVERY - ADDTL 15 MIN: Performed by: UROLOGY

## 2023-03-23 PROCEDURE — 3600000014 HC SURGERY LEVEL 4 ADDTL 15MIN: Performed by: UROLOGY

## 2023-03-23 PROCEDURE — 6360000002 HC RX W HCPCS: Performed by: UROLOGY

## 2023-03-23 PROCEDURE — 6370000000 HC RX 637 (ALT 250 FOR IP): Performed by: UROLOGY

## 2023-03-23 PROCEDURE — 2720000010 HC SURG SUPPLY STERILE: Performed by: UROLOGY

## 2023-03-23 PROCEDURE — 6360000002 HC RX W HCPCS

## 2023-03-23 PROCEDURE — 7100000011 HC PHASE II RECOVERY - ADDTL 15 MIN: Performed by: UROLOGY

## 2023-03-23 PROCEDURE — 6360000002 HC RX W HCPCS: Performed by: NURSE ANESTHETIST, CERTIFIED REGISTERED

## 2023-03-23 PROCEDURE — C2617 STENT, NON-COR, TEM W/O DEL: HCPCS | Performed by: UROLOGY

## 2023-03-23 PROCEDURE — 6370000000 HC RX 637 (ALT 250 FOR IP)

## 2023-03-23 PROCEDURE — C1769 GUIDE WIRE: HCPCS | Performed by: UROLOGY

## 2023-03-23 PROCEDURE — 2580000003 HC RX 258

## 2023-03-23 PROCEDURE — 3700000000 HC ANESTHESIA ATTENDED CARE: Performed by: UROLOGY

## 2023-03-23 PROCEDURE — 2709999900 HC NON-CHARGEABLE SUPPLY: Performed by: UROLOGY

## 2023-03-23 PROCEDURE — 3700000001 HC ADD 15 MINUTES (ANESTHESIA): Performed by: UROLOGY

## 2023-03-23 PROCEDURE — 7100000010 HC PHASE II RECOVERY - FIRST 15 MIN: Performed by: UROLOGY

## 2023-03-23 PROCEDURE — 2500000003 HC RX 250 WO HCPCS

## 2023-03-23 DEVICE — URETERAL STENT
Type: IMPLANTABLE DEVICE | Site: URETER | Status: FUNCTIONAL
Brand: PERCUFLEX™ PLUS

## 2023-03-23 RX ORDER — SODIUM CHLORIDE 9 MG/ML
INJECTION, SOLUTION INTRAVENOUS PRN
Status: DISCONTINUED | OUTPATIENT
Start: 2023-03-23 | End: 2023-03-23 | Stop reason: HOSPADM

## 2023-03-23 RX ORDER — SODIUM CHLORIDE 0.9 % (FLUSH) 0.9 %
5-40 SYRINGE (ML) INJECTION EVERY 12 HOURS SCHEDULED
Status: DISCONTINUED | OUTPATIENT
Start: 2023-03-23 | End: 2023-03-23 | Stop reason: HOSPADM

## 2023-03-23 RX ORDER — LIDOCAINE HYDROCHLORIDE 20 MG/ML
INJECTION, SOLUTION EPIDURAL; INFILTRATION; INTRACAUDAL; PERINEURAL PRN
Status: DISCONTINUED | OUTPATIENT
Start: 2023-03-23 | End: 2023-03-23 | Stop reason: SDUPTHER

## 2023-03-23 RX ORDER — DEXAMETHASONE SODIUM PHOSPHATE 4 MG/ML
INJECTION, SOLUTION INTRA-ARTICULAR; INTRALESIONAL; INTRAMUSCULAR; INTRAVENOUS; SOFT TISSUE PRN
Status: DISCONTINUED | OUTPATIENT
Start: 2023-03-23 | End: 2023-03-23 | Stop reason: SDUPTHER

## 2023-03-23 RX ORDER — FENTANYL CITRATE 50 UG/ML
50 INJECTION, SOLUTION INTRAMUSCULAR; INTRAVENOUS EVERY 5 MIN PRN
Status: DISCONTINUED | OUTPATIENT
Start: 2023-03-23 | End: 2023-03-23 | Stop reason: HOSPADM

## 2023-03-23 RX ORDER — GLYCOPYRROLATE 0.2 MG/ML
INJECTION INTRAMUSCULAR; INTRAVENOUS PRN
Status: DISCONTINUED | OUTPATIENT
Start: 2023-03-23 | End: 2023-03-23 | Stop reason: SDUPTHER

## 2023-03-23 RX ORDER — HYDROCODONE BITARTRATE AND ACETAMINOPHEN 5; 325 MG/1; MG/1
1 TABLET ORAL EVERY 4 HOURS PRN
Status: DISCONTINUED | OUTPATIENT
Start: 2023-03-23 | End: 2023-03-23 | Stop reason: HOSPADM

## 2023-03-23 RX ORDER — CEFAZOLIN SODIUM IN 0.9 % NACL 2 G/100 ML
2000 PLASTIC BAG, INJECTION (ML) INTRAVENOUS
Status: COMPLETED | OUTPATIENT
Start: 2023-03-23 | End: 2023-03-23

## 2023-03-23 RX ORDER — DIMENHYDRINATE 50 MG/1
50 TABLET ORAL ONCE
Status: COMPLETED | OUTPATIENT
Start: 2023-03-23 | End: 2023-03-23

## 2023-03-23 RX ORDER — FENTANYL CITRATE 50 UG/ML
INJECTION, SOLUTION INTRAMUSCULAR; INTRAVENOUS PRN
Status: DISCONTINUED | OUTPATIENT
Start: 2023-03-23 | End: 2023-03-23 | Stop reason: SDUPTHER

## 2023-03-23 RX ORDER — ACETAMINOPHEN 325 MG/1
650 TABLET ORAL ONCE
Status: COMPLETED | OUTPATIENT
Start: 2023-03-23 | End: 2023-03-23

## 2023-03-23 RX ORDER — EPHEDRINE SULFATE/0.9% NACL/PF 25 MG/5 ML
SYRINGE (ML) INTRAVENOUS PRN
Status: DISCONTINUED | OUTPATIENT
Start: 2023-03-23 | End: 2023-03-23 | Stop reason: SDUPTHER

## 2023-03-23 RX ORDER — METOCLOPRAMIDE HYDROCHLORIDE 5 MG/ML
10 INJECTION INTRAMUSCULAR; INTRAVENOUS
Status: DISCONTINUED | OUTPATIENT
Start: 2023-03-23 | End: 2023-03-23 | Stop reason: HOSPADM

## 2023-03-23 RX ORDER — FENTANYL CITRATE 50 UG/ML
25 INJECTION, SOLUTION INTRAMUSCULAR; INTRAVENOUS EVERY 5 MIN PRN
Status: DISCONTINUED | OUTPATIENT
Start: 2023-03-23 | End: 2023-03-23 | Stop reason: HOSPADM

## 2023-03-23 RX ORDER — HYDROCODONE BITARTRATE AND ACETAMINOPHEN 5; 325 MG/1; MG/1
2 TABLET ORAL EVERY 4 HOURS PRN
Status: DISCONTINUED | OUTPATIENT
Start: 2023-03-23 | End: 2023-03-23 | Stop reason: HOSPADM

## 2023-03-23 RX ORDER — PROPOFOL 10 MG/ML
INJECTION, EMULSION INTRAVENOUS PRN
Status: DISCONTINUED | OUTPATIENT
Start: 2023-03-23 | End: 2023-03-23 | Stop reason: SDUPTHER

## 2023-03-23 RX ORDER — SODIUM CHLORIDE, SODIUM LACTATE, POTASSIUM CHLORIDE, CALCIUM CHLORIDE 600; 310; 30; 20 MG/100ML; MG/100ML; MG/100ML; MG/100ML
INJECTION, SOLUTION INTRAVENOUS CONTINUOUS
Status: DISCONTINUED | OUTPATIENT
Start: 2023-03-23 | End: 2023-03-23 | Stop reason: HOSPADM

## 2023-03-23 RX ORDER — SODIUM CHLORIDE 0.9 % (FLUSH) 0.9 %
5-40 SYRINGE (ML) INJECTION PRN
Status: DISCONTINUED | OUTPATIENT
Start: 2023-03-23 | End: 2023-03-23 | Stop reason: HOSPADM

## 2023-03-23 RX ORDER — ONDANSETRON 2 MG/ML
4 INJECTION INTRAMUSCULAR; INTRAVENOUS
Status: DISCONTINUED | OUTPATIENT
Start: 2023-03-23 | End: 2023-03-23 | Stop reason: HOSPADM

## 2023-03-23 RX ORDER — ONDANSETRON 2 MG/ML
INJECTION INTRAMUSCULAR; INTRAVENOUS PRN
Status: DISCONTINUED | OUTPATIENT
Start: 2023-03-23 | End: 2023-03-23 | Stop reason: SDUPTHER

## 2023-03-23 RX ORDER — LIDOCAINE HYDROCHLORIDE 20 MG/ML
JELLY TOPICAL PRN
Status: DISCONTINUED | OUTPATIENT
Start: 2023-03-23 | End: 2023-03-23 | Stop reason: ALTCHOICE

## 2023-03-23 RX ORDER — KETOROLAC TROMETHAMINE 30 MG/ML
INJECTION, SOLUTION INTRAMUSCULAR; INTRAVENOUS PRN
Status: DISCONTINUED | OUTPATIENT
Start: 2023-03-23 | End: 2023-03-23 | Stop reason: SDUPTHER

## 2023-03-23 RX ADMIN — ONDANSETRON 4 MG: 2 INJECTION INTRAMUSCULAR; INTRAVENOUS at 11:54

## 2023-03-23 RX ADMIN — PROPOFOL 200 MG: 10 INJECTION, EMULSION INTRAVENOUS at 11:44

## 2023-03-23 RX ADMIN — EPHEDRINE SULFATE 10 MG: 5 INJECTION INTRAVENOUS at 11:53

## 2023-03-23 RX ADMIN — DEXAMETHASONE SODIUM PHOSPHATE 4 MG: 4 INJECTION, SOLUTION INTRAMUSCULAR; INTRAVENOUS at 11:49

## 2023-03-23 RX ADMIN — Medication 2000 MG: at 11:37

## 2023-03-23 RX ADMIN — FENTANYL CITRATE 25 MCG: 50 INJECTION INTRAMUSCULAR; INTRAVENOUS at 12:07

## 2023-03-23 RX ADMIN — EPHEDRINE SULFATE 5 MG: 5 INJECTION INTRAVENOUS at 12:06

## 2023-03-23 RX ADMIN — FENTANYL CITRATE 25 MCG: 50 INJECTION INTRAMUSCULAR; INTRAVENOUS at 11:50

## 2023-03-23 RX ADMIN — SODIUM CHLORIDE, POTASSIUM CHLORIDE, SODIUM LACTATE AND CALCIUM CHLORIDE: 600; 310; 30; 20 INJECTION, SOLUTION INTRAVENOUS at 10:00

## 2023-03-23 RX ADMIN — FENTANYL CITRATE 25 MCG: 50 INJECTION INTRAMUSCULAR; INTRAVENOUS at 11:44

## 2023-03-23 RX ADMIN — LIDOCAINE HYDROCHLORIDE 100 MG: 20 INJECTION, SOLUTION EPIDURAL; INFILTRATION; INTRACAUDAL; PERINEURAL at 11:44

## 2023-03-23 RX ADMIN — EPHEDRINE SULFATE 5 MG: 5 INJECTION INTRAVENOUS at 11:55

## 2023-03-23 RX ADMIN — EPHEDRINE SULFATE 5 MG: 5 INJECTION INTRAVENOUS at 12:00

## 2023-03-23 RX ADMIN — GLYCOPYRROLATE 0.2 MG: 0.2 INJECTION INTRAMUSCULAR; INTRAVENOUS at 11:42

## 2023-03-23 RX ADMIN — DIMENHYDRINATE 50 MG: 50 TABLET ORAL at 09:51

## 2023-03-23 RX ADMIN — ACETAMINOPHEN 650 MG: 325 TABLET ORAL at 09:51

## 2023-03-23 RX ADMIN — FENTANYL CITRATE 25 MCG: 50 INJECTION INTRAMUSCULAR; INTRAVENOUS at 11:59

## 2023-03-23 RX ADMIN — KETOROLAC TROMETHAMINE 30 MG: 30 INJECTION, SOLUTION INTRAMUSCULAR at 12:11

## 2023-03-23 ASSESSMENT — LIFESTYLE VARIABLES: SMOKING_STATUS: 0

## 2023-03-23 NOTE — ANESTHESIA PRE PROCEDURE
05:50 AM    HGB 12.4 03/03/2023 05:50 AM    HCT 37.1 03/03/2023 05:50 AM    MCV 87.9 03/03/2023 05:50 AM    RDW 13.2 03/03/2023 05:50 AM     03/03/2023 05:50 AM       CMP:   Lab Results   Component Value Date/Time     03/03/2023 05:50 AM    K 4.4 03/03/2023 05:50 AM     03/03/2023 05:50 AM    CO2 27 03/03/2023 05:50 AM    BUN 22 03/03/2023 05:50 AM    CREATININE 0.85 03/03/2023 05:50 AM    GFRAA >60 06/24/2020 11:31 AM    LABGLOM >60 03/03/2023 05:50 AM    GLUCOSE 98 03/03/2023 05:50 AM    GLUCOSE 95 02/14/2012 10:05 AM    PROT 7.5 12/16/2022 03:42 PM    CALCIUM 9.0 03/03/2023 05:50 AM    BILITOT 0.3 12/16/2022 03:42 PM    ALKPHOS 98 12/16/2022 03:42 PM    AST 29 12/16/2022 03:42 PM    ALT 30 12/16/2022 03:42 PM       POC Tests: No results for input(s): POCGLU, POCNA, POCK, POCCL, POCBUN, POCHEMO, POCHCT in the last 72 hours.     Coags:   Lab Results   Component Value Date/Time    PROTIME 9.4 03/14/2014 09:09 AM    INR 0.9 03/14/2014 09:09 AM    APTT 25.9 03/14/2014 09:09 AM       HCG (If Applicable): No results found for: PREGTESTUR, PREGSERUM, HCG, HCGQUANT     ABGs: No results found for: PHART, PO2ART, ACY0NWR, NXP2UTY, BEART, G4HGLZWU     Type & Screen (If Applicable):  No results found for: LABABO, LABRH    Drug/Infectious Status (If Applicable):  No results found for: HIV, HEPCAB    COVID-19 Screening (If Applicable):   Lab Results   Component Value Date/Time    COVID19 Detected 01/19/2021 12:35 PM           Anesthesia Evaluation  Patient summary reviewed and Nursing notes reviewed no history of anesthetic complications:   Airway: Mallampati: III  TM distance: >3 FB   Neck ROM: full  Mouth opening: > = 3 FB   Dental: normal exam         Pulmonary:normal exam    (+) sleep apnea: on CPAP,      (-) not a current smoker                           Cardiovascular:  Exercise tolerance: good (>4 METS),   (+) hypertension: moderate, hyperlipidemia      ECG reviewed  Rhythm: regular  Rate: normal

## 2023-03-23 NOTE — DISCHARGE INSTRUCTIONS
SAME DAY SURGERY DISCHARGE INSTRUCTIONS    1. Do not drive or operate hazardous machinery for 24 hours. 2.  Do not make important personal or business decisions for 24 hours. 3.  Do not drink alcoholic beverages for 24 hours. 4.  Do not smoke tobacco products for 24 hours. 5.  Eat light foods (Jell-O, soups, etc....) and drink plenty of fluids (water, Sprite, etc...) up to 8 glasses per day, as you can tolerate. 6.  Limit your activities for 24 hours. Do not engage in heavy work until your surgeon gives you permission. 7.  Patient should not be left alone for 12-24 hours following surgical procedure. 8.  Wash hands before and after incision care. It is important to practice good personal hygiene during the post op period. 9.  Call your surgeon for any questions regarding your surgery. CYSTOSCOPY DISCHARGE INSTRUCTIONS    Possible burning during urination and/or blood tinged urine. Drink 6-8 glasses of water for the next day or so. (This helps to flush the urinary tract.)    Call Dr. Tuan Navarro ([phone]) if you develop:    Fever over 100 degrees  Prolonged soreness/pain  Unusual bleeding/bruising  Unable to urinate or if urine is bloody  You cannot pass urine 8 hours after the test.  You have pain in your belly or your back just below your rib cage. (This is called flank pain.)  You have frequent urge to urinate but can pass only small amounts of urine. Call Dr. Tuan Navarro office for follow-up appointment ([phone]).

## 2023-03-23 NOTE — PROGRESS NOTES
Patient verbalizes readiness for discharge. Discharge instructions given to patient and responsible adult, answered all questions, and verbalized understanding of discharge instructions. Discharge Criteria    Inpatients must meet Criteria 1 through 7. All other patients are either YES or N/A. If a NO is chosen then Anesthesia or Surgeon must be notified. 1.  Minimum 30 minutes after last dose of sedative medication, minimum 120 minutes after last dose of reversal agent. Yes      2. Systolic BP stable within 20 mmHg for 30 minutes & systolic BP between 90 & 656 or within 10 mmHg of baseline. Yes      3. Pulse between 60 and 100 or within 10 bpm of baseline. Yes      4. Spontaneous respiratory rate >/= 10 per minute. Yes      5. SaO2 >/= 95 or  >/= baseline. Yes      6. Able to cough and swallow or return to baseline function. Yes      7. Alert and oriented or return to baseline mental status. Yes      8. Demonstrates controlled, coordinated movements, ambulates with steady gait, or return to baseline activity function. Yes      9. Minimal or no pain or nausea, or at a level tolerable and acceptable to patient. Yes      10. Takes and retains oral fluids as allowed. Yes      11. Procedural / perioperative site stable. Minimal or no bleeding. Yes          12. If GI endoscopy procedure, minimal or no abdominal distention or passing flatus. N/A      13. Written discharge instructions and emergency telephone number provided. Yes      14. Accompanied by a responsible adult.     Yes

## 2023-03-23 NOTE — INTERVAL H&P NOTE
Here for right HLL    History and Physical reviewed  I have examined the patient and no changes    Nita Jang MD

## 2023-03-23 NOTE — ANESTHESIA POSTPROCEDURE EVALUATION
Department of Anesthesiology  Postprocedure Note    Patient: Mary Slaughter  MRN: [id number]  YOB: 1965  Date of evaluation: 3/23/2023      Procedure Summary     Date: 03/23/23 Room / Location: [address] [place] [address]    Anesthesia Start: 1139 Anesthesia Stop: 1218    Procedure: CYSTOSCOPY URETEROSCOPY LASER-HLL,  POSSIBLE STENT EXCHANGE (Right: Ureter) Diagnosis:       Ureteral stone with hydronephrosis      (URETERAL STONE WITH HYDRONEPHROSIS)    Surgeons: Anjum Valencia MD Responsible Provider: ELIGIO Champion CRNA    Anesthesia Type: general ASA Status: 3          Anesthesia Type: No value filed.     Philip Phase I: Philip Score: 10    Philip Phase II: Philip Score: 10      Anesthesia Post Evaluation    Patient location during evaluation: PACU  Patient participation: complete - patient participated  Level of consciousness: awake  Airway patency: patent  Nausea & Vomiting: no vomiting and no nausea  Complications: no  Cardiovascular status: blood pressure returned to baseline and hemodynamically stable  Respiratory status: acceptable, spontaneous ventilation and room air  Hydration status: stable  Multimodal analgesia pain management approach

## 2023-03-23 NOTE — BRIEF OP NOTE
Brief Postoperative Note      Patient: Divina Beal  YOB: 1965  MRN: [id number]    Date of Procedure: 3/23/2023    Pre-Op Diagnosis: URETERAL STONE WITH HYDRONEPHROSIS    Post-Op Diagnosis: Same       Procedure(s):  CYSTOSCOPY URETEROSCOPY LASER-HLL,  POSSIBLE STENT EXCHANGE    Surgeon(s):  Violetta Mcgregor MD    Assistant:  * No surgical staff found *    Anesthesia: General    Estimated Blood Loss (mL): Minimal    Complications: None    Specimens:   * No specimens in log *    Implants:  Implant Name Type Inv.  Item Serial No.  Lot No. LRB No. Used Action   STENT URET 6FR L24CM HYDR+ GRAD CIRCUMFERENTIAL MRK LO PROF - [id number]  Landon Saner 6FR L24CM HYDR+ GRAD CIRCUMFERENTIAL MRK LO PROF  [facility] UROLOGY- [id number] Right 1 Implanted         Drains: * No LDAs found *    Findings: right renal/ ureteral calculus    Electronically signed by Violetta Mcgregor MD on 3/23/2023 at 12:38 PM

## 2023-03-23 NOTE — OP NOTE
[address], [place] [address]                                OPERATIVE REPORT    PATIENT NAME: COLTEN Harper                    :        1965  MED REC NO:   [id number]                              ROOM:  ACCOUNT NO:   [de-identified]                           ADMIT DATE: 2023  PROVIDER:     Violetta Mcgregor    DATE OF PROCEDURE:  2023    SURGEON:  Dr. Violetta Mcgregor. ASSISTANT:  None. PREOPERATIVE DIAGNOSES:  1. Right ureteral calculus. 2.  Right renal calculus. POSTOPERATIVE DIAGNOSES:  1. Right ureteral calculus. 2.  Right renal calculus. PROCEDURES PERFORMED:  Cystoscopy, right ureteroscopy, right holmium  laser lithotripsy, right ureteral stent exchange. ANESTHESIA:  General.    COMPLICATIONS:  None. ESTIMATED BLOOD LOSS:  Minimal.    SPECIMENS:  None. PROSTHESIS:  A 6-American x 24-cm double-J ureteral stent. DISPOSITION:  Stable. FINDINGS:  1.  A 7 mm right renal stone. 2.  Multiple smaller right renal stones. INDICATIONS:  The patient is a 55-year-old female with previous stent  placed for ureteral calculus, here now for definitive therapy. DESCRIPTION OF PROCEDURE:  The patient was taken back to the operating  room after informed consent including all risks, benefits, and  alternatives were obtained. The patient was transferred from the [facility]  onto the operating table, where she was induced under general  anesthesia, given IV Ancef for preoperative antibiotic prophylaxis. To  begin the case, she was prepped and draped in normal sterile fashion,  placed in dorsal lithotomy. She had a 22-American sheath with a 30-degree  lens passed through the urethra into the bladder. Once in the bladder,  we identified the right ureteral stent. This was grasped and removed  through the meatus. A 0.035-inch wire was passed up.   We then used a  dual-lumen catheter, placed additional

## 2023-03-27 ENCOUNTER — OFFICE VISIT (OUTPATIENT)
Dept: UROLOGY | Age: 58
End: 2023-03-27
Payer: COMMERCIAL

## 2023-03-27 ENCOUNTER — OFFICE VISIT (OUTPATIENT)
Dept: PULMONOLOGY | Age: 58
End: 2023-03-27
Payer: COMMERCIAL

## 2023-03-27 VITALS
OXYGEN SATURATION: 97 % | BODY MASS INDEX: 45.08 KG/M2 | HEIGHT: 62 IN | WEIGHT: 245 LBS | RESPIRATION RATE: 16 BRPM | SYSTOLIC BLOOD PRESSURE: 132 MMHG | TEMPERATURE: 95.7 F | HEART RATE: 60 BPM | DIASTOLIC BLOOD PRESSURE: 80 MMHG

## 2023-03-27 VITALS
SYSTOLIC BLOOD PRESSURE: 127 MMHG | HEART RATE: 62 BPM | WEIGHT: 263 LBS | DIASTOLIC BLOOD PRESSURE: 66 MMHG | RESPIRATION RATE: 18 BRPM | OXYGEN SATURATION: 97 % | BODY MASS INDEX: 48.4 KG/M2 | HEIGHT: 62 IN

## 2023-03-27 DIAGNOSIS — G47.33 OSA ON CPAP: Primary | ICD-10-CM

## 2023-03-27 DIAGNOSIS — N20.0 KIDNEY STONES: ICD-10-CM

## 2023-03-27 DIAGNOSIS — Z99.89 OSA ON CPAP: Primary | ICD-10-CM

## 2023-03-27 DIAGNOSIS — N13.2 URETERAL STONE WITH HYDRONEPHROSIS: Primary | ICD-10-CM

## 2023-03-27 PROCEDURE — 99204 OFFICE O/P NEW MOD 45 MIN: CPT | Performed by: INTERNAL MEDICINE

## 2023-03-27 PROCEDURE — 3078F DIAST BP <80 MM HG: CPT | Performed by: INTERNAL MEDICINE

## 2023-03-27 PROCEDURE — 3074F SYST BP LT 130 MM HG: CPT | Performed by: INTERNAL MEDICINE

## 2023-03-27 PROCEDURE — 3078F DIAST BP <80 MM HG: CPT | Performed by: NURSE PRACTITIONER

## 2023-03-27 PROCEDURE — 3074F SYST BP LT 130 MM HG: CPT | Performed by: NURSE PRACTITIONER

## 2023-03-27 PROCEDURE — 99213 OFFICE O/P EST LOW 20 MIN: CPT | Performed by: NURSE PRACTITIONER

## 2023-03-27 RX ORDER — OMEPRAZOLE 20 MG/1
CAPSULE, DELAYED RELEASE ORAL
Qty: 90 CAPSULE | Refills: 1 | Status: SHIPPED | OUTPATIENT
Start: 2023-03-27

## 2023-03-27 ASSESSMENT — ENCOUNTER SYMPTOMS
COUGH: 0
EYE REDNESS: 0
ABDOMINAL PAIN: 0
NAUSEA: 0
CONSTIPATION: 0
APNEA: 0
VOMITING: 0
SHORTNESS OF BREATH: 0
BACK PAIN: 0
COLOR CHANGE: 0
WHEEZING: 0

## 2023-03-27 NOTE — PROGRESS NOTES
HPI:        Patient is a 62 y.o. female in no acute distress. She is alert and oriented to person, place, and time. History  8/2018 Right HLL     3/2022 spontaneously passed 2 stones    3/2023 right ureteral stent placed due to 8 mm ureteral stone    3/23/2023 right HLL and stent exchange    Today  Here today for stent removal following right holmium laser lithotripsy on 3/23/2023. She tolerated stent removal with minimal complaints. She denies nausea, vomiting or fevers.     Past Medical History:   Diagnosis Date    Allergic rhinitis     Arthritis     COVID-19 01/21/2021    not hospitalized : h/a, loss smell, tired abd ache,  no meds    Enlarged thyroid 2016    nodules check annually    Fibromyalgia     GERD (gastroesophageal reflux disease)     Headache(784.0)     migranes at times    Hyperlipidemia     Hypertension     Hypertension     NEIL (obstructive sleep apnea) 07/08/2016    Restless leg syndrome     Restless legs 07/08/2016    Sleep apnea      Past Surgical History:   Procedure Laterality Date    BLADDER SURGERY  09/09/2014    bladder sling    CHOLECYSTECTOMY      CYSTOSCOPY Right 08/10/2018    Dr [place] Lance- stent placement    CYSTOSCOPY Right 3/3/2023    CYSTOSCOPY URETERAL STENT INSERTION performed by Cris Kidd MD at [address] Right 3/23/2023    CYSTOSCOPY URETEROSCOPY LASER-HLL,  POSSIBLE STENT EXCHANGE performed by Cris Kidd MD at [facility] Right 03/03/2023    Dr Cipriano Henson ([facility])      Total    KNEE SURGERY      LITHOTRIPSY Right 08/21/2018    stent placement    OTHER SURGICAL HISTORY  03/21/2014    [facility] (Dr. Chayo Sequeira)    OR CYSTO/URETERO W/LITHOTRIPSY &INDWELL STENT INSRT Right 08/21/2018    CYSTOSCOPY URETEROSCOPY LASER-HLL performed by Cris Kidd MD at [address] &INDWELL STENT INSRT Right 08/21/2018    CYSTOSCOPY STENT Thedora Crick performed by

## 2023-03-27 NOTE — PATIENT INSTRUCTIONS
You may experience waves of pain and/or nausea for the next 24-72 hrs. You may also experience burning with urination, frequency, urgency, bladder spasms, and blood in the urine. All of this should continue to improve over the next several days. The blood in the urine can last up to two weeks. 1) take ibuprofen (motrin) 600 mg (3 of the 200mg tabs) every 6 hours WITH FOOD for the next 72 hours. 2) take Flomax for the next 72 hours. 3) drink at least 80 oz fluid (water, juice, Gatorade - NOT tea, coffee, soda pop) daily    For pain NOT controlled by ibuprofen use OTC acetaminophen as directed as directed as needed. Call our office [phone] or go to ER (if after normal office hours) if you develop fever, intractable vomiting, severe/intolerable pain. SURVEY:    You may be receiving a survey from [employer] regarding your visit today. Please complete the survey to enable us to provide the highest quality of care to you and your family. If you cannot score us a very good on any question, please call the office to discuss how we could have made your experience a very good one. Thank you.

## 2023-03-27 NOTE — PROGRESS NOTES
Patient had stent removal post Cystoscopy [facility] 3/23/23  Patient tolerated stent removal with no problems. Advised her to increase fluids and to call office if she has any concerns.

## 2023-03-30 ENCOUNTER — OFFICE VISIT (OUTPATIENT)
Dept: PAIN MANAGEMENT | Age: 58
End: 2023-03-30
Payer: COMMERCIAL

## 2023-03-30 VITALS
WEIGHT: 245 LBS | HEART RATE: 65 BPM | BODY MASS INDEX: 45.08 KG/M2 | OXYGEN SATURATION: 95 % | HEIGHT: 62 IN | RESPIRATION RATE: 19 BRPM

## 2023-03-30 DIAGNOSIS — M79.7 FIBROMYALGIA: ICD-10-CM

## 2023-03-30 DIAGNOSIS — M79.18 MYOFASCIAL PAIN SYNDROME: Primary | ICD-10-CM

## 2023-03-30 DIAGNOSIS — M79.2 NEUROPATHIC PAIN: ICD-10-CM

## 2023-03-30 DIAGNOSIS — E66.01 CLASS 3 SEVERE OBESITY WITH BODY MASS INDEX (BMI) OF 45.0 TO 49.9 IN ADULT, UNSPECIFIED OBESITY TYPE, UNSPECIFIED WHETHER SERIOUS COMORBIDITY PRESENT (HCC): ICD-10-CM

## 2023-03-30 PROCEDURE — 99214 OFFICE O/P EST MOD 30 MIN: CPT | Performed by: STUDENT IN AN ORGANIZED HEALTH CARE EDUCATION/TRAINING PROGRAM

## 2023-03-30 RX ORDER — PREGABALIN 225 MG/1
225 CAPSULE ORAL 2 TIMES DAILY
Qty: 180 CAPSULE | Refills: 0 | Status: SHIPPED | OUTPATIENT
Start: 2023-03-30 | End: 2023-06-28

## 2023-03-30 NOTE — PROGRESS NOTES
Chronic Pain Clinic Note     Encounter Date: 3/30/2023     SUBJECTIVE:  Chief Complaint   Patient presents with    3 Month Follow-Up     History of Present Illness:   Fabian Sheridan is a 62 y.o. female who presents with lumbar back pain, leg pain. She states that the Lyrica does help to calm down her pain. Medication Refill: Lyrica     Current Complaints of Pain:   Location: lumbar   Radiation: occasional BLE  Severity: moderate  Pain Numerical Score -    Average: 4-5     Highest: 8  Lowest: 2-3  Character/Quality: Complains of pain that is aching  Timing: Increases w/prolonged sitting/standing/walking  Associated symptoms: none  Numbness:   Weakness:   Exacerbating factors: prolonged activity  Alleviating factors: heat, medication  Length of time pain has been present: Started on - years ago  Inciting event/injury: none  Bowel/Bladder incontinence: none  Falls: no  Physical Therapy: yes, multiple times    History of Interventions:   Surgery: No previous lumbar/cervical surgeries  Injections: Lumbar epidurals    Imaging:    Lumbar MRI 5/3/2019    FINDINGS: The lumbar vertebral bodies are numbered assuming the last complete    intervertebral disc space is L5-S1, with five lumbar vertebral bodies numbered    accordingly. The lumbar vertebral body heights are normal. The lumbar vertebral alignment is    normal. There is no significant spondylolisthesis. The conus medullaris appears normal and terminates at the level of L2. The individual intervertebral disc spaces are as follows:       T12-L1: There is no significant posterior disc abnormality, spinal canal, or    neural foraminal stenosis. L1-L2: There is no significant posterior disc abnormality, spinal canal, or    neural foraminal stenosis. L2-L3: There is no significant posterior disc abnormality, spinal canal, or    neural foraminal stenosis.        L3-L4: There is no significant posterior disc abnormality, spinal canal, or

## 2023-04-03 ENCOUNTER — OFFICE VISIT (OUTPATIENT)
Dept: FAMILY MEDICINE CLINIC | Age: 58
End: 2023-04-03

## 2023-04-03 VITALS
DIASTOLIC BLOOD PRESSURE: 76 MMHG | OXYGEN SATURATION: 94 % | BODY MASS INDEX: 48.03 KG/M2 | WEIGHT: 261 LBS | HEART RATE: 86 BPM | SYSTOLIC BLOOD PRESSURE: 118 MMHG | HEIGHT: 62 IN

## 2023-04-03 DIAGNOSIS — M79.7 FIBROMYALGIA: ICD-10-CM

## 2023-04-03 DIAGNOSIS — M79.2 NEUROPATHIC PAIN: ICD-10-CM

## 2023-04-03 DIAGNOSIS — R74.8 ELEVATED CK: ICD-10-CM

## 2023-04-03 DIAGNOSIS — G25.81 RESTLESS LEG: ICD-10-CM

## 2023-04-03 DIAGNOSIS — M54.17 LUMBOSACRAL RADICULOPATHY AT L4: ICD-10-CM

## 2023-04-03 RX ORDER — DULOXETIN HYDROCHLORIDE 30 MG/1
30 CAPSULE, DELAYED RELEASE ORAL DAILY
Qty: 90 CAPSULE | Refills: 1 | Status: SHIPPED | OUTPATIENT
Start: 2023-04-03

## 2023-04-03 RX ORDER — ROPINIROLE 1 MG/1
1 TABLET, FILM COATED ORAL 2 TIMES DAILY
Qty: 180 TABLET | Refills: 1 | Status: SHIPPED | OUTPATIENT
Start: 2023-04-03

## 2023-04-03 ASSESSMENT — ENCOUNTER SYMPTOMS
ABDOMINAL DISTENTION: 0
COUGH: 0
EYES NEGATIVE: 1
BACK PAIN: 1
RHINORRHEA: 0

## 2023-04-03 NOTE — PROGRESS NOTES
TWICE DAILY  WITH MEALS 180 tablet 0    montelukast (SINGULAIR) 10 MG tablet TAKE 1 TABLET NIGHTLY 90 tablet 1    ezetimibe (ZETIA) 10 MG tablet TAKE 1 TABLET DAILY 90 tablet 1    spironolactone (ALDACTONE) 25 MG tablet TAKE 1 TABLET DAILY 90 tablet 1    metoprolol tartrate (LOPRESSOR) 50 MG tablet TAKE 1 TABLET TWICE A  tablet 1    Cholecalciferol (VITAMIN D3) 50 MCG (2000 UT) CAPS Take by mouth daily       aspirin 81 MG EC tablet Take 1 tablet by mouth daily Indications: once a day      tamsulosin (FLOMAX) 0.4 MG capsule Take 1 capsule by mouth daily (Patient not taking: Reported on 4/3/2023) 30 capsule 2    ketorolac (TORADOL) 10 MG tablet Take 1 tablet by mouth every 6 hours as needed for Pain (Patient not taking: Reported on 4/3/2023) 20 tablet 0    ondansetron (ZOFRAN) 4 MG tablet Take 1 tablet by mouth 3 times daily as needed for Nausea or Vomiting (Patient not taking: Reported on 4/3/2023) 15 tablet 0     No current facility-administered medications for this visit. Allergies   Allergen Reactions    Iv Contrast [Iodides] Swelling    Iodinated Contrast Media Swelling    Lisinopril Swelling     Swelling of upper lip    Simvastatin Other (See Comments)     Elevated CK/CKMB.     Hydrocodone Nausea And Vomiting       Health Maintenance   Topic Date Due    COVID-19 Vaccine (1) Never done    HIV screen  Never done    Hepatitis C screen  Never done    DTaP/Tdap/Td vaccine (1 - Tdap) Never done    Shingles vaccine (1 of 2) Never done    Colorectal Cancer Screen  10/21/2021    Flu vaccine (Season Ended) 08/01/2023    Depression Screen  02/13/2024    Breast cancer screen  07/13/2024    Lipids  12/16/2027    Hepatitis A vaccine  Aged Out    Hib vaccine  Aged Out    Meningococcal (ACWY) vaccine  Aged Out    Pneumococcal 0-64 years Vaccine  Aged Out    Diabetes screen  Discontinued    Cervical cancer screen  Discontinued       Subjective:      Review of Systems   Constitutional:  Negative for activity change,

## 2023-04-03 NOTE — PATIENT INSTRUCTIONS
Phone: [phone]  Fax: [facility] Office Hours:  Monday: Ryanne Vaughn office location [phone] ([phone]) Offering additional late hours the first Monday of the month until 7 pm.   Tuesday: 8-5 Wednesday: 8-5 Thursday:  Additional hours offered 2 Thursdays a month. Please call to inquire those dates. Fridays: 7:30-4:30   SURVEY:    You may be receiving a survey from [employer] regarding your visit today. Please complete the survey to enable us to provide the highest quality of care to you and your family. If you cannot score us a very good on any question, please call the office to discuss how we could have made your experience a very good one. Thank you.

## 2023-04-30 DIAGNOSIS — M17.11 PRIMARY OSTEOARTHRITIS OF RIGHT KNEE: ICD-10-CM

## 2023-05-01 NOTE — TELEPHONE ENCOUNTER
Last OV: 4/3/2023   02/13/23 chronic   Last RX:    Next scheduled apt: Visit date not found          Surescript requesting a refill

## 2023-05-02 RX ORDER — NAPROXEN 375 MG/1
TABLET ORAL
Qty: 180 TABLET | Refills: 0 | Status: SHIPPED | OUTPATIENT
Start: 2023-05-02

## 2023-05-08 ENCOUNTER — HOSPITAL ENCOUNTER (OUTPATIENT)
Dept: GENERAL RADIOLOGY | Age: 58
Discharge: HOME | End: 2023-05-10
Payer: COMMERCIAL

## 2023-05-08 ENCOUNTER — HOSPITAL ENCOUNTER (OUTPATIENT)
Age: 58
Discharge: HOME | End: 2023-05-10
Payer: COMMERCIAL

## 2023-05-08 DIAGNOSIS — N20.0 KIDNEY STONES: ICD-10-CM

## 2023-05-08 DIAGNOSIS — N13.2 URETERAL STONE WITH HYDRONEPHROSIS: ICD-10-CM

## 2023-05-08 PROCEDURE — 74018 RADEX ABDOMEN 1 VIEW: CPT

## 2023-05-09 ENCOUNTER — OFFICE VISIT (OUTPATIENT)
Dept: UROLOGY | Age: 58
End: 2023-05-09

## 2023-05-09 VITALS
HEART RATE: 71 BPM | WEIGHT: 265 LBS | SYSTOLIC BLOOD PRESSURE: 116 MMHG | DIASTOLIC BLOOD PRESSURE: 61 MMHG | BODY MASS INDEX: 48.47 KG/M2 | TEMPERATURE: 97.2 F

## 2023-05-09 DIAGNOSIS — N20.0 KIDNEY STONES: Primary | ICD-10-CM

## 2023-05-09 RX ORDER — DOXAZOSIN MESYLATE 1 MG/1
TABLET ORAL
COMMUNITY
Start: 2023-04-30

## 2023-05-09 ASSESSMENT — ENCOUNTER SYMPTOMS
APNEA: 0
SHORTNESS OF BREATH: 0
WHEEZING: 0
VOMITING: 0
BACK PAIN: 0
EYE REDNESS: 0
ABDOMINAL PAIN: 0
COLOR CHANGE: 0
NAUSEA: 0
CONSTIPATION: 0
COUGH: 0

## 2023-05-09 NOTE — PROGRESS NOTES
Simvastatin, and Hydrocodone  Family History   Problem Relation Age of Onset    Heart Disease Mother     High Blood Pressure Father     Heart Disease Father     High Blood Pressure Sister     High Blood Pressure Brother     Cancer Sister         Breast     Social History     Tobacco Use   Smoking Status Never   Smokeless Tobacco Never       Social History     Substance and Sexual Activity   Alcohol Use No    Comment: rarely       Review of Systems   Constitutional:  Negative for appetite change, chills and fever. Eyes:  Negative for redness and visual disturbance. Respiratory:  Negative for apnea, cough, shortness of breath and wheezing. Cardiovascular:  Negative for chest pain and leg swelling. Gastrointestinal:  Negative for abdominal pain, constipation, nausea and vomiting. Genitourinary:  Negative for difficulty urinating, dyspareunia, dysuria, enuresis, flank pain, frequency, hematuria, pelvic pain, urgency, vaginal bleeding and vaginal discharge. Musculoskeletal:  Negative for back pain, joint swelling and myalgias. Skin:  Negative for color change, rash and wound. Neurological:  Negative for dizziness, tremors and numbness. Hematological:  Negative for adenopathy. Does not bruise/bleed easily. Psychiatric/Behavioral:  Negative for sleep disturbance. /61 (Site: Left Upper Arm, Position: Sitting, Cuff Size: Large Adult)   Pulse 71   Temp 97.2 °F (36.2 °C)   Wt 265 lb (120.2 kg)   LMP  (LMP Unknown)   BMI 48.47 kg/m²       PHYSICAL EXAM:  Constitutional: Patient resting comfortably, in no acute distress. Neuro: Alert and oriented to person place and time. Psych: Mood and affect normal.  HEENT: normocephalic, atraumatic  Lungs: Respiratory effort normal, unlabored  Abdomen: Soft, non-tender, non-distended   : No CVA tenderness.    Pelvic: deferred     Lab Results   Component Value Date    BUN 22 (H) 03/03/2023     Lab Results   Component Value Date    CREATININE 0.85

## 2023-05-09 NOTE — PATIENT INSTRUCTIONS
STONE PREVENTION    To prevent future stone formation:    1) DO drink ~65-80 oz (2-2.5L) of water per day to stay adequately hydrated     2) AVOID/LIMIT intake of \"bad fluids\": soda/pop, coffee, tea, alcohol, energy drinks, any beverage with caffeine can act to dehydrate you       \"BAD FLUIDS\" DO NOT COUNT TOWARD THE 65-80oz of water    3) REDUCE consumption of sodium/salt - DO NOT salt your food, read labels (lunch meats, canned soups, prepared meals are high in salt), choose low salt options    4) DO NOT EAT animal protein/meat more than 2 meals a day - this includes red meat, pork, poultry and fish    SURVEY:    You may be receiving a survey from [employer] regarding your visit today. Please complete the survey to enable us to provide the highest quality of care to you and your family. If you cannot score us a very good on any question, please call the office to discuss how we could have made your experience a very good one. Thank you.

## 2023-06-08 ENCOUNTER — OFFICE VISIT (OUTPATIENT)
Dept: PULMONOLOGY | Age: 58
End: 2023-06-08
Payer: COMMERCIAL

## 2023-06-08 VITALS
RESPIRATION RATE: 16 BRPM | BODY MASS INDEX: 48.4 KG/M2 | WEIGHT: 263 LBS | DIASTOLIC BLOOD PRESSURE: 72 MMHG | HEART RATE: 67 BPM | SYSTOLIC BLOOD PRESSURE: 126 MMHG | OXYGEN SATURATION: 96 % | HEIGHT: 62 IN | TEMPERATURE: 95.6 F

## 2023-06-08 DIAGNOSIS — G47.33 OSA (OBSTRUCTIVE SLEEP APNEA): Primary | ICD-10-CM

## 2023-06-08 PROCEDURE — 3078F DIAST BP <80 MM HG: CPT | Performed by: NURSE PRACTITIONER

## 2023-06-08 PROCEDURE — 99214 OFFICE O/P EST MOD 30 MIN: CPT | Performed by: NURSE PRACTITIONER

## 2023-06-08 PROCEDURE — 3074F SYST BP LT 130 MM HG: CPT | Performed by: NURSE PRACTITIONER

## 2023-06-08 RX ORDER — MONTELUKAST SODIUM 10 MG/1
10 TABLET ORAL NIGHTLY
Qty: 90 TABLET | Refills: 3 | Status: SHIPPED | OUTPATIENT
Start: 2023-06-08

## 2023-06-08 ASSESSMENT — ENCOUNTER SYMPTOMS
CONSTIPATION: 0
SINUS PAIN: 0
STRIDOR: 0
RHINORRHEA: 0
WHEEZING: 0
SHORTNESS OF BREATH: 0
DIARRHEA: 0
CHEST TIGHTNESS: 0
NAUSEA: 0
SINUS PRESSURE: 0
COUGH: 0
VOMITING: 0

## 2023-06-08 NOTE — PATIENT INSTRUCTIONS
SURVEY:    You may be receiving a survey from [employer] regarding your visit today. Please complete the survey to enable us to provide the highest quality of care to you and your family. If you cannot score us a very good on any question, please call the office to discuss how we could have made your experience a very good one. Thank you.

## 2023-06-20 ENCOUNTER — OFFICE VISIT (OUTPATIENT)
Dept: UROLOGY | Age: 58
End: 2023-06-20
Payer: COMMERCIAL

## 2023-06-20 VITALS
WEIGHT: 265 LBS | HEART RATE: 76 BPM | DIASTOLIC BLOOD PRESSURE: 75 MMHG | SYSTOLIC BLOOD PRESSURE: 114 MMHG | TEMPERATURE: 97.7 F | HEIGHT: 62 IN | BODY MASS INDEX: 48.76 KG/M2

## 2023-06-20 DIAGNOSIS — N20.0 KIDNEY STONES: Primary | ICD-10-CM

## 2023-06-20 PROCEDURE — 3074F SYST BP LT 130 MM HG: CPT | Performed by: PHYSICIAN ASSISTANT

## 2023-06-20 PROCEDURE — 99213 OFFICE O/P EST LOW 20 MIN: CPT | Performed by: PHYSICIAN ASSISTANT

## 2023-06-20 PROCEDURE — 3078F DIAST BP <80 MM HG: CPT | Performed by: PHYSICIAN ASSISTANT

## 2023-06-20 ASSESSMENT — ENCOUNTER SYMPTOMS
WHEEZING: 0
APNEA: 0
VOMITING: 0
COLOR CHANGE: 0
EYE REDNESS: 0
SHORTNESS OF BREATH: 0
COUGH: 0
ABDOMINAL PAIN: 0
BACK PAIN: 0
NAUSEA: 0
CONSTIPATION: 0

## 2023-06-20 NOTE — PATIENT INSTRUCTIONS
SURVEY:    You may be receiving a survey from [employer] regarding your visit today. Please complete the survey to enable us to provide the highest quality of care to you and your family. If you cannot score us a very good on any question, please call the office to discuss how we could have made your experience a very good one. Thank you.

## 2023-06-20 NOTE — PROGRESS NOTES
6/20/2023. Current Outpatient Medications on File Prior to Visit   Medication Sig Dispense Refill    montelukast (SINGULAIR) 10 MG tablet Take 1 tablet by mouth nightly 90 tablet 3    doxazosin (CARDURA) 1 MG tablet       naproxen (NAPROSYN) 375 MG tablet TAKE 1 TABLET TWICE DAILY  WITH MEALS 180 tablet 0    DULoxetine (CYMBALTA) 30 MG extended release capsule Take 1 capsule by mouth daily Anxiety and peripheral neuropathy 90 capsule 1    rOPINIRole (REQUIP) 1 MG tablet Take 1 tablet by mouth in the morning and at bedtime 180 tablet 1    pregabalin (LYRICA) 225 MG capsule Take 1 capsule by mouth in the morning and 1 capsule in the evening. Do all this for 90 days. L4 radiculopathy. Max Daily Amount: 450 mg. 180 capsule 0    omeprazole (PRILOSEC) 20 MG delayed release capsule TAKE 1 CAPSULE DAILY IN THEMORNING ON AN EMPTY STOMACH 90 capsule 1    ezetimibe (ZETIA) 10 MG tablet TAKE 1 TABLET DAILY 90 tablet 1    spironolactone (ALDACTONE) 25 MG tablet TAKE 1 TABLET DAILY 90 tablet 1    metoprolol tartrate (LOPRESSOR) 50 MG tablet TAKE 1 TABLET TWICE A  tablet 1    Cholecalciferol (VITAMIN D3) 50 MCG (2000 UT) CAPS Take by mouth daily       aspirin 81 MG EC tablet Take 1 tablet by mouth daily Indications: once a day      tamsulosin (FLOMAX) 0.4 MG capsule Take 1 capsule by mouth daily (Patient not taking: Reported on 4/3/2023) 30 capsule 2    ondansetron (ZOFRAN) 4 MG tablet Take 1 tablet by mouth 3 times daily as needed for Nausea or Vomiting (Patient not taking: Reported on 4/3/2023) 15 tablet 0     No current facility-administered medications on file prior to visit.      Iv contrast [iodides], Iodinated contrast media, Lisinopril, Simvastatin, and Hydrocodone  Family History   Problem Relation Age of Onset    Heart Disease Mother     High Blood Pressure Father     Heart Disease Father     High Blood Pressure Sister     High Blood Pressure Brother     Cancer Sister         Breast     Social History

## 2023-07-13 ENCOUNTER — OFFICE VISIT (OUTPATIENT)
Dept: PAIN MANAGEMENT | Age: 58
End: 2023-07-13
Payer: COMMERCIAL

## 2023-07-13 VITALS
OXYGEN SATURATION: 96 % | BODY MASS INDEX: 48.47 KG/M2 | WEIGHT: 265 LBS | HEART RATE: 75 BPM | RESPIRATION RATE: 19 BRPM

## 2023-07-13 DIAGNOSIS — M79.18 MYOFASCIAL PAIN SYNDROME: Primary | ICD-10-CM

## 2023-07-13 DIAGNOSIS — M79.7 FIBROMYALGIA: ICD-10-CM

## 2023-07-13 DIAGNOSIS — M79.2 NEUROPATHIC PAIN: ICD-10-CM

## 2023-07-13 DIAGNOSIS — E66.01 CLASS 3 SEVERE OBESITY WITH BODY MASS INDEX (BMI) OF 45.0 TO 49.9 IN ADULT, UNSPECIFIED OBESITY TYPE, UNSPECIFIED WHETHER SERIOUS COMORBIDITY PRESENT (HCC): ICD-10-CM

## 2023-07-13 PROCEDURE — 99214 OFFICE O/P EST MOD 30 MIN: CPT | Performed by: STUDENT IN AN ORGANIZED HEALTH CARE EDUCATION/TRAINING PROGRAM

## 2023-07-13 RX ORDER — PREGABALIN 225 MG/1
225 CAPSULE ORAL 2 TIMES DAILY
Qty: 180 CAPSULE | Refills: 0 | Status: SHIPPED | OUTPATIENT
Start: 2023-07-13 | End: 2023-10-11

## 2023-07-13 NOTE — PROGRESS NOTES
tablet TAKE 1 TABLET DAILY    metoprolol tartrate (LOPRESSOR) 50 MG tablet TAKE 1 TABLET TWICE A DAY    montelukast (SINGULAIR) 10 mg, Oral, NIGHTLY    naproxen (NAPROSYN) 375 MG tablet TAKE 1 TABLET TWICE DAILY  WITH MEALS    omeprazole (PRILOSEC) 20 MG delayed release capsule TAKE 1 CAPSULE DAILY IN THEMORNING ON AN EMPTY STOMACH    ondansetron (ZOFRAN) 4 mg, Oral, 3 TIMES DAILY PRN    pregabalin (LYRICA) 225 mg, Oral, 2 TIMES DAILY, L4 radiculopathy    rOPINIRole (REQUIP) 1 mg, Oral, 2 times daily    spironolactone (ALDACTONE) 25 MG tablet TAKE 1 TABLET DAILY    tamsulosin (FLOMAX) 0.4 mg, Oral, DAILY       Allergies   Allergen Reactions    Iv Contrast [Iodides] Swelling    Iodinated Contrast Media Swelling    Lisinopril Swelling     Swelling of upper lip    Simvastatin Other (See Comments)     Elevated CK/CKMB.     Hydrocodone Nausea And Vomiting       Review of Systems:   Constitutional: negative for weight changes or fevers  Cardiovascular: negative for chest pain, palpitations, irregular heart beat  Respiratory: negative for dyspnea, cough, wheezing  Gastrointestinal: negative for constipation, diarrhea, nausea  Genitourinary: negative for bowel/bladder incontinence   Musculoskeletal: positive for low back pain  Neurological: negative for radicular leg pain, leg weakness, positive for numbness/tingling  Behavioral/Psych: negative for anxiety/depression   Hematological: negative for abnormal bleeding, anticoagulation use or antiplatelet use  All other systems reviewed and are negative    OBJECTIVE:    Vitals:    07/13/23 1119   Pulse: 75   Resp: 19   SpO2: 96%     PHYSICAL EXAM    GENERAL: No acute distress, pleasant, well-appearing  HEENT: Normocephalic, atraumatic, Pupils equal and round  CARDIOVASCULAR: Well perfused, No peripheral cyanosis  PULMONARY: Good chest wall excursion, breathing unlabored  PSYCH: Appropriate affect and insight, non-pressured speech  SKIN: No rashes or

## 2023-07-13 NOTE — PATIENT INSTRUCTIONS
SURVEY:    You may be receiving a survey from [employer] regarding your visit today. Please complete the survey to enable us to provide the highest quality of care to you and your family. If you cannot score us a very good on any question, please call the office to discuss how we could have made your experience a very good one. Thank you.   Clarence PRO/Robert Lopes Final  Magdaline Gottron, MD Dalbert Dings, MD Delories Hawks, MD Buren Galley, DO Bonilla Lal, APRN-CN  ELIGIO Hirsch-CNP  [address], ARMANI Galindoodia Sender, [address] [facility], [address] [place] [address], [address] [place] [address]

## 2023-08-01 ENCOUNTER — TELEPHONE (OUTPATIENT)
Dept: PRIMARY CARE CLINIC | Age: 58
End: 2023-08-01

## 2023-08-01 NOTE — TELEPHONE ENCOUNTER
Loretta called in and cancelled her appointment for today stating that she was feeling better and didn't feel she needed to be seen. She was then asking if you had found anyone to refer her to (Neuro/Muscular), since she had not heard anything yet. Please advise.

## 2023-08-04 ENCOUNTER — CARE COORDINATION (OUTPATIENT)
Dept: CARE COORDINATION | Age: 58
End: 2023-08-04

## 2023-08-04 NOTE — CARE COORDINATION
Ambulatory Care Coordination Note  2023    Patient Current Location:  Home: [address] [place] [address]  [place] [address]     ACM contacted the patient by telephone. Verified name and  with patient as identifiers. Provided introduction to self, and explanation of the ACM role. Challenges to be reviewed by the provider   Additional needs identified to be addressed with provider: Yes  Referral                Method of communication with provider: chart routing. ACM: Linda Hills RN    CC outreach call placed to patient. Spoke with Loretta who reports she is doing ok. Informed her of options for neuromuscular referrals- [facility] or [facility]. Loretta states that she is familiar with [facility] as she has seen doctors up there previously. Prefer this option. Call placed to [facility] Neuromuscular center line- spoke with staff and obtained fax number to send referral to. Fax to [phone]    Care Coordination Plan of Care: This nurse Care Coordinator will  - route chart to PCP and request referral be sent to [facility] clinic per patient preference.   -plan outreach call to patient in 1-2 weeks   -[facility] and get scheduled? Offered patient enrollment in the Remote Patient Monitoring (RPM) program for in-home monitoring: Yes, but did not enroll at this time.     Future Appointments   Date Time Provider [address]   2023  3:30 PM SCHEDULE, [facility] TIFF UROLOGY GREEN PT SCHEDULE TIFF UROLOGY [facility]   10/5/2023  2:30 PM TRELL Smith urol [facility]   10/26/2023 11:45 AM DO ABDIRIZAK Scherer PAIN [facility]   2024  2:30 PM TRELL Mueller urol [facility]   2024  2:45 PM ELIGIO Fair CNP TIFF PULM [facility]

## 2023-08-04 NOTE — CARE COORDINATION
Received patient as PCP referral. See referral note below:     [facility] coordinator need assistance finding pt correct referral , likely [facility] for neuromuscular specialist that can manage disorders with CPK elevation. Pt has chronic elevation and is trying to get connected. Seen Dr. Whitt Favors locally and pain management with having evals all up to muscle bx. Please assist           CC outreach call placed to patient. Unable to reach patient at either home or mobile numbers listed. Did leave voicemail messages on both numbers requesting a return call back as soon as patient is able. Contact information left. Future Appointments   Date Time Provider [address]   8/30/2023  3:30 PM SCHEDULE, [facility] TIFF UROLOGY GREEN PT SCHEDULE TIFF UROLOGY [facility]   10/5/2023  2:30 PM TRELL Martines urol [facility]   10/26/2023 11:45 AM DO ABDIRIZAK Gautam PAIN [facility]   5/9/2024  2:30 PM TRELL Martines urol [facility]   6/13/2024  2:45 PM ELIGIO Mazariegos - CNP TIFF PULM [facility]       Care Coordination Plan of Care: This nurse Care Coordinator will  - await call back from patient, and if no return call; will attempt to reach patient back again next week   -offer referral to either [address] [facility] [address]

## 2023-08-04 NOTE — TELEPHONE ENCOUNTER
[facility] coordinator need assistance finding pt correct referral , likely [facility] for neuromuscular specialist that can manage disorders with CPK elevation. Pt has chronic elevation and is trying to get connected. Seen Dr. Nehemias Lopes locally and pain management with having evals all up to muscle bx.      Please assist

## 2023-08-06 DIAGNOSIS — I10 ESSENTIAL HYPERTENSION: ICD-10-CM

## 2023-08-06 DIAGNOSIS — M17.11 PRIMARY OSTEOARTHRITIS OF RIGHT KNEE: ICD-10-CM

## 2023-08-08 ENCOUNTER — TELEPHONE (OUTPATIENT)
Dept: PRIMARY CARE CLINIC | Age: 58
End: 2023-08-08

## 2023-08-08 DIAGNOSIS — R74.8 ELEVATED CPK: Primary | ICD-10-CM

## 2023-08-08 RX ORDER — DOXAZOSIN MESYLATE 1 MG/1
TABLET ORAL
Qty: 90 TABLET | Refills: 3 | Status: SHIPPED | OUTPATIENT
Start: 2023-08-08

## 2023-08-08 RX ORDER — SPIRONOLACTONE 25 MG/1
TABLET ORAL
Qty: 90 TABLET | Refills: 1 | Status: SHIPPED | OUTPATIENT
Start: 2023-08-08

## 2023-08-08 RX ORDER — METOPROLOL TARTRATE 50 MG/1
TABLET, FILM COATED ORAL
Qty: 180 TABLET | Refills: 1 | Status: SHIPPED | OUTPATIENT
Start: 2023-08-08

## 2023-08-08 RX ORDER — NAPROXEN 375 MG/1
TABLET ORAL
Qty: 180 TABLET | Refills: 0 | Status: SHIPPED | OUTPATIENT
Start: 2023-08-08

## 2023-08-08 NOTE — TELEPHONE ENCOUNTER
PEREZ Lang I spoke to Magi at [facility] and scheduled pt with Neuromuscular specialist.    I called patient LVREGINE @ [phone] she is schedule with [facility] Neuromuscular specialist Dr. Lou Mon Monday September 11, 2023 @ 1:00 pm arrival time is 12:45 pm. Office phone # [phone], office fax # [phone] Appt is @ [address] - 9th floor ( Appt questions call scheduling @ [phone]). All information faxed to [facility] Dr. Rafael See @ [phone].

## 2023-08-09 ENCOUNTER — CARE COORDINATION (OUTPATIENT)
Dept: CARE COORDINATION | Age: 58
End: 2023-08-09

## 2023-08-09 NOTE — CARE COORDINATION
Ambulatory Care Coordination Note  2023    Patient Current Location:  Home: [address] [place] [address]  Lazarus Mike [id number]     ACM contacted the patient by telephone. Verified name and  with patient as identifiers. Provided introduction to self, and explanation of the ACM role. Challenges to be reviewed by the provider   Additional needs identified to be addressed with provider: No  none               Method of communication with provider: none. ACM: Cindi Lucas RN    Incoming call from patient. Retuning missed call from earlier today. Loretta states she did get message from PCP office yesterday. States she attempted to call the office back to let them know she got the message and was unable to reach anyone states \"something is messed up with their message system. \" Denied any new needs today. Appreciative of writer calling to make sure message was received. Offered patient enrollment in the Remote Patient Monitoring (RPM) program for in-home monitoring: Patient declined. Future Appointments   Date Time Provider [address] [place] [address]   2023  3:30 PM SCHEDULE, [facility] TIFF UROLOGY GREEN PT SCHEDULE TIFF UROLOGY [facility]   10/5/2023  2:30 PM Delayne TRELL Bolaños urol [facility]   10/26/2023 11:45 AM DO ABDIRIZAK Blanco PAIN [facility]   2024  2:30 PM DelayTRELL St urol [facility]   2024  2:45 PM ELIGIO Aguirre - CNP TIFF PULM [facility]     Care Coordination Plan of Care:    This nurse Care Coordinator will  - plan outreach call to patient in 2-3 weeks   -remind of speciality appointment at [address] [place] [address]- see details in telephone encounter from 23.   - if no new needs plan to graduate

## 2023-08-09 NOTE — CARE COORDINATION
ACC: Sarthak Carbajal, RN    CC outreach call placed to patient. Unable to reach Loretta. Left a voicemail message requesting a return phone call back to this [facility] when patient is able to [phone], office hours given. Wanting to make sure she received voicemail from PCP office yesterday. Future Appointments   Date Time Provider [address]   8/30/2023  3:30 PM SCHEDULE, [facility] TIFF UROLOGY GREEN PT SCHEDULE TIFF UROLOGY [facility]   10/5/2023  2:30 PM TRELL Benites urol [facility]   10/26/2023 11:45 AM DO ABDIRIZAK Ridley PAIN [facility]   5/9/2024  2:30 PM TRELL Benites urol [facility]   6/13/2024  2:45 PM ELIGIO Reid - CNP TIFF PULM [facility]       Care Coordination Plan of Care: This nurse Care Coordinator will  - await call back from patient, and if no return call; will attempt to reach patient back again next week.    -Neuromuscular apt at [facility] 9/11   -any other needs at this time?   -offer RPM

## 2023-08-30 ENCOUNTER — OFFICE VISIT (OUTPATIENT)
Dept: UROLOGY | Age: 58
End: 2023-08-30
Payer: COMMERCIAL

## 2023-08-30 VITALS
OXYGEN SATURATION: 98 % | DIASTOLIC BLOOD PRESSURE: 76 MMHG | WEIGHT: 265 LBS | RESPIRATION RATE: 19 BRPM | SYSTOLIC BLOOD PRESSURE: 118 MMHG | BODY MASS INDEX: 48.47 KG/M2

## 2023-08-30 DIAGNOSIS — Z51.81 MEDICATION MONITORING ENCOUNTER: ICD-10-CM

## 2023-08-30 DIAGNOSIS — N20.0 KIDNEY STONES: Primary | ICD-10-CM

## 2023-08-30 PROCEDURE — 3074F SYST BP LT 130 MM HG: CPT | Performed by: PHYSICIAN ASSISTANT

## 2023-08-30 PROCEDURE — 3078F DIAST BP <80 MM HG: CPT | Performed by: PHYSICIAN ASSISTANT

## 2023-08-30 PROCEDURE — 99214 OFFICE O/P EST MOD 30 MIN: CPT | Performed by: PHYSICIAN ASSISTANT

## 2023-08-30 RX ORDER — POTASSIUM CITRATE 10 MEQ/1
10 TABLET, EXTENDED RELEASE ORAL 2 TIMES DAILY WITH MEALS
Qty: 60 TABLET | Refills: 3 | Status: SHIPPED | OUTPATIENT
Start: 2023-08-30

## 2023-08-30 ASSESSMENT — ENCOUNTER SYMPTOMS
ABDOMINAL PAIN: 0
BACK PAIN: 0
COLOR CHANGE: 0
SHORTNESS OF BREATH: 0
COUGH: 0
VOMITING: 0
APNEA: 0
WHEEZING: 0
EYE REDNESS: 0
NAUSEA: 0
CONSTIPATION: 0

## 2023-08-30 NOTE — PROGRESS NOTES
Cholecalciferol (VITAMIN D3) 50 MCG (2000 UT) CAPS Take by mouth daily       aspirin 81 MG EC tablet Take 1 tablet by mouth daily Indications: once a day      ondansetron (ZOFRAN) 4 MG tablet Take 1 tablet by mouth 3 times daily as needed for Nausea or Vomiting 15 tablet 0    [DISCONTINUED] tamsulosin (FLOMAX) 0.4 MG capsule Take 1 capsule by mouth daily 30 capsule 2     No facility-administered encounter medications on file as of 8/30/2023. Current Outpatient Medications on File Prior to Visit   Medication Sig Dispense Refill    naproxen (NAPROSYN) 375 MG tablet TAKE 1 TABLET TWICE DAILY  WITH MEALS 180 tablet 0    doxazosin (CARDURA) 1 MG tablet TAKE 1 TABLET AT BEDTIME 90 tablet 3    spironolactone (ALDACTONE) 25 MG tablet TAKE 1 TABLET DAILY 90 tablet 1    metoprolol tartrate (LOPRESSOR) 50 MG tablet TAKE 1 TABLET TWICE A  tablet 1    pregabalin (LYRICA) 225 MG capsule Take 1 capsule by mouth in the morning and 1 capsule in the evening. Do all this for 90 days. L4 radiculopathy. Max Daily Amount: 450 mg. 180 capsule 0    montelukast (SINGULAIR) 10 MG tablet Take 1 tablet by mouth nightly 90 tablet 3    DULoxetine (CYMBALTA) 30 MG extended release capsule Take 1 capsule by mouth daily Anxiety and peripheral neuropathy 90 capsule 1    rOPINIRole (REQUIP) 1 MG tablet Take 1 tablet by mouth in the morning and at bedtime 180 tablet 1    omeprazole (PRILOSEC) 20 MG delayed release capsule TAKE 1 CAPSULE DAILY IN THEMORNING ON AN EMPTY STOMACH 90 capsule 1    ezetimibe (ZETIA) 10 MG tablet TAKE 1 TABLET DAILY 90 tablet 1    Cholecalciferol (VITAMIN D3) 50 MCG (2000 UT) CAPS Take by mouth daily       aspirin 81 MG EC tablet Take 1 tablet by mouth daily Indications: once a day      ondansetron (ZOFRAN) 4 MG tablet Take 1 tablet by mouth 3 times daily as needed for Nausea or Vomiting 15 tablet 0     No current facility-administered medications on file prior to visit.      Iv contrast [iodides], Iodinated

## 2023-09-01 ENCOUNTER — CARE COORDINATION (OUTPATIENT)
Dept: CASE MANAGEMENT | Age: 58
End: 2023-09-01

## 2023-09-01 DIAGNOSIS — Z12.31 SCREENING MAMMOGRAM, ENCOUNTER FOR: Primary | ICD-10-CM

## 2023-09-01 NOTE — CARE COORDINATION
Request from JACKIE Pérez.,    Please call patient to remind her of upcomiing appt    Patient given appt date, time, address and instructions. Remind her she is schedule with [facility] Neuromuscular specialist Dr. Paulie Champagne Monday September 11, 2023 @ 1:00 pm arrival time is 12:45 pm. Office phone # [phone]. Appt is @ the [address] [place] [address] - 9th floor ( Appt questions call scheduling @ [phone]). -any other needs from care coordination at this time? Patient has no further questions at this time.     Rodo Lima, [facility]  Care Coordination Transition

## 2023-09-15 ENCOUNTER — CARE COORDINATION (OUTPATIENT)
Dept: CARE COORDINATION | Age: 58
End: 2023-09-15

## 2023-09-15 NOTE — CARE COORDINATION
Ambulatory Care Coordination Note  9/15/2023    Chart reviewed. Noted  reached out to patient on writer behalf on 9/1. Patient had denied any further needs or questions. Noted she attended neurology appointment on 9/11 at [facility]. Future Appointments   Date Time Provider [address]   9/20/2023  2:30 PM Chon Steve [facility] [address] [place]   10/5/2023  2:30 PM TRELL Smith urol [facility]   10/26/2023 11:45 AM DO Du Scherer PAIN [facility]   10/26/2023  2:00 PM TRELL Mueller urol [facility]   5/9/2024  2:30 PM TRELL Mueller urol [facility]   6/13/2024  2:45 PM ELIGIO Fair - CNP TIFF PULM [facility]       Care Coordination Plan of Care:    This nurse Care Coordinator will  - plan to graduate from care coordination as no further needs noted   -remove name from care team

## 2023-09-19 ENCOUNTER — HOSPITAL ENCOUNTER (OUTPATIENT)
Age: 58
Discharge: HOME | End: 2023-09-19
Payer: COMMERCIAL

## 2023-09-19 DIAGNOSIS — Z51.81 MEDICATION MONITORING ENCOUNTER: ICD-10-CM

## 2023-09-19 LAB
ANION GAP SERPL CALCULATED.3IONS-SCNC: 10 MMOL/L (ref 9–17)
BUN SERPL-MCNC: 24 MG/DL (ref 6–20)
BUN/CREAT SERPL: 34 (ref 9–20)
CALCIUM SERPL-MCNC: 9.3 MG/DL (ref 8.6–10.4)
CHLORIDE SERPL-SCNC: 101 MMOL/L (ref 98–107)
CO2 SERPL-SCNC: 23 MMOL/L (ref 20–31)
CREAT SERPL-MCNC: 0.7 MG/DL (ref 0.5–0.9)
GFR SERPL CREATININE-BSD FRML MDRD: >60 ML/MIN/1.73M2
GLUCOSE SERPL-MCNC: 85 MG/DL (ref 70–99)
POTASSIUM SERPL-SCNC: 4.3 MMOL/L (ref 3.7–5.3)
SODIUM SERPL-SCNC: 134 MMOL/L (ref 135–144)

## 2023-09-19 PROCEDURE — 80048 BASIC METABOLIC PNL TOTAL CA: CPT

## 2023-09-19 PROCEDURE — 36415 COLL VENOUS BLD VENIPUNCTURE: CPT

## 2023-09-20 ENCOUNTER — HOSPITAL ENCOUNTER (OUTPATIENT)
Dept: MAMMOGRAPHY | Age: 58
Discharge: HOME | End: 2023-09-22
Payer: COMMERCIAL

## 2023-09-20 DIAGNOSIS — Z12.31 SCREENING MAMMOGRAM, ENCOUNTER FOR: ICD-10-CM

## 2023-09-20 PROCEDURE — 77063 BREAST TOMOSYNTHESIS BI: CPT

## 2023-10-01 DIAGNOSIS — K21.9 GASTROESOPHAGEAL REFLUX DISEASE WITHOUT ESOPHAGITIS: ICD-10-CM

## 2023-10-02 NOTE — TELEPHONE ENCOUNTER
Last OV: 4/3/2023  Last RX: 3/27/2023   Next scheduled apt: Visit date not found      Surescripts requesting refill

## 2023-10-03 RX ORDER — OMEPRAZOLE 20 MG/1
CAPSULE, DELAYED RELEASE ORAL
Qty: 90 CAPSULE | Refills: 1 | Status: SHIPPED | OUTPATIENT
Start: 2023-10-03

## 2023-10-21 DIAGNOSIS — G25.81 RESTLESS LEG: ICD-10-CM

## 2023-10-24 NOTE — TELEPHONE ENCOUNTER
Last OV: 4/3/2023  Last RX: 4/3/2023   Next scheduled apt: Visit date not found      Surescripts requesting refill

## 2023-10-26 ENCOUNTER — OFFICE VISIT (OUTPATIENT)
Dept: PAIN MANAGEMENT | Age: 58
End: 2023-10-26
Payer: COMMERCIAL

## 2023-10-26 VITALS
SYSTOLIC BLOOD PRESSURE: 118 MMHG | BODY MASS INDEX: 48.47 KG/M2 | DIASTOLIC BLOOD PRESSURE: 76 MMHG | OXYGEN SATURATION: 95 % | HEART RATE: 61 BPM | RESPIRATION RATE: 19 BRPM | WEIGHT: 265 LBS

## 2023-10-26 DIAGNOSIS — M79.2 NEUROPATHIC PAIN: ICD-10-CM

## 2023-10-26 DIAGNOSIS — E66.01 CLASS 3 SEVERE OBESITY WITH BODY MASS INDEX (BMI) OF 45.0 TO 49.9 IN ADULT, UNSPECIFIED OBESITY TYPE, UNSPECIFIED WHETHER SERIOUS COMORBIDITY PRESENT (HCC): ICD-10-CM

## 2023-10-26 DIAGNOSIS — M79.7 FIBROMYALGIA: ICD-10-CM

## 2023-10-26 DIAGNOSIS — M79.18 MYOFASCIAL PAIN SYNDROME: Primary | ICD-10-CM

## 2023-10-26 PROCEDURE — 99214 OFFICE O/P EST MOD 30 MIN: CPT | Performed by: STUDENT IN AN ORGANIZED HEALTH CARE EDUCATION/TRAINING PROGRAM

## 2023-10-26 PROCEDURE — 3078F DIAST BP <80 MM HG: CPT | Performed by: STUDENT IN AN ORGANIZED HEALTH CARE EDUCATION/TRAINING PROGRAM

## 2023-10-26 PROCEDURE — 3074F SYST BP LT 130 MM HG: CPT | Performed by: STUDENT IN AN ORGANIZED HEALTH CARE EDUCATION/TRAINING PROGRAM

## 2023-10-26 RX ORDER — ROPINIROLE 1 MG/1
TABLET, FILM COATED ORAL
Qty: 180 TABLET | Refills: 1 | Status: SHIPPED | OUTPATIENT
Start: 2023-10-26

## 2023-10-26 RX ORDER — PREGABALIN 225 MG/1
225 CAPSULE ORAL 2 TIMES DAILY
Qty: 180 CAPSULE | Refills: 0 | Status: SHIPPED | OUTPATIENT
Start: 2023-10-26 | End: 2024-01-24

## 2023-10-26 NOTE — PROGRESS NOTES
canal, or    neural foraminal stenosis. L3-L4: There is no significant posterior disc abnormality, spinal canal, or    neural foraminal stenosis. L4-L5: There is mild disc bulging. There is no significant spinal canal or    neural foraminal stenosis. L5-S1: There is mild disc desiccation and disc bulging. There is no significant    spinal canal stenosis. There is no significant neural foraminal stenosis. The partially visualized intra-abdominal and pelvic soft tissues are    unremarkable.        Past Medical History:   Diagnosis Date    Allergic rhinitis     Arthritis     COVID-19 01/21/2021    not hospitalized : h/a, loss smell, tired abd ache,  no meds    Enlarged thyroid 2016    nodules check annually    Fibromyalgia     GERD (gastroesophageal reflux disease)     Headache(784.0)     migranes at times    Hyperlipidemia     Hypertension     Hypertension     NEIL (obstructive sleep apnea) 07/08/2016    Restless leg syndrome     Restless legs 07/08/2016    Sleep apnea        Past Surgical History:   Procedure Laterality Date    BLADDER SURGERY  09/09/2014    bladder sling    CHOLECYSTECTOMY      CYSTOSCOPY Right 08/10/2018    Dr Herrera [place]- stent placement    CYSTOSCOPY Right 3/3/2023    CYSTOSCOPY URETERAL STENT INSERTION performed by Narda Schirmer, MD at [address] Right 3/23/2023    CYSTOSCOPY URETEROSCOPY LASER-HLL,  POSSIBLE STENT EXCHANGE performed by Narda Schirmer, MD at [facility] Right 03/03/2023    Dr Rose Douglas ([address])      Total    KNEE SURGERY      LITHOTRIPSY Right 08/21/2018    stent placement    OTHER SURGICAL HISTORY  03/21/2014    Coaptite (Dr. Chester Guzman)    [place] CYSTO/URETERO W/LITHOTRIPSY &INDWELL STENT INSRT Right 08/21/2018    CYSTOSCOPY URETEROSCOPY LASER-HLL performed by Narda Schirmer, MD at [address] [facility] &INDWELL [address] INSRT Right 08/21/2018    CYSTOSCOPY STENT

## 2023-10-26 NOTE — PATIENT INSTRUCTIONS
SURVEY:    You may be receiving a survey from [employer] regarding your visit today. Please complete the survey to enable us to provide the highest quality of care to you and your family. If you cannot score us a very good on any question, please call the office to discuss how we could have made your experience a very good one. Thank you.   [place] [address]  MD Michael Barajas MD Carron Dimmer, MD Branda Panda, DO Alexander Agrawal, APRN-CN  Dane Servin, APRN-CNP  [address], PM  SUND, 1 [facility], [address] [facility], [address] [facility], [address] [place] [address], [address] [place] [address], [address] [place] [address]

## 2023-10-28 DIAGNOSIS — M54.17 LUMBOSACRAL RADICULOPATHY AT L4: ICD-10-CM

## 2023-10-28 DIAGNOSIS — R74.8 ELEVATED CK: ICD-10-CM

## 2023-10-28 DIAGNOSIS — M79.7 FIBROMYALGIA: ICD-10-CM

## 2023-10-28 DIAGNOSIS — M79.2 NEUROPATHIC PAIN: ICD-10-CM

## 2023-10-31 RX ORDER — DULOXETIN HYDROCHLORIDE 30 MG/1
CAPSULE, DELAYED RELEASE ORAL
Qty: 90 CAPSULE | Refills: 1 | Status: SHIPPED | OUTPATIENT
Start: 2023-10-31

## 2023-11-04 DIAGNOSIS — M17.11 PRIMARY OSTEOARTHRITIS OF RIGHT KNEE: ICD-10-CM

## 2023-11-07 ENCOUNTER — OFFICE VISIT (OUTPATIENT)
Dept: UROLOGY | Age: 58
End: 2023-11-07
Payer: COMMERCIAL

## 2023-11-07 VITALS
DIASTOLIC BLOOD PRESSURE: 82 MMHG | SYSTOLIC BLOOD PRESSURE: 129 MMHG | HEART RATE: 71 BPM | TEMPERATURE: 97.7 F | HEIGHT: 62 IN | WEIGHT: 267 LBS | BODY MASS INDEX: 49.13 KG/M2

## 2023-11-07 DIAGNOSIS — Z51.81 MEDICATION MONITORING ENCOUNTER: ICD-10-CM

## 2023-11-07 DIAGNOSIS — N20.0 KIDNEY STONES: Primary | ICD-10-CM

## 2023-11-07 PROCEDURE — 99214 OFFICE O/P EST MOD 30 MIN: CPT | Performed by: PHYSICIAN ASSISTANT

## 2023-11-07 PROCEDURE — 3079F DIAST BP 80-89 MM HG: CPT | Performed by: PHYSICIAN ASSISTANT

## 2023-11-07 PROCEDURE — 3074F SYST BP LT 130 MM HG: CPT | Performed by: PHYSICIAN ASSISTANT

## 2023-11-07 RX ORDER — POTASSIUM CITRATE 10 MEQ/1
10 TABLET, EXTENDED RELEASE ORAL 2 TIMES DAILY WITH MEALS
Qty: 60 TABLET | Refills: 3 | Status: SHIPPED | OUTPATIENT
Start: 2023-11-07

## 2023-11-07 ASSESSMENT — ENCOUNTER SYMPTOMS
SHORTNESS OF BREATH: 0
COLOR CHANGE: 0
WHEEZING: 0
VOMITING: 0
EYE REDNESS: 0
NAUSEA: 0
APNEA: 0
BACK PAIN: 0
CONSTIPATION: 0
ABDOMINAL PAIN: 0
COUGH: 0

## 2023-11-07 NOTE — PATIENT INSTRUCTIONS
SURVEY:    You may be receiving a survey from [employer] regarding your visit today. Please complete the survey to enable us to provide the highest quality of care to you and your family. If you cannot score us a very good on any question, please call the office to discuss how we could have made your experience a very good one. Thank you.

## 2023-11-07 NOTE — PROGRESS NOTES
HPI:    Patient is a 62 y.o. female in no acute distress. She is alert and oriented to person, place, and time. History  8/2018 Right HLL     3/2022 spontaneously passed 2 stones    3/2023 right ureteral stent placed due to 8 mm ureteral stone    3/23/2023 right HLL and stent exchange    Calcium oxalate stones    6/2023 Litholink - low urine volume of 1.03 L. Her calcium oxalate level was 9.35, urine pH was low at 5.212 and her supersaturated uric acid level was very high at 4.15. Patient is a is currently on Aldactone. 8/2023: urine volume was 2.58 liters (much improved), urine citrate was 467, SS uric acid 1.04, urine pH was 5.412.    10/2023 urine volume was 1.98 liters (worsened), urine citrate was 852 (improved), SS uric acid 1.76, urine pH was 5.397 (significant change from prior). Today  Patient is here today for follow-up Litholink results. At last visit we did start her on potassiums citrate. Patient states that she has only been taking this once a day instead of twice a day. Have her repeat her Litholink. urine volume was 1.98 liters (worsened), urine citrate was 852 (improved), SS uric acid 1.76, urine pH was 5.397 (significant change from prior). She states that she has been doing well. She did have lab work within the past 3 months which did show potassium of 4.3, BUN of 24 and a creatinine of 0.7.     Past Medical History:   Diagnosis Date    Allergic rhinitis     Arthritis     COVID-19 01/21/2021    not hospitalized : h/a, loss smell, tired abd ache,  no meds    Enlarged thyroid 2016    nodules check annually    Fibromyalgia     GERD (gastroesophageal reflux disease)     Headache(784.0)     migranes at times    Hyperlipidemia     Hypertension     Hypertension     NEIL (obstructive sleep apnea) 07/08/2016    Restless leg syndrome     Restless legs 07/08/2016    Sleep apnea      Past Surgical History:   Procedure Laterality Date    BLADDER SURGERY  09/09/2014    bladder sling

## 2023-11-08 NOTE — TELEPHONE ENCOUNTER
Last OV: 4/3/2023  Last RX: 8/8/2023   Next scheduled apt: Visit date not found      Surescripts requesting refill

## 2023-11-10 RX ORDER — NAPROXEN 375 MG/1
TABLET ORAL
Qty: 180 TABLET | Refills: 0 | Status: SHIPPED | OUTPATIENT
Start: 2023-11-10

## 2023-11-16 ENCOUNTER — HOSPITAL ENCOUNTER (OUTPATIENT)
Dept: GENERAL RADIOLOGY | Age: 58
Discharge: HOME | End: 2023-11-18
Payer: COMMERCIAL

## 2023-11-16 ENCOUNTER — OFFICE VISIT (OUTPATIENT)
Dept: FAMILY MEDICINE CLINIC | Age: 58
End: 2023-11-16
Payer: COMMERCIAL

## 2023-11-16 ENCOUNTER — HOSPITAL ENCOUNTER (OUTPATIENT)
Age: 58
Discharge: HOME | End: 2023-11-18
Payer: COMMERCIAL

## 2023-11-16 VITALS
BODY MASS INDEX: 48.82 KG/M2 | WEIGHT: 267 LBS | OXYGEN SATURATION: 98 % | TEMPERATURE: 97.7 F | HEART RATE: 68 BPM | DIASTOLIC BLOOD PRESSURE: 70 MMHG | SYSTOLIC BLOOD PRESSURE: 130 MMHG

## 2023-11-16 DIAGNOSIS — M70.62 GREATER TROCHANTERIC BURSITIS OF LEFT HIP: ICD-10-CM

## 2023-11-16 DIAGNOSIS — B35.1 FUNGAL TOENAIL INFECTION: ICD-10-CM

## 2023-11-16 DIAGNOSIS — H65.191 ACUTE MEE (MIDDLE EAR EFFUSION), RIGHT: Primary | ICD-10-CM

## 2023-11-16 PROCEDURE — 3078F DIAST BP <80 MM HG: CPT | Performed by: NURSE PRACTITIONER

## 2023-11-16 PROCEDURE — 3075F SYST BP GE 130 - 139MM HG: CPT | Performed by: NURSE PRACTITIONER

## 2023-11-16 PROCEDURE — 99213 OFFICE O/P EST LOW 20 MIN: CPT | Performed by: NURSE PRACTITIONER

## 2023-11-16 PROCEDURE — 73502 X-RAY EXAM HIP UNI 2-3 VIEWS: CPT

## 2023-11-16 RX ORDER — TERBINAFINE HYDROCHLORIDE 250 MG/1
250 TABLET ORAL DAILY
Qty: 84 TABLET | Refills: 0 | Status: SHIPPED | OUTPATIENT
Start: 2023-11-16 | End: 2024-02-08

## 2023-11-16 RX ORDER — PREDNISONE 20 MG/1
20 TABLET ORAL DAILY
Qty: 3 TABLET | Refills: 0 | Status: SHIPPED | OUTPATIENT
Start: 2023-11-16 | End: 2023-11-19

## 2023-11-16 NOTE — PROGRESS NOTES
Right toenails are abnormally thick. Fungal disease present. Skin:     General: Skin is warm and dry. Neurological:      Mental Status: She is alert and oriented to person, place, and time. Data:     Lab Results   Component Value Date/Time     09/19/2023 01:51 PM    K 4.3 09/19/2023 01:51 PM     09/19/2023 01:51 PM    CO2 23 09/19/2023 01:51 PM    BUN 24 09/19/2023 01:51 PM    CREATININE 0.7 09/19/2023 01:51 PM    GLUCOSE 85 09/19/2023 01:51 PM    GLUCOSE 95 02/14/2012 10:05 AM    PROT 7.5 12/16/2022 03:42 PM    LABALBU 4.3 12/16/2022 03:42 PM    LABALBU 4.6 02/14/2012 10:05 AM    BILITOT 0.3 12/16/2022 03:42 PM    ALKPHOS 98 12/16/2022 03:42 PM    AST 29 12/16/2022 03:42 PM    ALT 30 12/16/2022 03:42 PM     Lab Results   Component Value Date/Time    WBC 8.9 03/03/2023 05:50 AM    RBC 4.22 03/03/2023 05:50 AM    HGB 12.4 03/03/2023 05:50 AM    HCT 37.1 03/03/2023 05:50 AM    MCV 87.9 03/03/2023 05:50 AM    MCH 29.4 03/03/2023 05:50 AM    MCHC 33.4 03/03/2023 05:50 AM    RDW 13.2 03/03/2023 05:50 AM     03/03/2023 05:50 AM    MPV 9.4 03/03/2023 05:50 AM     Lab Results   Component Value Date/Time    TSH 1.88 12/16/2022 03:42 PM     Lab Results   Component Value Date/Time    CHOL 233 12/16/2022 03:42 PM    CHOL 249 05/03/2018 12:00 AM    HDL 49 12/16/2022 03:42 PM    LABA1C 5.6 06/24/2020 11:31 AM          Assessment & Plan        Diagnosis Orders   1. Acute SHIVA (middle ear effusion), right   --flonase and zyrtec       2. Greater trochanteric bursitis of left hip   --will start on 3 day treatment of prednisone, then start naproxen XR HIP 2-3 VW W PELVIS LEFT      3. Fungal toenail infection   --will start on Lamisil x 12 weeks         Patient verbalizes understanding and agreement with plan. All questions answered. If symptoms do not resolve or worsen, return to office.                  Completed Refills   Requested Prescriptions     Signed Prescriptions Disp Refills    terbinafine

## 2023-11-16 NOTE — PATIENT INSTRUCTIONS
SURVEY:    You may be receiving a survey from [employer] regarding your visit today. Please complete the survey to enable us to provide the highest quality of care to you and your family. If you cannot score us a very good (5 Stars) on any question, please call the office to discuss how we could have made your experience a very good one. Thank you.     Clinical Care Team: ELIGIO Ariza-JOSIANE Ashton LPN    Clerical Team: 1 Mike Sanchez

## 2024-01-09 ENCOUNTER — OFFICE VISIT (OUTPATIENT)
Dept: UROLOGY | Age: 59
End: 2024-01-09
Payer: COMMERCIAL

## 2024-01-09 VITALS
BODY MASS INDEX: 49.11 KG/M2 | WEIGHT: 268.6 LBS | DIASTOLIC BLOOD PRESSURE: 77 MMHG | SYSTOLIC BLOOD PRESSURE: 117 MMHG | HEART RATE: 74 BPM | TEMPERATURE: 97.8 F

## 2024-01-09 DIAGNOSIS — N20.0 KIDNEY STONES: ICD-10-CM

## 2024-01-09 DIAGNOSIS — Z51.81 MEDICATION MONITORING ENCOUNTER: Primary | ICD-10-CM

## 2024-01-09 PROCEDURE — 3078F DIAST BP <80 MM HG: CPT | Performed by: PHYSICIAN ASSISTANT

## 2024-01-09 PROCEDURE — 3074F SYST BP LT 130 MM HG: CPT | Performed by: PHYSICIAN ASSISTANT

## 2024-01-09 PROCEDURE — 99213 OFFICE O/P EST LOW 20 MIN: CPT | Performed by: PHYSICIAN ASSISTANT

## 2024-01-09 RX ORDER — POTASSIUM CITRATE 10 MEQ/1
10 TABLET, EXTENDED RELEASE ORAL 2 TIMES DAILY WITH MEALS
Qty: 180 TABLET | Refills: 3 | Status: SHIPPED | OUTPATIENT
Start: 2024-01-09

## 2024-01-09 ASSESSMENT — ENCOUNTER SYMPTOMS
SHORTNESS OF BREATH: 0
VOMITING: 0
CONSTIPATION: 0
NAUSEA: 0
ABDOMINAL PAIN: 0
APNEA: 0
WHEEZING: 0
EYE REDNESS: 0
COUGH: 0
BACK PAIN: 0
COLOR CHANGE: 0

## 2024-01-09 NOTE — PROGRESS NOTES
HPI:    Patient is a 58 y.o. female in no acute distress.  She is alert and oriented to person, place, and time.      History  8/2018 Right HLL     3/2022 spontaneously passed 2 stones    3/2023 right ureteral stent placed due to 8 mm ureteral stone    3/23/2023 right HLL and stent exchange    Calcium oxalate stones    6/2023 Litholink - low urine volume of 1.03 L.  Her calcium oxalate level was 9.35, urine pH was low at 5.212 and her supersaturated uric acid level was very high at 4.15.  Patient is a is currently on Aldactone.    8/2023: urine volume was 2.58 liters (much improved), urine citrate was 467, SS uric acid 1.04, urine pH was 5.412.    10/2023 urine volume was 1.98 liters (worsened), urine citrate was 852 (improved), SS uric acid 1.76, urine pH was 5.397 (significant change from prior).    12/2023 -  Patient's urine volume has worsened to 1.66 L.,  Urine citrate 152-stable urine pH 5.437, SS uric acid 1.76, no change.      Today  Patient is here today for follow-up Litholink results.  At last visit she was only taking her potassium citrate once a day.  We did increase this to twice a day.  We did repeat her Litholink. She did have lab work less than a month ago which showed a BUN of 15, creatinine of 0.6 and potassium of 4.1.  Patient's urine volume has worsened to 1.66 L.,  Urine citrate 152-stable urine pH 5.437, SS uric acid 1.76, no change.        Past Medical History:   Diagnosis Date    Allergic rhinitis     Arthritis     COVID-19 01/21/2021    not hospitalized : h/a, loss smell, tired abd ache,  no meds    Enlarged thyroid 2016    nodules check annually    Fibromyalgia     GERD (gastroesophageal reflux disease)     Headache(784.0)     migranes at times    Hyperlipidemia     Hypertension     Hypertension     NEIL (obstructive sleep apnea) 07/08/2016    Restless leg syndrome     Restless legs 07/08/2016    Sleep apnea      Past Surgical History:   Procedure Laterality Date    BLADDER SURGERY

## 2024-01-10 DIAGNOSIS — E78.2 MIXED HYPERLIPIDEMIA: ICD-10-CM

## 2024-01-11 RX ORDER — EZETIMIBE 10 MG/1
TABLET ORAL
Qty: 90 TABLET | Refills: 1 | Status: SHIPPED | OUTPATIENT
Start: 2024-01-11

## 2024-01-25 ENCOUNTER — OFFICE VISIT (OUTPATIENT)
Dept: PAIN MANAGEMENT | Age: 59
End: 2024-01-25
Payer: COMMERCIAL

## 2024-01-25 VITALS
RESPIRATION RATE: 18 BRPM | HEART RATE: 64 BPM | SYSTOLIC BLOOD PRESSURE: 118 MMHG | OXYGEN SATURATION: 96 % | WEIGHT: 267 LBS | DIASTOLIC BLOOD PRESSURE: 78 MMHG | BODY MASS INDEX: 48.82 KG/M2

## 2024-01-25 DIAGNOSIS — M79.18 MYOFASCIAL PAIN SYNDROME: Primary | ICD-10-CM

## 2024-01-25 DIAGNOSIS — M79.2 NEUROPATHIC PAIN: ICD-10-CM

## 2024-01-25 DIAGNOSIS — M79.7 FIBROMYALGIA: ICD-10-CM

## 2024-01-25 PROCEDURE — 99213 OFFICE O/P EST LOW 20 MIN: CPT | Performed by: STUDENT IN AN ORGANIZED HEALTH CARE EDUCATION/TRAINING PROGRAM

## 2024-01-25 PROCEDURE — 3074F SYST BP LT 130 MM HG: CPT | Performed by: STUDENT IN AN ORGANIZED HEALTH CARE EDUCATION/TRAINING PROGRAM

## 2024-01-25 PROCEDURE — 3078F DIAST BP <80 MM HG: CPT | Performed by: STUDENT IN AN ORGANIZED HEALTH CARE EDUCATION/TRAINING PROGRAM

## 2024-01-25 RX ORDER — PREGABALIN 225 MG/1
225 CAPSULE ORAL 2 TIMES DAILY
Qty: 180 CAPSULE | Refills: 0 | Status: SHIPPED | OUTPATIENT
Start: 2024-01-25 | End: 2024-04-24

## 2024-01-25 NOTE — PROGRESS NOTES
W/LITHOTRIPSY &INDWELL STENT INSRT Right 08/21/2018    CYSTOSCOPY STENT INSERTION-EXCHANGE performed by Kenny Lopez MD at [facility] OR    DE OFFICE/OUTPT VISIT,PROCEDURE ONLY Right 08/10/2018    CYSTOSCOPY STENT INSERTION performed by Kenny Lopez MD at [facility] OR    TUBAL LIGATION         Family History   Problem Relation Age of Onset    Heart Disease Mother     High Blood Pressure Father     Heart Disease Father     High Blood Pressure Sister     Cancer Sister         Breast    Breast Cancer Sister     High Blood Pressure Brother        Social History     Socioeconomic History    Marital status: Single     Spouse name: Not on file    Number of children: Not on file    Years of education: Not on file    Highest education level: Not on file   Occupational History    Not on file   Tobacco Use    Smoking status: Never    Smokeless tobacco: Never   Vaping Use    Vaping Use: Never used   Substance and Sexual Activity    Alcohol use: No     Comment: rarely    Drug use: No    Sexual activity: Not on file   Other Topics Concern    Not on file   Social History Narrative    Not on file     Social Determinants of Health     Financial Resource Strain: Low Risk  (2/13/2023)    Overall Financial Resource Strain (CARDIA)     Difficulty of Paying Living Expenses: Not hard at all   Food Insecurity: Not on file (2/13/2023)   Transportation Needs: Unknown (2/13/2023)    PRAPARE - Transportation     Lack of Transportation (Medical): Not on file     Lack of Transportation (Non-Medical): No   Physical Activity: Not on file   Stress: Not on file   Social Connections: Not on file   Intimate Partner Violence: Not on file   Housing Stability: Unknown (2/13/2023)    Housing Stability Vital Sign     Unable to Pay for Housing in the Last Year: Not on file     Number of Places Lived in the Last Year: Not on file     Unstable Housing in the Last Year: No       Medications & Allergies:   Current Outpatient Medications   Medication Instructions

## 2024-01-25 NOTE — PATIENT INSTRUCTIONS
SURVEY:    You may be receiving a survey from [facility] regarding your visit today.    Please complete the survey to enable us to provide the highest quality of care to you and your family.    If you cannot score us a very good on any question, please call the office to discuss how we could have made your experience a very good one.    Thank you.  [facility]  MD Deb Ashton, MD Maninder Gaines, DO  Irineo Allen, MD Darcie Kemp, APRN-CNP  Terra Sanchez, Practice Manager  Devika, CMA  Nikky, CCMBROWN Aldrich, CMA  Jose, PSC   Paulette, EMMANUELLEN

## 2024-02-04 DIAGNOSIS — I10 ESSENTIAL HYPERTENSION: ICD-10-CM

## 2024-02-04 DIAGNOSIS — M17.11 PRIMARY OSTEOARTHRITIS OF RIGHT KNEE: ICD-10-CM

## 2024-02-05 RX ORDER — SPIRONOLACTONE 25 MG/1
TABLET ORAL
Qty: 90 TABLET | Refills: 1 | Status: SHIPPED | OUTPATIENT
Start: 2024-02-05

## 2024-02-05 RX ORDER — METOPROLOL TARTRATE 50 MG/1
TABLET, FILM COATED ORAL
Qty: 180 TABLET | Refills: 1 | Status: SHIPPED | OUTPATIENT
Start: 2024-02-05

## 2024-02-05 RX ORDER — NAPROXEN 375 MG/1
TABLET ORAL
Qty: 180 TABLET | Refills: 0 | Status: SHIPPED | OUTPATIENT
Start: 2024-02-05

## 2024-02-05 NOTE — TELEPHONE ENCOUNTER
Last OV: 11/16/2023  Last RX: 8/8/2023, 11/10/2023   Next scheduled apt: Visit date not found      Surescripts requesting refill

## 2024-02-05 NOTE — TELEPHONE ENCOUNTER
Appt is due for annual physical also new neurologist specialized in muscle disorders in [place] would like to refer her to. Dr. Khan Bite

## 2024-02-12 ENCOUNTER — OFFICE VISIT (OUTPATIENT)
Dept: FAMILY MEDICINE CLINIC | Age: 59
End: 2024-02-12

## 2024-02-12 VITALS
HEART RATE: 65 BPM | DIASTOLIC BLOOD PRESSURE: 84 MMHG | WEIGHT: 267 LBS | HEIGHT: 62 IN | OXYGEN SATURATION: 98 % | SYSTOLIC BLOOD PRESSURE: 120 MMHG | BODY MASS INDEX: 49.13 KG/M2

## 2024-02-12 DIAGNOSIS — R74.8 ELEVATED CK: ICD-10-CM

## 2024-02-12 DIAGNOSIS — M79.10 MYALGIA: ICD-10-CM

## 2024-02-12 DIAGNOSIS — Z00.00 ENCOUNTER FOR WELL ADULT EXAM WITHOUT ABNORMAL FINDINGS: Primary | ICD-10-CM

## 2024-02-12 DIAGNOSIS — Z51.81 ENCOUNTER FOR MONITORING STATIN THERAPY: ICD-10-CM

## 2024-02-12 DIAGNOSIS — I10 ESSENTIAL HYPERTENSION: ICD-10-CM

## 2024-02-12 DIAGNOSIS — Z13.0 SCREENING FOR IRON DEFICIENCY ANEMIA: ICD-10-CM

## 2024-02-12 DIAGNOSIS — Z71.89 ACP (ADVANCE CARE PLANNING): ICD-10-CM

## 2024-02-12 DIAGNOSIS — M79.7 FIBROMYALGIA: ICD-10-CM

## 2024-02-12 DIAGNOSIS — Z13.29 SCREENING FOR THYROID DISORDER: ICD-10-CM

## 2024-02-12 DIAGNOSIS — Z79.899 ENCOUNTER FOR MONITORING STATIN THERAPY: ICD-10-CM

## 2024-02-12 DIAGNOSIS — M79.2 NEUROPATHIC PAIN: ICD-10-CM

## 2024-02-12 DIAGNOSIS — E78.2 MIXED HYPERLIPIDEMIA: ICD-10-CM

## 2024-02-12 NOTE — PATIENT INSTRUCTIONS
more?    Go to [email or web address]-resources/advance-care-planning   to learn how to:    Name someone you trust to make healthcare decisions for you, only if you can’t. (Healthcare Power of )    Document your wishes for care if you were seriously ill and not expected to recover or are approaching end of life. (Advance Directive or Living Will)    The same page can be found using the QR code below.                Starting a Weight Loss Plan: Care Instructions  Overview     If you're thinking about losing weight, it can be hard to know where to start. Your doctor can help you set up a weight loss plan that best meets your needs. You may want to take a class on nutrition or exercise, or you could join a weight loss support group. If you have questions about how to make changes to your eating or exercise habits, ask your doctor about seeing a registered dietitian or an exercise specialist.  It can be a big challenge to lose weight. But you don't have to make huge changes at once. Make small changes, and stick with them. When those changes become habit, add a few more changes.  If you don't think you're ready to make changes right now, try to pick a date in the future. Make an appointment to see your doctor to discuss whether the time is right for you to start a plan.  Follow-up care is a key part of your treatment and safety. Be sure to make and go to all appointments, and call your doctor if you are having problems. It's also a good idea to know your test results and keep a list of the medicines you take.  How can you care for yourself at home?  Set realistic goals. Many people expect to lose much more weight than is likely. A weight loss of 5% to 10% of your body weight may be enough to improve your health.  Get family and friends involved to provide support. Talk to them about why you are trying to lose weight, and ask them to help. They can help by participating in exercise and having meals with

## 2024-02-12 NOTE — PROGRESS NOTES
Loretta Puri (:  1965) is a 58 y.o. female,Established patient, here for evaluation of the following chief complaint(s):  Annual Exam (HTN, hyperlipidemia) and Weight Management (Pt would like to discuss medication options for weight loss)         ASSESSMENT/PLAN:  1. Screening for iron deficiency anemia  2. Encounter for monitoring statin therapy  3. Screening for thyroid disorder      No follow-ups on file.         Subjective   SUBJECTIVE/OBJECTIVE:  HPI  Here for annual exam. Is taking all her prescribed medications as prescribed.   Wants to discuss weight management.    Has seen a dietician   Is participating with the weight watcher program.    Exercises have been yoga and stretching.     Bilateral leg pain keeps her from doing more exertional   exercises.   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   Has 2 adult children that are local who she sees regularly.   No current tobacco use.  Occasional Noemi once every 4 months.   1 Pepsi a day. Has cut back.     Review of Systems       Objective   Physical Exam             An electronic signature was used to authenticate this note.    --Onel Saul PAS-2  [facility]  Physician Assistant Program   
INSRT Right 08/21/2018    CYSTOSCOPY STENT INSERTION-EXCHANGE performed by Kenny Lopez MD at [facility] OR    UT OFFICE/OUTPT VISIT,PROCEDURE ONLY Right 08/10/2018    CYSTOSCOPY STENT INSERTION performed by Kenny Lopez MD at [facility] OR    TUBAL LIGATION           Family History   Problem Relation Age of Onset    Heart Disease Mother     High Blood Pressure Father     Heart Disease Father     High Blood Pressure Sister     Cancer Sister         Breast    Breast Cancer Sister     High Blood Pressure Brother        Social History     Tobacco Use    Smoking status: Never    Smokeless tobacco: Never   Vaping Use    Vaping Use: Never used   Substance Use Topics    Alcohol use: No     Comment: rarely    Drug use: No       Objective     Vital Signs  /84 (Site: Left Upper Arm, Position: Sitting)   Pulse 65   Ht 1.565 m (5' 1.6\")   Wt 121.1 kg (267 lb)   LMP  (LMP Unknown)   SpO2 98%   BMI 49.47 kg/m²   Wt Readings from Last 3 Encounters:   02/12/24 121.1 kg (267 lb)   01/25/24 121.1 kg (267 lb)   01/09/24 121.8 kg (268 lb 9.6 oz)       Waist Circumference  There were no vitals filed for this visit.    Physical Exam  Vitals and nursing note reviewed.   Constitutional:       General: She is not in acute distress.     Appearance: Normal appearance. She is well-developed.   HENT:      Head: Normocephalic and atraumatic.      Right Ear: Tympanic membrane and external ear normal.      Left Ear: Tympanic membrane and external ear normal.      Nose: No congestion.      Mouth/Throat:      Mouth: Mucous membranes are moist.      Pharynx: No posterior oropharyngeal erythema.   Eyes:      General: No scleral icterus.     Pupils: Pupils are equal, round, and reactive to light.   Neck:      Vascular: No carotid bruit (neg chris).   Cardiovascular:      Rate and Rhythm: Normal rate and regular rhythm.      Heart sounds: Normal heart sounds. No murmur heard.  Pulmonary:      Effort: Pulmonary effort is normal. No respiratory

## 2024-02-16 NOTE — TELEPHONE ENCOUNTER
2/16/2024        RE: Shayne Birch         [address] [facility] [id number]          To Whom It May Concern,      Due to medical reasons, Shayne Birch  should remain out of work until 2/26/24. Patient may not lift anything greater than 3-5 lbs for a total of 7 days        Sincerely,          Haley Lim RN      Received results from sleep study

## 2024-02-23 ENCOUNTER — HOSPITAL ENCOUNTER (OUTPATIENT)
Age: 59
Discharge: HOME | End: 2024-02-23
Payer: COMMERCIAL

## 2024-02-23 ENCOUNTER — HOSPITAL ENCOUNTER (OUTPATIENT)
Dept: CT IMAGING | Age: 59
Discharge: HOME | End: 2024-02-23
Payer: COMMERCIAL

## 2024-02-23 ENCOUNTER — OFFICE VISIT (OUTPATIENT)
Dept: PRIMARY CARE CLINIC | Age: 59
End: 2024-02-23
Payer: COMMERCIAL

## 2024-02-23 VITALS
HEART RATE: 72 BPM | BODY MASS INDEX: 48.54 KG/M2 | WEIGHT: 262 LBS | OXYGEN SATURATION: 95 % | SYSTOLIC BLOOD PRESSURE: 118 MMHG | DIASTOLIC BLOOD PRESSURE: 72 MMHG

## 2024-02-23 DIAGNOSIS — K57.32 DIVERTICULITIS OF COLON: Primary | ICD-10-CM

## 2024-02-23 DIAGNOSIS — K57.32 DIVERTICULITIS OF COLON: ICD-10-CM

## 2024-02-23 DIAGNOSIS — R10.32 LLQ ABDOMINAL PAIN: ICD-10-CM

## 2024-02-23 DIAGNOSIS — R11.0 NAUSEA: ICD-10-CM

## 2024-02-23 DIAGNOSIS — K92.1 BLACK STOOL: ICD-10-CM

## 2024-02-23 LAB
ALBUMIN SERPL-MCNC: 4.1 G/DL (ref 3.5–5.2)
ALP SERPL-CCNC: 98 U/L (ref 35–104)
ALT SERPL-CCNC: 39 U/L (ref 5–33)
ANION GAP SERPL CALCULATED.3IONS-SCNC: 12 MMOL/L (ref 9–17)
AST SERPL-CCNC: 31 U/L
BASOPHILS # BLD: 0.02 K/UL (ref 0–0.2)
BASOPHILS NFR BLD: 0 % (ref 0–2)
BILIRUB DIRECT SERPL-MCNC: <0.1 MG/DL
BILIRUB INDIRECT SERPL-MCNC: ABNORMAL MG/DL (ref 0–1)
BILIRUB SERPL-MCNC: 0.3 MG/DL (ref 0.3–1.2)
BUN SERPL-MCNC: 14 MG/DL (ref 6–20)
BUN/CREAT SERPL: 20 (ref 9–20)
CALCIUM SERPL-MCNC: 8.6 MG/DL (ref 8.6–10.4)
CHLORIDE SERPL-SCNC: 101 MMOL/L (ref 98–107)
CO2 SERPL-SCNC: 24 MMOL/L (ref 20–31)
CREAT SERPL-MCNC: 0.7 MG/DL (ref 0.5–0.9)
EOSINOPHIL # BLD: 0.15 K/UL (ref 0–0.4)
EOSINOPHILS RELATIVE PERCENT: 3 % (ref 0–5)
ERYTHROCYTE [DISTWIDTH] IN BLOOD BY AUTOMATED COUNT: 12.9 % (ref 12.1–15.2)
GFR SERPL CREATININE-BSD FRML MDRD: >60 ML/MIN/1.73M2
GLUCOSE SERPL-MCNC: 91 MG/DL (ref 70–99)
HCT VFR BLD AUTO: 43.4 % (ref 36–46)
HEMOCCULT STL QL: NEGATIVE
HGB BLD-MCNC: 14.6 G/DL (ref 12–16)
IMM GRANULOCYTES # BLD AUTO: 0.01 K/UL (ref 0–0.3)
IMM GRANULOCYTES NFR BLD: 0 % (ref 0–5)
LYMPHOCYTES NFR BLD: 2.18 K/UL (ref 1–4.8)
LYMPHOCYTES RELATIVE PERCENT: 39 % (ref 15–40)
MCH RBC QN AUTO: 28.7 PG (ref 26–34)
MCHC RBC AUTO-ENTMCNC: 33.6 G/DL (ref 31–37)
MCV RBC AUTO: 85.3 FL (ref 80–100)
MONOCYTES NFR BLD: 0.7 K/UL (ref 0–1)
MONOCYTES NFR BLD: 13 % (ref 4–8)
NEUTROPHILS NFR BLD: 45 % (ref 47–75)
NEUTS SEG NFR BLD: 2.49 K/UL (ref 2.5–7)
PLATELET # BLD AUTO: 219 K/UL (ref 140–450)
PMV BLD AUTO: 9 FL (ref 6–12)
POTASSIUM SERPL-SCNC: 4.2 MMOL/L (ref 3.7–5.3)
PROT SERPL-MCNC: 7.1 G/DL (ref 6.4–8.3)
RBC # BLD AUTO: 5.09 M/UL (ref 4–5.2)
SODIUM SERPL-SCNC: 137 MMOL/L (ref 135–144)
WBC OTHER # BLD: 5.6 K/UL (ref 3.5–11)

## 2024-02-23 PROCEDURE — 80048 BASIC METABOLIC PNL TOTAL CA: CPT

## 2024-02-23 PROCEDURE — 80076 HEPATIC FUNCTION PANEL: CPT

## 2024-02-23 PROCEDURE — 74176 CT ABD & PELVIS W/O CONTRAST: CPT

## 2024-02-23 PROCEDURE — 36415 COLL VENOUS BLD VENIPUNCTURE: CPT

## 2024-02-23 PROCEDURE — 85025 COMPLETE CBC W/AUTO DIFF WBC: CPT

## 2024-02-23 PROCEDURE — 82270 OCCULT BLOOD FECES: CPT | Performed by: NURSE PRACTITIONER

## 2024-02-23 RX ORDER — CIPROFLOXACIN 500 MG/1
500 TABLET, FILM COATED ORAL 2 TIMES DAILY
Qty: 20 TABLET | Refills: 0 | Status: SHIPPED | OUTPATIENT
Start: 2024-02-23 | End: 2024-03-04

## 2024-02-23 RX ORDER — METRONIDAZOLE 500 MG/1
500 TABLET ORAL 3 TIMES DAILY
Qty: 30 TABLET | Refills: 0 | Status: SHIPPED | OUTPATIENT
Start: 2024-02-23 | End: 2024-03-04

## 2024-02-23 RX ORDER — ONDANSETRON 4 MG/1
4 TABLET, ORALLY DISINTEGRATING ORAL EVERY 12 HOURS PRN
Qty: 10 TABLET | Refills: 0 | Status: SHIPPED | OUTPATIENT
Start: 2024-02-23

## 2024-02-23 RX ORDER — ONDANSETRON 4 MG/1
4 TABLET, ORALLY DISINTEGRATING ORAL ONCE
Status: COMPLETED | OUTPATIENT
Start: 2024-02-23 | End: 2024-02-23

## 2024-02-23 RX ADMIN — ONDANSETRON 4 MG: 4 TABLET, ORALLY DISINTEGRATING ORAL at 15:13

## 2024-02-23 SDOH — ECONOMIC STABILITY: INCOME INSECURITY: HOW HARD IS IT FOR YOU TO PAY FOR THE VERY BASICS LIKE FOOD, HOUSING, MEDICAL CARE, AND HEATING?: NOT HARD AT ALL

## 2024-02-23 SDOH — ECONOMIC STABILITY: FOOD INSECURITY: WITHIN THE PAST 12 MONTHS, YOU WORRIED THAT YOUR FOOD WOULD RUN OUT BEFORE YOU GOT MONEY TO BUY MORE.: NEVER TRUE

## 2024-02-23 SDOH — ECONOMIC STABILITY: FOOD INSECURITY: WITHIN THE PAST 12 MONTHS, THE FOOD YOU BOUGHT JUST DIDN'T LAST AND YOU DIDN'T HAVE MONEY TO GET MORE.: NEVER TRUE

## 2024-02-23 NOTE — PROGRESS NOTES
for blood.   Good hydration   Limit fiber in diet.  Labs and testing today CT abdomen and pelvis , pt with anaphylaxis reaction to contrast. Pt does not eant to read about d dimer             Completed Refills   Requested Prescriptions     Signed Prescriptions Disp Refills    ciprofloxacin (CIPRO) 500 MG tablet 20 tablet 0     Sig: Take 1 tablet by mouth 2 times daily for 10 days Acute diverticulitis    metroNIDAZOLE (FLAGYL) 500 MG tablet 30 tablet 0     Sig: Take 1 tablet by mouth 3 times daily for 10 days Acute diverticulitis    ondansetron (ZOFRAN-ODT) 4 MG disintegrating tablet 10 tablet 0     Sig: Take 1 tablet by mouth every 12 hours as needed for Nausea or Vomiting     No follow-ups on file.  Orders Placed This Encounter   Medications    ciprofloxacin (CIPRO) 500 MG tablet     Sig: Take 1 tablet by mouth 2 times daily for 10 days Acute diverticulitis     Dispense:  20 tablet     Refill:  0    metroNIDAZOLE (FLAGYL) 500 MG tablet     Sig: Take 1 tablet by mouth 3 times daily for 10 days Acute diverticulitis     Dispense:  30 tablet     Refill:  0    ondansetron (ZOFRAN-ODT) disintegrating tablet 4 mg    ondansetron (ZOFRAN-ODT) 4 MG disintegrating tablet     Sig: Take 1 tablet by mouth every 12 hours as needed for Nausea or Vomiting     Dispense:  10 tablet     Refill:  0     Orders Placed This Encounter   Procedures    CT ABDOMEN PELVIS WO CONTRAST Additional Contrast? None     Standing Status:   Future     Number of Occurrences:   1     Standing Expiration Date:   2/23/2025     Order Specific Question:   Additional Contrast?     Answer:   None     Order Specific Question:   Reason for exam:     Answer:   diarrhea onset Monday continues and vomiting x 1 day, suspect diverticulitis with LLQ pain    CBC with Auto Differential     Standing Status:   Future     Number of Occurrences:   1     Standing Expiration Date:   2/23/2025    Basic Metabolic Panel     Standing Status:   Future     Number of Occurrences:

## 2024-02-26 ENCOUNTER — TELEPHONE (OUTPATIENT)
Dept: FAMILY MEDICINE CLINIC | Age: 59
End: 2024-02-26

## 2024-03-01 ENCOUNTER — TELEPHONE (OUTPATIENT)
Dept: NEUROLOGY | Age: 59
End: 2024-03-01

## 2024-03-01 NOTE — TELEPHONE ENCOUNTER
03 01 2024 I called the patient times 2 (02 19 2024 and 03 01 2024 at ) to schedule new patient appointment with one of our providers, NEAL both times, no response.  I mailed the patient a letter asking them to call the office back to schedule this appointment.  KS

## 2024-03-29 ENCOUNTER — HOSPITAL ENCOUNTER (OUTPATIENT)
Age: 59
Discharge: HOME | End: 2024-03-29
Payer: COMMERCIAL

## 2024-03-29 DIAGNOSIS — Z79.899 ENCOUNTER FOR MONITORING STATIN THERAPY: ICD-10-CM

## 2024-03-29 DIAGNOSIS — I10 ESSENTIAL HYPERTENSION: ICD-10-CM

## 2024-03-29 DIAGNOSIS — M79.10 MYALGIA: ICD-10-CM

## 2024-03-29 DIAGNOSIS — Z13.0 SCREENING FOR IRON DEFICIENCY ANEMIA: ICD-10-CM

## 2024-03-29 DIAGNOSIS — Z51.81 ENCOUNTER FOR MONITORING STATIN THERAPY: ICD-10-CM

## 2024-03-29 DIAGNOSIS — Z13.29 SCREENING FOR THYROID DISORDER: ICD-10-CM

## 2024-03-29 DIAGNOSIS — M79.2 NEUROPATHIC PAIN: ICD-10-CM

## 2024-03-29 LAB
BASOPHILS # BLD: 0.01 K/UL (ref 0–0.2)
BASOPHILS NFR BLD: 0 % (ref 0–2)
CHOLEST SERPL-MCNC: 220 MG/DL (ref 0–199)
CHOLESTEROL/HDL RATIO: 4
CK SERPL-CCNC: 281 U/L (ref 26–192)
EOSINOPHIL # BLD: 0.17 K/UL (ref 0–0.4)
EOSINOPHILS RELATIVE PERCENT: 3 % (ref 0–5)
ERYTHROCYTE [DISTWIDTH] IN BLOOD BY AUTOMATED COUNT: 12.9 % (ref 12.1–15.2)
HCT VFR BLD AUTO: 43.6 % (ref 36–46)
HDLC SERPL-MCNC: 50 MG/DL
HGB BLD-MCNC: 14.8 G/DL (ref 12–16)
IMM GRANULOCYTES # BLD AUTO: 0.03 K/UL (ref 0–0.3)
IMM GRANULOCYTES NFR BLD: 1 % (ref 0–5)
LDH SERPL-CCNC: 272 U/L (ref 135–214)
LDLC SERPL CALC-MCNC: 146 MG/DL (ref 0–100)
LYMPHOCYTES NFR BLD: 2.02 K/UL (ref 1–4.8)
LYMPHOCYTES RELATIVE PERCENT: 40 % (ref 15–40)
MAGNESIUM SERPL-MCNC: 2.1 MG/DL (ref 1.6–2.6)
MCH RBC QN AUTO: 28.9 PG (ref 26–34)
MCHC RBC AUTO-ENTMCNC: 33.9 G/DL (ref 31–37)
MCV RBC AUTO: 85.2 FL (ref 80–100)
MONOCYTES NFR BLD: 0.54 K/UL (ref 0–1)
MONOCYTES NFR BLD: 11 % (ref 4–8)
NEUTROPHILS NFR BLD: 45 % (ref 47–75)
NEUTS SEG NFR BLD: 2.25 K/UL (ref 2.5–7)
PLATELET # BLD AUTO: 212 K/UL (ref 140–450)
PMV BLD AUTO: 9 FL (ref 6–12)
RBC # BLD AUTO: 5.12 M/UL (ref 4–5.2)
TRIGL SERPL-MCNC: 119 MG/DL
TSH SERPL DL<=0.05 MIU/L-ACNC: 1.39 UIU/ML (ref 0.3–5)
VLDLC SERPL CALC-MCNC: 24 MG/DL
WBC OTHER # BLD: 5 K/UL (ref 3.5–11)

## 2024-03-29 PROCEDURE — 36415 COLL VENOUS BLD VENIPUNCTURE: CPT

## 2024-03-29 PROCEDURE — 83735 ASSAY OF MAGNESIUM: CPT

## 2024-03-29 PROCEDURE — 85025 COMPLETE CBC W/AUTO DIFF WBC: CPT

## 2024-03-29 PROCEDURE — 82550 ASSAY OF CK (CPK): CPT

## 2024-03-29 PROCEDURE — 83615 LACTATE (LD) (LDH) ENZYME: CPT

## 2024-03-29 PROCEDURE — 80061 LIPID PANEL: CPT

## 2024-03-29 PROCEDURE — 84443 ASSAY THYROID STIM HORMONE: CPT

## 2024-04-11 DIAGNOSIS — K21.9 GASTROESOPHAGEAL REFLUX DISEASE WITHOUT ESOPHAGITIS: ICD-10-CM

## 2024-04-11 RX ORDER — OMEPRAZOLE 20 MG/1
CAPSULE, DELAYED RELEASE ORAL
Qty: 90 CAPSULE | Refills: 1 | Status: SHIPPED | OUTPATIENT
Start: 2024-04-11

## 2024-04-11 NOTE — TELEPHONE ENCOUNTER
Last OV 2/12/24 physical     Next OV 8/12/24    Requesting refill on omeprazole thru [facility].  Rx pending

## 2024-04-22 DIAGNOSIS — G25.81 RESTLESS LEG: ICD-10-CM

## 2024-04-22 RX ORDER — ROPINIROLE 1 MG/1
TABLET, FILM COATED ORAL
Qty: 180 TABLET | Refills: 1 | Status: SHIPPED | OUTPATIENT
Start: 2024-04-22

## 2024-04-22 NOTE — TELEPHONE ENCOUNTER
Last OV 2/12/24 physical    Next OV 8/12/24    Requesting refill on ropinirole thru [facility].  Rx pending

## 2024-04-25 ENCOUNTER — OFFICE VISIT (OUTPATIENT)
Dept: PAIN MANAGEMENT | Age: 59
End: 2024-04-25
Payer: COMMERCIAL

## 2024-04-25 VITALS
HEART RATE: 61 BPM | OXYGEN SATURATION: 98 % | RESPIRATION RATE: 18 BRPM | BODY MASS INDEX: 48.54 KG/M2 | WEIGHT: 262 LBS

## 2024-04-25 DIAGNOSIS — M79.7 FIBROMYALGIA: Primary | ICD-10-CM

## 2024-04-25 DIAGNOSIS — M79.2 NEUROPATHIC PAIN: ICD-10-CM

## 2024-04-25 DIAGNOSIS — M79.18 MYOFASCIAL PAIN SYNDROME: ICD-10-CM

## 2024-04-25 DIAGNOSIS — E66.01 CLASS 3 SEVERE OBESITY WITH BODY MASS INDEX (BMI) OF 45.0 TO 49.9 IN ADULT, UNSPECIFIED OBESITY TYPE, UNSPECIFIED WHETHER SERIOUS COMORBIDITY PRESENT (HCC): ICD-10-CM

## 2024-04-25 PROCEDURE — 99214 OFFICE O/P EST MOD 30 MIN: CPT | Performed by: STUDENT IN AN ORGANIZED HEALTH CARE EDUCATION/TRAINING PROGRAM

## 2024-04-25 RX ORDER — PREGABALIN 225 MG/1
225 CAPSULE ORAL 2 TIMES DAILY
Qty: 180 CAPSULE | Refills: 0 | Status: SHIPPED | OUTPATIENT
Start: 2024-04-25 | End: 2024-07-24

## 2024-04-25 NOTE — PROGRESS NOTES
Chronic Pain Clinic Note     Encounter Date: 4/25/2024     SUBJECTIVE:  Chief Complaint   Patient presents with    Discuss Medications    3 Month Follow-Up    Chronic Pain     History of Present Illness:   Loretta Puri is a 59 y.o. female who presents for 3 month med check with Fibromyalgia pain sydrome. She states that her pain is mostly in the lumbar region as well as bilateral hips.     Medication Refill: Lyrica     Current Complaints of Pain:   Location: low back & hips mostly  Radiation: occasional BLE  Severity: mild- moderate  Pain Numerical Score - 5   Average: 5     Highest: 6-8  Lowest: 2-3  Character/Quality: Complains of pain that is achy. Sometimes burning pain in left sciatic  Timing: Increases w/prolonged sitting/standing/walking  Associated symptoms: none  Numbness: LLE occasional  Weakness:   Exacerbating factors: prolonged activity  Alleviating factors: heat, medication  Length of time pain has been present: Started on - years ago  Inciting event/injury: none  Bowel/Bladder incontinence: none  Falls: no  Physical Therapy: yes, multiple times    History of Interventions:   Surgery: No previous lumbar/cervical surgeries  Injections: Lumbar epidurals    Imaging:    Lumbar MRI 5/3/2019    FINDINGS: The lumbar vertebral bodies are numbered assuming the last complete    intervertebral disc space is L5-S1, with five lumbar vertebral bodies numbered    accordingly.        The lumbar vertebral body heights are normal. The lumbar vertebral alignment is    normal. There is no significant spondylolisthesis.       The conus medullaris appears normal and terminates at the level of L2.       The individual intervertebral disc spaces are as follows:       T12-L1: There is no significant posterior disc abnormality, spinal canal, or    neural foraminal stenosis.       L1-L2: There is no significant posterior disc abnormality, spinal canal, or    neural foraminal stenosis.       L2-L3: There is no significant

## 2024-04-29 DIAGNOSIS — R74.8 ELEVATED CK: ICD-10-CM

## 2024-04-29 DIAGNOSIS — M79.7 FIBROMYALGIA: ICD-10-CM

## 2024-04-29 DIAGNOSIS — M79.2 NEUROPATHIC PAIN: ICD-10-CM

## 2024-04-29 DIAGNOSIS — M54.17 LUMBOSACRAL RADICULOPATHY AT L4: ICD-10-CM

## 2024-04-29 RX ORDER — DULOXETIN HYDROCHLORIDE 30 MG/1
CAPSULE, DELAYED RELEASE ORAL
Qty: 90 CAPSULE | Refills: 1 | Status: SHIPPED | OUTPATIENT
Start: 2024-04-29

## 2024-05-07 ENCOUNTER — HOSPITAL ENCOUNTER (OUTPATIENT)
Dept: GENERAL RADIOLOGY | Age: 59
Discharge: HOME | End: 2024-05-09
Payer: COMMERCIAL

## 2024-05-07 ENCOUNTER — HOSPITAL ENCOUNTER (OUTPATIENT)
Age: 59
Discharge: HOME | End: 2024-05-09
Payer: COMMERCIAL

## 2024-05-07 ENCOUNTER — HOSPITAL ENCOUNTER (OUTPATIENT)
Age: 59
Discharge: HOME | End: 2024-05-07
Payer: COMMERCIAL

## 2024-05-07 DIAGNOSIS — Z51.81 MEDICATION MONITORING ENCOUNTER: ICD-10-CM

## 2024-05-07 DIAGNOSIS — N20.0 KIDNEY STONES: ICD-10-CM

## 2024-05-07 LAB
ANION GAP SERPL CALCULATED.3IONS-SCNC: 10 MMOL/L (ref 9–17)
BUN SERPL-MCNC: 16 MG/DL (ref 6–20)
BUN/CREAT SERPL: 23 (ref 9–20)
CALCIUM SERPL-MCNC: 8.9 MG/DL (ref 8.6–10.4)
CHLORIDE SERPL-SCNC: 106 MMOL/L (ref 98–107)
CO2 SERPL-SCNC: 23 MMOL/L (ref 20–31)
CREAT SERPL-MCNC: 0.7 MG/DL (ref 0.5–0.9)
GFR, ESTIMATED: >90 ML/MIN/1.73M2
GLUCOSE SERPL-MCNC: 107 MG/DL (ref 70–99)
POTASSIUM SERPL-SCNC: 3.8 MMOL/L (ref 3.7–5.3)
SODIUM SERPL-SCNC: 139 MMOL/L (ref 135–144)

## 2024-05-07 PROCEDURE — 36415 COLL VENOUS BLD VENIPUNCTURE: CPT

## 2024-05-07 PROCEDURE — 74018 RADEX ABDOMEN 1 VIEW: CPT

## 2024-05-07 PROCEDURE — 80048 BASIC METABOLIC PNL TOTAL CA: CPT

## 2024-05-08 DIAGNOSIS — M17.11 PRIMARY OSTEOARTHRITIS OF RIGHT KNEE: ICD-10-CM

## 2024-05-09 ENCOUNTER — OFFICE VISIT (OUTPATIENT)
Dept: UROLOGY | Age: 59
End: 2024-05-09
Payer: COMMERCIAL

## 2024-05-09 VITALS
BODY MASS INDEX: 49.13 KG/M2 | HEIGHT: 62 IN | SYSTOLIC BLOOD PRESSURE: 118 MMHG | DIASTOLIC BLOOD PRESSURE: 78 MMHG | WEIGHT: 267 LBS

## 2024-05-09 DIAGNOSIS — Z51.81 MEDICATION MONITORING ENCOUNTER: ICD-10-CM

## 2024-05-09 DIAGNOSIS — N20.0 KIDNEY STONES: Primary | ICD-10-CM

## 2024-05-09 PROCEDURE — 3078F DIAST BP <80 MM HG: CPT | Performed by: PHYSICIAN ASSISTANT

## 2024-05-09 PROCEDURE — 3074F SYST BP LT 130 MM HG: CPT | Performed by: PHYSICIAN ASSISTANT

## 2024-05-09 PROCEDURE — 99213 OFFICE O/P EST LOW 20 MIN: CPT | Performed by: PHYSICIAN ASSISTANT

## 2024-05-09 RX ORDER — NAPROXEN 375 MG/1
TABLET ORAL
Qty: 180 TABLET | Refills: 0 | Status: SHIPPED | OUTPATIENT
Start: 2024-05-09

## 2024-05-09 ASSESSMENT — ENCOUNTER SYMPTOMS
VOMITING: 0
SHORTNESS OF BREATH: 0
NAUSEA: 0
ABDOMINAL PAIN: 0
COLOR CHANGE: 0
BACK PAIN: 0
EYE REDNESS: 0
APNEA: 0
COUGH: 0
CONSTIPATION: 0
WHEEZING: 0

## 2024-05-09 NOTE — PROGRESS NOTES
HPI:    Patient is a 59 y.o. female in no acute distress.  She is alert and oriented to person, place, and time.      History  8/2018 Right HLL     3/2022 spontaneously passed 2 stones    3/2023 right ureteral stent placed due to 8 mm ureteral stone    3/23/2023 right HLL and stent exchange    Calcium oxalate stones    6/2023 Litholink - low urine volume of 1.03 L.  Her calcium oxalate level was 9.35, urine pH was low at 5.212 and her supersaturated uric acid level was very high at 4.15.  Patient is a is currently on Aldactone.    8/2023: urine volume was 2.58 liters (much improved), urine citrate was 467, SS uric acid 1.04, urine pH was 5.412.    10/2023 urine volume was 1.98 liters (worsened), urine citrate was 852 (improved), SS uric acid 1.76, urine pH was 5.397 (significant change from prior).    12/2023 -  Patient's urine volume has worsened to 1.66 L.,  Urine citrate 152-stable urine pH 5.437, SS uric acid 1.76, no change.    Today  Patient is here today for follow-up kidney stones.  At last visit we encouraged her to drink 2 to 2-1/2 L of water a day.  Patient continues to take potassium citrate twice a day.  She did have lab work prior to visit today showing a potassium of 3.8 and normal kidney function.  She denies any spontaneous stone passage over the last 6 months.  Patient had KUB prior visit day which independent reviewed showing noticing  calcifications.  Patient also had a CT scan in February which showed no renal calculi.        Past Medical History:   Diagnosis Date    Allergic rhinitis     Arthritis     COVID-19 01/21/2021    not hospitalized : h/a, loss smell, tired abd ache,  no meds    Enlarged thyroid 2016    nodules check annually    Fibromyalgia     GERD (gastroesophageal reflux disease)     Headache(784.0)     migranes at times    Hyperlipidemia     Hypertension     Hypertension     NEIL (obstructive sleep apnea) 07/08/2016    Restless leg syndrome     Restless legs 07/08/2016    Sleep

## 2024-05-22 ENCOUNTER — OFFICE VISIT (OUTPATIENT)
Dept: NEUROLOGY | Age: 59
End: 2024-05-22
Payer: COMMERCIAL

## 2024-05-22 VITALS
BODY MASS INDEX: 48.95 KG/M2 | HEIGHT: 62 IN | WEIGHT: 266 LBS | SYSTOLIC BLOOD PRESSURE: 129 MMHG | HEART RATE: 65 BPM | DIASTOLIC BLOOD PRESSURE: 84 MMHG

## 2024-05-22 DIAGNOSIS — M79.10 MUSCLE PAIN: Primary | ICD-10-CM

## 2024-05-22 DIAGNOSIS — M54.17 LUMBOSACRAL RADICULOPATHY AT L4: ICD-10-CM

## 2024-05-22 DIAGNOSIS — R20.2 PARESTHESIA OF BILATERAL LEGS: ICD-10-CM

## 2024-05-22 DIAGNOSIS — M79.2 NEUROPATHIC PAIN: ICD-10-CM

## 2024-05-22 DIAGNOSIS — R74.8 ELEVATED CK: ICD-10-CM

## 2024-05-22 DIAGNOSIS — M79.7 FIBROMYALGIA: ICD-10-CM

## 2024-05-22 PROCEDURE — 99205 OFFICE O/P NEW HI 60 MIN: CPT | Performed by: STUDENT IN AN ORGANIZED HEALTH CARE EDUCATION/TRAINING PROGRAM

## 2024-05-22 PROCEDURE — G2211 COMPLEX E/M VISIT ADD ON: HCPCS | Performed by: STUDENT IN AN ORGANIZED HEALTH CARE EDUCATION/TRAINING PROGRAM

## 2024-05-22 PROCEDURE — 3079F DIAST BP 80-89 MM HG: CPT | Performed by: STUDENT IN AN ORGANIZED HEALTH CARE EDUCATION/TRAINING PROGRAM

## 2024-05-22 PROCEDURE — 3074F SYST BP LT 130 MM HG: CPT | Performed by: STUDENT IN AN ORGANIZED HEALTH CARE EDUCATION/TRAINING PROGRAM

## 2024-05-22 RX ORDER — DULOXETIN HYDROCHLORIDE 60 MG/1
CAPSULE, DELAYED RELEASE ORAL
Qty: 30 CAPSULE | Refills: 3 | Status: SHIPPED | OUTPATIENT
Start: 2024-05-22

## 2024-05-22 NOTE — PROGRESS NOTES
for neuropathy. Also discussed that fibromyalgia can exacerbate this pain. Plan detailed below.     Plan:  Increase Cymbalta to 60 mg daily (initially started by pcp, will increase dose)  Continue Lyrica 225 mg Bid ( prescribed by pain management)  EMG with NCV studies  Will order B12, maximilian panel, TRACY Cooley MD   Neurology & Sleep Medicine  [facility]

## 2024-06-24 ENCOUNTER — OFFICE VISIT (OUTPATIENT)
Dept: PULMONOLOGY | Age: 59
End: 2024-06-24
Payer: COMMERCIAL

## 2024-06-24 VITALS
SYSTOLIC BLOOD PRESSURE: 128 MMHG | TEMPERATURE: 96.9 F | BODY MASS INDEX: 49.19 KG/M2 | OXYGEN SATURATION: 95 % | WEIGHT: 267.3 LBS | RESPIRATION RATE: 16 BRPM | HEIGHT: 62 IN | HEART RATE: 69 BPM | DIASTOLIC BLOOD PRESSURE: 70 MMHG

## 2024-06-24 DIAGNOSIS — E66.01 MORBID OBESITY WITH BMI OF 45.0-49.9, ADULT (HCC): ICD-10-CM

## 2024-06-24 DIAGNOSIS — G47.33 OSA ON CPAP: Primary | ICD-10-CM

## 2024-06-24 PROCEDURE — 3074F SYST BP LT 130 MM HG: CPT | Performed by: INTERNAL MEDICINE

## 2024-06-24 PROCEDURE — 3078F DIAST BP <80 MM HG: CPT | Performed by: INTERNAL MEDICINE

## 2024-06-24 PROCEDURE — 99214 OFFICE O/P EST MOD 30 MIN: CPT | Performed by: INTERNAL MEDICINE

## 2024-06-24 NOTE — PROGRESS NOTES
PULMONARY MEDICINE OUTPATIENT NOTE                                                                       PATIENT:  Loretta Puri  YOB: 1965  MRN: [id number]     REFERRED BY: Gracie Maier APRN - CNP   CHIEF COMPLIANT: No chief complaint on file.       HISTORY     Loretta Puri is a 59 y.o. year old female here for follow-up    INITIAL VISIT: 3/27/2023  LAST VISIT (Cuco Collins CNP): 6/8/2023  TODAY's VISIT:6/24/2024    PULMONARY PROBLEM LIST:  1. NEIL on CPAP    2. Morbid obesity with BMI of 45.0-49.9, adult ([facility])         HISTORY OF PRESENT ILLNESS/CURRENT SYMPTOMS: Patient is morbidly obese.  She was initially diagnosed with severe obstructive sleep apnea on a split-night study in June 2016.  She was started on CPAP.  She has been using full facemask.  She was initially seen after new titration study in order to get a new machine and was recommended CPAP at 10 cm water.  She reports that she has been using CPAP as recommended and is benefiting from therapy.  She denies any excessive daytime sleepiness.  Compliance data was reviewed at the time of clinic visit.      EPWORTH SLEEPINESS SCALE:      12/1/2022     2:29 AM   Sleep Medicine   Sitting and reading 3   Watching TV 2   Sitting, inactive in a public place (e.g. a theatre or a meeting) 1   As a passenger in a car for an hour without a break 0   Lying down to rest in the afternoon when circumstances permit 3   Sitting and talking to someone 0   Sitting quietly after a lunch without alcohol 1   In a car, while stopped for a few minutes in traffic 0   [place] Sleepiness Score 10   Neck circumference (Inches) 17        PULMONARY COMORBIDITIES/RISK FACTORS:     Yes No   NASO-BRONCHIAL/ENVIRONMENTAL ALLERGIES [] [x]   ASTHMA [] [x]   CHRONIC SINUSITIS/POSTNASAL DRIP [] [x]   GERD/ACID REFLUX [] [x]   ASPIRIN USE [] [x]   CORONARY ARTERY DISEASE  []  [x]   CONGESTIVE CARDIAC FAILURE [] [x]   ATRIAL FIBRILLATION [] [x]   AMIODARONE USE [] [x]

## 2024-07-17 DIAGNOSIS — E78.2 MIXED HYPERLIPIDEMIA: ICD-10-CM

## 2024-07-18 RX ORDER — DOXAZOSIN MESYLATE 1 MG/1
TABLET ORAL
Qty: 90 TABLET | Refills: 3 | Status: SHIPPED | OUTPATIENT
Start: 2024-07-18

## 2024-07-18 RX ORDER — EZETIMIBE 10 MG/1
TABLET ORAL
Qty: 90 TABLET | Refills: 1 | Status: SHIPPED | OUTPATIENT
Start: 2024-07-18

## 2024-07-27 PROBLEM — E66.01 MORBID OBESITY WITH BMI OF 45.0-49.9, ADULT (HCC): Status: ACTIVE | Noted: 2024-07-27

## 2024-08-01 ENCOUNTER — HOSPITAL ENCOUNTER (OUTPATIENT)
Age: 59
Discharge: HOME | End: 2024-08-01
Payer: COMMERCIAL

## 2024-08-01 DIAGNOSIS — R20.2 PARESTHESIA OF BILATERAL LEGS: ICD-10-CM

## 2024-08-01 LAB
EST. AVERAGE GLUCOSE BLD GHB EST-MCNC: 108 MG/DL
FOLATE SERPL-MCNC: 13.1 NG/ML (ref 4.8–24.2)
HBA1C MFR BLD: 5.4 % (ref 4–6)
VIT B12 SERPL-MCNC: 349 PG/ML (ref 232–1245)

## 2024-08-01 PROCEDURE — 82607 VITAMIN B-12: CPT

## 2024-08-01 PROCEDURE — 36415 COLL VENOUS BLD VENIPUNCTURE: CPT

## 2024-08-01 PROCEDURE — 84165 PROTEIN E-PHORESIS SERUM: CPT

## 2024-08-01 PROCEDURE — 84155 ASSAY OF PROTEIN SERUM: CPT

## 2024-08-01 PROCEDURE — 86038 ANTINUCLEAR ANTIBODIES: CPT

## 2024-08-01 PROCEDURE — 86225 DNA ANTIBODY NATIVE: CPT

## 2024-08-01 PROCEDURE — 83036 HEMOGLOBIN GLYCOSYLATED A1C: CPT

## 2024-08-01 PROCEDURE — 82746 ASSAY OF FOLIC ACID SERUM: CPT

## 2024-08-02 LAB
ALBUMIN PERCENT: 59 % (ref 45–65)
ALBUMIN SERPL-MCNC: 4.3 G/DL (ref 3.2–5.2)
ALPHA 2 PERCENT: 10 % (ref 6–13)
ALPHA1 GLOB SERPL ELPH-MCNC: 0.2 G/DL (ref 0.1–0.4)
ALPHA1 GLOB SERPL ELPH-MCNC: 3 % (ref 3–6)
ALPHA2 GLOB SERPL ELPH-MCNC: 0.7 G/DL (ref 0.5–0.9)
B-GLOBULIN SERPL ELPH-MCNC: 0.9 G/DL (ref 0.5–1.1)
B-GLOBULIN SERPL ELPH-MCNC: 12 % (ref 11–19)
GAMMA GLOB SERPL ELPH-MCNC: 1.2 G/DL (ref 0.5–1.5)
GAMMA GLOBULIN %: 16 % (ref 9–20)
PATHOLOGIST: NORMAL
PROT PATTERN SERPL ELPH-IMP: NORMAL
PROT SERPL-MCNC: 7.3 G/DL (ref 6.6–8.7)
TOTAL PROT. SUM,%: 100 % (ref 98–102)
TOTAL PROT. SUM: 7.3 G/DL (ref 6.3–8.2)

## 2024-08-03 LAB
ANA SER QL IA: ABNORMAL
DSDNA IGG SER QL IA: 10 IU/ML
NUCLEAR IGG SER IA-RTO: 0.3 U/ML

## 2024-08-05 DIAGNOSIS — I10 ESSENTIAL HYPERTENSION: ICD-10-CM

## 2024-08-05 DIAGNOSIS — M17.11 PRIMARY OSTEOARTHRITIS OF RIGHT KNEE: ICD-10-CM

## 2024-08-06 RX ORDER — SPIRONOLACTONE 25 MG/1
TABLET ORAL
Qty: 90 TABLET | Refills: 1 | Status: SHIPPED | OUTPATIENT
Start: 2024-08-06

## 2024-08-06 RX ORDER — NAPROXEN 375 MG/1
TABLET ORAL
Qty: 180 TABLET | Refills: 0 | Status: SHIPPED | OUTPATIENT
Start: 2024-08-06

## 2024-08-06 RX ORDER — METOPROLOL TARTRATE 50 MG/1
TABLET, FILM COATED ORAL
Qty: 180 TABLET | Refills: 1 | Status: SHIPPED | OUTPATIENT
Start: 2024-08-06

## 2024-08-06 NOTE — TELEPHONE ENCOUNTER
Last OV: 2/12/2024  well adult   Last RX:    Next scheduled apt: 8/12/2024   6 months chronic           Surescript requesting a refill

## 2024-08-08 ENCOUNTER — OFFICE VISIT (OUTPATIENT)
Dept: PAIN MANAGEMENT | Age: 59
End: 2024-08-08
Payer: COMMERCIAL

## 2024-08-08 VITALS
OXYGEN SATURATION: 97 % | RESPIRATION RATE: 18 BRPM | BODY MASS INDEX: 48.83 KG/M2 | WEIGHT: 267 LBS | HEART RATE: 71 BPM

## 2024-08-08 DIAGNOSIS — M79.18 MYOFASCIAL PAIN SYNDROME: ICD-10-CM

## 2024-08-08 DIAGNOSIS — M54.16 LUMBAR RADICULOPATHY: Primary | ICD-10-CM

## 2024-08-08 DIAGNOSIS — M79.2 NEUROPATHIC PAIN: ICD-10-CM

## 2024-08-08 DIAGNOSIS — E66.01 CLASS 3 SEVERE OBESITY WITH BODY MASS INDEX (BMI) OF 45.0 TO 49.9 IN ADULT, UNSPECIFIED OBESITY TYPE, UNSPECIFIED WHETHER SERIOUS COMORBIDITY PRESENT (HCC): ICD-10-CM

## 2024-08-08 DIAGNOSIS — M79.7 FIBROMYALGIA: ICD-10-CM

## 2024-08-08 PROCEDURE — 99214 OFFICE O/P EST MOD 30 MIN: CPT | Performed by: STUDENT IN AN ORGANIZED HEALTH CARE EDUCATION/TRAINING PROGRAM

## 2024-08-08 RX ORDER — PREGABALIN 225 MG/1
225 CAPSULE ORAL 2 TIMES DAILY
Qty: 180 CAPSULE | Refills: 0 | Status: SHIPPED | OUTPATIENT
Start: 2024-08-08 | End: 2024-11-06

## 2024-08-12 ENCOUNTER — OFFICE VISIT (OUTPATIENT)
Dept: FAMILY MEDICINE CLINIC | Age: 59
End: 2024-08-12
Payer: COMMERCIAL

## 2024-08-12 VITALS
BODY MASS INDEX: 49.32 KG/M2 | SYSTOLIC BLOOD PRESSURE: 122 MMHG | HEIGHT: 62 IN | HEART RATE: 87 BPM | WEIGHT: 268 LBS | OXYGEN SATURATION: 96 % | DIASTOLIC BLOOD PRESSURE: 78 MMHG

## 2024-08-12 DIAGNOSIS — R74.8 ELEVATED CK: ICD-10-CM

## 2024-08-12 DIAGNOSIS — M54.17 LUMBOSACRAL RADICULOPATHY AT L4: ICD-10-CM

## 2024-08-12 DIAGNOSIS — M17.11 PRIMARY OSTEOARTHRITIS OF RIGHT KNEE: ICD-10-CM

## 2024-08-12 DIAGNOSIS — M79.10 MYALGIA: ICD-10-CM

## 2024-08-12 DIAGNOSIS — M79.2 NEUROPATHIC PAIN: ICD-10-CM

## 2024-08-12 DIAGNOSIS — G25.81 RESTLESS LEG: ICD-10-CM

## 2024-08-12 DIAGNOSIS — E78.2 MIXED HYPERLIPIDEMIA: ICD-10-CM

## 2024-08-12 DIAGNOSIS — I10 ESSENTIAL HYPERTENSION: Primary | ICD-10-CM

## 2024-08-12 DIAGNOSIS — M79.7 FIBROMYALGIA: ICD-10-CM

## 2024-08-12 PROCEDURE — 99213 OFFICE O/P EST LOW 20 MIN: CPT | Performed by: NURSE PRACTITIONER

## 2024-08-12 PROCEDURE — 3078F DIAST BP <80 MM HG: CPT | Performed by: NURSE PRACTITIONER

## 2024-08-12 PROCEDURE — 3074F SYST BP LT 130 MM HG: CPT | Performed by: NURSE PRACTITIONER

## 2024-08-12 RX ORDER — DULOXETIN HYDROCHLORIDE 60 MG/1
CAPSULE, DELAYED RELEASE ORAL
Qty: 180 CAPSULE | Refills: 2 | Status: SHIPPED | OUTPATIENT
Start: 2024-08-12

## 2024-08-12 RX ORDER — MONTELUKAST SODIUM 10 MG/1
10 TABLET ORAL NIGHTLY
Qty: 90 TABLET | Refills: 3 | Status: SHIPPED | OUTPATIENT
Start: 2024-08-12

## 2024-08-12 NOTE — PATIENT INSTRUCTIONS
THANK YOU FOR TRUSTING US WITH YOUR HEALTHCARE !!    The associates caring for you today were:    Family Practice Provider: Gracie DEL VALLE-JOSIANE    Clinical Team Nurse: Lori Staples LPN    Clinical Team Medical Assistant: Erin Menendez MA    Our goal is to provide you with EXCEPTIONAL patient care, and we strive to do this for EVERY patient, EVERY visit. Since we care about you and your experience, you may receive a patient satisfaction survey from [facility] by postal mail/email/text. We truly welcome your feedback and ask that you complete the survey to let us know how we are doing.    Important Numbers:  [facility] phone [phone]   [facility] Office Nurse/MA Line: [phone]  Fax  [phone]   Central [phone]  Billing questions: [phone]  Medical Records Request: [phone]    Manage your healthcare  with [facility] My[facility]. To activate your account, visit [email or web address]   DIGITAL scheduling available now through my chart.  Schedule your next appointment at your convenience through your my chart.       [facility] Office Hours:  Monday: [place] office location 8-5 ([phone]) Offering additional late hours the first Monday of the month until 7 pm.   Tuesday: : :  812 Noon, closed  of the month   Fridays: 7:30-4:30

## 2024-08-14 ENCOUNTER — HOSPITAL ENCOUNTER (OUTPATIENT)
Dept: MRI IMAGING | Age: 59
Discharge: HOME | End: 2024-08-16
Attending: STUDENT IN AN ORGANIZED HEALTH CARE EDUCATION/TRAINING PROGRAM
Payer: COMMERCIAL

## 2024-08-14 DIAGNOSIS — M54.16 LUMBAR RADICULOPATHY: ICD-10-CM

## 2024-08-14 PROCEDURE — 72148 MRI LUMBAR SPINE W/O DYE: CPT

## 2024-08-21 ENCOUNTER — TELEPHONE (OUTPATIENT)
Dept: NEUROLOGY | Age: 59
End: 2024-08-21

## 2024-08-21 ASSESSMENT — ENCOUNTER SYMPTOMS
WHEEZING: 0
SINUS PAIN: 0
SORE THROAT: 0
RHINORRHEA: 0
COUGH: 0
ABDOMINAL DISTENTION: 0
SHORTNESS OF BREATH: 0
CHEST TIGHTNESS: 0
EYES NEGATIVE: 1
BACK PAIN: 1

## 2024-08-21 NOTE — TELEPHONE ENCOUNTER
----- Message from Dr. Williams Ayala MD sent at 8/21/2024 10:55 AM EDT -----  Does this patient has a follow up in our clinic? Thanks

## 2024-08-23 NOTE — PROGRESS NOTES
Spoke with [facility], pt's last colonoscopy was 9/15/16. They are faxing us the results  
05/07/2024     05/07/2024    K 3.8 05/07/2024    CALCIUM 8.9 05/07/2024     05/07/2024    CO2 23 05/07/2024    LABGLOM >90 05/07/2024     Lab Results   Component Value Date    CHOL 220 (H) 03/29/2024    HDL 50 03/29/2024    TRIG 119 03/29/2024    ALT 39 (H) 02/23/2024    AST 31 02/23/2024     Lab Results   Component Value Date    TSH 1.39 03/29/2024    THYROXINE 7.0 06/12/2017     1. Essential hypertension  Stable and controlled     2. Primary osteoarthritis of right knee  Naprosyn, cymbalta     3. Restless leg  Controlled with requip and cymbalta     4. Mixed hyperlipidemia  Consider statin if CPK improves     5. Myalgia  Chronic on lyrica     6. Neuropathic pain    - DULoxetine (CYMBALTA) 60 MG extended release capsule; TAKE 1 CAPSULE DAILY FOR   ANXIETY AND PERIPHERAL     NEUROPATHY  Dispense: 180 capsule; Refill: 2    7. Fibromyalgia    - DULoxetine (CYMBALTA) 60 MG extended release capsule; TAKE 1 CAPSULE DAILY FOR   ANXIETY AND PERIPHERAL     NEUROPATHY  Dispense: 180 capsule; Refill: 2    8. Elevated CK    - DULoxetine (CYMBALTA) 60 MG extended release capsule; TAKE 1 CAPSULE DAILY FOR   ANXIETY AND PERIPHERAL     NEUROPATHY  Dispense: 180 capsule; Refill: 2    9. Lumbosacral radiculopathy at L4    - DULoxetine (CYMBALTA) 60 MG extended release capsule; TAKE 1 CAPSULE DAILY FOR   ANXIETY AND PERIPHERAL     NEUROPATHY  Dispense: 180 capsule; Refill: 2          Electronically signed by ELIGIO Holliday CNP on 8/21/2024 at 9:38 PM

## 2024-10-03 ENCOUNTER — HOSPITAL ENCOUNTER (OUTPATIENT)
Dept: NEUROLOGY | Age: 59
Discharge: HOME | End: 2024-10-03
Payer: COMMERCIAL

## 2024-10-03 PROCEDURE — 95886 MUSC TEST DONE W/N TEST COMP: CPT | Performed by: PHYSICAL MEDICINE & REHABILITATION

## 2024-10-03 PROCEDURE — 95909 NRV CNDJ TST 5-6 STUDIES: CPT | Performed by: PHYSICAL MEDICINE & REHABILITATION

## 2024-10-17 DIAGNOSIS — K21.9 GASTROESOPHAGEAL REFLUX DISEASE WITHOUT ESOPHAGITIS: ICD-10-CM

## 2024-10-17 DIAGNOSIS — G25.81 RESTLESS LEG: ICD-10-CM

## 2024-10-17 NOTE — TELEPHONE ENCOUNTER
Last OV: 8/12/2024  Last RX: 4/11/2024, 4/22/2024   Next scheduled apt: 2/17/2025    Pharmacy requesting refill

## 2024-10-19 RX ORDER — ROPINIROLE 1 MG/1
1 TABLET, FILM COATED ORAL 2 TIMES DAILY
Qty: 180 TABLET | Refills: 1 | Status: SHIPPED | OUTPATIENT
Start: 2024-10-19

## 2024-10-29 ENCOUNTER — OFFICE VISIT (OUTPATIENT)
Dept: NEUROLOGY | Age: 59
End: 2024-10-29
Payer: COMMERCIAL

## 2024-10-29 VITALS
HEART RATE: 64 BPM | HEIGHT: 62 IN | WEIGHT: 267.1 LBS | BODY MASS INDEX: 49.15 KG/M2 | SYSTOLIC BLOOD PRESSURE: 131 MMHG | DIASTOLIC BLOOD PRESSURE: 84 MMHG

## 2024-10-29 DIAGNOSIS — M79.7 FIBROMYALGIA: ICD-10-CM

## 2024-10-29 DIAGNOSIS — R20.2 PARESTHESIA OF BILATERAL LEGS: Primary | ICD-10-CM

## 2024-10-29 DIAGNOSIS — M54.17 LUMBOSACRAL RADICULOPATHY AT L4: ICD-10-CM

## 2024-10-29 DIAGNOSIS — M79.10 MUSCLE PAIN: ICD-10-CM

## 2024-10-29 DIAGNOSIS — M79.2 NEUROPATHIC PAIN: ICD-10-CM

## 2024-10-29 DIAGNOSIS — R74.8 ELEVATED CK: ICD-10-CM

## 2024-10-29 PROCEDURE — 3075F SYST BP GE 130 - 139MM HG: CPT | Performed by: PSYCHIATRY & NEUROLOGY

## 2024-10-29 PROCEDURE — 99214 OFFICE O/P EST MOD 30 MIN: CPT | Performed by: PSYCHIATRY & NEUROLOGY

## 2024-10-29 PROCEDURE — 3079F DIAST BP 80-89 MM HG: CPT | Performed by: PSYCHIATRY & NEUROLOGY

## 2024-10-29 RX ORDER — LAMOTRIGINE 25 MG/1
TABLET ORAL
Qty: 30 TABLET | Refills: 3 | Status: SHIPPED | OUTPATIENT
Start: 2024-10-29

## 2024-10-29 NOTE — PROGRESS NOTES
[facility]            [address], [address]          [place], [place] [address]          Dept: [phone]          Dept Fax: [phone]      HISTORY OF PRESENT ILLNESS:       I had the pleasure of seeing Loretta MONSALVE Khushi who presents to establish neurologic care. The patient presents for evaluation of fibromyalgia.      Patient has been following with pain management for possible fibromyalgia.  She notes the pain is mainly in her lumbar region as well as her bilateral hip area.  Patient is currently on Lyrica to 225 twice a day. She notes pain radiates from gluteal region and down to her calves. She notes the pain can be throbbing pain. She notes pain is tolerable for a few weeks and then flares up again. She notes she used to have dull pain from head to toe that has now improved with lyrica and is mainly in legs now. Sitting for prolonged period of time can trigger the pain.  She is able to get up from a sitting position.    She rates her pain a 5/10 in severity.     She notes Ck has been high. LDH was 272 on 3/29/24    Patient is currently Imaging:     Lumbar MRI 5/3/2019     FINDINGS: The lumbar vertebral bodies are numbered assuming the last complete    intervertebral disc space is L5-S1, with five lumbar vertebral bodies numbered    accordingly.        The lumbar vertebral body heights are normal. The lumbar vertebral alignment is    normal. There is no significant spondylolisthesis.       The conus medullaris appears normal and terminates at the level of L2.       The individual intervertebral disc spaces are as follows:       T12-L1: There is no significant posterior disc abnormality, spinal canal, or    neural foraminal stenosis.       L1-L2: There is no significant posterior disc abnormality, spinal canal, or    neural foraminal stenosis.       L2-L3: There is no significant posterior disc abnormality, spinal canal, or    neural foraminal stenosis.       L3-L4: There is no

## 2024-10-29 NOTE — PATIENT INSTRUCTIONS
Weeks 1 and 2: 25 mg once daily; increase based on response and tolerability as follows:   Weeks 3 and 4: 50 mg/day in 1 to 2 divided doses based on chosen formulation; (such as 25 mg twice daily which is 50 mg daily)  Week 5 and 6 increase by 50 mg/day every 1 to 2 weeks. (25 mg in the morning and 50 mg at night)  Weeks 7 and 8: 50 mg twice a day.  If she has been tolerating well without any rashes then,  Week 9 and 10: 75 mg twice a day  Week 11 onwards Maintenance dose is 100 mg BID

## 2024-11-01 DIAGNOSIS — M17.11 PRIMARY OSTEOARTHRITIS OF RIGHT KNEE: ICD-10-CM

## 2024-11-06 ENCOUNTER — HOSPITAL ENCOUNTER (OUTPATIENT)
Age: 59
Discharge: HOME | End: 2024-11-06
Payer: COMMERCIAL

## 2024-11-06 ENCOUNTER — HOSPITAL ENCOUNTER (OUTPATIENT)
Dept: MAMMOGRAPHY | Age: 59
Discharge: HOME | End: 2024-11-08
Payer: COMMERCIAL

## 2024-11-06 DIAGNOSIS — Z12.31 SCREENING MAMMOGRAM, ENCOUNTER FOR: ICD-10-CM

## 2024-11-06 DIAGNOSIS — R20.2 PARESTHESIA OF BILATERAL LEGS: ICD-10-CM

## 2024-11-06 DIAGNOSIS — Z51.81 MEDICATION MONITORING ENCOUNTER: ICD-10-CM

## 2024-11-06 DIAGNOSIS — Z12.31 SCREENING MAMMOGRAM, ENCOUNTER FOR: Primary | ICD-10-CM

## 2024-11-06 LAB
ANION GAP SERPL CALCULATED.3IONS-SCNC: 13 MMOL/L (ref 9–17)
BUN SERPL-MCNC: 19 MG/DL (ref 6–20)
BUN/CREAT SERPL: 24 (ref 9–20)
CALCIUM SERPL-MCNC: 9.4 MG/DL (ref 8.6–10.4)
CHLORIDE SERPL-SCNC: 103 MMOL/L (ref 98–107)
CK SERPL-CCNC: 445 U/L (ref 26–192)
CO2 SERPL-SCNC: 23 MMOL/L (ref 20–31)
CREAT SERPL-MCNC: 0.8 MG/DL (ref 0.5–0.9)
GFR, ESTIMATED: 85 ML/MIN/1.73M2
GLUCOSE SERPL-MCNC: 85 MG/DL (ref 70–99)
POTASSIUM SERPL-SCNC: 4.2 MMOL/L (ref 3.7–5.3)
SODIUM SERPL-SCNC: 139 MMOL/L (ref 135–144)

## 2024-11-06 PROCEDURE — 82550 ASSAY OF CK (CPK): CPT

## 2024-11-06 PROCEDURE — 86038 ANTINUCLEAR ANTIBODIES: CPT

## 2024-11-06 PROCEDURE — 86225 DNA ANTIBODY NATIVE: CPT

## 2024-11-06 PROCEDURE — 82085 ASSAY OF ALDOLASE: CPT

## 2024-11-06 PROCEDURE — 80048 BASIC METABOLIC PNL TOTAL CA: CPT

## 2024-11-06 PROCEDURE — 77063 BREAST TOMOSYNTHESIS BI: CPT

## 2024-11-06 PROCEDURE — 36415 COLL VENOUS BLD VENIPUNCTURE: CPT

## 2024-11-07 LAB
ANA SER QL IA: ABNORMAL
DSDNA IGG SER QL IA: 10 IU/ML
NUCLEAR IGG SER IA-RTO: 0.4 U/ML

## 2024-11-08 LAB — ALDOLASE SERPL-CCNC: 5.6 U/L (ref 1.2–7.6)

## 2024-11-11 ENCOUNTER — TELEPHONE (OUTPATIENT)
Age: 59
End: 2024-11-11

## 2024-11-18 DIAGNOSIS — M79.2 NEUROPATHIC PAIN: ICD-10-CM

## 2024-11-18 DIAGNOSIS — M79.7 FIBROMYALGIA: ICD-10-CM

## 2024-11-18 NOTE — TELEPHONE ENCOUNTER
Patient needs refill for pregabalin but appt is not until 1/9/25 due to limited scheduling availability.

## 2024-11-19 RX ORDER — PREGABALIN 225 MG/1
225 CAPSULE ORAL 2 TIMES DAILY
Qty: 180 CAPSULE | Refills: 0 | Status: SHIPPED | OUTPATIENT
Start: 2024-11-19 | End: 2025-02-17

## 2024-11-25 ENCOUNTER — OFFICE VISIT (OUTPATIENT)
Dept: FAMILY MEDICINE CLINIC | Age: 59
End: 2024-11-25
Payer: COMMERCIAL

## 2024-11-25 VITALS
HEIGHT: 62 IN | DIASTOLIC BLOOD PRESSURE: 82 MMHG | SYSTOLIC BLOOD PRESSURE: 136 MMHG | HEART RATE: 65 BPM | OXYGEN SATURATION: 97 % | WEIGHT: 271 LBS | BODY MASS INDEX: 49.87 KG/M2

## 2024-11-25 DIAGNOSIS — K21.9 GASTROESOPHAGEAL REFLUX DISEASE WITHOUT ESOPHAGITIS: ICD-10-CM

## 2024-11-25 DIAGNOSIS — M17.11 PRIMARY OSTEOARTHRITIS OF RIGHT KNEE: ICD-10-CM

## 2024-11-25 DIAGNOSIS — E66.01 MORBID OBESITY WITH BMI OF 45.0-49.9, ADULT: ICD-10-CM

## 2024-11-25 DIAGNOSIS — R74.8 ELEVATED CK: ICD-10-CM

## 2024-11-25 DIAGNOSIS — I10 ESSENTIAL HYPERTENSION: Primary | ICD-10-CM

## 2024-11-25 DIAGNOSIS — G25.81 RESTLESS LEG: ICD-10-CM

## 2024-11-25 DIAGNOSIS — E78.2 MIXED HYPERLIPIDEMIA: ICD-10-CM

## 2024-11-25 DIAGNOSIS — M79.7 FIBROMYALGIA: ICD-10-CM

## 2024-11-25 PROCEDURE — 99213 OFFICE O/P EST LOW 20 MIN: CPT | Performed by: NURSE PRACTITIONER

## 2024-11-25 PROCEDURE — 3079F DIAST BP 80-89 MM HG: CPT | Performed by: NURSE PRACTITIONER

## 2024-11-25 PROCEDURE — 3075F SYST BP GE 130 - 139MM HG: CPT | Performed by: NURSE PRACTITIONER

## 2024-11-25 RX ORDER — DULOXETIN HYDROCHLORIDE 30 MG/1
30 CAPSULE, DELAYED RELEASE ORAL 2 TIMES DAILY
Qty: 60 CAPSULE | Refills: 1 | Status: SHIPPED | OUTPATIENT
Start: 2024-11-25

## 2024-11-25 RX ORDER — TIRZEPATIDE 2.5 MG/.5ML
2.5 INJECTION, SOLUTION SUBCUTANEOUS WEEKLY
Qty: 2 ML | Refills: 0 | Status: SHIPPED | OUTPATIENT
Start: 2024-11-25

## 2024-11-25 NOTE — PATIENT INSTRUCTIONS
THANK YOU FOR TRUSTING US WITH YOUR HEALTHCARE !!    The associates caring for you today were:    Family Practice Provider: Gracie DEL VALLE-JOSIANE    Clinical Team Nurse: Lori Staples LPN    Clinical Team Medical Assistant: Erin Menendez MA    Our goal is to provide you with EXCEPTIONAL patient care, and we strive to do this for EVERY patient, EVERY visit. Since we care about you and your experience, you may receive a patient satisfaction survey from [employer] [facility] by postal mail/email/text. We truly welcome your feedback and ask that you complete the survey to let us know how we are doing.    Important Numbers:  [facility] phone [phone]   [place] Office Nurse/MA Line: [phone]  Fax  [phone]   Central [phone]  Billing questions: [phone]  Medical Records Request: [phone]    Manage your healthcare  with [facility] My[facility]. To activate your account, visit [email or web address])   DIGITAL scheduling available now through my chart.  Schedule your next appointment at your convenience through your my chart.       [place] Office Hours:  Monday: [place] office location 8-5 ([phone]) Offering additional late hours the first Monday of the month until 7 pm.   Tuesday: 8: :  812 Noon, closed  of the month   : 7:30-4:30   SURVEY:    You may be receiving a survey from [employer] regarding your visit today.    Please complete the survey to enable us to provide the highest quality of care to you and your family.    If you cannot score us a very good on any question, please call the office to discuss how we could have made your experience a very good one.    Thank you.     Please get Tdap (tetanus, diptheria and pertussis) at local pharmacy

## 2024-11-25 NOTE — PROGRESS NOTES
Cymbalta gives bad side effects - profuse sweating; was just doubled   Taking 30 mg of Cymbalta and sweating stopped; adjustment was made less than 2 weeks ago   Neurologist prescribed Lamictal - wanted to wait before filling this; also referred to rheumatology by him  Wants to discuss weight management   On Lyrica 225mg BID that allows her to keep symptoms manageable    Posterior legs bilaterally are currently bothersome, 5/10 for pain   Episodic \"snapping pain in the posterior calf and thigh\" that occurs with activity, resolved on its own, occurring more frequently now  Cramps in the left thigh  L>R SI joint pain, mild low back pain

## 2024-11-25 NOTE — PROGRESS NOTES
[facility]  [facility] [place]  [address] [address]  Dept: [phone]  Dept Fax: [phone]    Last encounter 8/12/2024    Discuss Medications (Various questions about new med prescribed by neurology) and Weight Management (Would like to discuss starting medication)       HPI:   Loretta Puri is a 59 y.o. female who presentstoday for her medical conditions/complaints as noted below.  Loretta Puri is c/o of Discuss Medications (Various questions about new med prescribed by neurology) and Weight Management (Would like to discuss starting medication)      HPI  Established patient    59 year old female here today for routine check up   Pt with chronic GERD on PPI omeprazole.     Knee chronic arthritis on naprosyn 375 mg bid     Hx kidney stones taking urocit-K    Wants to try zepbound if covered.     Uses cymbalta for fibromyalgia chronic elevation in CPK   Seeing neurology trying to move forward with cause.     Chronic environmental allergies on singulair.     Leg cramps uses requip   Htn on cardura and aldactone.     Lab Results   Component Value Date     (H) 03/29/2024     On zetia due to CPK elevation statin avoided.   ASA daily     Pregabalin with pain management   Latest trial is lamictal     Cymbalta 30 mg bid  will trial due to 60 mg with diffuse sweating.       Reviewed prior notes Neurology and Orthopedics  Reviewed previous Labs, Imaging, and Hospital Records    Past Medical History:   Diagnosis Date    Allergic rhinitis     Arthritis     COVID-19 01/21/2021    not hospitalized : h/a, loss smell, tired abd ache,  no meds    Enlarged thyroid 2016    nodules check annually    Fibromyalgia     GERD (gastroesophageal reflux disease)     Headache(784.0)     migranes at times    Hyperlipidemia     Hypertension     Hypertension     NEIL (obstructive sleep apnea) 07/08/2016    Restless leg syndrome     Restless legs 07/08/2016    Sleep apnea       Past Surgical

## 2024-12-05 ASSESSMENT — ENCOUNTER SYMPTOMS
SORE THROAT: 0
WHEEZING: 0
COUGH: 0
EYES NEGATIVE: 1
CHEST TIGHTNESS: 0
SHORTNESS OF BREATH: 0

## 2024-12-18 ASSESSMENT — RHEUMATOLOGY NEW PATIENT QUESTIONNAIRE
STOMACH PAIN: N
JOINT PAIN: Y
VAGINAL DRYNESS: N
DEPRESSION: N
UNEXPLAINED WEIGHT CHANGE: N
MORNING STIFFNESS IN LOWER BACK: Y
DIFFICULTY STAYING ASLEEP: N
INCREASED SUSCEPTIBILITY TO INFECTION: N
PAIN OR BURNING ON URINATION: N
PERSISTENT DIARRHEA: N
MORNING STIFFNESS: Y
COUGH: N
BLOOD IN STOOLS: N
SEIZURES: N
COLOR CHANGES OF HANDS OR FEET IN THE COLD: N
EASILY LOSING TEMPER: N
MEMORY LOSS: N
FEVER: N
HEADACHES: N
CHEST PAIN: N
DIFFICULTY FALLING ASLEEP: N
MUSCLE WEAKNESS: N
JOINT SWELLING: N
SHORTNESS OF BREATH: N
RASH OR ULCERS: N
SUN SENSITIVE (SUN ALLERGY): N
FAINTING: N
LOSS OF VISION: N
HEARTBURN OR REFLUX: Y
VOMITING OF BLOOD OR COFFEE GROUND CONSISTENCY MATERIAL: N
DIFFICULTY SWALLOWING: N
NUMBNESS OR TINGLING IN HANDS OR FEET: N
NAUSEA: N
SORES IN MOUTH OR NOSE: N
EYE REDNESS: N
ANXIETY: N
NODULES/BUMPS: N
SKIN TIGHTNESS: N
NIGHT SWEATS: N
HOW WOULD YOU DESCRIBE YOUR STIFFNESS ON AVERAGE: MODERATE
EYE DRYNESS: N
DIFFICULTY BREATHING LYING DOWN: N
DRYNESS OF MOUTH: N
UNEXPLAINED HEARING LOSS: N
UNUSUAL BLEEDING: N
HOARSE VOICE: N
AGITATION: N
RASH: N
EXCESSIVE HAIR LOSS (MORE THAN YOUR NORM): N
EYE PAIN: N
EASY BRUISING: Y
SWOLLEN OR TENDER GLANDS: N
LOSS OF CONSCIOUSNESS: N
SKIN REDNESS: N
UNUSUALLY RAPID OR SLOWED HEART RATE: N
BLACK STOOLS: N
ABNORMAL URINE: N
ANEMIA: N
DOUBLE OR BLURRED VISION: N
JAUNDICE: N
SWOLLEN LEGS OR FEET: N
BEHAVIORAL CHANGES: N
UNUSUAL FATIGUE: N

## 2024-12-19 ENCOUNTER — OFFICE VISIT (OUTPATIENT)
Age: 59
End: 2024-12-19

## 2024-12-19 VITALS
TEMPERATURE: 97.1 F | DIASTOLIC BLOOD PRESSURE: 68 MMHG | WEIGHT: 269.8 LBS | OXYGEN SATURATION: 96 % | BODY MASS INDEX: 49.65 KG/M2 | HEIGHT: 62 IN | HEART RATE: 66 BPM | SYSTOLIC BLOOD PRESSURE: 124 MMHG

## 2024-12-19 DIAGNOSIS — R76.8 POSITIVE ANA (ANTINUCLEAR ANTIBODY): Primary | ICD-10-CM

## 2024-12-19 DIAGNOSIS — R25.2 CRAMPS OF LEFT LOWER EXTREMITY: ICD-10-CM

## 2024-12-19 DIAGNOSIS — R74.8 ELEVATED CREATINE KINASE: ICD-10-CM

## 2024-12-19 DIAGNOSIS — M79.10 MYALGIA: ICD-10-CM

## 2024-12-19 DIAGNOSIS — E55.9 VITAMIN D DEFICIENCY: ICD-10-CM

## 2024-12-19 NOTE — PROGRESS NOTES
Review of Systems   Musculoskeletal:  Positive for myalgias.   All other systems reviewed and are negative.

## 2024-12-19 NOTE — PROGRESS NOTES
Reason for Referral: Paresthesia of bilateral legs    Albert Velázquez MD  [address]  [place] [id number]    Chief Complaint   Patient presents with    Establish Care    Paresthesia of bilateral legs       HISTORY OF PRESENT ILLNESS: Ms.Amy GRICEL Puri is a 59 y.o. female with a medical history remarkable for hypertension, NEIL on CPAP, restless leg, referred for evaluation of paresthesia of bilateral leg.    Patient reports that she has been having cramps, aches and pain in her bilateral lower extremity for years now.  She has seen multiple specialists and has been evaluated at different health systems included [facility].  At 1 point there was a concern for fibromyalgia but her other specialists were questioning this diagnosis.  She is already on Lyrica and Cymbalta but has not noticed much improvement.  She believes that her symptoms have not involved bilateral upper extremities as well.    She mentions that if she were to sit in a chair for more than 30 minutes she gets stiff and painful in her lower extremities and it is difficult for her to walk immediately.  What bothers her the most is that after getting home from work she cannot do anything and has to sit down so that her bilateral lower extremity aching improves.  After taking a break as she starts working the symptoms seem to recur once she has been on them for long duration.  Often times she feels that her posterior thighs are getting caught especially with flexion L>R.  She denies any skin rashes, dry eyes or swallowing issues.  She does report intermittent dry mouth which seem to have happened since she was on pregabalin.  Of note, her neurologist did some workup which showed equivocal EDUARDO twice this year although in the past it has been negative multiple times.  She also seems to have mildly elevated CK since 2017.     Some of the medications listed in her chart which have been shown to cause high CK include: Duloxetine,

## 2024-12-20 ENCOUNTER — HOSPITAL ENCOUNTER (OUTPATIENT)
Age: 59
Discharge: HOME | End: 2024-12-20
Payer: COMMERCIAL

## 2024-12-20 DIAGNOSIS — E55.9 VITAMIN D DEFICIENCY: ICD-10-CM

## 2024-12-20 DIAGNOSIS — R25.2 CRAMPS OF LEFT LOWER EXTREMITY: ICD-10-CM

## 2024-12-20 DIAGNOSIS — R74.8 ELEVATED CREATINE KINASE: ICD-10-CM

## 2024-12-20 DIAGNOSIS — R76.8 POSITIVE ANA (ANTINUCLEAR ANTIBODY): ICD-10-CM

## 2024-12-20 LAB
ALBUMIN SERPL-MCNC: 4.2 G/DL (ref 3.5–5.2)
ALP SERPL-CCNC: 96 U/L (ref 35–104)
ALT SERPL-CCNC: 28 U/L (ref 5–33)
AST SERPL-CCNC: 23 U/L
BILIRUB DIRECT SERPL-MCNC: <0.1 MG/DL
BILIRUB INDIRECT SERPL-MCNC: NORMAL MG/DL (ref 0–1)
BILIRUB SERPL-MCNC: 0.3 MG/DL (ref 0.3–1.2)
MAGNESIUM SERPL-MCNC: 1.9 MG/DL (ref 1.6–2.6)
PROT SERPL-MCNC: 7.3 G/DL (ref 6.4–8.3)
SEND OUT REPORT: NORMAL
SEND OUT REPORT: NORMAL

## 2024-12-20 PROCEDURE — 86235 NUCLEAR ANTIGEN ANTIBODY: CPT

## 2024-12-20 PROCEDURE — 86160 COMPLEMENT ANTIGEN: CPT

## 2024-12-20 PROCEDURE — 83735 ASSAY OF MAGNESIUM: CPT

## 2024-12-20 PROCEDURE — 82306 VITAMIN D 25 HYDROXY: CPT

## 2024-12-20 PROCEDURE — 86256 FLUORESCENT ANTIBODY TITER: CPT

## 2024-12-20 PROCEDURE — 36415 COLL VENOUS BLD VENIPUNCTURE: CPT

## 2024-12-20 PROCEDURE — 80076 HEPATIC FUNCTION PANEL: CPT

## 2024-12-21 LAB
25(OH)D3 SERPL-MCNC: 24.7 NG/ML (ref 30–100)
C3 SERPL-MCNC: 182 MG/DL (ref 90–180)
C4 SERPL-MCNC: 16 MG/DL (ref 10–40)

## 2024-12-23 LAB — DSDNA IGG TITR SER CLIF: NORMAL {TITER}

## 2024-12-24 LAB
ENA SS-A IGG SER QL: 1 U/ML
ENA SS-B IGG SER IA-ACNC: 0.7 U/ML

## 2024-12-30 LAB
SEND OUT REPORT: NORMAL
SEND OUT REPORT: NORMAL

## 2025-01-02 ENCOUNTER — OFFICE VISIT (OUTPATIENT)
Dept: PAIN MANAGEMENT | Age: 60
End: 2025-01-02
Payer: COMMERCIAL

## 2025-01-02 VITALS
DIASTOLIC BLOOD PRESSURE: 80 MMHG | WEIGHT: 268 LBS | BODY MASS INDEX: 49.02 KG/M2 | OXYGEN SATURATION: 98 % | SYSTOLIC BLOOD PRESSURE: 128 MMHG | HEART RATE: 98 BPM

## 2025-01-02 DIAGNOSIS — M79.18 MYOFASCIAL PAIN SYNDROME: Primary | ICD-10-CM

## 2025-01-02 DIAGNOSIS — M79.7 FIBROMYALGIA: ICD-10-CM

## 2025-01-02 DIAGNOSIS — E66.813 CLASS 3 SEVERE OBESITY WITH BODY MASS INDEX (BMI) OF 45.0 TO 49.9 IN ADULT, UNSPECIFIED OBESITY TYPE, UNSPECIFIED WHETHER SERIOUS COMORBIDITY PRESENT: ICD-10-CM

## 2025-01-02 DIAGNOSIS — E66.01 CLASS 3 SEVERE OBESITY WITH BODY MASS INDEX (BMI) OF 45.0 TO 49.9 IN ADULT, UNSPECIFIED OBESITY TYPE, UNSPECIFIED WHETHER SERIOUS COMORBIDITY PRESENT: ICD-10-CM

## 2025-01-02 DIAGNOSIS — M79.2 NEUROPATHIC PAIN: ICD-10-CM

## 2025-01-02 PROCEDURE — 99214 OFFICE O/P EST MOD 30 MIN: CPT | Performed by: STUDENT IN AN ORGANIZED HEALTH CARE EDUCATION/TRAINING PROGRAM

## 2025-01-02 PROCEDURE — 3074F SYST BP LT 130 MM HG: CPT | Performed by: STUDENT IN AN ORGANIZED HEALTH CARE EDUCATION/TRAINING PROGRAM

## 2025-01-02 PROCEDURE — 3079F DIAST BP 80-89 MM HG: CPT | Performed by: STUDENT IN AN ORGANIZED HEALTH CARE EDUCATION/TRAINING PROGRAM

## 2025-01-02 RX ORDER — PREGABALIN 225 MG/1
225 CAPSULE ORAL 2 TIMES DAILY
Qty: 180 CAPSULE | Refills: 0 | Status: SHIPPED | OUTPATIENT
Start: 2025-01-02 | End: 2025-04-02

## 2025-01-02 NOTE — PROGRESS NOTES
Chronic Pain Clinic Note     Encounter Date: 1/2/2025     SUBJECTIVE:  No chief complaint on file.    History of Present Illness:   Loretta Puri is a 59 y.o. female who presents for 3 month med check with Fibromyalgia pain sydrome. Lyrica is helping.     Medication Refill: Lyrica     Current Complaints of Pain:   Location: Legs, low back & hips mostly  Radiation: occasional BLE  Severity: mild- moderate  Pain Numerical Score - 3  Average: 5    Highest: 8  Lowest: 2  Character/Quality: Complains of pain that is achy. Sometimes burning pain in left sciatic  Timing: Increases w/prolonged sitting/standing/walking  Associated symptoms: none  Numbness: LLE occasional  Weakness:   Exacerbating factors: prolonged activity  Alleviating factors: heat, medication  Length of time pain has been present: Started on - years ago  Inciting event/injury: none  Bowel/Bladder incontinence: none  Falls: no  Physical Therapy: yes, multiple times    History of Interventions:   Surgery: No previous lumbar/cervical surgeries  Injections: Lumbar epidurals    Imaging:    Lumbar MRI 8/16/2024    Past Medical History:   Diagnosis Date    Allergic rhinitis     Arthritis     COVID-19 01/21/2021    not hospitalized : h/a, loss smell, tired abd ache,  no meds    Enlarged thyroid 2016    nodules check annually    Fibromyalgia     GERD (gastroesophageal reflux disease)     Headache(784.0)     migranes at times    Hyperlipidemia     Hypertension     Hypertension     NEIL (obstructive sleep apnea) 07/08/2016    Osteoarthritis 2020    Plantar fasciitis 2020    Restless leg syndrome     Restless legs 07/08/2016    Sciatica 2022?    Sleep apnea     Tendinitis 2019       Past Surgical History:   Procedure Laterality Date    BLADDER SURGERY  09/09/2014    bladder sling    CARPAL TUNNEL RELEASE  2022    CHOLECYSTECTOMY      COLONOSCOPY  09/15/2016    Dr. Beaulieu internal hemorrhoids normal due in 10 years = 2026    CYSTOSCOPY Right 08/10/2018      Subjective:       Patient ID: Marleen Alcocer is a 53 y.o. female.    Chief Complaint: Dizziness; Follow-up ( high b/p); and Hair Loss    Dizziness:   Chronicity:  New  Onset:  In the past 7 days  Progression since onset:  Gradually worsening  Frequency:  Constantly  Severity:  Moderate  Dizziness characteristics:  Off-balance, spacial disorientation and sensation of movement  Frequency of Spells:  Daily   Associated symptoms: light-headedness.no hearing loss, no ear congestion, no headaches, no tinnitus, no nausea, no vomiting, no diaphoresis, no weakness, no palpitations, no panic and no chest pain.  Aggravated by:  Nothing  Treatments tried:  Nothingno head trauma, no balance testing, no ear surgery, no head trauma, no ear infections, no ear tubes, no environmental allergies and no MRI head.  Hair Loss   This is a new problem. The current episode started more than 1 month ago. The problem occurs constantly. The problem has been gradually improving. Pertinent negatives include no abdominal pain, chest pain, coughing, diaphoresis, headaches, nausea, rash, sore throat, vomiting or weakness. Nothing aggravates the symptoms. She has tried nothing for the symptoms.     Review of Systems   Constitutional: Negative for diaphoresis.   HENT: Negative for hearing loss, sore throat and tinnitus.    Respiratory: Negative for cough.    Cardiovascular: Negative for chest pain and palpitations.   Gastrointestinal: Negative for abdominal pain, nausea and vomiting.   Skin: Negative for rash.   Allergic/Immunologic: Negative for environmental allergies.   Neurological: Positive for dizziness and light-headedness. Negative for weakness and headaches.       Objective:      Physical Exam   Constitutional: She is oriented to person, place, and time.   HENT:   Mouth/Throat: Oropharynx is clear and moist. No oropharyngeal exudate.   Eyes: Conjunctivae are normal. Pupils are equal, round, and reactive to light.   Cardiovascular: Normal  rate and regular rhythm.    Pulmonary/Chest: Effort normal and breath sounds normal. She has no wheezes.   Abdominal: Soft. Bowel sounds are normal. There is no tenderness.   Musculoskeletal: She exhibits no edema.   Lymphadenopathy:     She has no cervical adenopathy.   Neurological: She is alert and oriented to person, place, and time.   Skin: No erythema.   Psychiatric: Her behavior is normal.       Assessment:       1. Essential hypertension, benign    2. Dizziness    3. Weakness generalized    4. Need for hepatitis B screening test    5. Breast cancer screening        Plan:   Marleen was seen today for dizziness, follow-up and hair loss.    Diagnoses and all orders for this visit:    Essential hypertension, benign  -     EKG 12-lead  -     TSH; Future  -     CBC auto differential; Future  -     Comprehensive metabolic panel; Future  -     Echocardiogram stress test with [facility] ([facility] [place], [facility], [place], [place], [facility]); Future  Mildly elevated  Monitor and record  Recheck at follow up on Friday. Bring blood pressure meter  Dizziness  -     EKG 12-lead  -     TSH; Future  -     CBC auto differential; Future  -     Comprehensive metabolic panel; Future  -     Urinalysis; Future  -     Urine culture; Future  -     meclizine (ANTIVERT) 25 mg tablet; Take 1 tablet (25 mg total) by mouth 3 (three) times daily as needed.  -     Echocardiogram stress test with [facility] (CUPID ONLY-[facility] [place], [facility], [place], [place], [facility]); Future    Weakness generalized  -     EKG 12-lead  -     TSH; Future  -     CBC auto differential; Future  -     Comprehensive metabolic panel; Future  -     Urinalysis; Future  -     Urine culture; Future    Need for hepatitis B screening test  -     HEPATITIS C ANTIBODY; Future    Breast cancer screening  -     Mammo Digital Screening Bilateral With CAD; Future

## 2025-01-02 NOTE — PATIENT INSTRUCTIONS
SURVEY:    You may be receiving a survey from [facility] regarding your visit today.    Please complete the survey to enable us to provide the highest quality of care to you and your family.    If you cannot score us a very good on any question, please call the office to discuss how we could have made your experience a very good one.    Thank you.  [facility]  MD Deb Ashton, MD Maninder Gaines, DO  Irineo Allen, MD Darcie Kemp, APRN-CNP  Terra Sanchez, Practice Manager  Marguerite, CMA  Nikky, CCMA  Valdemar, CMA  Alejandrina, CMA  Jose, PSC   Paulette, LPN  Malinda, CMA

## 2025-01-08 DIAGNOSIS — I10 ESSENTIAL HYPERTENSION: ICD-10-CM

## 2025-01-14 RX ORDER — METOPROLOL TARTRATE 50 MG
TABLET ORAL
Qty: 180 TABLET | Refills: 1 | Status: SHIPPED | OUTPATIENT
Start: 2025-01-14

## 2025-01-14 NOTE — TELEPHONE ENCOUNTER
Last OV: 11/25/2024  Last RX: 8/6/2024   Next scheduled apt: 2/17/2025    Surescripts requesting refill

## 2025-01-20 ENCOUNTER — TELEPHONE (OUTPATIENT)
Age: 60
End: 2025-01-20

## 2025-01-20 NOTE — TELEPHONE ENCOUNTER
Pt called in and asked if she needed an appointment for her MRI, writer explained yes, and that there should be a paper stapled to her order for outpatient scheduling. Pt verified there was and she is going to schedule. She said she got confused and was waiting on a phone call from the hospital to schedule her.

## 2025-01-27 ENCOUNTER — HOSPITAL ENCOUNTER (OUTPATIENT)
Dept: MRI IMAGING | Age: 60
Discharge: HOME | End: 2025-01-29
Attending: STUDENT IN AN ORGANIZED HEALTH CARE EDUCATION/TRAINING PROGRAM
Payer: COMMERCIAL

## 2025-01-27 DIAGNOSIS — R74.8 ELEVATED CREATINE KINASE: ICD-10-CM

## 2025-01-27 PROCEDURE — 73718 MRI LOWER EXTREMITY W/O DYE: CPT

## 2025-01-30 ENCOUNTER — PATIENT MESSAGE (OUTPATIENT)
Dept: FAMILY MEDICINE CLINIC | Age: 60
End: 2025-01-30

## 2025-01-30 DIAGNOSIS — E66.01 MORBID OBESITY WITH BMI OF 45.0-49.9, ADULT: Primary | ICD-10-CM

## 2025-02-05 DIAGNOSIS — I10 ESSENTIAL HYPERTENSION: ICD-10-CM

## 2025-02-05 DIAGNOSIS — M17.11 PRIMARY OSTEOARTHRITIS OF RIGHT KNEE: ICD-10-CM

## 2025-02-05 DIAGNOSIS — E78.2 MIXED HYPERLIPIDEMIA: ICD-10-CM

## 2025-02-05 NOTE — TELEPHONE ENCOUNTER
I do not have form pt dropped off, Loretta gaines remembers her doing it but it did not make it to my box unsure what this form was ? Possible something for the GLP-1 med. [facility] and inquire if not reach out to pt.     Thanks

## 2025-02-06 RX ORDER — EZETIMIBE 10 MG/1
TABLET ORAL
Qty: 90 TABLET | Refills: 1 | Status: SHIPPED | OUTPATIENT
Start: 2025-02-06

## 2025-02-06 RX ORDER — SPIRONOLACTONE 25 MG/1
TABLET ORAL
Qty: 90 TABLET | Refills: 1 | Status: SHIPPED | OUTPATIENT
Start: 2025-02-06

## 2025-02-19 ENCOUNTER — OFFICE VISIT (OUTPATIENT)
Age: 60
End: 2025-02-19
Payer: COMMERCIAL

## 2025-02-19 VITALS
DIASTOLIC BLOOD PRESSURE: 72 MMHG | TEMPERATURE: 96.9 F | HEART RATE: 66 BPM | OXYGEN SATURATION: 95 % | BODY MASS INDEX: 49.87 KG/M2 | SYSTOLIC BLOOD PRESSURE: 122 MMHG | HEIGHT: 62 IN | WEIGHT: 271 LBS

## 2025-02-19 DIAGNOSIS — E55.9 VITAMIN D DEFICIENCY: Primary | ICD-10-CM

## 2025-02-19 DIAGNOSIS — M17.0 OSTEOARTHRITIS OF BOTH KNEES, UNSPECIFIED OSTEOARTHRITIS TYPE: ICD-10-CM

## 2025-02-19 DIAGNOSIS — R74.8 ELEVATED CREATINE KINASE: ICD-10-CM

## 2025-02-19 PROCEDURE — 3078F DIAST BP <80 MM HG: CPT | Performed by: STUDENT IN AN ORGANIZED HEALTH CARE EDUCATION/TRAINING PROGRAM

## 2025-02-19 PROCEDURE — 99213 OFFICE O/P EST LOW 20 MIN: CPT | Performed by: STUDENT IN AN ORGANIZED HEALTH CARE EDUCATION/TRAINING PROGRAM

## 2025-02-19 PROCEDURE — 3074F SYST BP LT 130 MM HG: CPT | Performed by: STUDENT IN AN ORGANIZED HEALTH CARE EDUCATION/TRAINING PROGRAM

## 2025-02-19 NOTE — PROGRESS NOTES
Reason for Referral: Paresthesia of bilateral legs    No referring provider defined for this encounter.    Chief Complaint   Patient presents with    Follow-up    Results     Will also obtain myositis panel and MRI of tibia given worsening myalgias and persistently elevated CK  Will also obtain magnesium and vitamin D levels  Will also obtain Sjogren antibodies         HISTORY OF PRESENT ILLNESS: Ms.Amy GRICEL Puri is a 59 y.o. female with a medical history remarkable for hypertension, NEIL on CPAP, restless leg, initially evaluated on 12/19/2024 for paresthesia of bilateral leg.    Patient reports that she has been having cramps, aches and pain in her bilateral lower extremity for years now.  She has seen multiple specialists and has been evaluated at different health systems included [facility].  At 1 point there was a concern for fibromyalgia but her other specialists were questioning this diagnosis.  She is already on Lyrica and Cymbalta but has not noticed much improvement.  She believes that her symptoms have not involved bilateral upper extremities as well.    She mentions that if she were to sit in a chair for more than 30 minutes she gets stiff and painful in her lower extremities and it is difficult for her to walk immediately.  What bothers her the most is that after getting home from work she cannot do anything and has to sit down so that her bilateral lower extremity aching improves.  After taking a break as she starts working the symptoms seem to recur once she has been on them for long duration.  Often times she feels that her posterior thighs are getting caught especially with flexion L>R.  She denies any skin rashes, dry eyes or swallowing issues.  She does report intermittent dry mouth which seem to have happened since she was on pregabalin.  Of note, her neurologist did some workup which showed equivocal EDUARDO twice this year although in the past it has been negative multiple times.  She

## 2025-02-25 ENCOUNTER — OFFICE VISIT (OUTPATIENT)
Dept: PRIMARY CARE CLINIC | Age: 60
End: 2025-02-25
Payer: COMMERCIAL

## 2025-02-25 VITALS
HEART RATE: 60 BPM | SYSTOLIC BLOOD PRESSURE: 124 MMHG | HEIGHT: 61 IN | DIASTOLIC BLOOD PRESSURE: 88 MMHG | BODY MASS INDEX: 50.6 KG/M2 | OXYGEN SATURATION: 95 % | WEIGHT: 268 LBS

## 2025-02-25 DIAGNOSIS — E66.01 MORBID OBESITY WITH BMI OF 50.0-59.9, ADULT: ICD-10-CM

## 2025-02-25 DIAGNOSIS — F41.9 ANXIETY AND DEPRESSION: ICD-10-CM

## 2025-02-25 DIAGNOSIS — I10 PRIMARY HYPERTENSION: ICD-10-CM

## 2025-02-25 DIAGNOSIS — F32.A ANXIETY AND DEPRESSION: ICD-10-CM

## 2025-02-25 DIAGNOSIS — Z00.00 ENCOUNTER FOR WELL ADULT EXAM WITHOUT ABNORMAL FINDINGS: Primary | ICD-10-CM

## 2025-02-25 DIAGNOSIS — G25.81 RESTLESS LEG: ICD-10-CM

## 2025-02-25 DIAGNOSIS — R74.8 ELEVATED CK: ICD-10-CM

## 2025-02-25 DIAGNOSIS — E78.2 MIXED HYPERLIPIDEMIA: ICD-10-CM

## 2025-02-25 PROCEDURE — 3079F DIAST BP 80-89 MM HG: CPT | Performed by: NURSE PRACTITIONER

## 2025-02-25 PROCEDURE — 99396 PREV VISIT EST AGE 40-64: CPT | Performed by: NURSE PRACTITIONER

## 2025-02-25 PROCEDURE — 3074F SYST BP LT 130 MM HG: CPT | Performed by: NURSE PRACTITIONER

## 2025-02-25 SDOH — ECONOMIC STABILITY: FOOD INSECURITY: WITHIN THE PAST 12 MONTHS, THE FOOD YOU BOUGHT JUST DIDN'T LAST AND YOU DIDN'T HAVE MONEY TO GET MORE.: NEVER TRUE

## 2025-02-25 SDOH — ECONOMIC STABILITY: FOOD INSECURITY: WITHIN THE PAST 12 MONTHS, YOU WORRIED THAT YOUR FOOD WOULD RUN OUT BEFORE YOU GOT MONEY TO BUY MORE.: NEVER TRUE

## 2025-02-25 ASSESSMENT — PATIENT HEALTH QUESTIONNAIRE - PHQ9
1. LITTLE INTEREST OR PLEASURE IN DOING THINGS: NOT AT ALL
SUM OF ALL RESPONSES TO PHQ QUESTIONS 1-9: 0
2. FEELING DOWN, DEPRESSED OR HOPELESS: NOT AT ALL
SUM OF ALL RESPONSES TO PHQ QUESTIONS 1-9: 0

## 2025-02-25 NOTE — PATIENT INSTRUCTIONS
hands, brush your teeth twice a day, and wear a seat belt in the car.   Where can you learn more?  Go to [email or web address] and enter P072 to learn more about \"Well Visit, Ages 18 to 65: Care Instructions.\"  Current as of: April 30, 2024  Content Version: 14.3  © 2024 [employer]   Care instructions adapted under license by [employer]. If you have questions about a medical condition or this instruction, always ask your healthcare professional. [employer] disclaims any warranty or liability for your use of this information.

## 2025-02-25 NOTE — PROGRESS NOTES
difficulty urinating.   Musculoskeletal:  Negative for arthralgias and joint swelling.   Skin: Negative.    Neurological:  Negative for dizziness and headaches.   Psychiatric/Behavioral:  Negative for behavioral problems and sleep disturbance.        Objective:  /88 (Site: Left Upper Arm, Position: Sitting)   Pulse 60   Ht 1.549 m (5' 1\")   Wt 121.6 kg (268 lb)   LMP  (LMP Unknown)   SpO2 95%   BMI 50.64 kg/m²   Physical Exam  Vitals and nursing note reviewed.   Constitutional:       General: She is not in acute distress.     Appearance: Normal appearance. She is well-developed.   HENT:      Head: Normocephalic and atraumatic.      Right Ear: Tympanic membrane and external ear normal.      Left Ear: Tympanic membrane and external ear normal.   Eyes:      General: No scleral icterus.     Pupils: Pupils are equal, round, and reactive to light.   Cardiovascular:      Rate and Rhythm: Normal rate and regular rhythm.      Heart sounds: Normal heart sounds. No murmur heard.  Pulmonary:      Effort: Pulmonary effort is normal. No respiratory distress.      Breath sounds: Normal breath sounds. No wheezing.   Abdominal:      Palpations: Abdomen is soft.      Tenderness: There is no abdominal tenderness.   Musculoskeletal:         General: Normal range of motion.      Cervical back: Normal range of motion and neck supple.   Lymphadenopathy:      Cervical: No cervical adenopathy.   Skin:     General: Skin is warm and dry.   Neurological:      Mental Status: She is alert and oriented to person, place, and time.   Psychiatric:         Behavior: Behavior normal.         Thought Content: Thought content normal.         Judgment: Judgment normal.           Diagnostic Data:  Lab Results   Component Value Date    GLUCOSE 85 11/06/2024    LABA1C 5.4 08/01/2024     Lab Results   Component Value Date    BUN 19 11/06/2024    CREATININE 0.8 11/06/2024     11/06/2024    K 4.2 11/06/2024    CALCIUM 9.4 11/06/2024

## 2025-02-28 ENCOUNTER — HOSPITAL ENCOUNTER (OUTPATIENT)
Dept: NUTRITION | Age: 60
Discharge: HOME | End: 2025-02-28

## 2025-02-28 NOTE — PROGRESS NOTES
Loretta Puri had an appointment with the dietitian this date, but called prior to appointment time to inform writer that she could not keep appointment, with desire to reschedule (voicemail).     Writer called and left voicemail with patient regarding rescheduling and provided centralized scheduling phone number [phone] to do such.    Thank you for the referral.    Electronically signed by Forrest Dean RD, LD on 2/28/2025 at 12:47 PM

## 2025-03-05 ASSESSMENT — ENCOUNTER SYMPTOMS
SHORTNESS OF BREATH: 0
EYES NEGATIVE: 1
SORE THROAT: 0
COUGH: 0
CHEST TIGHTNESS: 0
WHEEZING: 0

## 2025-03-09 ENCOUNTER — RESULTS FOLLOW-UP (OUTPATIENT)
Dept: NEUROLOGY | Age: 60
End: 2025-03-09

## 2025-03-10 NOTE — RESULT ENCOUNTER NOTE
The patient's creatinine kinase levels have been elevated previously 4 months ago.  She has an appointment coming up in 4/25.  Can you please request her to check her creatinine kinase/CK levels prior to coming to the appointment.  If the patient agrees can you please send in the lab request.  Thank you

## 2025-03-28 ENCOUNTER — TELEPHONE (OUTPATIENT)
Dept: PAIN MANAGEMENT | Age: 60
End: 2025-03-28

## 2025-03-28 DIAGNOSIS — M79.2 NEUROPATHIC PAIN: ICD-10-CM

## 2025-03-28 DIAGNOSIS — M79.7 FIBROMYALGIA: ICD-10-CM

## 2025-03-28 NOTE — TELEPHONE ENCOUNTER
Patient contacted the office stating her apt was rescheduled with Linda Wolf on 04/14/25. Patient voiced the apt change is fine with her, she just would need provider to send refill of Lyrica prior to OV on 04/14/25. Please advise. Thank you.

## 2025-04-01 RX ORDER — PREGABALIN 225 MG/1
225 CAPSULE ORAL 2 TIMES DAILY
Qty: 28 CAPSULE | Refills: 0 | Status: SHIPPED | OUTPATIENT
Start: 2025-04-01 | End: 2025-06-30

## 2025-04-14 ENCOUNTER — CLINICAL SUPPORT (OUTPATIENT)
Dept: FAMILY MEDICINE CLINIC | Age: 60
End: 2025-04-14

## 2025-04-14 ENCOUNTER — OFFICE VISIT (OUTPATIENT)
Dept: PAIN MANAGEMENT | Age: 60
End: 2025-04-14
Payer: COMMERCIAL

## 2025-04-14 VITALS
BODY MASS INDEX: 50.79 KG/M2 | WEIGHT: 269 LBS | HEIGHT: 61 IN | SYSTOLIC BLOOD PRESSURE: 140 MMHG | DIASTOLIC BLOOD PRESSURE: 92 MMHG

## 2025-04-14 VITALS
DIASTOLIC BLOOD PRESSURE: 80 MMHG | HEART RATE: 68 BPM | OXYGEN SATURATION: 96 % | SYSTOLIC BLOOD PRESSURE: 118 MMHG | WEIGHT: 269.6 LBS | BODY MASS INDEX: 50.9 KG/M2 | HEIGHT: 61 IN

## 2025-04-14 DIAGNOSIS — M79.7 FIBROMYALGIA: ICD-10-CM

## 2025-04-14 DIAGNOSIS — M79.18 MYOFASCIAL PAIN SYNDROME: ICD-10-CM

## 2025-04-14 DIAGNOSIS — M79.2 NEUROPATHIC PAIN: Primary | ICD-10-CM

## 2025-04-14 DIAGNOSIS — E66.01 MORBID OBESITY WITH BMI OF 45.0-49.9, ADULT: ICD-10-CM

## 2025-04-14 DIAGNOSIS — E88.810 METABOLIC SYNDROME: Primary | ICD-10-CM

## 2025-04-14 DIAGNOSIS — M54.16 LUMBAR RADICULOPATHY: ICD-10-CM

## 2025-04-14 PROCEDURE — 3080F DIAST BP >= 90 MM HG: CPT | Performed by: NURSE PRACTITIONER

## 2025-04-14 PROCEDURE — 3077F SYST BP >= 140 MM HG: CPT | Performed by: NURSE PRACTITIONER

## 2025-04-14 PROCEDURE — 99213 OFFICE O/P EST LOW 20 MIN: CPT | Performed by: NURSE PRACTITIONER

## 2025-04-14 RX ORDER — PREGABALIN 225 MG/1
225 CAPSULE ORAL 2 TIMES DAILY
Qty: 180 CAPSULE | Refills: 0 | Status: SHIPPED | OUTPATIENT
Start: 2025-04-14 | End: 2025-07-13

## 2025-04-14 NOTE — PROGRESS NOTES
Chronic Pain Clinic Note     Encounter Date: 4/14/2025     SUBJECTIVE:  Chief Complaint   Patient presents with    3 Month Follow-Up     History of Present Illness:   Loretta Puri is a 60 y.o. female who presents for 3 month medication check with Fibromyalgia pain sydrome. Lyrica is helping and is in need of a refill today. States she is doing well.     Medication Refill: Lyrica     Current Complaints of Pain:   Location: Legs, low back & hips mostly  Radiation: occasional BLE  Severity: mild- moderate  Pain Numerical Score - 2  Average: 3   Highest: 6  Lowest: 2  Character/Quality: Complains of pain that is achy. Sometimes burning pain in left sciatic  Timing: Increases w/prolonged sitting/standing/walking  Associated symptoms: none  Numbness: LLE occasional  Weakness:   Exacerbating factors: prolonged activity  Alleviating factors: heat, medication  Length of time pain has been present: Started on - years ago  Inciting event/injury: none  Bowel/Bladder incontinence: none  Falls: no  Physical Therapy: yes, multiple times    History of Interventions:   Surgery: No previous lumbar/cervical surgeries  Injections: Lumbar epidurals    Imaging:    Lumbar MRI 8/16/2024    Past Medical History:   Diagnosis Date    Allergic rhinitis     Arthritis     COVID-19 01/21/2021    not hospitalized : h/a, loss smell, tired abd ache,  no meds    Enlarged thyroid 2016    nodules check annually    Fibromyalgia     GERD (gastroesophageal reflux disease)     Headache(784.0)     migranes at times    Hyperlipidemia     Hypertension     Hypertension     NEIL (obstructive sleep apnea) 07/08/2016    Osteoarthritis 2020    Plantar fasciitis 2020    Restless leg syndrome     Restless legs 07/08/2016    Sciatica 2022?    Sleep apnea     Tendinitis 2019       Past Surgical History:   Procedure Laterality Date    BLADDER SURGERY  09/09/2014    bladder sling    CARPAL TUNNEL RELEASE  2022    CHOLECYSTECTOMY      COLONOSCOPY  09/15/2016

## 2025-04-14 NOTE — PATIENT INSTRUCTIONS
THANK YOU FOR TRUSTING US WITH YOUR HEALTHCARE !!    The associates caring for you today were:    Family Practice Provider: Gracie DEL VALLE-JOSIANE    Clinical Team Nurse: Lori Staples LPN    Clinical Team Medical Assistant: Erin Menendez MA    Our goal is to provide you with EXCEPTIONAL patient care, and we strive to do this for EVERY patient, EVERY visit. Since we care about you and your experience, you may receive a patient satisfaction survey from [facility] by postal mail/email/text. We truly welcome your feedback and ask that you complete the survey to let us know how we are doing.    Important Numbers:  [facility] phone [phone]   [place] Office Nurse/MA Line: [phone]  Fax  [phone]   Central [phone]  Billing questions: [phone]  Medical Records Request: [phone]    Manage your healthcare  with [facility] My[facility]. To activate your account, visit [email or web address]!   DIGITAL scheduling available now through my chart.  Schedule your next appointment at your convenience through your my chart.       [place] Office Hours:  Monday: [place] office location [phone] ([phone]) Offering additional late hours the first Monday of the month until 7 pm.   Tuesday: : :  812 Noon, closed  of the month   Fridays: 7:30-4:30

## 2025-04-14 NOTE — PATIENT INSTRUCTIONS
SURVEY:    You may be receiving a survey from [facility] regarding your visit today.    Please complete the survey to enable us to provide the highest quality of care to you and your family.    If you cannot score us a very good on any question, please call the office to discuss how we could have made your experience a very good one.    Thank you.    [facility]  MD Maninder Ashton, DO Linda Wolf, CNP  Irineo Allen, MD Darcie Kemp, APRN-CNP  Terra Sanchez, Practice Manager  Marguerite, RAVINDRA Sher, CCMBROWN Aldrich, CMA  Alejandrina, CMA  Jose, PSC   Paulette, LPN  Malinda, CMA

## 2025-04-15 NOTE — PROGRESS NOTES
Inform pt metformin increase to 1000 mg daily in am , can be taken   Make sure not to skip meals good water intake.       Gracie Maier APRN CNP

## 2025-04-17 DIAGNOSIS — G25.81 RESTLESS LEG: ICD-10-CM

## 2025-04-17 DIAGNOSIS — K21.9 GASTROESOPHAGEAL REFLUX DISEASE WITHOUT ESOPHAGITIS: ICD-10-CM

## 2025-04-21 RX ORDER — ROPINIROLE 1 MG/1
TABLET, FILM COATED ORAL
Qty: 180 TABLET | Refills: 1 | Status: SHIPPED | OUTPATIENT
Start: 2025-04-21

## 2025-04-21 RX ORDER — OMEPRAZOLE 20 MG/1
CAPSULE, DELAYED RELEASE ORAL
Qty: 90 CAPSULE | Refills: 1 | Status: SHIPPED | OUTPATIENT
Start: 2025-04-21

## 2025-04-25 DIAGNOSIS — N13.2 URETERAL STONE WITH HYDRONEPHROSIS: Primary | ICD-10-CM

## 2025-04-25 DIAGNOSIS — N20.0 KIDNEY STONES: ICD-10-CM

## 2025-04-30 ENCOUNTER — HOSPITAL ENCOUNTER (OUTPATIENT)
Dept: GENERAL RADIOLOGY | Age: 60
Discharge: HOME | End: 2025-05-02
Payer: COMMERCIAL

## 2025-04-30 ENCOUNTER — HOSPITAL ENCOUNTER (OUTPATIENT)
Age: 60
Discharge: HOME | End: 2025-05-02
Payer: COMMERCIAL

## 2025-04-30 DIAGNOSIS — N20.0 KIDNEY STONES: ICD-10-CM

## 2025-04-30 DIAGNOSIS — N13.2 URETERAL STONE WITH HYDRONEPHROSIS: ICD-10-CM

## 2025-04-30 PROCEDURE — 74018 RADEX ABDOMEN 1 VIEW: CPT

## 2025-05-01 ENCOUNTER — OFFICE VISIT (OUTPATIENT)
Dept: UROLOGY | Age: 60
End: 2025-05-01
Payer: COMMERCIAL

## 2025-05-01 ENCOUNTER — HOSPITAL ENCOUNTER (OUTPATIENT)
Age: 60
Discharge: HOME | End: 2025-05-01
Payer: COMMERCIAL

## 2025-05-01 ENCOUNTER — RESULTS FOLLOW-UP (OUTPATIENT)
Dept: UROLOGY | Age: 60
End: 2025-05-01

## 2025-05-01 VITALS
WEIGHT: 269 LBS | DIASTOLIC BLOOD PRESSURE: 73 MMHG | HEART RATE: 59 BPM | OXYGEN SATURATION: 97 % | HEIGHT: 61 IN | SYSTOLIC BLOOD PRESSURE: 117 MMHG | BODY MASS INDEX: 50.79 KG/M2

## 2025-05-01 DIAGNOSIS — N20.0 KIDNEY STONES: Primary | ICD-10-CM

## 2025-05-01 DIAGNOSIS — Z51.81 MEDICATION MONITORING ENCOUNTER: ICD-10-CM

## 2025-05-01 LAB
ANION GAP SERPL CALCULATED.3IONS-SCNC: 11 MMOL/L (ref 9–17)
BUN SERPL-MCNC: 17 MG/DL (ref 8–23)
CALCIUM SERPL-MCNC: 9.3 MG/DL (ref 8.6–10.4)
CHLORIDE SERPL-SCNC: 106 MMOL/L (ref 98–107)
CO2 SERPL-SCNC: 23 MMOL/L (ref 20–31)
CREAT SERPL-MCNC: 0.8 MG/DL (ref 0.5–0.9)
GFR, ESTIMATED: 84 ML/MIN/1.73M2
GLUCOSE SERPL-MCNC: 87 MG/DL (ref 70–99)
POTASSIUM SERPL-SCNC: 4.1 MMOL/L (ref 3.7–5.3)
SODIUM SERPL-SCNC: 140 MMOL/L (ref 135–144)

## 2025-05-01 PROCEDURE — 80048 BASIC METABOLIC PNL TOTAL CA: CPT

## 2025-05-01 PROCEDURE — 3078F DIAST BP <80 MM HG: CPT | Performed by: PHYSICIAN ASSISTANT

## 2025-05-01 PROCEDURE — 3074F SYST BP LT 130 MM HG: CPT | Performed by: PHYSICIAN ASSISTANT

## 2025-05-01 PROCEDURE — 99214 OFFICE O/P EST MOD 30 MIN: CPT | Performed by: PHYSICIAN ASSISTANT

## 2025-05-01 NOTE — PROGRESS NOTES
Sciatica 2022?    Sleep apnea     Tendinitis 2019     Past Surgical History:   Procedure Laterality Date    BLADDER SURGERY  09/09/2014    bladder sling    CARPAL TUNNEL RELEASE  2022    CHOLECYSTECTOMY      COLONOSCOPY  09/15/2016    Dr. Beaulieu internal hemorrhoids normal due in 10 years = 2026    CYSTOSCOPY Right 08/10/2018    Dr Lopez- stent placement    CYSTOSCOPY Right 03/03/2023    CYSTOSCOPY URETERAL STENT INSERTION performed by Kenny Lopez MD at [facility] OR    CYSTOSCOPY Right 03/23/2023    CYSTOSCOPY URETEROSCOPY LASER-HLL,  POSSIBLE STENT EXCHANGE performed by Kenny Lopez MD at [facility] OR    CYSTOSCOPY W/ URETERAL STENT PLACEMENT Right 03/03/2023    Dr Lopez    HYSTERECTOMY (CERVIX STATUS UNKNOWN)      Total    KNEE SURGERY      LITHOTRIPSY Right 08/21/2018    stent placement    OTHER SURGICAL HISTORY  03/21/2014    [facility] (Dr. Kang)    AL CYSTO/URETERO W/LITHOTRIPSY &INDWELL STENT INSRT Right 08/21/2018    CYSTOSCOPY URETEROSCOPY LASER-HLL performed by Kenny Lopez MD at [facility] OR    AL CYSTO/URETERO W/LITHOTRIPSY &INDWELL STENT INSRT Right 08/21/2018    CYSTOSCOPY STENT INSERTION-EXCHANGE performed by Kenny Lopez MD at [facility] OR    AL OFFICE/OUTPT VISIT,PROCEDURE ONLY Right 08/10/2018    CYSTOSCOPY STENT INSERTION performed by Kenny Lopez MD at [facility] OR    TUBAL LIGATION       Outpatient Encounter Medications as of 5/1/2025   Medication Sig Dispense Refill    rOPINIRole (REQUIP) 1 MG tablet TAKE 1 TABLET TWICE A DAY  (TAKE AT SUPPER AND BEDTIMEDAILY) 180 tablet 1    omeprazole (PRILOSEC) 20 MG delayed release capsule TAKE 1 CAPSULE DAILY IN THEMORNING ON AN EMPTY STOMACH 90 capsule 1    metFORMIN (GLUCOPHAGE) 1000 MG tablet Take 1 tablet by mouth daily (with breakfast) For metabolic syndrome , BMI 50.8 30 tablet 1    pregabalin (LYRICA) 225 MG capsule Take 1 capsule by mouth in the morning and 1 capsule in the evening. Do all this for 90 days. L4 radiculopathy. Max Daily

## 2025-05-11 DIAGNOSIS — M17.11 PRIMARY OSTEOARTHRITIS OF RIGHT KNEE: ICD-10-CM

## 2025-05-12 RX ORDER — NAPROXEN 375 MG/1
TABLET ORAL
Qty: 180 TABLET | Refills: 1 | Status: SHIPPED | OUTPATIENT
Start: 2025-05-12

## 2025-05-30 ENCOUNTER — OFFICE VISIT (OUTPATIENT)
Dept: PRIMARY CARE CLINIC | Age: 60
End: 2025-05-30
Payer: COMMERCIAL

## 2025-05-30 VITALS
BODY MASS INDEX: 50.6 KG/M2 | HEIGHT: 61 IN | WEIGHT: 268 LBS | OXYGEN SATURATION: 98 % | DIASTOLIC BLOOD PRESSURE: 76 MMHG | HEART RATE: 68 BPM | SYSTOLIC BLOOD PRESSURE: 122 MMHG

## 2025-05-30 DIAGNOSIS — E78.2 MIXED HYPERLIPIDEMIA: ICD-10-CM

## 2025-05-30 DIAGNOSIS — F32.A ANXIETY AND DEPRESSION: ICD-10-CM

## 2025-05-30 DIAGNOSIS — Z12.31 BREAST CANCER SCREENING BY MAMMOGRAM: ICD-10-CM

## 2025-05-30 DIAGNOSIS — E66.01 MORBID OBESITY WITH BMI OF 50.0-59.9, ADULT (HCC): ICD-10-CM

## 2025-05-30 DIAGNOSIS — I10 PRIMARY HYPERTENSION: Primary | ICD-10-CM

## 2025-05-30 DIAGNOSIS — K21.9 GASTROESOPHAGEAL REFLUX DISEASE WITHOUT ESOPHAGITIS: ICD-10-CM

## 2025-05-30 DIAGNOSIS — G25.81 RESTLESS LEG: ICD-10-CM

## 2025-05-30 DIAGNOSIS — F41.9 ANXIETY AND DEPRESSION: ICD-10-CM

## 2025-05-30 DIAGNOSIS — R74.8 ELEVATED CK: ICD-10-CM

## 2025-05-30 DIAGNOSIS — E28.39 ESTROGEN DEFICIENCY: ICD-10-CM

## 2025-05-30 DIAGNOSIS — Z13.820 OSTEOPOROSIS SCREENING: ICD-10-CM

## 2025-05-30 DIAGNOSIS — Z71.85 VACCINE COUNSELING: ICD-10-CM

## 2025-05-30 PROCEDURE — 3078F DIAST BP <80 MM HG: CPT | Performed by: NURSE PRACTITIONER

## 2025-05-30 PROCEDURE — 99213 OFFICE O/P EST LOW 20 MIN: CPT | Performed by: NURSE PRACTITIONER

## 2025-05-30 PROCEDURE — 3074F SYST BP LT 130 MM HG: CPT | Performed by: NURSE PRACTITIONER

## 2025-05-30 NOTE — PROGRESS NOTES
[facility] [place]  [address] [address]  Dept: [phone]     Subjective:  Loretta Puri is a 60 y.o. female presenting today for routine follow up of hypertension, hyperlipidemia, and impaired fasting glucose and recent labs.  She denies chest pain, shortness of breath or palpitations.  She denies headaches, vision changes and lightheadedness. She denies myalgias.  She denies numbness and tingling in the hands and feet. She has been compliant with diet and exercise. She has been compliant with her medications.  She is tolerating medications.      Hypertension: well controlled with current medications   Medications: continue current medication doses   Controlled with lopressor, spironolactone , cardura     Hyperlipidemia: well controlled with current medications   Medications: continue current medication doses   Recommended low cholesterol diet   On zetia     Impaired fasting glucose:  stable with normal A1c  Recommend low carb diet    Nutrition Status:  well developed, well nourished   Recommend continue current healthy lifestyle with diet and regular exercise     Left upper tooth cracked working with dentist     Vaccine counseling Tdap and prevnar 20 recommended       Skin lesion hardened round firm not bleeding but elevated papule catching on bra strap- will check for removal approx 5 mm size     Neurology eval and in pain management for chronic low back and leg pain on requip and lyrica.     Due to chronic elevation in CK pt on zetia and not on statin       CURRENT ALLERGIES: Iv contrast [iodides], Iodinated contrast media, Lisinopril, Simvastatin, and Hydrocodone    SOCIAL HISTORY:   Social History     Tobacco Use    Smoking status: Never     Passive exposure: Never    Smokeless tobacco: Never   Vaping Use    Vaping status: Never Used   Substance Use Topics    Alcohol use: No     Comment: rarely    Drug use: No

## 2025-06-03 ASSESSMENT — ENCOUNTER SYMPTOMS: BACK PAIN: 1

## 2025-06-16 RX ORDER — DULOXETIN HYDROCHLORIDE 30 MG/1
CAPSULE, DELAYED RELEASE ORAL
Qty: 90 CAPSULE | Refills: 1 | Status: SHIPPED | OUTPATIENT
Start: 2025-06-16

## 2025-06-16 NOTE — TELEPHONE ENCOUNTER
Last OV: 4/14/2025  Last RX: 11/25/2024   Next scheduled apt: 9/2/2025    Surescripts requesting refill

## 2025-07-14 ENCOUNTER — OFFICE VISIT (OUTPATIENT)
Dept: PAIN MANAGEMENT | Age: 60
End: 2025-07-14
Payer: COMMERCIAL

## 2025-07-14 VITALS — BODY MASS INDEX: 50.64 KG/M2 | DIASTOLIC BLOOD PRESSURE: 68 MMHG | SYSTOLIC BLOOD PRESSURE: 112 MMHG | WEIGHT: 268 LBS

## 2025-07-14 DIAGNOSIS — M79.18 MYOFASCIAL PAIN SYNDROME: ICD-10-CM

## 2025-07-14 DIAGNOSIS — M54.16 LUMBAR RADICULOPATHY: Primary | ICD-10-CM

## 2025-07-14 DIAGNOSIS — M79.7 FIBROMYALGIA: ICD-10-CM

## 2025-07-14 DIAGNOSIS — M79.2 NEUROPATHIC PAIN: ICD-10-CM

## 2025-07-14 DIAGNOSIS — E66.01 OBESITY, MORBID, BMI 50 OR HIGHER (HCC): ICD-10-CM

## 2025-07-14 PROCEDURE — 99214 OFFICE O/P EST MOD 30 MIN: CPT | Performed by: NURSE PRACTITIONER

## 2025-07-14 PROCEDURE — 3078F DIAST BP <80 MM HG: CPT | Performed by: NURSE PRACTITIONER

## 2025-07-14 PROCEDURE — 3074F SYST BP LT 130 MM HG: CPT | Performed by: NURSE PRACTITIONER

## 2025-07-14 RX ORDER — PREGABALIN 225 MG/1
225 CAPSULE ORAL 2 TIMES DAILY
Qty: 180 CAPSULE | Refills: 0 | Status: SHIPPED | OUTPATIENT
Start: 2025-07-14 | End: 2025-10-12

## 2025-07-14 NOTE — PROGRESS NOTES
Chronic Pain Clinic Note     Encounter Date: 7/14/2025     SUBJECTIVE:  Chief Complaint   Patient presents with    3 Month Follow-Up    Medication Refill     History of Present Illness:   Loretta Puri is a 60 y.o. female who presents for 3 month medication check with Fibromyalgia pain sydrome. Lyrica continues to help and is in need of a refill today. Patient states her right knee is \"flared up\" below kneecap and medially.   She would like discuss options for this.    Previously received gel injections to right knee from orthopedics that would provide relief for approximately 1 year or greater. She is considering this option as well.     Medication Refill: Lyrica     Current Complaints of Pain:   Location: Legs, low back, hips, right knee   Radiation: occasional BLE  Severity: mild- moderate  Pain Numerical Score - 6 (right knee) 4 (other areas)  Average: 3   Highest: 6  Lowest: 2  Character/Quality: Complains of pain that is achy. Sometimes burning pain in left sciatic  Timing: Increases w/prolonged sitting/standing/walking  Associated symptoms: none  Numbness: LLE occasional  Weakness:   Exacerbating factors: prolonged activity  Alleviating factors: heat, medication  Length of time pain has been present: Started on - years ago  Inciting event/injury: none  Bowel/Bladder incontinence: none  Falls: no  Physical Therapy: yes, multiple times    History of Interventions:   Surgery: No previous lumbar/cervical surgeries  Injections: Lumbar epidurals    Imaging:    Lumbar MRI 8/16/2024    Past Medical History:   Diagnosis Date    Allergic rhinitis     Arthritis     COVID-19 01/21/2021    not hospitalized : h/a, loss smell, tired abd ache,  no meds    Enlarged thyroid 2016    nodules check annually    Fibromyalgia     GERD (gastroesophageal reflux disease)     Headache(784.0)     migranes at times    Hyperlipidemia     Hypertension     Hypertension     NEIL (obstructive sleep apnea) 07/08/2016    Osteoarthritis 2020

## 2025-07-14 NOTE — PATIENT INSTRUCTIONS
SURVEY:    You may be receiving a survey from [facility] regarding your visit today.    Please complete the survey to enable us to provide the highest quality of care to you and your family.    If you cannot score us a very good on any question, please call the office to discuss how we could have made your experience a very good one.    Thank you.    [facility]  MD Maninder Ashton, DO Linda Wolf, CNP  Irineo Allen, MD Darcie Kemp, APRN-CNP  Terra Sanchez, Practice Manager  Marguerite, RAVINDRA Sher, CCMBROWN Aldrich, CMA  Alejandrina, CMA  Jose, PSC   Paulette, LPN  Malinda, CMA

## 2025-08-05 DIAGNOSIS — I10 ESSENTIAL HYPERTENSION: ICD-10-CM

## 2025-08-06 RX ORDER — SPIRONOLACTONE 25 MG/1
25 TABLET ORAL DAILY
Qty: 90 TABLET | Refills: 1 | Status: SHIPPED | OUTPATIENT
Start: 2025-08-06

## 2025-08-06 RX ORDER — MONTELUKAST SODIUM 10 MG/1
10 TABLET ORAL NIGHTLY
Qty: 90 TABLET | Refills: 3 | OUTPATIENT
Start: 2025-08-06

## 2025-08-06 RX ORDER — METOPROLOL TARTRATE 50 MG
50 TABLET ORAL 2 TIMES DAILY
Qty: 180 TABLET | Refills: 1 | Status: SHIPPED | OUTPATIENT
Start: 2025-08-06

## 2025-08-06 RX ORDER — DOXAZOSIN 1 MG/1
1 TABLET ORAL NIGHTLY
Qty: 90 TABLET | Refills: 3 | Status: SHIPPED | OUTPATIENT
Start: 2025-08-06

## 2025-08-15 RX ORDER — ZAFIRLUKAST 20 MG/1
20 TABLET, FILM COATED ORAL 2 TIMES DAILY
Qty: 180 TABLET | Refills: 1 | Status: SHIPPED | OUTPATIENT
Start: 2025-08-15 | End: 2026-08-15

## 2025-08-15 RX ORDER — MONTELUKAST SODIUM 10 MG/1
10 TABLET ORAL NIGHTLY
Qty: 90 TABLET | Refills: 3 | OUTPATIENT
Start: 2025-08-15

## (undated) DEVICE — SPONGE GZ W4XL4IN CELOS POSTOP AVANT

## (undated) DEVICE — Device

## (undated) DEVICE — SINGLE ACTION PUMPING SYSTEM

## (undated) DEVICE — GUIDEWIRE VASC STR 3 CM 0.035 INX150 CM STD NIT ZIPWIRE

## (undated) DEVICE — NITINOL STONE RETRIEVAL BASKET: Brand: ESCAPE

## (undated) DEVICE — Z INACTIVE USE 2660664 SOLUTION IRRIG 3000ML 0.9% SOD CHL USP UROMATIC PLAS CONT

## (undated) DEVICE — GLOVE ORANGE PI 7 1/2   MSG9075

## (undated) DEVICE — GUIDEWIRE ENDO L150CM DIA0.035IN STR TIP

## (undated) DEVICE — SOLUTION SURG PREP ANTIMICROBIAL 4 OZ SKIN WND EXIDINE

## (undated) DEVICE — SOLUTION IV IRRIG WATER 1000ML POUR BRL 2F7114

## (undated) DEVICE — SYRINGE MED 20ML STD CLR PLAS LUERLOCK TIP N CTRL DISP

## (undated) DEVICE — ADAPTER URO SCP UROLOK LL

## (undated) DEVICE — GOWN,AURORA,NON-REINFORCED,2XL: Brand: MEDLINE

## (undated) DEVICE — URETEROSCOPE FLX DIGITAL 3.1X650 MM 1.2 MM AXIS DISP

## (undated) DEVICE — Z DISCONTINUED BY MEDLINE USE 2711682 TRAY SKIN PREP DRY W/ PREM GLV

## (undated) DEVICE — SOLUTION IRRIG 3000ML 0.9% SOD CHL USP UROMATIC PLAS CONT

## (undated) DEVICE — FIBER LASER EXCALIBUR HOLM